# Patient Record
Sex: FEMALE | Race: WHITE | Employment: OTHER
[De-identification: names, ages, dates, MRNs, and addresses within clinical notes are randomized per-mention and may not be internally consistent; named-entity substitution may affect disease eponyms.]

---

## 2017-05-27 PROBLEM — R59.0 HILAR LYMPHADENOPATHY: Status: ACTIVE | Noted: 2017-05-27

## 2017-05-27 PROBLEM — R09.02 HYPOXIA: Status: ACTIVE | Noted: 2017-05-27

## 2017-05-27 PROBLEM — Z72.0 TOBACCO ABUSE: Status: ACTIVE | Noted: 2017-05-27

## 2017-05-27 PROBLEM — R91.1 LUNG NODULE: Status: ACTIVE | Noted: 2017-05-27

## 2017-06-09 ENCOUNTER — TELEPHONE (OUTPATIENT)
Dept: CASE MANAGEMENT | Age: 67
End: 2017-06-09

## 2017-07-14 ENCOUNTER — TELEPHONE (OUTPATIENT)
Dept: CASE MANAGEMENT | Age: 67
End: 2017-07-14

## 2018-03-30 PROBLEM — R59.0 HILAR LYMPHADENOPATHY: Status: RESOLVED | Noted: 2017-05-27 | Resolved: 2018-03-30

## 2018-03-30 PROBLEM — Z99.81 HYPOXEMIA REQUIRING SUPPLEMENTAL OXYGEN: Status: ACTIVE | Noted: 2018-03-30

## 2018-03-30 PROBLEM — Z72.0 TOBACCO ABUSE: Status: RESOLVED | Noted: 2017-05-27 | Resolved: 2018-03-30

## 2018-03-30 PROBLEM — C34.11 MALIGNANT NEOPLASM OF UPPER LOBE OF RIGHT LUNG (HCC): Status: ACTIVE | Noted: 2018-03-30

## 2018-03-30 PROBLEM — R09.02 HYPOXEMIA REQUIRING SUPPLEMENTAL OXYGEN: Status: ACTIVE | Noted: 2018-03-30

## 2018-03-30 PROBLEM — J44.9 COPD, SEVERE (HCC): Status: ACTIVE | Noted: 2018-03-30

## 2018-08-19 PROBLEM — Z92.3 STATUS POST RADIATION THERAPY: Status: ACTIVE | Noted: 2018-08-19

## 2018-08-19 PROBLEM — C34.31 MALIGNANT NEOPLASM OF LOWER LOBE OF RIGHT LUNG (HCC): Status: ACTIVE | Noted: 2018-08-19

## 2018-12-04 PROBLEM — R09.1: Status: ACTIVE | Noted: 2018-12-04

## 2020-01-15 ENCOUNTER — HOSPITAL ENCOUNTER (OUTPATIENT)
Dept: GENERAL RADIOLOGY | Age: 70
Discharge: HOME | End: 2020-01-17
Payer: MEDICARE

## 2020-01-15 PROCEDURE — 77080 DXA BONE DENSITY AXIAL: CPT

## 2021-05-23 PROBLEM — R91.1 PULMONARY NODULE, LEFT: Status: ACTIVE | Noted: 2021-05-23

## 2021-07-01 ENCOUNTER — HOSPITAL ENCOUNTER (OUTPATIENT)
Dept: CARDIAC REHAB | Age: 71
Setting detail: THERAPIES SERIES
Discharge: HOME | End: 2021-07-01
Payer: MEDICARE

## 2021-07-01 VITALS
BODY MASS INDEX: 26.15 KG/M2 | RESPIRATION RATE: 18 BRPM | OXYGEN SATURATION: 99 % | DIASTOLIC BLOOD PRESSURE: 62 MMHG | HEIGHT: 61 IN | HEART RATE: 71 BPM | WEIGHT: 138.5 LBS | SYSTOLIC BLOOD PRESSURE: 120 MMHG

## 2021-07-01 ASSESSMENT — PATIENT HEALTH QUESTIONNAIRE - PHQ9
SUM OF ALL RESPONSES TO PHQ QUESTIONS 1-9: 12
SUM OF ALL RESPONSES TO PHQ QUESTIONS 1-9: 12

## 2021-07-06 ENCOUNTER — HOSPITAL ENCOUNTER (OUTPATIENT)
Dept: CARDIAC REHAB | Age: 71
Setting detail: THERAPIES SERIES
Discharge: HOME | End: 2021-07-06
Payer: MEDICARE

## 2021-07-06 PROCEDURE — G0424 PULMONARY REHAB W EXER: HCPCS

## 2021-07-08 ENCOUNTER — HOSPITAL ENCOUNTER (OUTPATIENT)
Dept: CARDIAC REHAB | Age: 71
Setting detail: THERAPIES SERIES
Discharge: HOME | End: 2021-07-08
Payer: MEDICARE

## 2021-07-08 PROCEDURE — G0424 PULMONARY REHAB W EXER: HCPCS

## 2021-07-13 ENCOUNTER — HOSPITAL ENCOUNTER (OUTPATIENT)
Dept: CARDIAC REHAB | Age: 71
Setting detail: THERAPIES SERIES
Discharge: HOME | End: 2021-07-13
Payer: MEDICARE

## 2021-07-13 PROCEDURE — G0424 PULMONARY REHAB W EXER: HCPCS

## 2021-07-15 ENCOUNTER — HOSPITAL ENCOUNTER (OUTPATIENT)
Dept: CARDIAC REHAB | Age: 71
Setting detail: THERAPIES SERIES
Discharge: HOME | End: 2021-07-15
Payer: MEDICARE

## 2021-07-15 PROCEDURE — G0424 PULMONARY REHAB W EXER: HCPCS

## 2021-07-20 ENCOUNTER — HOSPITAL ENCOUNTER (OUTPATIENT)
Dept: CARDIAC REHAB | Age: 71
Setting detail: THERAPIES SERIES
Discharge: HOME | End: 2021-07-20
Payer: MEDICARE

## 2021-07-20 PROCEDURE — G0424 PULMONARY REHAB W EXER: HCPCS

## 2021-07-22 ENCOUNTER — HOSPITAL ENCOUNTER (OUTPATIENT)
Dept: CARDIAC REHAB | Age: 71
Setting detail: THERAPIES SERIES
Discharge: HOME | End: 2021-07-22
Payer: MEDICARE

## 2021-07-22 PROCEDURE — G0424 PULMONARY REHAB W EXER: HCPCS

## 2021-07-27 ENCOUNTER — HOSPITAL ENCOUNTER (OUTPATIENT)
Dept: CARDIAC REHAB | Age: 71
Setting detail: THERAPIES SERIES
Discharge: HOME | End: 2021-07-27
Payer: MEDICARE

## 2021-07-27 PROCEDURE — G0424 PULMONARY REHAB W EXER: HCPCS

## 2021-07-29 ENCOUNTER — HOSPITAL ENCOUNTER (OUTPATIENT)
Dept: CARDIAC REHAB | Age: 71
Setting detail: THERAPIES SERIES
Discharge: HOME | End: 2021-07-29
Payer: MEDICARE

## 2021-07-29 PROCEDURE — G0424 PULMONARY REHAB W EXER: HCPCS

## 2021-07-29 ASSESSMENT — PATIENT HEALTH QUESTIONNAIRE - PHQ9
SUM OF ALL RESPONSES TO PHQ QUESTIONS 1-9: 7
SUM OF ALL RESPONSES TO PHQ QUESTIONS 1-9: 7

## 2021-08-03 ENCOUNTER — HOSPITAL ENCOUNTER (OUTPATIENT)
Dept: CARDIAC REHAB | Age: 71
Setting detail: THERAPIES SERIES
Discharge: HOME | End: 2021-08-03
Payer: MEDICARE

## 2021-08-03 PROCEDURE — G0424 PULMONARY REHAB W EXER: HCPCS

## 2021-08-05 ENCOUNTER — HOSPITAL ENCOUNTER (OUTPATIENT)
Dept: CARDIAC REHAB | Age: 71
Setting detail: THERAPIES SERIES
Discharge: HOME | End: 2021-08-05
Payer: MEDICARE

## 2021-08-05 PROCEDURE — G0424 PULMONARY REHAB W EXER: HCPCS

## 2021-08-26 ENCOUNTER — HOSPITAL ENCOUNTER (OUTPATIENT)
Dept: CARDIAC REHAB | Age: 71
Setting detail: THERAPIES SERIES
Discharge: HOME | End: 2021-08-26
Payer: MEDICARE

## 2021-08-26 PROCEDURE — G0424 PULMONARY REHAB W EXER: HCPCS

## 2021-08-31 ENCOUNTER — HOSPITAL ENCOUNTER (OUTPATIENT)
Dept: CARDIAC REHAB | Age: 71
Setting detail: THERAPIES SERIES
Discharge: HOME | End: 2021-08-31
Payer: MEDICARE

## 2021-08-31 PROCEDURE — G0424 PULMONARY REHAB W EXER: HCPCS

## 2021-09-02 ENCOUNTER — HOSPITAL ENCOUNTER (OUTPATIENT)
Dept: CARDIAC REHAB | Age: 71
Setting detail: THERAPIES SERIES
Discharge: HOME | End: 2021-09-02
Payer: MEDICARE

## 2021-09-02 PROCEDURE — G0424 PULMONARY REHAB W EXER: HCPCS

## 2021-09-07 ENCOUNTER — HOSPITAL ENCOUNTER (OUTPATIENT)
Dept: CARDIAC REHAB | Age: 71
Setting detail: THERAPIES SERIES
Discharge: HOME | End: 2021-09-07
Payer: MEDICARE

## 2021-09-07 PROCEDURE — G0424 PULMONARY REHAB W EXER: HCPCS

## 2021-09-09 ENCOUNTER — HOSPITAL ENCOUNTER (OUTPATIENT)
Dept: CARDIAC REHAB | Age: 71
Setting detail: THERAPIES SERIES
Discharge: HOME | End: 2021-09-09
Payer: MEDICARE

## 2021-09-09 PROCEDURE — G0424 PULMONARY REHAB W EXER: HCPCS

## 2021-09-14 ENCOUNTER — HOSPITAL ENCOUNTER (OUTPATIENT)
Dept: CARDIAC REHAB | Age: 71
Setting detail: THERAPIES SERIES
Discharge: HOME | End: 2021-09-14
Payer: MEDICARE

## 2021-09-14 PROCEDURE — G0424 PULMONARY REHAB W EXER: HCPCS

## 2021-09-16 ENCOUNTER — HOSPITAL ENCOUNTER (OUTPATIENT)
Dept: CARDIAC REHAB | Age: 71
Setting detail: THERAPIES SERIES
Discharge: HOME | End: 2021-09-16
Payer: MEDICARE

## 2021-09-16 PROCEDURE — G0424 PULMONARY REHAB W EXER: HCPCS

## 2021-09-21 ENCOUNTER — HOSPITAL ENCOUNTER (OUTPATIENT)
Dept: CARDIAC REHAB | Age: 71
Setting detail: THERAPIES SERIES
Discharge: HOME | End: 2021-09-21
Payer: MEDICARE

## 2021-09-21 PROCEDURE — G0424 PULMONARY REHAB W EXER: HCPCS

## 2021-09-23 ENCOUNTER — HOSPITAL ENCOUNTER (OUTPATIENT)
Dept: CARDIAC REHAB | Age: 71
Setting detail: THERAPIES SERIES
Discharge: HOME | End: 2021-09-23
Payer: MEDICARE

## 2021-09-23 PROCEDURE — G0424 PULMONARY REHAB W EXER: HCPCS

## 2021-09-30 ENCOUNTER — HOSPITAL ENCOUNTER (OUTPATIENT)
Dept: CARDIAC REHAB | Age: 71
Setting detail: THERAPIES SERIES
Discharge: HOME | End: 2021-09-30
Payer: MEDICARE

## 2021-09-30 PROCEDURE — G0424 PULMONARY REHAB W EXER: HCPCS

## 2021-09-30 ASSESSMENT — PATIENT HEALTH QUESTIONNAIRE - PHQ9
SUM OF ALL RESPONSES TO PHQ QUESTIONS 1-9: 5
SUM OF ALL RESPONSES TO PHQ QUESTIONS 1-9: 5

## 2021-10-05 ENCOUNTER — HOSPITAL ENCOUNTER (OUTPATIENT)
Dept: CARDIAC REHAB | Age: 71
Setting detail: THERAPIES SERIES
Discharge: HOME | End: 2021-10-05
Payer: MEDICARE

## 2021-10-05 PROCEDURE — G0424 PULMONARY REHAB W EXER: HCPCS

## 2021-10-07 ENCOUNTER — HOSPITAL ENCOUNTER (OUTPATIENT)
Dept: CARDIAC REHAB | Age: 71
Setting detail: THERAPIES SERIES
Discharge: HOME | End: 2021-10-07
Payer: MEDICARE

## 2021-10-07 PROCEDURE — G0424 PULMONARY REHAB W EXER: HCPCS

## 2021-10-12 ENCOUNTER — HOSPITAL ENCOUNTER (OUTPATIENT)
Dept: CARDIAC REHAB | Age: 71
Setting detail: THERAPIES SERIES
Discharge: HOME | End: 2021-10-12
Payer: MEDICARE

## 2021-10-12 PROCEDURE — G0424 PULMONARY REHAB W EXER: HCPCS

## 2021-10-19 ENCOUNTER — HOSPITAL ENCOUNTER (OUTPATIENT)
Dept: CARDIAC REHAB | Age: 71
Setting detail: THERAPIES SERIES
Discharge: HOME | End: 2021-10-19
Payer: MEDICARE

## 2021-10-19 PROCEDURE — G0424 PULMONARY REHAB W EXER: HCPCS

## 2021-10-21 ENCOUNTER — HOSPITAL ENCOUNTER (OUTPATIENT)
Dept: CARDIAC REHAB | Age: 71
Setting detail: THERAPIES SERIES
Discharge: HOME | End: 2021-10-21
Payer: MEDICARE

## 2021-10-21 PROCEDURE — G0424 PULMONARY REHAB W EXER: HCPCS

## 2021-10-26 ENCOUNTER — HOSPITAL ENCOUNTER (OUTPATIENT)
Dept: CARDIAC REHAB | Age: 71
Setting detail: THERAPIES SERIES
Discharge: HOME | End: 2021-10-26
Payer: MEDICARE

## 2021-10-26 PROCEDURE — G0424 PULMONARY REHAB W EXER: HCPCS

## 2021-10-28 ENCOUNTER — HOSPITAL ENCOUNTER (OUTPATIENT)
Dept: CARDIAC REHAB | Age: 71
Setting detail: THERAPIES SERIES
Discharge: HOME | End: 2021-10-28
Payer: MEDICARE

## 2021-10-28 PROCEDURE — G0424 PULMONARY REHAB W EXER: HCPCS

## 2021-10-28 ASSESSMENT — PATIENT HEALTH QUESTIONNAIRE - PHQ9
SUM OF ALL RESPONSES TO PHQ QUESTIONS 1-9: 3
SUM OF ALL RESPONSES TO PHQ QUESTIONS 1-9: 3

## 2021-11-02 ENCOUNTER — HOSPITAL ENCOUNTER (OUTPATIENT)
Dept: CARDIAC REHAB | Age: 71
Setting detail: THERAPIES SERIES
Discharge: HOME | End: 2021-11-02
Payer: MEDICARE

## 2021-11-02 PROCEDURE — G0424 PULMONARY REHAB W EXER: HCPCS

## 2021-11-03 ENCOUNTER — TELEPHONE (OUTPATIENT)
Dept: CARDIAC REHAB | Age: 71
End: 2021-11-03

## 2021-11-03 NOTE — TELEPHONE ENCOUNTER
11/3/2021 9:14 am Nutrition: Attempted to reach patient on this date by phone regarding appointment for 11/4/2021 at 11:00 am. Line busy and unable to leave message. Will remain available.  Electronically signed by Ronel Byrne RD, LD on 11/3/21 at 9:15 AM EDT

## 2021-11-04 ENCOUNTER — HOSPITAL ENCOUNTER (OUTPATIENT)
Dept: CARDIAC REHAB | Age: 71
Setting detail: THERAPIES SERIES
Discharge: HOME | End: 2021-11-04
Payer: MEDICARE

## 2021-11-04 VITALS — HEIGHT: 61 IN | BODY MASS INDEX: 25.15 KG/M2 | WEIGHT: 133.2 LBS

## 2021-11-04 PROCEDURE — 93797 PHYS/QHP OP CAR RHAB WO ECG: CPT

## 2021-11-04 PROCEDURE — G0424 PULMONARY REHAB W EXER: HCPCS

## 2021-11-04 NOTE — PROGRESS NOTES
Outpatient Dietitian Assessment  11/4/2021    Estrella Damian 66 y.o. female    PCP:   Yariel Plascencia MD    Assessment:  Subjective data:Patient in for nutrition counseling. Patient reports she does not eat healthy and eats many sweets and salty foods; has decreased coffee consumption; had GI procedure yesterday ( camera inserted); has low iron stores; anemic and low Vitamin B 12 levels and on medications. Patient reports she has lost some upper fat stores but feels better in rehab and gaining muscle tone. Objective data:   Patient attest to negative for COVID-19 symptoms. Weight History:Patient reports highest recent weight 150 pounds and can lose 30 pounds and achieve weight 120 pounds if stops eating junk food. Risk explained. Patient verbalized understanding. Initial admit weight to rehab: 138 pounds in 7/1/2021. Patient has lost 5 pounds over 4 months indicating 3.6% weight loss. Weight loss beneficial. Discussed initial weight loss goal of 130 pounds and long term 128 pounds. Patient voiced she wants to develop muscle tone and not lose weight. Appearance:Patient appears slightly over weight ( had bulky sweater on  and difficulty to distinguish) and on oxygen     Comparative Standards:  Ideal Body Weight: 105 pounds +/-10%  Ideal Body Weight%: 126%  Resting Energy Expenditure(REE):1052  Estimated Total Calories: 1300- 1400 calories. Goal is for patient to eat more balanced meals. Weight loss not appropriate at this time. ( [place] with AF x 1.3)    Estimated Total Protein needs: 38 to 48 grams/day ( based on 0.8 to one gram/IBW)  Estimated Fluid needs: 1500- 1800 ml/day ( based on 25 to 30 ml/kg actual weight) * subject to change.      Pertinent Past Medical/Surgical History: COPD, Hypertension, Lung Cancer upper and lower lobe, lung biopsy, bronchoscopy, hypoxia, s/p radiation, history of fibrinous pleuritis, , left pulmonary nodule, history of skin biopsy, history of bulging cervical from Last 1 Encounters:   11/04/21 5' 1\" (1.549 m)     Wt Readings from Last 3 Encounters:   11/04/21 133 lb 3.2 oz (60.4 kg)   07/01/21 138 lb 8 oz (62.8 kg)   05/19/21 138 lb (62.6 kg)     Body mass index is 25.17 kg/m². Waist circumference:deferred  Body Fat %:deferred    Diagnosis:  Inadequate PO intake related to insufficient PO intake and pulmonary dysfunction as evident by patient comments and BMI 25.17. Intervention:    RD 1:1 initial assessment and education    Meet estimated needs. Maintain weight. Adequate hydration. Increased knowledge. Improved compliance. Follow individualized diet plan. Education provided:Medical Nutrition Therapy Heart Healthy Low Sodium, My Plate, Dietary Resolutions, Serving Size, MNT iron rich food and MNT Vitamin B 12 list of food, verbal education on effects of weight on breathing. Rate Your Plate not available. Interdisciplinary Treatment Plan (ITP) note: Patient is a phase II participant and ITP done. Monitoring/Evaluation:  RD will monitor PO intake and weight data as available. The goals set by the patient are to try to eat 3 balanced meals and 1 to 2 snacks to meet estimated needs, increase water consumption and decrease sweet and salty snack consumption. The patient will be followed on December 2, 2021. Clemencia Pike M.A.,R.D., L.D. Contact information: [phone].

## 2021-11-09 ENCOUNTER — HOSPITAL ENCOUNTER (OUTPATIENT)
Dept: CARDIAC REHAB | Age: 71
Setting detail: THERAPIES SERIES
Discharge: HOME | End: 2021-11-09
Payer: MEDICARE

## 2021-11-09 PROCEDURE — G0424 PULMONARY REHAB W EXER: HCPCS

## 2021-11-11 ENCOUNTER — HOSPITAL ENCOUNTER (OUTPATIENT)
Dept: CARDIAC REHAB | Age: 71
Setting detail: THERAPIES SERIES
Discharge: HOME | End: 2021-11-11
Payer: MEDICARE

## 2021-11-11 PROCEDURE — G0424 PULMONARY REHAB W EXER: HCPCS

## 2021-11-15 PROBLEM — I47.1 PAROXYSMAL SVT (SUPRAVENTRICULAR TACHYCARDIA) (HCC): Status: ACTIVE | Noted: 2021-11-15

## 2021-11-15 PROBLEM — I47.10 PAROXYSMAL SVT (SUPRAVENTRICULAR TACHYCARDIA): Status: ACTIVE | Noted: 2021-11-15

## 2021-11-16 ENCOUNTER — HOSPITAL ENCOUNTER (OUTPATIENT)
Dept: CARDIAC REHAB | Age: 71
Setting detail: THERAPIES SERIES
Discharge: HOME | End: 2021-11-16
Payer: MEDICARE

## 2021-11-16 PROCEDURE — G0424 PULMONARY REHAB W EXER: HCPCS

## 2021-11-18 ENCOUNTER — HOSPITAL ENCOUNTER (OUTPATIENT)
Dept: NON INVASIVE DIAGNOSTICS | Age: 71
Discharge: HOME | End: 2021-11-18
Payer: MEDICARE

## 2021-11-18 PROCEDURE — 93225 XTRNL ECG REC<48 HRS REC: CPT

## 2021-11-18 PROCEDURE — 93226 XTRNL ECG REC<48 HR SCAN A/R: CPT

## 2021-11-23 ENCOUNTER — HOSPITAL ENCOUNTER (OUTPATIENT)
Dept: CARDIAC REHAB | Age: 71
Setting detail: THERAPIES SERIES
Discharge: HOME | End: 2021-11-23
Payer: MEDICARE

## 2021-11-23 PROCEDURE — G0424 PULMONARY REHAB W EXER: HCPCS

## 2021-11-24 ENCOUNTER — HOSPITAL ENCOUNTER (OUTPATIENT)
Dept: CARDIAC REHAB | Age: 71
Setting detail: THERAPIES SERIES
End: 2021-11-24
Payer: MEDICARE

## 2021-11-30 ENCOUNTER — HOSPITAL ENCOUNTER (OUTPATIENT)
Dept: CARDIAC REHAB | Age: 71
Setting detail: THERAPIES SERIES
Discharge: HOME | End: 2021-11-30
Payer: MEDICARE

## 2021-11-30 PROCEDURE — G0424 PULMONARY REHAB W EXER: HCPCS

## 2021-12-01 ENCOUNTER — TELEPHONE (OUTPATIENT)
Dept: CARDIAC REHAB | Age: 71
End: 2021-12-01

## 2021-12-01 NOTE — TELEPHONE ENCOUNTER
12/1/2021 1605 Nutrition: Spoke with patient on this date by phone confirming appointment for 12/2/2021 at 11:30 am. Will remain available.  Electronically signed by Dami Ovalles RD, RAQUEL on 12/1/21 at 4:06 PM EST

## 2021-12-02 ENCOUNTER — HOSPITAL ENCOUNTER (OUTPATIENT)
Dept: CARDIAC REHAB | Age: 71
Setting detail: THERAPIES SERIES
Discharge: HOME | End: 2021-12-02
Payer: MEDICARE

## 2021-12-02 VITALS — WEIGHT: 131 LBS | HEIGHT: 61 IN | BODY MASS INDEX: 24.73 KG/M2

## 2021-12-02 PROCEDURE — G0424 PULMONARY REHAB W EXER: HCPCS

## 2021-12-02 ASSESSMENT — PATIENT HEALTH QUESTIONNAIRE - PHQ9
SUM OF ALL RESPONSES TO PHQ QUESTIONS 1-9: 7
SUM OF ALL RESPONSES TO PHQ QUESTIONS 1-9: 7

## 2021-12-02 NOTE — PROGRESS NOTES
Outpatient Dietitian Follow Up/Discharge   12/2/2021    Anjum Orr 66 y.o. female    PCP:   Janki Sanchez MD    Assessment:  Subjective data:Patient in for follow up visit. Patient reports stopped eating candy and cookies; struggles limiting sodium intake. but trying to eat healthier. Patient reports still struggling to improve iron levels and gets full quickly at times. Objective data:   Patient attest to negative for COVID-19 symptoms. Appearance: patient is slender frame build and wears oxygen. Goals reviewed. Patient is eating 3 meals per day. Patient prefers large salad with protein source ( chicken) and different fruits as dinner instead of multiple entrees for dinner. Goal is in progress and goals ongoing for discharge. Patient is not having snack before exercise. Risks explained. RD explained snacks available in department. Patient verbalized understanding. Goal not met but goal on going for discharge. Patient reports is drinking at least 6  glasses water per day. Goal in progress and goal on going for discharge. Patient is limiting sweets  but struggles with limiting salt due to  prepares salted items. Patient is trying different spices ( summer time- fresh spices and fall/winter - ground spices) Goal in progress and goal on going for discharge. Patient lost 2 pounds over one month indicating 1.5% weight loss. Weight loss beneficial. RD and patient reviewed initial weight loss goal of 130 pounds and long term weight loss goal 128 pounds. Goal in progress and goal on going for discharge.        24 hour Dietary Recall:    Breakfast: 8:30 am, at home,  1 large piece toast (equals 2 slices hearty white bread with butter) with 1 1/2 cups regular coffee ( plain)  AM snack: none  Lunch:  1:00 pm, at home, 1 bowl homemade wedding soup and 1 cup hot regular tea ( plain)  PM snack: none  Dinner: 6:00 pm, at home,  1/2 cup    homemade beef stew with 2 cups buttered noodles and 1 bowl salad ( mixed greens, carrots, dried cranberries, olives and baby croutons) with regular sweet and sour dressing,  1 cup hot regular tea ( plain)  Evening snack: none    Likes [employer], boxed [place]camore candy and other candies. Ht Readings from Last 1 Encounters:   12/02/21 5' 1\" (1.549 m)     Wt Readings from Last 3 Encounters:   12/02/21 131 lb (59.4 kg)   11/10/21 136 lb (61.7 kg)   11/04/21 133 lb 3.2 oz (60.4 kg)     Body mass index is 24.75 kg/m². Waist circumference:deferred  Body Fat %:deferred    Diagnosis: (reviewed)   Inadequate PO intake related to insufficient PO intake and pulmonary dysfunction as evident by patient comments and BMI 24.75. ( update: BMI decreased, BMI within normal limits and eating habits are improving)    Intervention:    RD 1:1 follow up and education    Continue to meet estimated needs. Continue to try to lose weight. Continued adequate hydration. Continued increased knowledge. Continued improved compliance. Continue to follow individualized diet plan. Education provided:Menu Planning, Label Reading and rate your plate directions. Rate Your Plate score pending. Interdisciplinary Treatment Plan (ITP) note: Patient is a phase II participant and ITP done. Monitoring/Evaluation:  The patient will be followed up per pt, PRN. Clemencia Wong MA, RDN, LD  Contact information: [phone].

## 2021-12-07 ENCOUNTER — HOSPITAL ENCOUNTER (OUTPATIENT)
Dept: CARDIAC REHAB | Age: 71
Setting detail: THERAPIES SERIES
Discharge: HOME | End: 2021-12-07
Payer: MEDICARE

## 2021-12-07 VITALS
SYSTOLIC BLOOD PRESSURE: 138 MMHG | OXYGEN SATURATION: 95 % | WEIGHT: 132 LBS | HEART RATE: 83 BPM | RESPIRATION RATE: 22 BRPM | DIASTOLIC BLOOD PRESSURE: 74 MMHG | HEIGHT: 62 IN | BODY MASS INDEX: 24.29 KG/M2

## 2021-12-07 PROCEDURE — G0424 PULMONARY REHAB W EXER: HCPCS

## 2021-12-07 ASSESSMENT — PATIENT HEALTH QUESTIONNAIRE - PHQ9
SUM OF ALL RESPONSES TO PHQ QUESTIONS 1-9: 6
SUM OF ALL RESPONSES TO PHQ QUESTIONS 1-9: 6

## 2021-12-09 ENCOUNTER — HOSPITAL ENCOUNTER (OUTPATIENT)
Dept: CARDIAC REHAB | Age: 71
Setting detail: THERAPIES SERIES
Discharge: HOME | End: 2021-12-09
Payer: MEDICARE

## 2021-12-09 PROCEDURE — G0424 PULMONARY REHAB W EXER: HCPCS

## 2021-12-14 ENCOUNTER — HOSPITAL ENCOUNTER (OUTPATIENT)
Dept: CARDIAC REHAB | Age: 71
Setting detail: THERAPIES SERIES
Discharge: HOME | End: 2021-12-14
Payer: MEDICARE

## 2021-12-14 PROCEDURE — G0424 PULMONARY REHAB W EXER: HCPCS

## 2021-12-21 ENCOUNTER — HOSPITAL ENCOUNTER (OUTPATIENT)
Dept: CARDIAC REHAB | Age: 71
Setting detail: THERAPIES SERIES
Discharge: HOME | End: 2021-12-21
Payer: MEDICARE

## 2021-12-21 PROCEDURE — G0424 PULMONARY REHAB W EXER: HCPCS

## 2021-12-23 ENCOUNTER — HOSPITAL ENCOUNTER (OUTPATIENT)
Dept: CARDIAC REHAB | Age: 71
Setting detail: THERAPIES SERIES
End: 2021-12-23
Payer: MEDICARE

## 2021-12-30 ENCOUNTER — HOSPITAL ENCOUNTER (OUTPATIENT)
Dept: CARDIAC REHAB | Age: 71
Setting detail: THERAPIES SERIES
Discharge: HOME | End: 2021-12-30
Payer: MEDICARE

## 2021-12-30 PROCEDURE — G0424 PULMONARY REHAB W EXER: HCPCS

## 2022-01-04 ENCOUNTER — HOSPITAL ENCOUNTER (OUTPATIENT)
Dept: CARDIAC REHAB | Age: 72
Setting detail: THERAPIES SERIES
Discharge: HOME | End: 2022-01-04
Payer: MEDICARE

## 2022-01-04 PROCEDURE — 94626 PHY/QHP OP PULM RHB W/MNTR: CPT

## 2022-01-04 ASSESSMENT — PATIENT HEALTH QUESTIONNAIRE - PHQ9
SUM OF ALL RESPONSES TO PHQ QUESTIONS 1-9: 6
SUM OF ALL RESPONSES TO PHQ QUESTIONS 1-9: 6

## 2022-01-06 ENCOUNTER — TELEPHONE (OUTPATIENT)
Dept: CARDIAC REHAB | Age: 72
End: 2022-01-06

## 2022-01-06 ENCOUNTER — HOSPITAL ENCOUNTER (OUTPATIENT)
Dept: CARDIAC REHAB | Age: 72
Setting detail: THERAPIES SERIES
Discharge: HOME | End: 2022-01-06
Payer: MEDICARE

## 2022-01-06 PROCEDURE — 94626 PHY/QHP OP PULM RHB W/MNTR: CPT

## 2022-01-06 PROCEDURE — G0237 THERAPEUTIC PROCD STRG ENDUR: HCPCS

## 2022-01-11 ENCOUNTER — HOSPITAL ENCOUNTER (OUTPATIENT)
Dept: CARDIAC REHAB | Age: 72
Setting detail: THERAPIES SERIES
Discharge: HOME | End: 2022-01-11
Payer: MEDICARE

## 2022-01-11 PROCEDURE — 94626 PHY/QHP OP PULM RHB W/MNTR: CPT

## 2022-01-20 ENCOUNTER — HOSPITAL ENCOUNTER (OUTPATIENT)
Dept: CARDIAC REHAB | Age: 72
Setting detail: THERAPIES SERIES
Discharge: HOME | End: 2022-01-20
Payer: MEDICARE

## 2022-01-20 PROCEDURE — 94626 PHY/QHP OP PULM RHB W/MNTR: CPT

## 2022-01-25 ENCOUNTER — HOSPITAL ENCOUNTER (OUTPATIENT)
Dept: CARDIAC REHAB | Age: 72
Setting detail: THERAPIES SERIES
Discharge: HOME | End: 2022-01-25
Payer: MEDICARE

## 2022-01-25 PROCEDURE — 94626 PHY/QHP OP PULM RHB W/MNTR: CPT

## 2022-01-27 ENCOUNTER — HOSPITAL ENCOUNTER (OUTPATIENT)
Dept: CARDIAC REHAB | Age: 72
Setting detail: THERAPIES SERIES
Discharge: HOME | End: 2022-01-27
Payer: MEDICARE

## 2022-01-27 PROCEDURE — 94626 PHY/QHP OP PULM RHB W/MNTR: CPT

## 2022-02-01 ENCOUNTER — HOSPITAL ENCOUNTER (OUTPATIENT)
Dept: CARDIAC REHAB | Age: 72
Setting detail: THERAPIES SERIES
Discharge: HOME | End: 2022-02-01
Payer: MEDICARE

## 2022-02-01 PROCEDURE — 94626 PHY/QHP OP PULM RHB W/MNTR: CPT

## 2022-02-01 ASSESSMENT — PATIENT HEALTH QUESTIONNAIRE - PHQ9
SUM OF ALL RESPONSES TO PHQ QUESTIONS 1-9: 3
SUM OF ALL RESPONSES TO PHQ QUESTIONS 1-9: 3

## 2022-02-03 ENCOUNTER — HOSPITAL ENCOUNTER (OUTPATIENT)
Dept: CARDIAC REHAB | Age: 72
Setting detail: THERAPIES SERIES
End: 2022-02-03
Payer: MEDICARE

## 2022-02-08 ENCOUNTER — HOSPITAL ENCOUNTER (OUTPATIENT)
Dept: CARDIAC REHAB | Age: 72
Setting detail: THERAPIES SERIES
Discharge: HOME | End: 2022-02-08
Payer: MEDICARE

## 2022-02-08 PROCEDURE — 94626 PHY/QHP OP PULM RHB W/MNTR: CPT

## 2022-02-10 ENCOUNTER — HOSPITAL ENCOUNTER (OUTPATIENT)
Dept: CARDIAC REHAB | Age: 72
Setting detail: THERAPIES SERIES
Discharge: HOME | End: 2022-02-10
Payer: MEDICARE

## 2022-02-10 PROCEDURE — 94626 PHY/QHP OP PULM RHB W/MNTR: CPT

## 2022-02-15 ENCOUNTER — HOSPITAL ENCOUNTER (OUTPATIENT)
Dept: CARDIAC REHAB | Age: 72
Setting detail: THERAPIES SERIES
Discharge: HOME | End: 2022-02-15
Payer: MEDICARE

## 2022-02-15 PROCEDURE — 94626 PHY/QHP OP PULM RHB W/MNTR: CPT

## 2022-02-17 ENCOUNTER — HOSPITAL ENCOUNTER (OUTPATIENT)
Dept: CARDIAC REHAB | Age: 72
Setting detail: THERAPIES SERIES
Discharge: HOME | End: 2022-02-17
Payer: MEDICARE

## 2022-02-17 PROCEDURE — 94626 PHY/QHP OP PULM RHB W/MNTR: CPT

## 2022-02-22 ENCOUNTER — HOSPITAL ENCOUNTER (OUTPATIENT)
Dept: CARDIAC REHAB | Age: 72
Setting detail: THERAPIES SERIES
End: 2022-02-22
Payer: MEDICARE

## 2022-02-24 ENCOUNTER — HOSPITAL ENCOUNTER (OUTPATIENT)
Dept: CARDIAC REHAB | Age: 72
Setting detail: THERAPIES SERIES
Discharge: HOME | End: 2022-02-24
Payer: MEDICARE

## 2022-02-24 PROCEDURE — 94626 PHY/QHP OP PULM RHB W/MNTR: CPT

## 2022-03-01 ENCOUNTER — HOSPITAL ENCOUNTER (OUTPATIENT)
Dept: CARDIAC REHAB | Age: 72
Setting detail: THERAPIES SERIES
Discharge: HOME | End: 2022-03-01
Payer: MEDICARE

## 2022-03-01 PROCEDURE — 94626 PHY/QHP OP PULM RHB W/MNTR: CPT

## 2022-03-01 ASSESSMENT — PATIENT HEALTH QUESTIONNAIRE - PHQ9
SUM OF ALL RESPONSES TO PHQ QUESTIONS 1-9: 0
10. IF YOU CHECKED OFF ANY PROBLEMS, HOW DIFFICULT HAVE THESE PROBLEMS MADE IT FOR YOU TO DO YOUR WORK, TAKE CARE OF THINGS AT HOME, OR GET ALONG WITH OTHER PEOPLE: 1
9. THOUGHTS THAT YOU WOULD BE BETTER OFF DEAD, OR OF HURTING YOURSELF: 0
SUM OF ALL RESPONSES TO PHQ QUESTIONS 1-9: 0
SUM OF ALL RESPONSES TO PHQ9 QUESTIONS 1 & 2: 0
4. FEELING TIRED OR HAVING LITTLE ENERGY: 0
6. FEELING BAD ABOUT YOURSELF - OR THAT YOU ARE A FAILURE OR HAVE LET YOURSELF OR YOUR FAMILY DOWN: 0
SUM OF ALL RESPONSES TO PHQ QUESTIONS 1-9: 0
8. MOVING OR SPEAKING SO SLOWLY THAT OTHER PEOPLE COULD HAVE NOTICED. OR THE OPPOSITE, BEING SO FIGETY OR RESTLESS THAT YOU HAVE BEEN MOVING AROUND A LOT MORE THAN USUAL: 0
5. POOR APPETITE OR OVEREATING: 0
3. TROUBLE FALLING OR STAYING ASLEEP: 0
2. FEELING DOWN, DEPRESSED OR HOPELESS: 0
SUM OF ALL RESPONSES TO PHQ QUESTIONS 1-9: 0
1. LITTLE INTEREST OR PLEASURE IN DOING THINGS: 0
7. TROUBLE CONCENTRATING ON THINGS, SUCH AS READING THE NEWSPAPER OR WATCHING TELEVISION: 0

## 2022-03-03 ENCOUNTER — HOSPITAL ENCOUNTER (OUTPATIENT)
Dept: CARDIAC REHAB | Age: 72
Setting detail: THERAPIES SERIES
End: 2022-03-03
Payer: MEDICARE

## 2022-03-08 ENCOUNTER — HOSPITAL ENCOUNTER (OUTPATIENT)
Dept: CARDIAC REHAB | Age: 72
Setting detail: THERAPIES SERIES
Discharge: HOME | End: 2022-03-08
Payer: MEDICARE

## 2022-03-08 PROCEDURE — 94626 PHY/QHP OP PULM RHB W/MNTR: CPT

## 2022-03-10 ENCOUNTER — HOSPITAL ENCOUNTER (OUTPATIENT)
Dept: CARDIAC REHAB | Age: 72
Setting detail: THERAPIES SERIES
Discharge: HOME | End: 2022-03-10
Payer: MEDICARE

## 2022-03-10 PROCEDURE — 94626 PHY/QHP OP PULM RHB W/MNTR: CPT

## 2022-03-15 ENCOUNTER — HOSPITAL ENCOUNTER (OUTPATIENT)
Dept: CARDIAC REHAB | Age: 72
Setting detail: THERAPIES SERIES
Discharge: HOME | End: 2022-03-15
Payer: MEDICARE

## 2022-03-15 PROCEDURE — 94626 PHY/QHP OP PULM RHB W/MNTR: CPT

## 2022-03-17 ENCOUNTER — HOSPITAL ENCOUNTER (OUTPATIENT)
Dept: CARDIAC REHAB | Age: 72
Setting detail: THERAPIES SERIES
Discharge: HOME | End: 2022-03-17
Payer: MEDICARE

## 2022-03-17 PROCEDURE — 94626 PHY/QHP OP PULM RHB W/MNTR: CPT

## 2022-03-22 ENCOUNTER — HOSPITAL ENCOUNTER (OUTPATIENT)
Dept: CARDIAC REHAB | Age: 72
Setting detail: THERAPIES SERIES
Discharge: HOME | End: 2022-03-22
Payer: MEDICARE

## 2022-03-22 PROCEDURE — 94626 PHY/QHP OP PULM RHB W/MNTR: CPT

## 2022-03-24 ENCOUNTER — HOSPITAL ENCOUNTER (OUTPATIENT)
Dept: CARDIAC REHAB | Age: 72
Setting detail: THERAPIES SERIES
Discharge: HOME | End: 2022-03-24
Payer: MEDICARE

## 2022-03-24 PROCEDURE — 94626 PHY/QHP OP PULM RHB W/MNTR: CPT

## 2022-03-29 ENCOUNTER — HOSPITAL ENCOUNTER (OUTPATIENT)
Dept: CARDIAC REHAB | Age: 72
Setting detail: THERAPIES SERIES
Discharge: HOME | End: 2022-03-29
Payer: MEDICARE

## 2022-03-29 PROCEDURE — 94626 PHY/QHP OP PULM RHB W/MNTR: CPT

## 2022-03-29 ASSESSMENT — PATIENT HEALTH QUESTIONNAIRE - PHQ9
4. FEELING TIRED OR HAVING LITTLE ENERGY: 1
3. TROUBLE FALLING OR STAYING ASLEEP: 0
SUM OF ALL RESPONSES TO PHQ QUESTIONS 1-9: 3
6. FEELING BAD ABOUT YOURSELF - OR THAT YOU ARE A FAILURE OR HAVE LET YOURSELF OR YOUR FAMILY DOWN: 1
SUM OF ALL RESPONSES TO PHQ QUESTIONS 1-9: 3
10. IF YOU CHECKED OFF ANY PROBLEMS, HOW DIFFICULT HAVE THESE PROBLEMS MADE IT FOR YOU TO DO YOUR WORK, TAKE CARE OF THINGS AT HOME, OR GET ALONG WITH OTHER PEOPLE: 0
8. MOVING OR SPEAKING SO SLOWLY THAT OTHER PEOPLE COULD HAVE NOTICED. OR THE OPPOSITE, BEING SO FIGETY OR RESTLESS THAT YOU HAVE BEEN MOVING AROUND A LOT MORE THAN USUAL: 0
1. LITTLE INTEREST OR PLEASURE IN DOING THINGS: 0
7. TROUBLE CONCENTRATING ON THINGS, SUCH AS READING THE NEWSPAPER OR WATCHING TELEVISION: 0
SUM OF ALL RESPONSES TO PHQ QUESTIONS 1-9: 3
9. THOUGHTS THAT YOU WOULD BE BETTER OFF DEAD, OR OF HURTING YOURSELF: 0
SUM OF ALL RESPONSES TO PHQ QUESTIONS 1-9: 3
SUM OF ALL RESPONSES TO PHQ9 QUESTIONS 1 & 2: 1
5. POOR APPETITE OR OVEREATING: 0
2. FEELING DOWN, DEPRESSED OR HOPELESS: 1

## 2022-03-31 ENCOUNTER — HOSPITAL ENCOUNTER (OUTPATIENT)
Dept: CARDIAC REHAB | Age: 72
Setting detail: THERAPIES SERIES
Discharge: HOME | End: 2022-03-31
Payer: MEDICARE

## 2022-03-31 PROCEDURE — 94626 PHY/QHP OP PULM RHB W/MNTR: CPT

## 2022-04-07 ENCOUNTER — HOSPITAL ENCOUNTER (OUTPATIENT)
Dept: CARDIAC REHAB | Age: 72
Setting detail: THERAPIES SERIES
End: 2022-04-07
Payer: MEDICARE

## 2022-04-12 ENCOUNTER — HOSPITAL ENCOUNTER (OUTPATIENT)
Dept: CARDIAC REHAB | Age: 72
Setting detail: THERAPIES SERIES
Discharge: HOME | End: 2022-04-12
Payer: MEDICARE

## 2022-04-12 PROCEDURE — 94626 PHY/QHP OP PULM RHB W/MNTR: CPT

## 2022-04-14 ENCOUNTER — HOSPITAL ENCOUNTER (OUTPATIENT)
Dept: CARDIAC REHAB | Age: 72
Setting detail: THERAPIES SERIES
Discharge: HOME | End: 2022-04-14
Payer: MEDICARE

## 2022-04-14 PROCEDURE — 94626 PHY/QHP OP PULM RHB W/MNTR: CPT

## 2022-04-19 ENCOUNTER — HOSPITAL ENCOUNTER (OUTPATIENT)
Dept: CARDIAC REHAB | Age: 72
Setting detail: THERAPIES SERIES
Discharge: HOME | End: 2022-04-19
Payer: MEDICARE

## 2022-04-19 PROCEDURE — 94626 PHY/QHP OP PULM RHB W/MNTR: CPT

## 2022-04-21 ENCOUNTER — HOSPITAL ENCOUNTER (OUTPATIENT)
Dept: CARDIAC REHAB | Age: 72
Setting detail: THERAPIES SERIES
Discharge: HOME | End: 2022-04-21
Payer: MEDICARE

## 2022-04-21 PROCEDURE — 94626 PHY/QHP OP PULM RHB W/MNTR: CPT

## 2022-04-26 ENCOUNTER — HOSPITAL ENCOUNTER (OUTPATIENT)
Dept: CARDIAC REHAB | Age: 72
Setting detail: THERAPIES SERIES
Discharge: HOME | End: 2022-04-26
Payer: MEDICARE

## 2022-04-26 PROCEDURE — 94626 PHY/QHP OP PULM RHB W/MNTR: CPT

## 2022-04-26 ASSESSMENT — PATIENT HEALTH QUESTIONNAIRE - PHQ9
5. POOR APPETITE OR OVEREATING: 1
7. TROUBLE CONCENTRATING ON THINGS, SUCH AS READING THE NEWSPAPER OR WATCHING TELEVISION: 0
3. TROUBLE FALLING OR STAYING ASLEEP: 0
9. THOUGHTS THAT YOU WOULD BE BETTER OFF DEAD, OR OF HURTING YOURSELF: 0
SUM OF ALL RESPONSES TO PHQ QUESTIONS 1-9: 2
SUM OF ALL RESPONSES TO PHQ9 QUESTIONS 1 & 2: 0
8. MOVING OR SPEAKING SO SLOWLY THAT OTHER PEOPLE COULD HAVE NOTICED. OR THE OPPOSITE, BEING SO FIGETY OR RESTLESS THAT YOU HAVE BEEN MOVING AROUND A LOT MORE THAN USUAL: 0
SUM OF ALL RESPONSES TO PHQ QUESTIONS 1-9: 2
10. IF YOU CHECKED OFF ANY PROBLEMS, HOW DIFFICULT HAVE THESE PROBLEMS MADE IT FOR YOU TO DO YOUR WORK, TAKE CARE OF THINGS AT HOME, OR GET ALONG WITH OTHER PEOPLE: 0
6. FEELING BAD ABOUT YOURSELF - OR THAT YOU ARE A FAILURE OR HAVE LET YOURSELF OR YOUR FAMILY DOWN: 0
SUM OF ALL RESPONSES TO PHQ QUESTIONS 1-9: 2
2. FEELING DOWN, DEPRESSED OR HOPELESS: 0
4. FEELING TIRED OR HAVING LITTLE ENERGY: 1
1. LITTLE INTEREST OR PLEASURE IN DOING THINGS: 0
SUM OF ALL RESPONSES TO PHQ QUESTIONS 1-9: 2

## 2022-04-28 ENCOUNTER — HOSPITAL ENCOUNTER (OUTPATIENT)
Dept: CARDIAC REHAB | Age: 72
Setting detail: THERAPIES SERIES
Discharge: HOME | End: 2022-04-28
Payer: MEDICARE

## 2022-04-28 PROCEDURE — 94626 PHY/QHP OP PULM RHB W/MNTR: CPT

## 2022-05-03 ENCOUNTER — HOSPITAL ENCOUNTER (OUTPATIENT)
Dept: CARDIAC REHAB | Age: 72
Setting detail: THERAPIES SERIES
End: 2022-05-03
Payer: MEDICARE

## 2022-05-05 ENCOUNTER — HOSPITAL ENCOUNTER (OUTPATIENT)
Dept: CARDIAC REHAB | Age: 72
Setting detail: THERAPIES SERIES
Discharge: HOME | End: 2022-05-05
Payer: MEDICARE

## 2022-05-05 PROCEDURE — 94626 PHY/QHP OP PULM RHB W/MNTR: CPT

## 2022-05-10 ENCOUNTER — HOSPITAL ENCOUNTER (OUTPATIENT)
Dept: CARDIAC REHAB | Age: 72
Setting detail: THERAPIES SERIES
Discharge: HOME | End: 2022-05-10
Payer: MEDICARE

## 2022-05-10 PROCEDURE — 94626 PHY/QHP OP PULM RHB W/MNTR: CPT

## 2022-05-12 ENCOUNTER — HOSPITAL ENCOUNTER (OUTPATIENT)
Dept: CARDIAC REHAB | Age: 72
Setting detail: THERAPIES SERIES
Discharge: HOME | End: 2022-05-12
Payer: MEDICARE

## 2022-05-12 PROCEDURE — 94626 PHY/QHP OP PULM RHB W/MNTR: CPT

## 2022-05-17 ENCOUNTER — HOSPITAL ENCOUNTER (OUTPATIENT)
Dept: CARDIAC REHAB | Age: 72
Setting detail: THERAPIES SERIES
Discharge: HOME | End: 2022-05-17
Payer: MEDICARE

## 2022-05-17 VITALS
BODY MASS INDEX: 24.14 KG/M2 | HEIGHT: 62 IN | DIASTOLIC BLOOD PRESSURE: 70 MMHG | RESPIRATION RATE: 18 BRPM | HEART RATE: 85 BPM | OXYGEN SATURATION: 90 % | SYSTOLIC BLOOD PRESSURE: 134 MMHG

## 2022-05-17 PROCEDURE — 94626 PHY/QHP OP PULM RHB W/MNTR: CPT

## 2022-05-17 ASSESSMENT — PATIENT HEALTH QUESTIONNAIRE - PHQ9
SUM OF ALL RESPONSES TO PHQ QUESTIONS 1-9: 3
9. THOUGHTS THAT YOU WOULD BE BETTER OFF DEAD, OR OF HURTING YOURSELF: 0
SUM OF ALL RESPONSES TO PHQ9 QUESTIONS 1 & 2: 1
6. FEELING BAD ABOUT YOURSELF - OR THAT YOU ARE A FAILURE OR HAVE LET YOURSELF OR YOUR FAMILY DOWN: 0
2. FEELING DOWN, DEPRESSED OR HOPELESS: 1
SUM OF ALL RESPONSES TO PHQ QUESTIONS 1-9: 3
10. IF YOU CHECKED OFF ANY PROBLEMS, HOW DIFFICULT HAVE THESE PROBLEMS MADE IT FOR YOU TO DO YOUR WORK, TAKE CARE OF THINGS AT HOME, OR GET ALONG WITH OTHER PEOPLE: 0
5. POOR APPETITE OR OVEREATING: 1
7. TROUBLE CONCENTRATING ON THINGS, SUCH AS READING THE NEWSPAPER OR WATCHING TELEVISION: 0
1. LITTLE INTEREST OR PLEASURE IN DOING THINGS: 0
3. TROUBLE FALLING OR STAYING ASLEEP: 1
4. FEELING TIRED OR HAVING LITTLE ENERGY: 0
8. MOVING OR SPEAKING SO SLOWLY THAT OTHER PEOPLE COULD HAVE NOTICED. OR THE OPPOSITE, BEING SO FIGETY OR RESTLESS THAT YOU HAVE BEEN MOVING AROUND A LOT MORE THAN USUAL: 0
SUM OF ALL RESPONSES TO PHQ QUESTIONS 1-9: 3
SUM OF ALL RESPONSES TO PHQ QUESTIONS 1-9: 3

## 2022-05-17 NOTE — PROGRESS NOTES
[address] [facility]  [phone]    Upon completion of your initial Phase II portion of cardiopulmonary rehabilitation, you will need to continue to exercise to maintain your present level of fitness obtained during Phase II to prevent any complications that may occur with lack of activity. This home exercise program is designed to help you achieve your maximum fitness level based on your accomplishments in Phase II. Following are some guidelines to help you exercise safely and effectively. Make exercise part of your daily routine  Do not exercise if you are not feeling well  Wear comfortable shoes and clothes made for exercise  Do not engage in vigorous activities for at least one hour after a heavy meal  Do not drink alcoholic or caffeinated beverages two hours before exercising  Avoid smoking one hour before and right after exercising, try to quit ASAP  Plan to exercise in a climate-controlled environment. Do not exercise outside if it's hotter than 80F, colder than 30F or greater than 75% humidity, and/or very windy  After exercise, take a brief warm shower. Avoid extremely hot or cold water  When exercising outdoors consider:  Letting someone know that you are exercising outdoors. If possible, exercise with someone  Have a plan of action of what to do if you do not feel well during exercise  Carry your identification; especially if you are exercising alone  Wear colors that are easily seen  When exercising in cold weather:  Dress to maintain body heat, but avoid overheating  Dress in layers  Wear a hat and gloves to prevent heat loss  A mask or scarf over your nose and mouth will warm the air as you breathe. Also, inhale through your nose  During your exercise program, if you experience any of the following symptoms STOP!!!  Chest tightness, discomfort, or pain.  If chest pain persists after two minutes, take nitroglycerine pills if prescribed as instructed, make sure you are seated. Light headedness or dizziness. Wait a maximum of two minutes and if there is no relief or an increase in symptoms, STOP and contact your physician IMMEDIATELY! Unusual shortness of breath  Nausea or vomiting   Leg pain or weakness  Palpitations  Irregular heartbeat  Pale, cool, clammy skin    Pulmonary Patients:   Make sure to use your inhaler before exercise and remember to carry your inhaler with you always. Remember to check your oxygen tank before exercising to be sure that you have enough. If you have pulse ox monitor take it with you as well. To get the most out of your home exercise program, you will need to follow the recommended guidelines set up by the staff at the [place] [id number]. These guidelines are based on your present conditions. Based on your age, medication and ejection fraction (% of blood pumped from the heart), your personal target heart rate range is  bpm.   (this is what you have been working between -- this is working at your [employer] under [employer] of [address]). Frequency: According to ACSM guidelines, it is recommended to exercise most days of the week, maintaining 150 minutes or more of low to moderate intensity. This can be done in combination of aerobic (exercise increasing the cardiovascular system by exchanging oxygen) and dynamic (exercise involving movement of the joints to increase strength and stability).        Intensity:  Low                                               Moderate                                                       High      Modalities Level METs   Arm ergometer (Level 1; 20 Hilton)  Level 8; 38 Hilton 4.2 METs    Nu-Step (Level 1; 10 Hilton)   Level 4; 28 Hilton 1.9 METs   Recumbent Bike (Level 1; 10 Hilton)   Level 4; 34 Hilton    Treadmill (0.8 mph) 1.6 METs                Heart Rate Conversions     10 second count Beats/Minute 10 Second Count Beats/Minute   10 60 21 126    1 66 22 132   12 72 23 138   13 78 24 144   14 84 25 150   15 90 26 156   16 96 27 162   17 102 28 168   18 108 29 174   19 114 30 180   20 120 31 186       Rate of Perceived Dyspnea   0 Nothing at all    .5 Extremely Slight   1 Very Slight   2 Slight   3 Moderate    4 Somewhat Severe   5 Severe   6    7 Very Severe   8    9 Extremely Severe    10 Maximal        Aye's Rate of Perceived Exertion   6 No Exertion   7 Extremely Light   8    9 Very Light   10    11 Light   12    13 Somewhat Hard   14    15 Hard (Heavy)   16    17 Very Hard   18    19 Extremely Hard   20 Maximal Exertion                          **RPD scale or rate of perceived dyspnea scale is a tool that can be used to determine how breathless you may feel while at rest or during exertion. It is common for your breathing to get heavier with exercise. Your RPD should not exceed a 5 while exercising. **    ** RPE scale or rate of perceived exertion is a tool that can be used to determine how hard you are exerting yourself during daily activities or working out. You should take caution when you feel that you are working at a 12 or higher and you should never work at a rate of 17 or higher. **    Comments: Patient Completed a Pre/Post 6MWT. Patient did well with her second round of Pulmonary Rehab and showed significant progressions. Patient plans to continue with our phase III pulmonary rehab.               Approximate Energy Requirements of Selected Activities  Category Self or Home Care Occupational Recreational Physical Conditioning   VERY LIGHT  < 3 METS  <10mL/kg/min  <4kcals    1 MET= sitting and doing nothing Washing, shaving, dressing  Washing dishes  Driving automobile  Dusting, ironing  Shopping  Using hand tools  Kneading dough  Riding power mower    Sitting (clerical, assembling)  Standing (store clerking, tending bar)  Repairing radios and TVs  Desk work   Operating cranes Shuffleboard, darts  Horseshoes  Bait casting  Billiards, bowling  [employer], [employer] (cart)  Abby Pancake playing  Card playing  [address] riding (walk)  Kayaking, rowing, canoeing (2.5 mph)   Walking (level at 2 mph)  Bicycling (5 mph)  Very light calisthenics  Range of motion exercising    LIGHT  3-5 METs   11-18mL/kg/min  4-6kcals Cleaning windows  Raking leaves  Weeding  Waxing floors (slowly)  Painting  Carrying objects (15-30 lbs)  Gardening, hoeing  Power saw  Pushing  Stocking shelves (light to medium weight)  Moderate walking   Light carpentry  Machine assembly   Auto repair Plastering/drywall  Putting in a sidewalk  Hitching trailers  [address] work  Qian  Interior  Repair/remodeling  Plowing/operating   Tractor  Driving trailer truck [address] (social)  Golfing (walking)  Sailing  Horseback riding (trot)  Volleyball (6 person)  [employer]  Table tennis  Softball (noncompetitive)  Ice boating  Taylor, rowing, canoeing (3 mph) Walking (3-4 mph)  Bicycling (5 mph)  Light calisthenics   Light swimming  Stair climbing (moderate)  Moderate calisthenics    MODERATE  5-7 METs  18-25mL/kg/min  6-8kcals Easy digging in garden  Push mowing (level)   Carrying objects (30-60lbs)  Splitting wood Carpentry (exterior home building)  Shoveling dirt  Using pneumatic tools Badminton (competitive)  Tennis (singles)   Downhill skiing (light)  Backpacking (5 lbs)  Ice or roller skating  Horseback riding (post trot)  Softball (competitive)  Cross-country hiking   [employer], rowing, canoeing (4 mph) Walking (4-5 mph)  Bicycling (9-10 mph)  Swimming (breast stroke)   HEAVY-Not Recommended  7-9 METs  25-32 mL/kg/min  8-10kcals Sawing wood  Heavy shoveling   Climbing a ladder  Carrying heavy objects (60-90lbs)  Carrying 20lbs upstairs Hand sawing hardwood  Tending furnace  Digging ditches  Using pick and shovel  Using sledge hammer  Moving and pushing desks and filing cabinets  Laying railroad track [employer] climbing   [employer] riding (gallop)  [place] shoeing (3mph)  Sledding, tobogganing  Ice hockey  Paddle ball  Touch football  Kayaking, rowing, canoeing (5mph)  Basketball (noncompetitive) Downhill skiing (vigorous)  Cross-country skiing (4 mph) Heavy calisthenics   Jogging (5mph)  Swimming (crawl)  Using rower machine  Walking (6mph)  Bicycling (11-12mph)  Stair climbing (fast)   VERY HEAVY-Not Recommended  >9 METs  >32 mL/kg/min  >10kcals Carrying Objects (>90lb)  Shoveling heavy snow  Shoveling 16lbs x10/min Logging   Heavy labor   Handball  Squash  Cross-country skiing  Basketball (competitive) Running (>6mph)  Bicycling (>13mph) or up a steep hill  Jumping rope

## 2022-06-08 ENCOUNTER — HOSPITAL ENCOUNTER (OUTPATIENT)
Dept: CARDIAC REHAB | Age: 72
Discharge: HOME | End: 2022-06-08

## 2022-06-08 PROCEDURE — 9900000038 HC CARDIAC REHAB PHASE 3 - MONTHLY

## 2022-07-13 ENCOUNTER — HOSPITAL ENCOUNTER (OUTPATIENT)
Dept: CARDIAC REHAB | Age: 72
Discharge: HOME | End: 2022-07-13

## 2022-07-13 PROCEDURE — 9900000038 HC CARDIAC REHAB PHASE 3 - MONTHLY

## 2022-08-10 ENCOUNTER — HOSPITAL ENCOUNTER (OUTPATIENT)
Dept: CARDIAC REHAB | Age: 72
Discharge: HOME | End: 2022-08-10

## 2022-08-10 PROCEDURE — 9900000038 HC CARDIAC REHAB PHASE 3 - MONTHLY

## 2022-12-03 PROBLEM — S22.32XA CLOSED FRACTURE OF ONE RIB OF LEFT SIDE: Status: ACTIVE | Noted: 2022-12-03

## 2022-12-03 PROBLEM — U09.9 POST COVID-19 CONDITION, UNSPECIFIED: Status: ACTIVE | Noted: 2022-12-03

## 2023-06-02 PROBLEM — F43.9 STRESS AT HOME: Status: ACTIVE | Noted: 2023-06-02

## 2023-11-02 NOTE — PROGRESS NOTES
SPOKE WITH DR. FREITAS ABOUT COPD,O2 USE, LUNG CA. DR INFANTE'S NOTES ARE IN Epic. MAY PROCEED WITH SURGERY.

## 2023-11-02 NOTE — PROGRESS NOTES
[facility] PRE-ADMISSION TESTING INSTRUCTIONS    The Preadmission Testing patient is instructed accordingly using the following criteria (check applicable):    ARRIVAL INSTRUCTIONS:  [x] Parking the day of Surgery is located in the Main Entrance lot. Upon entering the door, make an immediate right to the surgery reception desk    [x] Bring photo ID and insurance card    [] Bring in a copy of Living will or Durable Power of  papers. [x] Please be sure to arrange transportation to and from the hospital    [x] Please arrange for someone to be with you the remainder of the day due to having anesthesia      GENERAL INSTRUCTIONS:    [x] Nothing by mouth after midnight, including gum, candy, mints or water    [x] You may brush your teeth, but do not swallow any water    [x] Take medications as instructed with 1-2 oz of water    [x] Stop herbal supplements and vitamins 5 days prior to procedure    [x] Follow preop dosing of blood thinners per physician instructions    [] Do not take insulin or oral diabetic medications    [] If diabetic and have low blood sugar or feel symptomatic, take 1-2oz apple juice or glucose tablets    [] Bring inhalers day of surgery    [] Bring C-PAP/ Bi-Pap day of surgery    [] Bring urine specimen day of surgery    [x] Antibacterial Soap shower or bath AM of Surgery, no lotion, powders or creams to surgical site    [] Follow bowel prep as instructed per surgeon    [x] No tobacco products within 24 hours of surgery     [x] No alcohol or illegal drug use within 24 hours of surgery.     [x] Jewelry, body piercing's, eyeglasses, contact lenses and dentures are not permitted into surgery (bring cases)      [] Please do not wear any nail polish or make up on the day of surgery    [] If not already done, you can expect a call from registration    [x] If surgeon requests a time change you will be notified the day prior to surgery    [] If you receive a survey after

## 2023-11-06 ENCOUNTER — ANESTHESIA EVENT (OUTPATIENT)
Dept: OPERATING ROOM | Age: 73
End: 2023-11-06
Payer: MEDICARE

## 2023-11-06 RX ORDER — ONDANSETRON 2 MG/ML
4 INJECTION INTRAMUSCULAR; INTRAVENOUS EVERY 6 HOURS PRN
Status: CANCELLED | OUTPATIENT
Start: 2023-11-06

## 2023-11-06 RX ORDER — BRIMONIDINE TARTRATE 2 MG/ML
1 SOLUTION/ DROPS OPHTHALMIC 3 TIMES DAILY
Status: CANCELLED | OUTPATIENT
Start: 2023-11-06

## 2023-11-06 RX ORDER — PREDNISOLONE ACETATE 10 MG/ML
1 SUSPENSION/ DROPS OPHTHALMIC
Status: CANCELLED | OUTPATIENT
Start: 2023-11-07

## 2023-11-06 RX ORDER — ONDANSETRON 4 MG/1
4 TABLET, ORALLY DISINTEGRATING ORAL EVERY 8 HOURS PRN
Status: CANCELLED | OUTPATIENT
Start: 2023-11-06

## 2023-11-06 RX ORDER — CIPROFLOXACIN HYDROCHLORIDE 3.5 MG/ML
1 SOLUTION/ DROPS TOPICAL EVERY 6 HOURS SCHEDULED
Status: CANCELLED | OUTPATIENT
Start: 2023-11-07

## 2023-11-07 ENCOUNTER — ANESTHESIA (OUTPATIENT)
Dept: OPERATING ROOM | Age: 73
End: 2023-11-07
Payer: MEDICARE

## 2023-11-07 ENCOUNTER — HOSPITAL ENCOUNTER (OUTPATIENT)
Age: 73
Setting detail: OUTPATIENT SURGERY
Discharge: HOME | End: 2023-11-07
Attending: STUDENT IN AN ORGANIZED HEALTH CARE EDUCATION/TRAINING PROGRAM | Admitting: STUDENT IN AN ORGANIZED HEALTH CARE EDUCATION/TRAINING PROGRAM
Payer: MEDICARE

## 2023-11-07 VITALS
OXYGEN SATURATION: 98 % | HEIGHT: 62 IN | BODY MASS INDEX: 21.35 KG/M2 | SYSTOLIC BLOOD PRESSURE: 123 MMHG | TEMPERATURE: 98 F | DIASTOLIC BLOOD PRESSURE: 57 MMHG | WEIGHT: 116 LBS | RESPIRATION RATE: 16 BRPM | HEART RATE: 100 BPM

## 2023-11-07 PROCEDURE — 2580000003 HC RX 258: Performed by: NURSE ANESTHETIST, CERTIFIED REGISTERED

## 2023-11-07 PROCEDURE — 2709999900 HC NON-CHARGEABLE SUPPLY: Performed by: STUDENT IN AN ORGANIZED HEALTH CARE EDUCATION/TRAINING PROGRAM

## 2023-11-07 PROCEDURE — 6370000000 HC RX 637 (ALT 250 FOR IP)

## 2023-11-07 PROCEDURE — 2500000003 HC RX 250 WO HCPCS: Performed by: STUDENT IN AN ORGANIZED HEALTH CARE EDUCATION/TRAINING PROGRAM

## 2023-11-07 PROCEDURE — V2632 POST CHMBR INTRAOCULAR LENS: HCPCS | Performed by: STUDENT IN AN ORGANIZED HEALTH CARE EDUCATION/TRAINING PROGRAM

## 2023-11-07 PROCEDURE — 3700000000 HC ANESTHESIA ATTENDED CARE: Performed by: STUDENT IN AN ORGANIZED HEALTH CARE EDUCATION/TRAINING PROGRAM

## 2023-11-07 PROCEDURE — 6360000002 HC RX W HCPCS: Performed by: STUDENT IN AN ORGANIZED HEALTH CARE EDUCATION/TRAINING PROGRAM

## 2023-11-07 PROCEDURE — 6370000000 HC RX 637 (ALT 250 FOR IP): Performed by: STUDENT IN AN ORGANIZED HEALTH CARE EDUCATION/TRAINING PROGRAM

## 2023-11-07 PROCEDURE — 3600000013 HC SURGERY LEVEL 3 ADDTL 15MIN: Performed by: STUDENT IN AN ORGANIZED HEALTH CARE EDUCATION/TRAINING PROGRAM

## 2023-11-07 PROCEDURE — 7100000011 HC PHASE II RECOVERY - ADDTL 15 MIN: Performed by: STUDENT IN AN ORGANIZED HEALTH CARE EDUCATION/TRAINING PROGRAM

## 2023-11-07 PROCEDURE — 3700000001 HC ADD 15 MINUTES (ANESTHESIA): Performed by: STUDENT IN AN ORGANIZED HEALTH CARE EDUCATION/TRAINING PROGRAM

## 2023-11-07 PROCEDURE — 7100000010 HC PHASE II RECOVERY - FIRST 15 MIN: Performed by: STUDENT IN AN ORGANIZED HEALTH CARE EDUCATION/TRAINING PROGRAM

## 2023-11-07 PROCEDURE — 3600000003 HC SURGERY LEVEL 3 BASE: Performed by: STUDENT IN AN ORGANIZED HEALTH CARE EDUCATION/TRAINING PROGRAM

## 2023-11-07 DEVICE — LENS CLAREON IOL 21.0D: Type: IMPLANTABLE DEVICE | Site: EYE | Status: FUNCTIONAL

## 2023-11-07 RX ORDER — TROPICAMIDE 10 MG/ML
1 SOLUTION/ DROPS OPHTHALMIC
Status: COMPLETED | OUTPATIENT
Start: 2023-11-07 | End: 2023-11-07

## 2023-11-07 RX ORDER — SODIUM CHLORIDE 9 MG/ML
INJECTION, SOLUTION INTRAVENOUS CONTINUOUS PRN
Status: DISCONTINUED | OUTPATIENT
Start: 2023-11-07 | End: 2023-11-07 | Stop reason: SDUPTHER

## 2023-11-07 RX ORDER — TETRACAINE HYDROCHLORIDE 5 MG/ML
1 SOLUTION OPHTHALMIC
Status: COMPLETED | OUTPATIENT
Start: 2023-11-07 | End: 2023-11-07

## 2023-11-07 RX ORDER — PHENYLEPHRINE HYDROCHLORIDE 25 MG/ML
1 SOLUTION/ DROPS OPHTHALMIC
Status: COMPLETED | OUTPATIENT
Start: 2023-11-07 | End: 2023-11-07

## 2023-11-07 RX ORDER — TETRACAINE HYDROCHLORIDE 5 MG/ML
SOLUTION OPHTHALMIC PRN
Status: DISCONTINUED | OUTPATIENT
Start: 2023-11-07 | End: 2023-11-07 | Stop reason: HOSPADM

## 2023-11-07 RX ORDER — CIPROFLOXACIN HYDROCHLORIDE 3.5 MG/ML
SOLUTION/ DROPS TOPICAL PRN
Status: DISCONTINUED | OUTPATIENT
Start: 2023-11-07 | End: 2023-11-07 | Stop reason: HOSPADM

## 2023-11-07 RX ORDER — CYCLOPENTOLATE HYDROCHLORIDE 10 MG/ML
1 SOLUTION/ DROPS OPHTHALMIC
Status: COMPLETED | OUTPATIENT
Start: 2023-11-07 | End: 2023-11-07

## 2023-11-07 RX ORDER — BRIMONIDINE TARTRATE 2 MG/ML
SOLUTION/ DROPS OPHTHALMIC PRN
Status: DISCONTINUED | OUTPATIENT
Start: 2023-11-07 | End: 2023-11-07 | Stop reason: HOSPADM

## 2023-11-07 RX ORDER — KETOROLAC TROMETHAMINE 5 MG/ML
1 SOLUTION OPHTHALMIC
Status: COMPLETED | OUTPATIENT
Start: 2023-11-07 | End: 2023-11-07

## 2023-11-07 RX ORDER — PREDNISOLONE ACETATE 10 MG/ML
SUSPENSION/ DROPS OPHTHALMIC PRN
Status: DISCONTINUED | OUTPATIENT
Start: 2023-11-07 | End: 2023-11-07 | Stop reason: HOSPADM

## 2023-11-07 RX ORDER — CEFAZOLIN SODIUM 1 G/3ML
INJECTION, POWDER, FOR SOLUTION INTRAMUSCULAR; INTRAVENOUS PRN
Status: DISCONTINUED | OUTPATIENT
Start: 2023-11-07 | End: 2023-11-07 | Stop reason: HOSPADM

## 2023-11-07 RX ORDER — SODIUM CHLORIDE, SODIUM LACTATE, POTASSIUM CHLORIDE, CALCIUM CHLORIDE 600; 310; 30; 20 MG/100ML; MG/100ML; MG/100ML; MG/100ML
INJECTION, SOLUTION INTRAVENOUS CONTINUOUS
Status: DISCONTINUED | OUTPATIENT
Start: 2023-11-07 | End: 2023-11-07 | Stop reason: HOSPADM

## 2023-11-07 RX ORDER — CIPROFLOXACIN HYDROCHLORIDE 3.5 MG/ML
1 SOLUTION/ DROPS TOPICAL
Status: COMPLETED | OUTPATIENT
Start: 2023-11-07 | End: 2023-11-07

## 2023-11-07 RX ADMIN — Medication 1 DROP: at 07:20

## 2023-11-07 RX ADMIN — TETRACAINE HYDROCHLORIDE 1 DROP: 5 SOLUTION OPHTHALMIC at 06:58

## 2023-11-07 RX ADMIN — Medication 1 DROP: at 07:39

## 2023-11-07 RX ADMIN — CIPROFLOXACIN 1 DROP: 3 SOLUTION OPHTHALMIC at 07:20

## 2023-11-07 RX ADMIN — KETOROLAC TROMETHAMINE 1 DROP: 5 SOLUTION OPHTHALMIC at 07:20

## 2023-11-07 RX ADMIN — Medication 1 DROP: at 06:58

## 2023-11-07 RX ADMIN — KETOROLAC TROMETHAMINE 1 DROP: 5 SOLUTION OPHTHALMIC at 07:39

## 2023-11-07 RX ADMIN — CIPROFLOXACIN 1 DROP: 3 SOLUTION OPHTHALMIC at 07:11

## 2023-11-07 RX ADMIN — TETRACAINE HYDROCHLORIDE 1 DROP: 5 SOLUTION OPHTHALMIC at 07:20

## 2023-11-07 RX ADMIN — SODIUM CHLORIDE: 9 INJECTION, SOLUTION INTRAVENOUS at 08:09

## 2023-11-07 RX ADMIN — KETOROLAC TROMETHAMINE 1 DROP: 5 SOLUTION OPHTHALMIC at 06:58

## 2023-11-07 RX ADMIN — PHENYLEPHRINE HYDROCHLORIDE 1 DROP: 25 SOLUTION/ DROPS OPHTHALMIC at 07:39

## 2023-11-07 RX ADMIN — TETRACAINE HYDROCHLORIDE 1 DROP: 5 SOLUTION OPHTHALMIC at 07:11

## 2023-11-07 RX ADMIN — Medication 1 DROP: at 07:10

## 2023-11-07 RX ADMIN — CIPROFLOXACIN 1 DROP: 3 SOLUTION OPHTHALMIC at 07:39

## 2023-11-07 RX ADMIN — PHENYLEPHRINE HYDROCHLORIDE 1 DROP: 25 SOLUTION/ DROPS OPHTHALMIC at 07:10

## 2023-11-07 RX ADMIN — PHENYLEPHRINE HYDROCHLORIDE 1 DROP: 25 SOLUTION/ DROPS OPHTHALMIC at 06:58

## 2023-11-07 RX ADMIN — PHENYLEPHRINE HYDROCHLORIDE 1 DROP: 25 SOLUTION/ DROPS OPHTHALMIC at 07:20

## 2023-11-07 RX ADMIN — KETOROLAC TROMETHAMINE 1 DROP: 5 SOLUTION OPHTHALMIC at 07:10

## 2023-11-07 RX ADMIN — CIPROFLOXACIN 1 DROP: 3 SOLUTION OPHTHALMIC at 06:58

## 2023-11-07 ASSESSMENT — COPD QUESTIONNAIRES: CAT_SEVERITY: SEVERE

## 2023-11-07 ASSESSMENT — PAIN SCALES - GENERAL
PAINLEVEL_OUTOF10: 0
PAINLEVEL_OUTOF10: 0

## 2023-11-07 ASSESSMENT — ENCOUNTER SYMPTOMS: DYSPNEA ACTIVITY LEVEL: NO INTERVAL CHANGE

## 2023-11-07 ASSESSMENT — PAIN - FUNCTIONAL ASSESSMENT: PAIN_FUNCTIONAL_ASSESSMENT: 0-10

## 2023-11-07 NOTE — ANESTHESIA POSTPROCEDURE EVALUATION
Department of Anesthesiology  Postprocedure Note    Patient: Lucrecia Valdez  MRN: [id number]  YOB: 1950  Date of evaluation: 11/7/2023      Procedure Summary     Date: 11/07/23 Room / Location: [facility] OR 08 / [facility]    Anesthesia Start: 1360 Anesthesia Stop: 9003    Procedure: RIGHT EYE CATARACT EXTRACTION IOL IMPLANT (Right: Eye) Diagnosis:       Cataract of right eye, unspecified cataract type      (Cataract of right eye, unspecified cataract type [H26.9])    Surgeons: Nirmal Valentine MD Responsible Provider: Kendal Koch DO    Anesthesia Type: MAC ASA Status: 4          Anesthesia Type: MAC    Ling Phase I: Ling Score: 9    Ling Phase II:        Anesthesia Post Evaluation    Patient location during evaluation: PACU  Patient participation: complete - patient participated  Level of consciousness: awake and alert  Pain score: 0  Airway patency: patent  Nausea & Vomiting: no nausea and no vomiting  Complications: no  Cardiovascular status: hemodynamically stable  Respiratory status: acceptable  Hydration status: stable  Pain management: adequate

## 2023-11-07 NOTE — DISCHARGE INSTRUCTIONS
1. PLEASE READ AFTER CATARACT INSTRUCTIONS FROM DR. MCKEON. 2.  LEAVE THE EYE PATCH ON TILL TOMORROW . DR. MCKEON WILL REMOVE THE PATCH IN THE MORNING. 3.  FOLLOW UP IN DR MCKEON OFFICE TOMORROW AT 8:30 AM.  SHE WILL GIVE YOU ALL YOUR EYE DROPS AND INSTRUCTIONS AT THAT TIME. 4.  FOLLOW POST ANESTHESIA DISCHARGE INSTRUCTIONS PROVIDED. Marco Antonio Parker. Diane Ordonez M.D. Diplomate American Board of Ophthalmology    [address] [place], [address]  Phone: [phone]  Fax: [phone]  After Cataract Surgery Patient Directions       A recently operated eye must be protected from injury and infection since it is healing. You must always scrub your hands well before applying any medications, treating or touching about the eye; this will lessen the chance of introducing infection. Some REDNESS is expected, as you will note when the dressing is removed; this redness should gradually lessen. Mild irritation is expected. It may feel like an eyelash irritating the eye. DEEP ACHING pain is NOT expected. Please call me immediately if you have any problems at office or through medical dental [phone]    A mucus discharge will sometimes be found on the lashes or at the corners of the eye; this is normal in modest amounts. Use a clean tissue directly from the box (not a purse or pocket) or a clean washcloth to gently wipe around the eye to remove any mucus from the eye or lashes. If this discharge should become excessive and yellow (pus-like), notify me immediately. If any eye, eyelid or orbital swelling becomes prominent notify me. Your vision may be blurred for a period of time postoperatively while the eye is healing. The vision should not dramatically worsen at any time. Notify me if there is any significant change. If any other eye changes occur that seem out of the ordinary please contact our office.     You should not

## 2023-11-07 NOTE — OP NOTE
Operative Note      Patient: Allegra Montano  YOB: 1950  MRN: [id number]    Date of Procedure: 11/7/2023    Pre-Op Diagnosis Codes:     * Cataract of right eye, unspecified cataract type [H26.9]    Post-Op Diagnosis:  Combined Cataract Right Eye       Procedure(s):  RIGHT EYE CATARACT EXTRACTION IOL IMPLANT    Surgeon(s):  Chichi Burns MD    Assistant:   * No surgical staff found *    Anesthesia: Monitor Anesthesia Care    Estimated Blood Loss (mL): Minimal    Complications: None    Specimens:   * No specimens in log *    Implants:  Implant Name Type Inv. Item Serial No.  Lot No. LRB No. Used Action   LENS CLAREON IOL 21.0D - [id number]  [address] [place] [address],[address] Msb 21.0D [id number] [employer]-  Right 1 Implanted         Drains: * No LDAs found *    Findings: As above      Detailed Description of Procedure: The patient was identified in the preoperative area where consent was obtained and the surgical eye indicated by the patient was appropriately marked. Proper eyedrops were instilled per nursing orders. The patient was taken to the operating room suite where leads and monitors were placed. The patient confirmed the operative eye in the presence of the surgeon and circulator and the intraocular lens was confirmed. The eye was then cleaned and draped in the usual fasion for intraocular surgery. A lid speculum was placed. A 15 degree blade was used to make a paracentesis. Preservative free Shugarcaine was placed for additional anesthesia. Viscoat was injected into the anterior chamber. A 2.75mm keratome was used to construct the main wound. A bent cystitome was used to start a continuous curvilinear capsulorrhexis, which was then completed using Utrata forceps. The capsule was removed from the eye. BSS on a weber hydrodissection cannula was used to hydrodissect the lens. The lens was rotated to ensure it was freely mobile.   The phacoemulsification canula

## 2023-12-01 RX ORDER — M-VIT,TX,IRON,MINS/CALC/FOLIC 27MG-0.4MG
1 TABLET ORAL DAILY
COMMUNITY

## 2023-12-01 NOTE — PROGRESS NOTES
DR. Ximena Negro NOTIFIED OF COPD AND CONT. O2 USE. SHE IS SOB WITH EXERTION. NO FURTHER WORKUP NEEDED.

## 2023-12-01 NOTE — PROGRESS NOTES
[employer] PRE-ADMISSION TESTING INSTRUCTIONS    The Preadmission Testing patient is instructed accordingly using the following criteria (check applicable):    ARRIVAL INSTRUCTIONS:  [x] Parking the day of Surgery is located in the Main Entrance lot. Upon entering the door, make an immediate right to the surgery reception desk    [x] Bring photo ID and insurance card    [] Bring in a copy of Living will or Durable Power of  papers. [x] Please be sure to arrange transportation to and from the hospital    [x] Please arrange for someone to be with you the remainder of the day due to having anesthesia      GENERAL INSTRUCTIONS:    [x] Nothing by mouth after midnight, including gum, candy, mints or water    [x] You may brush your teeth, but do not swallow any water    [x] Take medications as instructed with 1-2 oz of water    [x] Stop herbal supplements and vitamins 5 days prior to procedure    [x] Follow preop dosing of blood thinners per physician instructions    [] Do not take insulin or oral diabetic medications    [] If diabetic and have low blood sugar or feel symptomatic, take 1-2oz apple juice or glucose tablets    [] Bring inhalers day of surgery    [] Bring C-PAP/ Bi-Pap day of surgery    [] Bring urine specimen day of surgery    [x] Antibacterial Soap shower or bath AM of Surgery, no lotion, powders or creams to surgical site    [] Follow bowel prep as instructed per surgeon    [x] No tobacco products within 24 hours of surgery     [x] No alcohol or illegal drug use within 24 hours of surgery.     [x] Jewelry, body piercing's, eyeglasses, contact lenses and dentures are not permitted into surgery (bring cases)      [] Please do not wear any nail polish or make up on the day of surgery    [] If not already done, you can expect a call from registration    [x] If surgeon requests a time change you will be notified the day prior to surgery    [] If you receive a survey after

## 2023-12-02 PROBLEM — Z91.89 PULMONARY NODULE LESS THAN 1 CM IN DIAMETER WITH MODERATE TO HIGH RISK FOR MALIGNANT NEOPLASM: Status: ACTIVE | Noted: 2021-05-23

## 2023-12-02 PROBLEM — Z23 IMMUNIZATION DUE: Status: ACTIVE | Noted: 2023-12-02

## 2023-12-02 PROBLEM — J96.11 CHRONIC RESPIRATORY FAILURE WITH HYPOXIA AND HYPERCAPNIA (HCC): Status: ACTIVE | Noted: 2023-12-02

## 2023-12-02 PROBLEM — J96.12 CHRONIC RESPIRATORY FAILURE WITH HYPOXIA AND HYPERCAPNIA (HCC): Status: ACTIVE | Noted: 2023-12-02

## 2023-12-04 RX ORDER — ONDANSETRON 2 MG/ML
4 INJECTION INTRAMUSCULAR; INTRAVENOUS EVERY 6 HOURS PRN
Status: CANCELLED | OUTPATIENT
Start: 2023-12-04

## 2023-12-04 RX ORDER — ONDANSETRON 4 MG/1
4 TABLET, ORALLY DISINTEGRATING ORAL EVERY 8 HOURS PRN
Status: CANCELLED | OUTPATIENT
Start: 2023-12-04

## 2023-12-04 RX ORDER — BRIMONIDINE TARTRATE 2 MG/ML
1 SOLUTION/ DROPS OPHTHALMIC 3 TIMES DAILY
Status: CANCELLED | OUTPATIENT
Start: 2023-12-04

## 2023-12-04 RX ORDER — KETOROLAC TROMETHAMINE 5 MG/ML
1 SOLUTION OPHTHALMIC 4 TIMES DAILY
Status: CANCELLED | OUTPATIENT
Start: 2023-12-04

## 2023-12-04 RX ORDER — PREDNISOLONE ACETATE 10 MG/ML
1 SUSPENSION/ DROPS OPHTHALMIC EVERY 6 HOURS SCHEDULED
Status: CANCELLED | OUTPATIENT
Start: 2023-12-05

## 2023-12-05 ENCOUNTER — ANESTHESIA (OUTPATIENT)
Dept: OPERATING ROOM | Age: 73
End: 2023-12-05
Payer: MEDICARE

## 2023-12-05 ENCOUNTER — ANESTHESIA EVENT (OUTPATIENT)
Dept: OPERATING ROOM | Age: 73
End: 2023-12-05
Payer: MEDICARE

## 2023-12-05 ENCOUNTER — HOSPITAL ENCOUNTER (OUTPATIENT)
Age: 73
Setting detail: OUTPATIENT SURGERY
Discharge: HOME | End: 2023-12-05
Attending: STUDENT IN AN ORGANIZED HEALTH CARE EDUCATION/TRAINING PROGRAM | Admitting: STUDENT IN AN ORGANIZED HEALTH CARE EDUCATION/TRAINING PROGRAM
Payer: MEDICARE

## 2023-12-05 VITALS
WEIGHT: 150 LBS | OXYGEN SATURATION: 96 % | BODY MASS INDEX: 27.6 KG/M2 | RESPIRATION RATE: 22 BRPM | SYSTOLIC BLOOD PRESSURE: 131 MMHG | DIASTOLIC BLOOD PRESSURE: 58 MMHG | HEART RATE: 82 BPM | TEMPERATURE: 97 F | HEIGHT: 62 IN

## 2023-12-05 PROCEDURE — 7100000011 HC PHASE II RECOVERY - ADDTL 15 MIN: Performed by: STUDENT IN AN ORGANIZED HEALTH CARE EDUCATION/TRAINING PROGRAM

## 2023-12-05 PROCEDURE — 3700000001 HC ADD 15 MINUTES (ANESTHESIA): Performed by: STUDENT IN AN ORGANIZED HEALTH CARE EDUCATION/TRAINING PROGRAM

## 2023-12-05 PROCEDURE — 2709999900 HC NON-CHARGEABLE SUPPLY: Performed by: STUDENT IN AN ORGANIZED HEALTH CARE EDUCATION/TRAINING PROGRAM

## 2023-12-05 PROCEDURE — 6370000000 HC RX 637 (ALT 250 FOR IP): Performed by: STUDENT IN AN ORGANIZED HEALTH CARE EDUCATION/TRAINING PROGRAM

## 2023-12-05 PROCEDURE — V2632 POST CHMBR INTRAOCULAR LENS: HCPCS | Performed by: STUDENT IN AN ORGANIZED HEALTH CARE EDUCATION/TRAINING PROGRAM

## 2023-12-05 PROCEDURE — 3600000002 HC SURGERY LEVEL 2 BASE: Performed by: STUDENT IN AN ORGANIZED HEALTH CARE EDUCATION/TRAINING PROGRAM

## 2023-12-05 PROCEDURE — 3600000012 HC SURGERY LEVEL 2 ADDTL 15MIN: Performed by: STUDENT IN AN ORGANIZED HEALTH CARE EDUCATION/TRAINING PROGRAM

## 2023-12-05 PROCEDURE — 7100000010 HC PHASE II RECOVERY - FIRST 15 MIN: Performed by: STUDENT IN AN ORGANIZED HEALTH CARE EDUCATION/TRAINING PROGRAM

## 2023-12-05 PROCEDURE — 6360000002 HC RX W HCPCS: Performed by: STUDENT IN AN ORGANIZED HEALTH CARE EDUCATION/TRAINING PROGRAM

## 2023-12-05 PROCEDURE — 3700000000 HC ANESTHESIA ATTENDED CARE: Performed by: STUDENT IN AN ORGANIZED HEALTH CARE EDUCATION/TRAINING PROGRAM

## 2023-12-05 PROCEDURE — 6370000000 HC RX 637 (ALT 250 FOR IP)

## 2023-12-05 PROCEDURE — 2500000003 HC RX 250 WO HCPCS: Performed by: STUDENT IN AN ORGANIZED HEALTH CARE EDUCATION/TRAINING PROGRAM

## 2023-12-05 PROCEDURE — 2580000003 HC RX 258: Performed by: NURSE ANESTHETIST, CERTIFIED REGISTERED

## 2023-12-05 DEVICE — LENS CLAREON IOL 21.0D: Type: IMPLANTABLE DEVICE | Site: EYE | Status: FUNCTIONAL

## 2023-12-05 RX ORDER — IPRATROPIUM BROMIDE AND ALBUTEROL SULFATE 2.5; .5 MG/3ML; MG/3ML
1 SOLUTION RESPIRATORY (INHALATION)
Status: CANCELLED | OUTPATIENT
Start: 2023-12-05 | End: 2023-12-06

## 2023-12-05 RX ORDER — PHENYLEPHRINE HYDROCHLORIDE 25 MG/ML
1 SOLUTION/ DROPS OPHTHALMIC
Status: COMPLETED | OUTPATIENT
Start: 2023-12-05 | End: 2023-12-05

## 2023-12-05 RX ORDER — SODIUM CHLORIDE 9 MG/ML
INJECTION, SOLUTION INTRAVENOUS PRN
Status: CANCELLED | OUTPATIENT
Start: 2023-12-05

## 2023-12-05 RX ORDER — CEFAZOLIN SODIUM 1 G/3ML
INJECTION, POWDER, FOR SOLUTION INTRAMUSCULAR; INTRAVENOUS PRN
Status: DISCONTINUED | OUTPATIENT
Start: 2023-12-05 | End: 2023-12-05 | Stop reason: ALTCHOICE

## 2023-12-05 RX ORDER — SODIUM CHLORIDE 0.9 % (FLUSH) 0.9 %
5-40 SYRINGE (ML) INJECTION PRN
Status: CANCELLED | OUTPATIENT
Start: 2023-12-05

## 2023-12-05 RX ORDER — HYDRALAZINE HYDROCHLORIDE 20 MG/ML
10 INJECTION INTRAMUSCULAR; INTRAVENOUS
Status: CANCELLED | OUTPATIENT
Start: 2023-12-05

## 2023-12-05 RX ORDER — ONDANSETRON 2 MG/ML
4 INJECTION INTRAMUSCULAR; INTRAVENOUS
Status: CANCELLED | OUTPATIENT
Start: 2023-12-05 | End: 2023-12-06

## 2023-12-05 RX ORDER — SODIUM CHLORIDE 9 MG/ML
INJECTION, SOLUTION INTRAVENOUS CONTINUOUS PRN
Status: DISCONTINUED | OUTPATIENT
Start: 2023-12-05 | End: 2023-12-05 | Stop reason: SDUPTHER

## 2023-12-05 RX ORDER — PREDNISOLONE ACETATE 10 MG/ML
SUSPENSION/ DROPS OPHTHALMIC PRN
Status: DISCONTINUED | OUTPATIENT
Start: 2023-12-05 | End: 2023-12-05 | Stop reason: ALTCHOICE

## 2023-12-05 RX ORDER — SODIUM CHLORIDE, SODIUM LACTATE, POTASSIUM CHLORIDE, CALCIUM CHLORIDE 600; 310; 30; 20 MG/100ML; MG/100ML; MG/100ML; MG/100ML
INJECTION, SOLUTION INTRAVENOUS CONTINUOUS
Status: DISCONTINUED | OUTPATIENT
Start: 2023-12-05 | End: 2023-12-05 | Stop reason: HOSPADM

## 2023-12-05 RX ORDER — CIPROFLOXACIN HYDROCHLORIDE 3.5 MG/ML
1 SOLUTION/ DROPS TOPICAL
Status: COMPLETED | OUTPATIENT
Start: 2023-12-05 | End: 2023-12-05

## 2023-12-05 RX ORDER — TROPICAMIDE 10 MG/ML
1 SOLUTION/ DROPS OPHTHALMIC
Status: COMPLETED | OUTPATIENT
Start: 2023-12-05 | End: 2023-12-05

## 2023-12-05 RX ORDER — LABETALOL HYDROCHLORIDE 5 MG/ML
10 INJECTION, SOLUTION INTRAVENOUS
Status: CANCELLED | OUTPATIENT
Start: 2023-12-05

## 2023-12-05 RX ORDER — CIPROFLOXACIN HYDROCHLORIDE 3.5 MG/ML
SOLUTION/ DROPS TOPICAL PRN
Status: DISCONTINUED | OUTPATIENT
Start: 2023-12-05 | End: 2023-12-05 | Stop reason: ALTCHOICE

## 2023-12-05 RX ORDER — KETOROLAC TROMETHAMINE 5 MG/ML
1 SOLUTION OPHTHALMIC
Status: COMPLETED | OUTPATIENT
Start: 2023-12-05 | End: 2023-12-05

## 2023-12-05 RX ORDER — MIDAZOLAM HYDROCHLORIDE 2 MG/2ML
2 INJECTION, SOLUTION INTRAMUSCULAR; INTRAVENOUS
Status: CANCELLED | OUTPATIENT
Start: 2023-12-05 | End: 2023-12-06

## 2023-12-05 RX ORDER — PROPARACAINE HYDROCHLORIDE 5 MG/ML
1 SOLUTION/ DROPS OPHTHALMIC
Status: COMPLETED | OUTPATIENT
Start: 2023-12-05 | End: 2023-12-05

## 2023-12-05 RX ORDER — SODIUM CHLORIDE 0.9 % (FLUSH) 0.9 %
5-40 SYRINGE (ML) INJECTION EVERY 12 HOURS SCHEDULED
Status: CANCELLED | OUTPATIENT
Start: 2023-12-05

## 2023-12-05 RX ORDER — MEPERIDINE HYDROCHLORIDE 25 MG/ML
12.5 INJECTION INTRAMUSCULAR; INTRAVENOUS; SUBCUTANEOUS EVERY 5 MIN PRN
Status: CANCELLED | OUTPATIENT
Start: 2023-12-05

## 2023-12-05 RX ORDER — CYCLOPENTOLATE HYDROCHLORIDE 10 MG/ML
1 SOLUTION/ DROPS OPHTHALMIC
Status: COMPLETED | OUTPATIENT
Start: 2023-12-05 | End: 2023-12-05

## 2023-12-05 RX ADMIN — CIPROFLOXACIN 1 DROP: 3 SOLUTION OPHTHALMIC at 06:56

## 2023-12-05 RX ADMIN — Medication 1 DROP: at 07:10

## 2023-12-05 RX ADMIN — PHENYLEPHRINE HYDROCHLORIDE 1 DROP: 25 SOLUTION/ DROPS OPHTHALMIC at 07:10

## 2023-12-05 RX ADMIN — PHENYLEPHRINE HYDROCHLORIDE 1 DROP: 25 SOLUTION/ DROPS OPHTHALMIC at 06:56

## 2023-12-05 RX ADMIN — CIPROFLOXACIN 1 DROP: 3 SOLUTION OPHTHALMIC at 07:05

## 2023-12-05 RX ADMIN — Medication 1 DROP: at 06:55

## 2023-12-05 RX ADMIN — KETOROLAC TROMETHAMINE 1 DROP: 5 SOLUTION OPHTHALMIC at 06:55

## 2023-12-05 RX ADMIN — Medication 1 DROP: at 07:05

## 2023-12-05 RX ADMIN — SODIUM CHLORIDE: 9 INJECTION, SOLUTION INTRAVENOUS at 08:04

## 2023-12-05 RX ADMIN — Medication 1 DROP: at 07:24

## 2023-12-05 RX ADMIN — KETOROLAC TROMETHAMINE 1 DROP: 5 SOLUTION OPHTHALMIC at 07:24

## 2023-12-05 RX ADMIN — KETOROLAC TROMETHAMINE 1 DROP: 5 SOLUTION OPHTHALMIC at 07:10

## 2023-12-05 RX ADMIN — PHENYLEPHRINE HYDROCHLORIDE 1 DROP: 25 SOLUTION/ DROPS OPHTHALMIC at 07:24

## 2023-12-05 RX ADMIN — KETOROLAC TROMETHAMINE 1 DROP: 5 SOLUTION OPHTHALMIC at 07:05

## 2023-12-05 RX ADMIN — CIPROFLOXACIN 1 DROP: 3 SOLUTION OPHTHALMIC at 07:23

## 2023-12-05 RX ADMIN — PHENYLEPHRINE HYDROCHLORIDE 1 DROP: 25 SOLUTION/ DROPS OPHTHALMIC at 07:05

## 2023-12-05 RX ADMIN — CIPROFLOXACIN 1 DROP: 3 SOLUTION OPHTHALMIC at 07:10

## 2023-12-05 NOTE — OP NOTE
Operative Note      Patient: Cy Martinez  YOB: 1950  MRN: [id number]    Date of Procedure: 12/5/2023    Pre-Op Diagnosis Codes:     * Cataract of left eye, unspecified cataract type [H26.9]    Post-Op Diagnosis: Same       Procedure(s):  LEFT EYE CATARACT EXTRACTION IOL IMPLANT    Surgeon(s):  Ralph Jaimes MD    Assistant:   * No surgical staff found *    Anesthesia: Monitor Anesthesia Care    Estimated Blood Loss (mL): Minimal    Complications: None    Specimens:   * No specimens in log *    Implants:  Implant Name Type Inv. Item Serial No.  Lot No. LRB No. Used Action   LENS CLAREON IOL 21.0D - [id number]  LENS CLAREON IOL 21.0D [id number] [address] [facility] [employer]  Left 1 Implanted         Drains: * No LDAs found *    Findings: Combined cataract left eye        Detailed Description of Procedure: The patient was identified in the preoperative area where consent was obtained and the surgical eye indicated by the patient was appropriately marked. Proper eyedrops were instilled per nursing orders. The patient was taken to the operating room suite where leads and monitors were placed. The patient confirmed the operative eye in the presence of the surgeon and circulator and the intraocular lens was confirmed. The eye was then cleaned and draped in the usual fasion for intraocular surgery. A lid speculum was placed. A 15 degree blade was used to make a paracentesis. Preservative free Shugarcaine was placed for additional anesthesia. Viscoat was injected into the anterior chamber. A 2.75mm keratome was used to construct the main wound. . A bent cystitome was used to start a continuous curvilinear capsulorrhexis, which was then completed using Utrata forceps. The capsule was removed from the eye. BSS on a weber hydrodissection cannula was used to hydrodissect the lens. The lens was rotated to ensure it was freely mobile.   The phacoemulsification canula was placed

## 2023-12-05 NOTE — ANESTHESIA POSTPROCEDURE EVALUATION
Department of Anesthesiology  Postprocedure Note    Patient: Britany Rothman  MRN: [id number]  YOB: 1950  Date of evaluation: 12/5/2023      Procedure Summary     Date: 12/05/23 Room / Location: [facility] OR 03 / [facility]    Anesthesia Start: 8921 Anesthesia Stop: 9947    Procedure: LEFT EYE CATARACT EXTRACTION IOL IMPLANT (Left: Eye) Diagnosis:       Cataract of left eye, unspecified cataract type      (Cataract of left eye, unspecified cataract type [H26.9])    Surgeons: Alvaro Mensah MD Responsible Provider: Riky Moncada DO    Anesthesia Type: MAC ASA Status: 3          Anesthesia Type: No value filed.     Ling Phase I: Ling Score: 9    Ling Phase II:        Anesthesia Post Evaluation    Patient location during evaluation: bedside  Patient participation: complete - patient participated  Level of consciousness: awake and alert  Airway patency: patent  Nausea & Vomiting: no nausea and no vomiting  Complications: no  Cardiovascular status: blood pressure returned to baseline  Respiratory status: acceptable  Hydration status: euvolemic

## 2023-12-12 ENCOUNTER — HOSPITAL ENCOUNTER (OUTPATIENT)
Age: 73
Discharge: HOME | End: 2023-12-12

## 2023-12-29 ENCOUNTER — HOSPITAL ENCOUNTER (OUTPATIENT)
Age: 73
Discharge: HOME | End: 2023-12-29

## 2023-12-29 LAB
B.E.: 7.8 MMOL/L (ref -3–3)
COHB: 0.5 % (ref 0–1.5)
CRITICAL: ABNORMAL
DATE ANALYZED: ABNORMAL
DATE OF COLLECTION: ABNORMAL
HCO3: 35 MMOL/L (ref 22–26)
HHB: 7.5 % (ref 0–5)
LAB: ABNORMAL
Lab: 1138
METHB: 0.3 % (ref 0–1.5)
MODE: ABNORMAL
O2 CONTENT: 15.1 ML/DL
O2 SATURATION: 92.4 % (ref 92–98.5)
O2HB: 91.7 % (ref 94–97)
OPERATOR ID: 1821
PATIENT TEMP: 37 C
PCO2: 63 MMHG (ref 35–45)
PH BLOOD GAS: 7.36 (ref 7.35–7.45)
PO2: 66.2 MMHG (ref 75–100)
SOURCE, BLOOD GAS: ABNORMAL
THB: 11.7 G/DL (ref 11.5–16.5)
TIME ANALYZED: 1143

## 2024-05-26 PROBLEM — R91.8 PULMONARY NODULES/LESIONS, MULTIPLE: Status: ACTIVE | Noted: 2021-05-23

## 2024-11-28 PROBLEM — R54 FRAILTY: Status: ACTIVE | Noted: 2024-11-28

## 2024-11-28 PROBLEM — C34.90: Status: ACTIVE | Noted: 2024-11-28

## 2024-12-02 ENCOUNTER — HOSPITAL ENCOUNTER (INPATIENT)
Age: 74
LOS: 12 days | Discharge: NURSING, REHAB OR LONG-TERM CARE | DRG: 811 | End: 2024-12-14
Attending: EMERGENCY MEDICINE | Admitting: INTERNAL MEDICINE
Payer: MEDICARE

## 2024-12-02 ENCOUNTER — APPOINTMENT (OUTPATIENT)
Dept: CT IMAGING | Age: 74
DRG: 811 | End: 2024-12-02
Payer: MEDICARE

## 2024-12-02 DIAGNOSIS — D64.9 SYMPTOMATIC ANEMIA: Primary | ICD-10-CM

## 2024-12-02 DIAGNOSIS — C34.90 MALIGNANT NEOPLASM OF LUNG, UNSPECIFIED LATERALITY, UNSPECIFIED PART OF LUNG (HCC): ICD-10-CM

## 2024-12-02 DIAGNOSIS — T45.1X5A COMPLICATION OF CHEMOTHERAPY, INITIAL ENCOUNTER: ICD-10-CM

## 2024-12-02 DIAGNOSIS — R55 SYNCOPE AND COLLAPSE: ICD-10-CM

## 2024-12-02 DIAGNOSIS — R55 SYNCOPE, UNSPECIFIED SYNCOPE TYPE: ICD-10-CM

## 2024-12-02 LAB
ALBUMIN SERPL-MCNC: 3.8 G/DL (ref 3.5–5.2)
ALP SERPL-CCNC: 52 U/L (ref 35–104)
ALT SERPL-CCNC: 16 U/L (ref 0–32)
ANION GAP SERPL CALCULATED.3IONS-SCNC: 7 MMOL/L (ref 7–16)
AST SERPL-CCNC: 21 U/L (ref 0–31)
BASOPHILS # BLD: 0.02 K/UL (ref 0–0.2)
BASOPHILS NFR BLD: 0 % (ref 0–2)
BILIRUB SERPL-MCNC: 0.3 MG/DL (ref 0–1.2)
BNP SERPL-MCNC: 282 PG/ML (ref 0–450)
BUN SERPL-MCNC: 17 MG/DL (ref 6–23)
CALCIUM SERPL-MCNC: 9.1 MG/DL (ref 8.6–10.2)
CHLORIDE SERPL-SCNC: 97 MMOL/L (ref 98–107)
CK SERPL-CCNC: 45 U/L (ref 20–180)
CO2 SERPL-SCNC: 38 MMOL/L (ref 22–29)
CREAT SERPL-MCNC: 0.5 MG/DL (ref 0.5–1)
EOSINOPHIL # BLD: 0.02 K/UL (ref 0.05–0.5)
EOSINOPHILS RELATIVE PERCENT: 0 % (ref 0–6)
ERYTHROCYTE [DISTWIDTH] IN BLOOD BY AUTOMATED COUNT: 20.1 % (ref 11.5–15)
GFR, ESTIMATED: >90 ML/MIN/1.73M2
GLUCOSE SERPL-MCNC: 130 MG/DL (ref 74–99)
HCT VFR BLD AUTO: 21.7 % (ref 34–48)
HGB BLD-MCNC: 6.6 G/DL (ref 11.5–15.5)
IMM GRANULOCYTES # BLD AUTO: 0.1 K/UL (ref 0–0.58)
IMM GRANULOCYTES NFR BLD: 1 % (ref 0–5)
LYMPHOCYTES NFR BLD: 0.43 K/UL (ref 1.5–4)
LYMPHOCYTES RELATIVE PERCENT: 4 % (ref 20–42)
MCH RBC QN AUTO: 29.6 PG (ref 26–35)
MCHC RBC AUTO-ENTMCNC: 30.4 G/DL (ref 32–34.5)
MCV RBC AUTO: 97.3 FL (ref 80–99.9)
MONOCYTES NFR BLD: 0.82 K/UL (ref 0.1–0.95)
MONOCYTES NFR BLD: 7 % (ref 2–12)
NEUTROPHILS NFR BLD: 88 % (ref 43–80)
NEUTS SEG NFR BLD: 10.28 K/UL (ref 1.8–7.3)
PLATELET # BLD AUTO: 245 K/UL (ref 130–450)
PMV BLD AUTO: 10.1 FL (ref 7–12)
POTASSIUM SERPL-SCNC: 4 MMOL/L (ref 3.5–5)
PROT SERPL-MCNC: 6.3 G/DL (ref 6.4–8.3)
RBC # BLD AUTO: 2.23 M/UL (ref 3.5–5.5)
RBC # BLD: ABNORMAL 10*6/UL
SODIUM SERPL-SCNC: 142 MMOL/L (ref 132–146)
TROPONIN I SERPL HS-MCNC: 37 NG/L (ref 0–9)
TROPONIN I SERPL HS-MCNC: 38 NG/L (ref 0–9)
WBC OTHER # BLD: 11.7 K/UL (ref 4.5–11.5)

## 2024-12-02 PROCEDURE — 71275 CT ANGIOGRAPHY CHEST: CPT

## 2024-12-02 PROCEDURE — 99285 EMERGENCY DEPT VISIT HI MDM: CPT

## 2024-12-02 PROCEDURE — 90714 TD VACC NO PRESV 7 YRS+ IM: CPT | Performed by: EMERGENCY MEDICINE

## 2024-12-02 PROCEDURE — 85025 COMPLETE CBC W/AUTO DIFF WBC: CPT

## 2024-12-02 PROCEDURE — 93005 ELECTROCARDIOGRAM TRACING: CPT | Performed by: EMERGENCY MEDICINE

## 2024-12-02 PROCEDURE — 82550 ASSAY OF CK (CPK): CPT

## 2024-12-02 PROCEDURE — 90471 IMMUNIZATION ADMIN: CPT | Performed by: EMERGENCY MEDICINE

## 2024-12-02 PROCEDURE — 80053 COMPREHEN METABOLIC PANEL: CPT

## 2024-12-02 PROCEDURE — 84484 ASSAY OF TROPONIN QUANT: CPT

## 2024-12-02 PROCEDURE — 2060000000 HC ICU INTERMEDIATE R&B

## 2024-12-02 PROCEDURE — 70450 CT HEAD/BRAIN W/O DYE: CPT

## 2024-12-02 PROCEDURE — 6370000000 HC RX 637 (ALT 250 FOR IP): Performed by: INTERNAL MEDICINE

## 2024-12-02 PROCEDURE — 6360000002 HC RX W HCPCS: Performed by: EMERGENCY MEDICINE

## 2024-12-02 PROCEDURE — 94640 AIRWAY INHALATION TREATMENT: CPT

## 2024-12-02 PROCEDURE — P9016 RBC LEUKOCYTES REDUCED: HCPCS

## 2024-12-02 PROCEDURE — 86850 RBC ANTIBODY SCREEN: CPT

## 2024-12-02 PROCEDURE — 86923 COMPATIBILITY TEST ELECTRIC: CPT

## 2024-12-02 PROCEDURE — 6360000004 HC RX CONTRAST MEDICATION: Performed by: RADIOLOGY

## 2024-12-02 PROCEDURE — 6370000000 HC RX 637 (ALT 250 FOR IP): Performed by: EMERGENCY MEDICINE

## 2024-12-02 PROCEDURE — 86900 BLOOD TYPING SEROLOGIC ABO: CPT

## 2024-12-02 PROCEDURE — 86901 BLOOD TYPING SEROLOGIC RH(D): CPT

## 2024-12-02 PROCEDURE — 30233N1 TRANSFUSION OF NONAUTOLOGOUS RED BLOOD CELLS INTO PERIPHERAL VEIN, PERCUTANEOUS APPROACH: ICD-10-PCS | Performed by: INTERNAL MEDICINE

## 2024-12-02 PROCEDURE — 99223 1ST HOSP IP/OBS HIGH 75: CPT | Performed by: INTERNAL MEDICINE

## 2024-12-02 PROCEDURE — 83880 ASSAY OF NATRIURETIC PEPTIDE: CPT

## 2024-12-02 RX ORDER — IOPAMIDOL 755 MG/ML
70 INJECTION, SOLUTION INTRAVASCULAR
Status: COMPLETED | OUTPATIENT
Start: 2024-12-02 | End: 2024-12-02

## 2024-12-02 RX ORDER — POLYETHYLENE GLYCOL 3350 17 G/17G
17 POWDER, FOR SOLUTION ORAL DAILY PRN
Status: DISCONTINUED | OUTPATIENT
Start: 2024-12-02 | End: 2024-12-03

## 2024-12-02 RX ORDER — SODIUM CHLORIDE 9 MG/ML
INJECTION, SOLUTION INTRAVENOUS PRN
Status: DISCONTINUED | OUTPATIENT
Start: 2024-12-02 | End: 2024-12-03

## 2024-12-02 RX ORDER — ACETAMINOPHEN 325 MG/1
650 TABLET ORAL EVERY 6 HOURS PRN
Status: DISCONTINUED | OUTPATIENT
Start: 2024-12-02 | End: 2024-12-03

## 2024-12-02 RX ORDER — IPRATROPIUM BROMIDE AND ALBUTEROL SULFATE 2.5; .5 MG/3ML; MG/3ML
3 SOLUTION RESPIRATORY (INHALATION) ONCE
Status: COMPLETED | OUTPATIENT
Start: 2024-12-02 | End: 2024-12-02

## 2024-12-02 RX ORDER — SODIUM CHLORIDE 0.9 % (FLUSH) 0.9 %
5-40 SYRINGE (ML) INJECTION PRN
Status: DISCONTINUED | OUTPATIENT
Start: 2024-12-02 | End: 2024-12-03

## 2024-12-02 RX ORDER — ENOXAPARIN SODIUM 100 MG/ML
40 INJECTION SUBCUTANEOUS DAILY
Status: DISCONTINUED | OUTPATIENT
Start: 2024-12-03 | End: 2024-12-03

## 2024-12-02 RX ORDER — ACETAMINOPHEN 650 MG/1
650 SUPPOSITORY RECTAL EVERY 6 HOURS PRN
Status: DISCONTINUED | OUTPATIENT
Start: 2024-12-02 | End: 2024-12-03

## 2024-12-02 RX ORDER — MAGNESIUM SULFATE IN WATER 40 MG/ML
2000 INJECTION, SOLUTION INTRAVENOUS PRN
Status: DISCONTINUED | OUTPATIENT
Start: 2024-12-02 | End: 2024-12-03

## 2024-12-02 RX ORDER — ONDANSETRON 4 MG/1
4 TABLET, ORALLY DISINTEGRATING ORAL EVERY 8 HOURS PRN
Status: DISCONTINUED | OUTPATIENT
Start: 2024-12-02 | End: 2024-12-03

## 2024-12-02 RX ORDER — POTASSIUM CHLORIDE 7.45 MG/ML
10 INJECTION INTRAVENOUS PRN
Status: DISCONTINUED | OUTPATIENT
Start: 2024-12-02 | End: 2024-12-03

## 2024-12-02 RX ORDER — ONDANSETRON 2 MG/ML
4 INJECTION INTRAMUSCULAR; INTRAVENOUS EVERY 6 HOURS PRN
Status: DISCONTINUED | OUTPATIENT
Start: 2024-12-02 | End: 2024-12-03

## 2024-12-02 RX ORDER — POTASSIUM CHLORIDE 1500 MG/1
40 TABLET, EXTENDED RELEASE ORAL PRN
Status: DISCONTINUED | OUTPATIENT
Start: 2024-12-02 | End: 2024-12-03

## 2024-12-02 RX ORDER — SODIUM CHLORIDE 0.9 % (FLUSH) 0.9 %
5-40 SYRINGE (ML) INJECTION EVERY 12 HOURS SCHEDULED
Status: DISCONTINUED | OUTPATIENT
Start: 2024-12-02 | End: 2024-12-03

## 2024-12-02 RX ADMIN — ACETAMINOPHEN 650 MG: 325 TABLET ORAL at 20:21

## 2024-12-02 RX ADMIN — IPRATROPIUM BROMIDE AND ALBUTEROL SULFATE 3 DOSE: .5; 2.5 SOLUTION RESPIRATORY (INHALATION) at 11:48

## 2024-12-02 RX ADMIN — CLOSTRIDIUM TETANI TOXOID ANTIGEN (FORMALDEHYDE INACTIVATED) AND CORYNEBACTERIUM DIPHTHERIAE TOXOID ANTIGEN (FORMALDEHYDE INACTIVATED) 0.5 ML: 5; 2 INJECTION, SUSPENSION INTRAMUSCULAR at 14:49

## 2024-12-02 RX ADMIN — IOPAMIDOL 70 ML: 755 INJECTION, SOLUTION INTRAVENOUS at 13:23

## 2024-12-02 ASSESSMENT — PAIN DESCRIPTION - DESCRIPTORS
DESCRIPTORS: ACHING;NUMBNESS;THROBBING
DESCRIPTORS: ACHING

## 2024-12-02 ASSESSMENT — PAIN DESCRIPTION - ORIENTATION
ORIENTATION: MID
ORIENTATION: POSTERIOR

## 2024-12-02 ASSESSMENT — PAIN DESCRIPTION - LOCATION
LOCATION: BACK;HEAD
LOCATION: BACK

## 2024-12-02 ASSESSMENT — PAIN - FUNCTIONAL ASSESSMENT
PAIN_FUNCTIONAL_ASSESSMENT: ACTIVITIES ARE NOT PREVENTED
PAIN_FUNCTIONAL_ASSESSMENT: 0-10

## 2024-12-02 ASSESSMENT — PAIN DESCRIPTION - PAIN TYPE: TYPE: ACUTE PAIN

## 2024-12-02 ASSESSMENT — PAIN SCALES - GENERAL
PAINLEVEL_OUTOF10: 4
PAINLEVEL_OUTOF10: 6

## 2024-12-02 ASSESSMENT — PAIN DESCRIPTION - ONSET: ONSET: ON-GOING

## 2024-12-02 ASSESSMENT — PAIN DESCRIPTION - FREQUENCY: FREQUENCY: CONTINUOUS

## 2024-12-02 NOTE — ED PROVIDER NOTES
74-year-old female presenting from home.  She got up overnight to go to the bathroom.  Says she got weak maybe little lightheaded and fell.  She has pain to her mid thoracic area.  She is always on oxygen, has known lung cancer and is being treated with chemotherapy.  Upon arrival she is wearing her oxygen, is 94% on her oxygen, awake, alert, oriented x 4         Family History   Problem Relation Age of Onset    Cancer Mother         lymphoma non-hodgkin's    Inflam Bowel Dis Father         Diverticulitis     Past Surgical History:   Procedure Laterality Date    BREAST LUMPECTOMY Left 2020    BRONCHOSCOPY  08/30/2017    With EBUS    CARPAL TUNNEL RELEASE Bilateral     COLONOSCOPY  04/01/2021    EYE SURGERY Right 11/07/2023    RIGHT EYE CATARACT EXTRACTION IOL IMPLANT performed by Lida Horvath MD at [facility] OR    EYE SURGERY Left 12/5/2023    LEFT EYE CATARACT EXTRACTION IOL IMPLANT performed by Lida Horvath MD at [facility] OR    KNEE ARTHROSCOPY Left     LUNG BIOPSY Right 07/25/2017    SKIN BIOPSY      CANCER BASAL TIP NOSE       Review of Systems   Constitutional:  Negative for chills and fever.   Respiratory:  Negative for chest tightness and shortness of breath.    Musculoskeletal:  Positive for back pain.   Neurological:  Positive for weakness and light-headedness.        Physical Exam  Constitutional:       General: She is not in acute distress.     Appearance: She is well-developed.      Comments: Chronically ill-appearing   HENT:      Head: Normocephalic and atraumatic.   Eyes:      Conjunctiva/sclera: Conjunctivae normal.      Pupils: Pupils are equal, round, and reactive to light.   Neck:      Thyroid: No thyromegaly.   Cardiovascular:      Rate and Rhythm: Normal rate and regular rhythm.   Pulmonary:      Effort: Pulmonary effort is normal. No respiratory distress.      Breath sounds: Normal breath sounds.      Comments: Minimally tight breath sounds  Abdominal:      General: There is no  117 23 96 % -- --   12/02/24 1930 (!) 138/54 -- (!) 114 22 96 % -- --   12/02/24 1925 119/71 98.5 °F (36.9 °C) (!) 115 20 94 % -- --   12/02/24 1920 132/60 -- (!) 113 29 91 % -- --   12/02/24 1915 (!) 131/57 -- (!) 110 25 93 % -- --   12/02/24 1858 (!) 145/56 98.7 °F (37.1 °C) (!) 104 24 96 % -- --   12/02/24 1600 135/67 -- (!) 111 27 97 % -- --   12/02/24 1530 (!) 112/52 -- (!) 113 17 94 % -- --   12/02/24 1221 (!) 148/58 -- (!) 114 (!) 31 -- -- --   12/02/24 1122 (!) 148/77 98.6 °F (37 °C) 98 22 93 % 1.549 m (5' 1\") 52.2 kg (115 lb)       Oxygen Saturation Interpretation: Normal    ------------------------------------------ PROGRESS NOTES ------------------------------------------  Re-evaluation(s):  Patient’s symptoms show no change  Repeat physical examination is not changed    Counseling:  I have spoken with the patient and spouse and daughter  and discussed today’s results, in addition to providing specific details for the plan of care and counseling regarding the diagnosis and prognosis.  Their questions are answered at this time and they are agreeable with the plan of admission.    CRITICAL CARE:   32 MINUTES.          Please note that the withdrawal or failure to initiate urgent interventions for this patient would likely result in a life threatening deterioration or permanent disability.  Accordingly this patient received the above mentioned time, excluding separately billable procedures.         --------------------------------- ADDITIONAL PROVIDER NOTES ---------------------------------  Consultations:  . Spoke with Dr. Calvert.  Discussed case.  They will admit the patient.  This patient's ED course included: a personal history and physicial examination, re-evaluation prior to disposition, multiple bedside re-evaluations, and IV medications    This patient has remained hemodynamically stable during their ED course.    Diagnosis:  1. Symptomatic anemia    2. Syncope and collapse    3. Malignant neoplasm

## 2024-12-02 NOTE — H&P
[facility]   History and Physical      CHIEF COMPLAINT:  near syncope    History of Present Illness:  74 y.o. female with a history of left breast and lung cancer, on chemo for lung cancer, COPD, HTN chronic respiratory faliure on 4 liters chronically presents with near syncope.  When she got up to go to the bathroom last night she felt weak.  This was associated with lightheadedness. She fell. possible  LOC.  She hit her head when she fell and has received stitches in her occiput.  She also has pain in her mid  to palpation she thinks she fell onto this area as well.  Patient is somewhat anxious and changes her mind about medical care in various ways.  I do not trust that she is a good historian but she is not overtly very confused      Informant(s) for H&P: ed dr, chart, patient    REVIEW OF SYSTEMS:  no fevers, chills, cp, sob, n/v, ha, vision/hearing changes, wt changes, hot/cold flashes, other open skin lesions, diarrhea, constipation, dysuria/hematuria unless noted in HPI. Complete ROS performed with the patient and is otherwise negative.      PMH:  Past Medical History:   Diagnosis Date    Cancer (HCC) 2020    LT BREAST    COPD (chronic obstructive pulmonary disease) ([facility])     History of Holter monitoring 11/18/2021    Hypertension     Lung cancer, lower lobe (HCC) 08/2017    Lung cancer, upper lobe (HCC) 08/2017       Surgical History:  Past Surgical History:   Procedure Laterality Date    BREAST LUMPECTOMY Left 2020    BRONCHOSCOPY  08/30/2017    With EBUS    CARPAL TUNNEL RELEASE Bilateral     COLONOSCOPY  04/01/2021    EYE SURGERY Right 11/07/2023    RIGHT EYE CATARACT EXTRACTION IOL IMPLANT performed by Lida Horvath MD at [facility] OR    EYE SURGERY Left 12/5/2023    LEFT EYE CATARACT EXTRACTION IOL IMPLANT performed by Lida Horvath MD at [facility] OR    KNEE ARTHROSCOPY Left     LUNG BIOPSY Right 07/25/2017    SKIN BIOPSY      CANCER BASAL TIP

## 2024-12-02 NOTE — CONSENT
Informed Consent for Blood Component Transfusion Note    I have discussed with the patient the rationale for blood component transfusion; its benefits in treating or preventing fatigue, organ damage, or death; and its risk which includes mild transfusion reactions, rare risk of blood borne infection, or more serious but rare reactions. I have discussed the alternatives to transfusion, including the risk and consequences of not receiving transfusion. The patient had an opportunity to ask questions and had agreed to proceed with transfusion of blood components.    Electronically signed by Moisés GAONA MD on 12/2/24 at 4:26 PM EST

## 2024-12-03 LAB
ABO/RH: NORMAL
ANTIBODY SCREEN: NEGATIVE
ARM BAND NUMBER: NORMAL
BASOPHILS # BLD: 0 K/UL (ref 0–0.2)
BASOPHILS NFR BLD: 0 % (ref 0–2)
BLOOD BANK BLOOD PRODUCT EXPIRATION DATE: NORMAL
BLOOD BANK DISPENSE STATUS: NORMAL
BLOOD BANK ISBT PRODUCT BLOOD TYPE: 6200
BLOOD BANK PRODUCT CODE: NORMAL
BLOOD BANK SAMPLE EXPIRATION: NORMAL
BLOOD BANK UNIT TYPE AND RH: NORMAL
BPU ID: NORMAL
COMPONENT: NORMAL
CROSSMATCH RESULT: NORMAL
EOSINOPHIL # BLD: 0 K/UL (ref 0.05–0.5)
EOSINOPHILS RELATIVE PERCENT: 0 % (ref 0–6)
ERYTHROCYTE [DISTWIDTH] IN BLOOD BY AUTOMATED COUNT: 19.9 % (ref 11.5–15)
HCT VFR BLD AUTO: 25.4 % (ref 34–48)
HCT VFR BLD AUTO: 26.3 % (ref 34–48)
HGB BLD-MCNC: 7.8 G/DL (ref 11.5–15.5)
HGB BLD-MCNC: 8.4 G/DL (ref 11.5–15.5)
LYMPHOCYTES NFR BLD: 0.42 K/UL (ref 1.5–4)
LYMPHOCYTES RELATIVE PERCENT: 5 % (ref 20–42)
MCH RBC QN AUTO: 30.1 PG (ref 26–35)
MCHC RBC AUTO-ENTMCNC: 30.7 G/DL (ref 32–34.5)
MCV RBC AUTO: 98.1 FL (ref 80–99.9)
MONOCYTES NFR BLD: 0.35 K/UL (ref 0.1–0.95)
MONOCYTES NFR BLD: 4 % (ref 2–12)
NEUTROPHILS NFR BLD: 90 % (ref 43–80)
NEUTS SEG NFR BLD: 7.23 K/UL (ref 1.8–7.3)
PLATELET # BLD AUTO: 198 K/UL (ref 130–450)
PMV BLD AUTO: 10.6 FL (ref 7–12)
RBC # BLD AUTO: 2.59 M/UL (ref 3.5–5.5)
RBC # BLD: ABNORMAL 10*6/UL
TRANSFUSION STATUS: NORMAL
UNIT DIVISION: 0
UNIT ISSUE DATE/TIME: NORMAL
WBC OTHER # BLD: 8 K/UL (ref 4.5–11.5)

## 2024-12-03 PROCEDURE — 85018 HEMOGLOBIN: CPT

## 2024-12-03 PROCEDURE — 2580000003 HC RX 258: Performed by: INTERNAL MEDICINE

## 2024-12-03 PROCEDURE — 6360000002 HC RX W HCPCS: Performed by: INTERNAL MEDICINE

## 2024-12-03 PROCEDURE — 94640 AIRWAY INHALATION TREATMENT: CPT

## 2024-12-03 PROCEDURE — 2060000000 HC ICU INTERMEDIATE R&B

## 2024-12-03 PROCEDURE — 36415 COLL VENOUS BLD VENIPUNCTURE: CPT

## 2024-12-03 PROCEDURE — 99232 SBSQ HOSP IP/OBS MODERATE 35: CPT | Performed by: INTERNAL MEDICINE

## 2024-12-03 PROCEDURE — 6370000000 HC RX 637 (ALT 250 FOR IP): Performed by: INTERNAL MEDICINE

## 2024-12-03 PROCEDURE — 85025 COMPLETE CBC W/AUTO DIFF WBC: CPT

## 2024-12-03 PROCEDURE — 85014 HEMATOCRIT: CPT

## 2024-12-03 PROCEDURE — 2700000000 HC OXYGEN THERAPY PER DAY

## 2024-12-03 PROCEDURE — 94761 N-INVAS EAR/PLS OXIMETRY MLT: CPT

## 2024-12-03 RX ORDER — ACETAMINOPHEN 325 MG/1
650 TABLET ORAL EVERY 4 HOURS PRN
Status: DISCONTINUED | OUTPATIENT
Start: 2024-12-03 | End: 2024-12-14 | Stop reason: HOSPADM

## 2024-12-03 RX ORDER — ALBUTEROL SULFATE 0.83 MG/ML
2.5 SOLUTION RESPIRATORY (INHALATION) 4 TIMES DAILY PRN
Status: DISCONTINUED | OUTPATIENT
Start: 2024-12-03 | End: 2024-12-14 | Stop reason: HOSPADM

## 2024-12-03 RX ORDER — M-VIT,TX,IRON,MINS/CALC/FOLIC 27MG-0.4MG
1 TABLET ORAL DAILY
Status: DISCONTINUED | OUTPATIENT
Start: 2024-12-03 | End: 2024-12-14 | Stop reason: HOSPADM

## 2024-12-03 RX ORDER — ARFORMOTEROL TARTRATE 15 UG/2ML
15 SOLUTION RESPIRATORY (INHALATION)
Status: DISCONTINUED | OUTPATIENT
Start: 2024-12-04 | End: 2024-12-07

## 2024-12-03 RX ORDER — GUAIFENESIN 600 MG/1
1200 TABLET, EXTENDED RELEASE ORAL 2 TIMES DAILY PRN
Status: DISCONTINUED | OUTPATIENT
Start: 2024-12-03 | End: 2024-12-14 | Stop reason: HOSPADM

## 2024-12-03 RX ORDER — ARFORMOTEROL TARTRATE 15 UG/2ML
15 SOLUTION RESPIRATORY (INHALATION)
Status: DISCONTINUED | OUTPATIENT
Start: 2024-12-03 | End: 2024-12-03

## 2024-12-03 RX ORDER — BUDESONIDE 0.5 MG/2ML
1 INHALANT ORAL
Status: DISCONTINUED | OUTPATIENT
Start: 2024-12-03 | End: 2024-12-03

## 2024-12-03 RX ORDER — BUDESONIDE 0.5 MG/2ML
1 INHALANT ORAL
Status: DISCONTINUED | OUTPATIENT
Start: 2024-12-04 | End: 2024-12-14 | Stop reason: HOSPADM

## 2024-12-03 RX ORDER — IPRATROPIUM BROMIDE 42 UG/1
1 SPRAY, METERED NASAL 2 TIMES DAILY PRN
Status: DISCONTINUED | OUTPATIENT
Start: 2024-12-03 | End: 2024-12-03 | Stop reason: RX

## 2024-12-03 RX ADMIN — ARFORMOTEROL TARTRATE 15 MCG: 15 SOLUTION RESPIRATORY (INHALATION) at 17:11

## 2024-12-03 RX ADMIN — IPRATROPIUM BROMIDE 0.5 MG: 0.5 SOLUTION RESPIRATORY (INHALATION) at 17:11

## 2024-12-03 RX ADMIN — SODIUM CHLORIDE, PRESERVATIVE FREE 5 ML: 5 INJECTION INTRAVENOUS at 09:13

## 2024-12-03 RX ADMIN — BUDESONIDE INHALATION 1000 MCG: 0.5 SUSPENSION RESPIRATORY (INHALATION) at 17:12

## 2024-12-03 RX ADMIN — ENOXAPARIN SODIUM 40 MG: 100 INJECTION SUBCUTANEOUS at 09:11

## 2024-12-03 RX ADMIN — ACETAMINOPHEN 650 MG: 325 TABLET ORAL at 20:14

## 2024-12-03 RX ADMIN — ACETAMINOPHEN 650 MG: 325 TABLET ORAL at 04:44

## 2024-12-03 RX ADMIN — Medication 1 TABLET: at 16:10

## 2024-12-03 ASSESSMENT — PAIN - FUNCTIONAL ASSESSMENT: PAIN_FUNCTIONAL_ASSESSMENT: ACTIVITIES ARE NOT PREVENTED

## 2024-12-03 ASSESSMENT — PAIN SCALES - GENERAL
PAINLEVEL_OUTOF10: 5
PAINLEVEL_OUTOF10: 2
PAINLEVEL_OUTOF10: 2

## 2024-12-03 ASSESSMENT — PAIN DESCRIPTION - DESCRIPTORS
DESCRIPTORS: ACHING;DISCOMFORT;SORE
DESCRIPTORS: ACHING;DISCOMFORT;SORE

## 2024-12-03 ASSESSMENT — PAIN DESCRIPTION - LOCATION
LOCATION: BACK
LOCATION: GENERALIZED

## 2024-12-03 NOTE — PROGRESS NOTES
Admitting Date and Time: 12/2/2024 11:03 AM  Admit Dx: Syncope and collapse [R55]  Anemia [D64.9]  Complication of chemotherapy, initial encounter [T45.1X5A]  Symptomatic anemia [D64.9]  Malignant neoplasm of lung, unspecified laterality, unspecified part of lung (HCC) [C34.90]    Subjective:    Pt feels well  Per RN: no issues    ROS: denies fever, chills, cp, sob, n/v, HA unless stated above.     budesonide  1 mg Nebulization BID RT    And    arformoterol tartrate  15 mcg Nebulization BID RT    And    ipratropium  0.5 mg Nebulization Q6H RT    therapeutic multivitamin-minerals  1 tablet Oral Daily     albuterol, 2.5 mg, 4x Daily PRN  guaiFENesin, 1,200 mg, BID PRN         Objective:    BP (!) 136/57   Pulse (!) 114   Temp 98.4 °F (36.9 °C) (Oral)   Resp 18   Ht 1.575 m (5' 2\")   Wt 50.9 kg (112 lb 3.4 oz)   SpO2 94%   BMI 20.52 kg/m²   General Appearance: alert and oriented to person, place and time and in no acute distress  Skin: warm and dry  Head: normocephalic and atraumatic  Eyes: pupils equal, round, and reactive to light, extraocular eye movements intact, conjunctivae normal  Neck: neck supple and non tender without mass   Pulmonary/Chest: clear to auscultation bilaterally- no wheezes, rales or rhonchi, normal air movement, no respiratory distress  Cardiovascular: normal rate, normal S1 and S2 and no carotid bruits  Abdomen: soft, non-tender, non-distended, normal bowel sounds, no masses or organomegaly  Extremities: no cyanosis, no clubbing and no  edema  Neurologic: no cranial nerve deficit and speech normal      Recent Labs     12/02/24  1130      K 4.0   CL 97*   CO2 38*   BUN 17   CREATININE 0.5   GLUCOSE 130*   CALCIUM 9.1       Recent Labs     12/02/24  1130   ALKPHOS 52   BILITOT 0.3   AST 21   ALT 16       Recent Labs     12/02/24  1130 12/03/24  0018 12/03/24  0721   WBC 11.7*  --  8.0   RBC 2.23*  --  2.59*   HGB 6.6* 8.4* 7.8*   HCT 21.7* 26.3* 25.4*   MCV 97.3  --  98.1   MCH 29.6

## 2024-12-03 NOTE — ACP (ADVANCE CARE PLANNING)
Advance Care Planning   Healthcare Decision Maker:    Primary Decision Maker: Juan C Vivas - Spouse - [phone]    Secondary Decision Maker: RachelLigia hammond - Child - [phone]    Click here to complete Healthcare Decision Makers including selection of the Healthcare Decision Maker Relationship (ie \"Primary\").  Today we documented Decision Maker(s) consistent with Legal Next of Kin hierarchy.   Incomplete POA for Healthcare in medical record

## 2024-12-03 NOTE — CARE COORDINATION
Case Management Assessment  Initial Evaluation    Date/Time of Evaluation: 12/3/2024 2:50 PM  Assessment Completed by: ANMOL Galvan    If patient is discharged prior to next notation, then this note serves as note for discharge by case management.    Patient Name: Ashley Vivas                   YOB: 1950  Diagnosis: Syncope and collapse [R55]  Anemia [D64.9]  Complication of chemotherapy, initial encounter [T45.1X5A]  Symptomatic anemia [D64.9]  Malignant neoplasm of lung, unspecified laterality, unspecified part of lung (HCC) [C34.90]                   Date / Time: 12/2/2024 11:03 AM    Patient Admission Status: Inpatient   Readmission Risk (Low < 19, Mod (19-27), High > 27): Readmission Risk Score: 13.2    Current PCP: Samina Ji MD  PCP verified by ? Yes    Chart Reviewed: Yes      History Provided by: Patient  Patient Orientation: Alert and Oriented, Person, Situation, Place    Patient Cognition: Alert    Hospitalization in the last 30 days (Readmission):  No    If yes, Readmission Assessment in  Navigator will be completed.    Advance Directives:      Code Status: Full Code   Patient's Primary Decision Maker is: Legal Next of Kin    Primary Decision Maker: Juan C Vivas - Spouse - [phone]    Secondary Decision Maker: Ligia Hightower - Child - [phone]    Discharge Planning:    Patient lives with: Spouse/Significant Other Type of Home: House  Primary Care Giver: Self  Patient Support Systems include: Spouse/Significant Other, Children, Family Members   Current Financial resources:    Current community resources:    Current services prior to admission: None            Current DME:              Type of Home Care services:  None    ADLS  Prior functional level: Independent in ADLs/IADLs  Current functional level: Independent in ADLs/IADLs    PT AM-PAC:   /24  OT AM-PAC:   /24    Family can provide assistance at DC: No  Would you like Case Management to discuss the

## 2024-12-03 NOTE — PLAN OF CARE
Problem: Discharge Planning  Goal: Discharge to home or other facility with appropriate resources  12/3/2024 1228 by Pepe Genao, RN  Outcome: Progressing  Flowsheets (Taken 12/3/2024 0756)  Discharge to home or other facility with appropriate resources:   Identify barriers to discharge with patient and caregiver   Arrange for needed discharge resources and transportation as appropriate   Identify discharge learning needs (meds, wound care, etc)  12/3/2024 0611 by Sourav Cuba RN  Outcome: Progressing     Problem: Pain  Goal: Verbalizes/displays adequate comfort level or baseline comfort level  12/3/2024 1228 by Pepe Genao, RN  Outcome: Progressing  Flowsheets (Taken 12/3/2024 0755)  Verbalizes/displays adequate comfort level or baseline comfort level:   Encourage patient to monitor pain and request assistance   Assess pain using appropriate pain scale   Administer analgesics based on type and severity of pain and evaluate response  12/3/2024 0611 by Sourav Cuba RN  Outcome: Progressing     Problem: Safety - Adult  Goal: Free from fall injury  12/3/2024 1228 by Pepe Genao, RN  Outcome: Progressing  12/3/2024 0611 by Sourav Cuba RN  Outcome: Progressing     Problem: ABCDS Injury Assessment  Goal: Absence of physical injury  12/3/2024 1228 by Pepe Genao, RN  Outcome: Progressing  12/3/2024 0611 by Sourav Cuba RN  Outcome: Progressing

## 2024-12-03 NOTE — PROGRESS NOTES
Spiritual Health History and Assessment/Progress Note  [facility]    Initial Encounter, Spiritual/Emotional Needs,  ,  ,      Name: Ashley Vivas MRN: [id number]    Age: 74 y.o.     Sex: female   Language: English   Rastafari: Zoroastrianism   Anemia     Date: 12/3/2024                           Spiritual Assessment began in [facility]        Referral/Consult From: Rounding   Encounter Overview/Reason: Initial Encounter, Spiritual/Emotional Needs  Service Provided For: Patient    Pauly, Belief, Meaning:   Patient is connected with a pauly tradition or spiritual practice  Family/Friends No family/friends present      Importance and Influence:  Patient has spiritual/personal beliefs that influence decisions regarding their health  Family/Friends No family/friends present    Community:  Patient is connected with a spiritual community  Family/Friends No family/friends present    Assessment and Plan of Care:     Patient Interventions include: Engaged in theological reflection  Family/Friends Interventions include: No family/friends present    Patient Plan of Care: Spiritual Care available upon further referral  Family/Friends Plan of Care: No family/friends present    Electronically signed by Chaplain Joan on 12/3/2024 at 12:27 PM

## 2024-12-04 ENCOUNTER — APPOINTMENT (OUTPATIENT)
Age: 74
DRG: 811 | End: 2024-12-04
Payer: MEDICARE

## 2024-12-04 PROBLEM — R53.81 DEBILITATED: Status: ACTIVE | Noted: 2024-12-04

## 2024-12-04 LAB
ANION GAP SERPL CALCULATED.3IONS-SCNC: 6 MMOL/L (ref 7–16)
BASOPHILS # BLD: 0.07 K/UL (ref 0–0.2)
BASOPHILS NFR BLD: 1 % (ref 0–2)
BUN SERPL-MCNC: 17 MG/DL (ref 6–23)
CALCIUM SERPL-MCNC: 8.7 MG/DL (ref 8.6–10.2)
CHLORIDE SERPL-SCNC: 101 MMOL/L (ref 98–107)
CHOLEST SERPL-MCNC: 213 MG/DL
CO2 SERPL-SCNC: 38 MMOL/L (ref 22–29)
CREAT SERPL-MCNC: 0.6 MG/DL (ref 0.5–1)
EOSINOPHIL # BLD: 0.07 K/UL (ref 0.05–0.5)
EOSINOPHILS RELATIVE PERCENT: 1 % (ref 0–6)
ERYTHROCYTE [DISTWIDTH] IN BLOOD BY AUTOMATED COUNT: 19.6 % (ref 11.5–15)
GFR, ESTIMATED: >90 ML/MIN/1.73M2
GLUCOSE SERPL-MCNC: 152 MG/DL (ref 74–99)
HBA1C MFR BLD: 5.4 % (ref 4–5.6)
HCT VFR BLD AUTO: 26.8 % (ref 34–48)
HDLC SERPL-MCNC: 79 MG/DL
HGB BLD-MCNC: 7.8 G/DL (ref 11.5–15.5)
IRON SATN MFR SERPL: 8 % (ref 15–50)
IRON SERPL-MCNC: 28 UG/DL (ref 37–145)
LDLC SERPL CALC-MCNC: 113 MG/DL
LYMPHOCYTES NFR BLD: 0.76 K/UL (ref 1.5–4)
LYMPHOCYTES RELATIVE PERCENT: 10 % (ref 20–42)
MAGNESIUM SERPL-MCNC: 2.1 MG/DL (ref 1.6–2.6)
MCH RBC QN AUTO: 29.5 PG (ref 26–35)
MCHC RBC AUTO-ENTMCNC: 29.1 G/DL (ref 32–34.5)
MCV RBC AUTO: 101.5 FL (ref 80–99.9)
MONOCYTES NFR BLD: 0.69 K/UL (ref 0.1–0.95)
MONOCYTES NFR BLD: 9 % (ref 2–12)
NEUTROPHILS NFR BLD: 80 % (ref 43–80)
NEUTS SEG NFR BLD: 6.32 K/UL (ref 1.8–7.3)
PLATELET # BLD AUTO: 212 K/UL (ref 130–450)
PMV BLD AUTO: 11.3 FL (ref 7–12)
POTASSIUM SERPL-SCNC: 4.1 MMOL/L (ref 3.5–5)
RBC # BLD AUTO: 2.64 M/UL (ref 3.5–5.5)
RBC # BLD: ABNORMAL 10*6/UL
SODIUM SERPL-SCNC: 145 MMOL/L (ref 132–146)
T4 FREE SERPL-MCNC: 0.8 NG/DL (ref 0.9–1.7)
TIBC SERPL-MCNC: 338 UG/DL (ref 250–450)
TRIGL SERPL-MCNC: 105 MG/DL
TSH SERPL DL<=0.05 MIU/L-ACNC: 0.86 UIU/ML (ref 0.27–4.2)
VLDLC SERPL CALC-MCNC: 21 MG/DL
WBC OTHER # BLD: 7.9 K/UL (ref 4.5–11.5)

## 2024-12-04 PROCEDURE — 97535 SELF CARE MNGMENT TRAINING: CPT

## 2024-12-04 PROCEDURE — 83036 HEMOGLOBIN GLYCOSYLATED A1C: CPT

## 2024-12-04 PROCEDURE — 97161 PT EVAL LOW COMPLEX 20 MIN: CPT | Performed by: PHYSICAL THERAPIST

## 2024-12-04 PROCEDURE — 6370000000 HC RX 637 (ALT 250 FOR IP): Performed by: INTERNAL MEDICINE

## 2024-12-04 PROCEDURE — 99223 1ST HOSP IP/OBS HIGH 75: CPT | Performed by: INTERNAL MEDICINE

## 2024-12-04 PROCEDURE — 2700000000 HC OXYGEN THERAPY PER DAY

## 2024-12-04 PROCEDURE — 6360000002 HC RX W HCPCS: Performed by: INTERNAL MEDICINE

## 2024-12-04 PROCEDURE — 80061 LIPID PANEL: CPT

## 2024-12-04 PROCEDURE — 84443 ASSAY THYROID STIM HORMONE: CPT

## 2024-12-04 PROCEDURE — 83550 IRON BINDING TEST: CPT

## 2024-12-04 PROCEDURE — 94640 AIRWAY INHALATION TREATMENT: CPT

## 2024-12-04 PROCEDURE — 82607 VITAMIN B-12: CPT

## 2024-12-04 PROCEDURE — 82728 ASSAY OF FERRITIN: CPT

## 2024-12-04 PROCEDURE — 80048 BASIC METABOLIC PNL TOTAL CA: CPT

## 2024-12-04 PROCEDURE — 82746 ASSAY OF FOLIC ACID SERUM: CPT

## 2024-12-04 PROCEDURE — 97165 OT EVAL LOW COMPLEX 30 MIN: CPT

## 2024-12-04 PROCEDURE — 6370000000 HC RX 637 (ALT 250 FOR IP): Performed by: CLINICAL NURSE SPECIALIST

## 2024-12-04 PROCEDURE — 36415 COLL VENOUS BLD VENIPUNCTURE: CPT

## 2024-12-04 PROCEDURE — 2060000000 HC ICU INTERMEDIATE R&B

## 2024-12-04 PROCEDURE — 97530 THERAPEUTIC ACTIVITIES: CPT | Performed by: PHYSICAL THERAPIST

## 2024-12-04 PROCEDURE — 83735 ASSAY OF MAGNESIUM: CPT

## 2024-12-04 PROCEDURE — 85025 COMPLETE CBC W/AUTO DIFF WBC: CPT

## 2024-12-04 PROCEDURE — 99232 SBSQ HOSP IP/OBS MODERATE 35: CPT | Performed by: INTERNAL MEDICINE

## 2024-12-04 PROCEDURE — 84439 ASSAY OF FREE THYROXINE: CPT

## 2024-12-04 PROCEDURE — 83540 ASSAY OF IRON: CPT

## 2024-12-04 PROCEDURE — APPSS60 APP SPLIT SHARED TIME 46-60 MINUTES: Performed by: CLINICAL NURSE SPECIALIST

## 2024-12-04 RX ORDER — DOCUSATE SODIUM 100 MG/1
100 CAPSULE, LIQUID FILLED ORAL DAILY
Status: DISCONTINUED | OUTPATIENT
Start: 2024-12-04 | End: 2024-12-14 | Stop reason: HOSPADM

## 2024-12-04 RX ORDER — BISACODYL 10 MG
10 SUPPOSITORY, RECTAL RECTAL ONCE
Status: DISCONTINUED | OUTPATIENT
Start: 2024-12-04 | End: 2024-12-14 | Stop reason: HOSPADM

## 2024-12-04 RX ORDER — METOPROLOL SUCCINATE 25 MG/1
12.5 TABLET, EXTENDED RELEASE ORAL DAILY
Status: DISCONTINUED | OUTPATIENT
Start: 2024-12-04 | End: 2024-12-14 | Stop reason: HOSPADM

## 2024-12-04 RX ADMIN — DOCUSATE SODIUM 100 MG: 100 CAPSULE, LIQUID FILLED ORAL at 14:42

## 2024-12-04 RX ADMIN — IPRATROPIUM BROMIDE 0.5 MG: 0.5 SOLUTION RESPIRATORY (INHALATION) at 18:52

## 2024-12-04 RX ADMIN — BUDESONIDE INHALATION 1000 MCG: 0.5 SUSPENSION RESPIRATORY (INHALATION) at 07:30

## 2024-12-04 RX ADMIN — ACETAMINOPHEN 650 MG: 325 TABLET ORAL at 16:30

## 2024-12-04 RX ADMIN — BUDESONIDE INHALATION 1000 MCG: 0.5 SUSPENSION RESPIRATORY (INHALATION) at 18:52

## 2024-12-04 RX ADMIN — ARFORMOTEROL TARTRATE 15 MCG: 15 SOLUTION RESPIRATORY (INHALATION) at 07:30

## 2024-12-04 RX ADMIN — IPRATROPIUM BROMIDE 0.5 MG: 0.5 SOLUTION RESPIRATORY (INHALATION) at 10:41

## 2024-12-04 RX ADMIN — Medication 1 TABLET: at 08:40

## 2024-12-04 RX ADMIN — ARFORMOTEROL TARTRATE 15 MCG: 15 SOLUTION RESPIRATORY (INHALATION) at 18:52

## 2024-12-04 RX ADMIN — IPRATROPIUM BROMIDE 0.5 MG: 0.5 SOLUTION RESPIRATORY (INHALATION) at 07:30

## 2024-12-04 RX ADMIN — METOPROLOL SUCCINATE 12.5 MG: 25 TABLET, EXTENDED RELEASE ORAL at 14:42

## 2024-12-04 ASSESSMENT — PAIN SCALES - GENERAL: PAINLEVEL_OUTOF10: 6

## 2024-12-04 ASSESSMENT — PAIN DESCRIPTION - DESCRIPTORS: DESCRIPTORS: ACHING

## 2024-12-04 ASSESSMENT — PAIN DESCRIPTION - LOCATION: LOCATION: GENERALIZED

## 2024-12-04 NOTE — PROGRESS NOTES
Patient refused dulcolax, adjusted medication to be given 12/5 at 0900. Patient stated she was tired and did not want the medication at this time. Educated patient.

## 2024-12-04 NOTE — CONSULTS
Inpatient Cardiology Consultation      Reason for Consult:  Syncope    Consulting Physician: Dr Bernal    Requesting Physician:  Dr. Calvert    Date of Consultation: 12/4/2024    HISTORY OF PRESENT ILLNESS:   Ashley Vivas  is a 74 y.o.  female not previously known to [facility] - denies following with a cardiologist     PMH: see below    Pulmonology office visit 11/24/2024 patient was noted to have tachycardia with questionable PSVT was started on Toprol XL 25 daily and stopped losartan by Dr. Wiley    ED [facility] 12/2/24 via EMS for Fall--- weak slightly lightheaded  Posterior head laceration linear 3 cm-repaired in ED  Arrival vitals: /77 HR  SpO2 93% on 6 L nasal cannula oxygen afebrile  Significant Labs: NT proBNP 282 troponin 38-37 CK total 45 BUN 17 CR 0.6 K4.1 WBC 11.7>> 7.9 Hgb 6.6>> 1 unit PRBC>> 8.4>> 7.8  albumin 3.8 LFT WNL  RAD: CT of head without contrast: Presumed small vessel ischemic deep white matter disease.  No acute finding.  CT of the chest with contrast: No PE.  RUL and RLL tumor LLL nodule moderate emphysema linear segment focal infiltrate with adjacent pleural scarring mild T11 compression fracture  ED treatment: Tetanus injection, DuoNeb, Tylenol    Patient seen and examined.  Recent vitals: /75 HR 95 SpO2 86% on 6 L nasal cannula nasal cannula increased to high flow 8 L SpO2 90% afebrile  Weight 112 pounds BMI 21  Intake and output incomplete  Patient is weak and frail appearing.  Falling asleep during interview - limiting ROS.    Patient tells me that on day of admission, 12/2/2024, she got up to go to the bathroom in the middle of the night.  She does not use an assistive device.  She may have gotten up quickly but she is unsure.  She had a sudden onset of palpitations and dizziness with the room \"sort of spinning\".  The next thing she remembers she awoke on the ground.  Her back and the back of her head hurt.  She denied any chest pain pressure  mouth daily   Yes Harper Harden MD   metoprolol succinate (TOPROL XL) 25 MG extended release tablet Take 1 tablet by mouth daily  Patient not taking: Reported on 12/2/2024 11/26/24   Juan C Wiley DO   albuterol sulfate HFA (VENTOLIN HFA) 108 (90 Base) MCG/ACT inhaler Inhale 2 puffs into the lungs 4 times daily as needed for Wheezing 1/2/24   Juan C Wiley DO   levoFLOXacin (LEVAQUIN) 750 MG tablet Take 1 tablet by mouth daily  Patient not taking: Reported on 12/1/2023 11/29/23   Juan C Wiley DO   methylPREDNISolone (MEDROL DOSEPACK) 4 MG tablet Take 1 tablet by mouth See Admin Instructions Take by mouth.  Patient taking differently: Take 1 tablet by mouth See Admin Instructions Indications: PRN Take by mouth. 11/29/23   Juan C Wiley DO   oseltamivir (TAMIFLU) 75 MG capsule Take 1 capsule by mouth 2 times daily X 5 days cold/flu symptoms Dec-May 11/29/23   Juan C Wiley DO   ipratropium (ATROVENT) 0.06 % nasal spray 1 spray by Each Nostril route 2 times daily as needed (to control nasal drip) 11/29/23   Juan C Wiley DO   levoFLOXacin (LEVAQUIN) 750 MG tablet Take 1 tablet by mouth daily Take 1 tablet daily for 7 days. 12/1/22   Juan C Wiley DO   guaiFENesin (MUCINEX) 600 MG extended release tablet Take 2 tablets by mouth 2 times daily as needed Drink a full glass of water with each dose to help thin mucus FOR FLU    Harper Harden MD   OXYGEN Inhale into the lungs continuous 3-4 liters continuous oxygen flow with humidification with Inogen One provided by HCS    Harper Harden MD       Current Medications:    Current Facility-Administered Medications: albuterol (PROVENTIL) (2.5 MG/3ML) 0.083% nebulizer solution 2.5 mg, 2.5 mg, Nebulization, 4x Daily PRN  guaiFENesin (MUCINEX) extended release tablet 1,200 mg, 1,200 mg, Oral, BID PRN  therapeutic multivitamin-minerals 1 tablet, 1 tablet, Oral, Daily  budesonide (PULMICORT) nebulizer suspension 1,000 mcg, 1 mg, Nebulization, BID    Recurrent squamous cell lung cancer with bilateral lung nodules confirmed on biopsy 7/24/2024 with Thoracic Surgeon, Dr. Cory Delgadillo, at the [facility].  Currently under the care of Oncologist, Dr. Martita Tompkins, at [facility] and noting response to chemotherapy on most recent chest CT in October 2024 and initiating targeted therapy with Keytruda under the care of medical oncology.   IV port in the left subclavian vein   L Breast CA 2020 Stage 1 s/p lumpectomy chemotherapy and radiation that has been completed as of March 31, 2021.   cytotoxic chemotherapy developed acute on chronic anemia due to chronic disease with her hemoglobin reduced to 8.2 g/dL   Aranesp   Skin cancer 2017  Carpal tunnel  Anxiety/depression  BMI 21  Frailty -     Plan:   Mild troponin leak in the setting of significant anemia with hemoglobin of 6.6 is likely type II in nature  Please rule out GI bleed and consult GI as well as obtain stool occult blood  If no GI bleed consider hematology involvement for evaluation of anemia  Echocardiogram to guide therapy  TSH and fasting lipid profile if not has been done recently  14-day Zio patch heart monitor prior to discharge  Monitor closely on telemetry  Monitor electrolyte and keep potassium at 4 magnesium at 2  Daily weight, strict in and out monitoring, and orthostatic vital signs  Once GI evaluation is complete/anemia evaluation completed and if there is no risk of ongoing or recurrent anemia arrange for Lexiscan myocardial perfusion stress test for further evaluation  We will resume metoprolol succinate bilateral lower dose (12.5 mg p.o. daily).  Rest per primary and other specialist involved          Guanakito Bernal MD  [facility]

## 2024-12-04 NOTE — PROGRESS NOTES
Admitting Date and Time: 12/2/2024 11:03 AM  Admit Dx: Syncope and collapse [R55]  Anemia [D64.9]  Complication of chemotherapy, initial encounter [T45.1X5A]  Symptomatic anemia [D64.9]  Malignant neoplasm of lung, unspecified laterality, unspecified part of lung (HCC) [C34.90]    Subjective:    Pt feels well  Per RN: no issues    ROS: denies fever, chills, cp, sob, n/v, HA unless stated above.     metoprolol succinate  12.5 mg Oral Daily    docusate sodium  100 mg Oral Daily    therapeutic multivitamin-minerals  1 tablet Oral Daily    budesonide  1 mg Nebulization BID RT    And    arformoterol tartrate  15 mcg Nebulization BID RT    And    ipratropium  0.5 mg Nebulization 4x Daily RT     albuterol, 2.5 mg, 4x Daily PRN  guaiFENesin, 1,200 mg, BID PRN  acetaminophen, 650 mg, Q4H PRN         Objective:    BP (!) 128/50   Pulse (!) 108   Temp 98.1 °F (36.7 °C) (Temporal)   Resp 20   Ht 1.575 m (5' 2\")   Wt 50.9 kg (112 lb 3.4 oz)   SpO2 93%   BMI 20.52 kg/m²   General Appearance: alert and oriented to person, place and time and in no acute distress  Skin: warm and dry  Head: normocephalic and atraumatic  Eyes: pupils equal, round, and reactive to light, extraocular eye movements intact, conjunctivae normal  Neck: neck supple and non tender without mass   Pulmonary/Chest: clear to auscultation bilaterally- no wheezes, rales or rhonchi, normal air movement, no respiratory distress  Cardiovascular: normal rate, normal S1 and S2 and no carotid bruits  Abdomen: soft, non-tender, non-distended, normal bowel sounds, no masses or organomegaly  Extremities: no cyanosis, no clubbing and no  edema  Neurologic: no cranial nerve deficit and speech normal      Recent Labs     12/02/24  1130 12/04/24  0502    145   K 4.0 4.1   CL 97* 101   CO2 38* 38*   BUN 17 17   CREATININE 0.5 0.6   GLUCOSE 130* 152*   CALCIUM 9.1 8.7       Recent Labs     12/02/24  1130   ALKPHOS 52   BILITOT 0.3   AST 21   ALT 16       Recent Labs      12/02/24  1130 12/03/24  0018 12/03/24  0721 12/04/24  0502   WBC 11.7*  --  8.0 7.9   RBC 2.23*  --  2.59* 2.64*   HGB 6.6* 8.4* 7.8* 7.8*   HCT 21.7* 26.3* 25.4* 26.8*   MCV 97.3  --  98.1 101.5*   MCH 29.6  --  30.1 29.5   MCHC 30.4*  --  30.7* 29.1*   RDW 20.1*  --  19.9* 19.6*     --  198 212   MPV 10.1  --  10.6 11.3           Radiology:   CT HEAD WO CONTRAST   Final Result      1.  No evidence of acute intracranial process.      2.  Findings of presumed small vessel ischemic deep white matter disease.      3.  Skin staples in the posterior scalp in a presumed area of laceration.  No   evidence of skull fracture.         CTA CHEST W CONTRAST   Final Result   1. No PE.   2. Right upper lobe and right lower lobe treated tumor.  Left lower lobe   subcentimeter pleural base nodule.   3. Moderate emphysema.  Lingular segment focal infiltrate with adjacent   pleural scarring in keeping with post radiotherapy scarring.   4. Age indeterminate mild T11 superior endplate compression fracture.   5. When outside CT exam is received, an addendum report can be issued.             Assessment:  Principal Problem:    Symptomatic anemia  Resolved Problems:    * No resolved hospital problems. *      Plan: History of present illness from History and Physical:  74 y.o. female with a history of left breast and lung cancer, on chemo for lung cancer, COPD, HTN chronic respiratory faliure on 4 liters chronically presents with near syncope.  When she got up to go to the bathroom last night she felt weak.  This was associated with lightheadedness. She fell. possible  LOC.  She hit her head when she fell and has received stitches in her occiput.  She also has pain in her mid  to palpation she thinks she fell onto this area as well.  Patient is somewhat anxious and changes her mind about medical care in various ways.  I do not trust that she is a good historian but she is not overtly very confused         (Near?)

## 2024-12-04 NOTE — PROGRESS NOTES
OCCUPATIONAL THERAPY INITIAL EVALUATION    [facility]  [address] [place]        Date:2024                                                  Patient Name: Ashley Vivas    MRN: [id number]    : 1950    Room: [address] [place] [address]      Evaluating OT: Rachid Cardoso OTR/L; #[id number]     Referring Provider and Specific Provider Orders/Date:      24 1015  OT eval and treat  Start:  24 1015,   End:  24 1015,   ONE TIME,   Standing Count:  1 Occurrences,   R       Comments:  Please provide services when f...    Pepe Calvert MD      Placement Recommendation: Subacute Rehab vs Home with occupational therapy if patient continues to progress with OT.        Diagnosis:   1. Symptomatic anemia    2. Syncope and collapse    3. Malignant neoplasm of lung, unspecified laterality, unspecified part of lung (HCC)    4. Complication of chemotherapy, initial encounter    5. Syncope, unspecified syncope type         Surgery: None      Pertinent Medical History:       Past Medical History:   Diagnosis Date    Cancer (HCC)     LT BREAST    COPD (chronic obstructive pulmonary disease) (HCC)     History of Holter monitoring 2021    Hypertension     Lung cancer, lower lobe (HCC) 2017    Lung cancer, upper lobe (HCC) 2017         Past Surgical History:   Procedure Laterality Date    BREAST LUMPECTOMY Left     BRONCHOSCOPY  2017    With EBUS    CARPAL TUNNEL RELEASE Bilateral     COLONOSCOPY  2021    EYE SURGERY Right 2023    RIGHT EYE CATARACT EXTRACTION IOL IMPLANT performed by Lida Horvath MD at [facility]    EYE SURGERY Left 2023    LEFT EYE CATARACT EXTRACTION IOL IMPLANT performed by Lida Horvath MD at [facility]    KNEE ARTHROSCOPY Left     LUNG BIOPSY Right 2017    SKIN BIOPSY      CANCER BASAL TIP NOSE        Precautions:  Fall Risk, NC O2, Head staples      Assessment of current deficits:

## 2024-12-04 NOTE — DISCHARGE SUMMARY
[facility] Hospitalist       Hospitalist Physician Discharge Summary       No follow-up provider specified.    Activity level: Slowly increase as tolerated    Diet: ADULT DIET; Regular    Labs: None are pending at the discharge    Condition at discharge: Stable    Dispo: Return to home setting     Patient ID:  Ashley Vivas  [id number]  74 y.o.  1950    Admit date: 12/2/2024    Discharge date and time:  12/4/2024  9:34 AM    Admission Diagnoses: Principal Problem:    Anemia  Resolved Problems:    * No resolved hospital problems. *      Discharge Diagnoses: Principal Problem:    Anemia  Resolved Problems:    * No resolved hospital problems. *      Consults:  IP CONSULT TO CARDIOLOGY    Procedures: None significant except if described in hospital course.    Hospital Course:  History of present illness from History and Physical:  74 y.o. female with a history of left breast and lung cancer, on chemo for lung cancer, COPD, HTN chronic respiratory faliure on 4 liters chronically presents with near syncope.  When she got up to go to the bathroom last night she felt weak.  This was associated with lightheadedness. She fell. possible  LOC.  She hit her head when she fell and has received stitches in her occiput.  She also has pain in her mid  to palpation she thinks she fell onto this area as well.  Patient is somewhat anxious and changes her mind about medical care in various ways.  I do not trust that she is a good historian but she is not overtly very confused         (Near?) syncope: can not be sure about LOC since patient and witness  seem to be poor historians.  Could have been due to anemia.    48 hours of tele: No abnormality  Cardiology said:lexiscan once identify cause of anemia.  Cardiology canceled cardiology also consulted GI to help diagnose the cause of anemia however this is not necessary considering she is not even had a Hemoccult of her stool.  Instead I will wait for

## 2024-12-04 NOTE — PROGRESS NOTES
Physical Therapy  Physical Therapy Initial Evaluation/Plan of Care    Room #:  0618/0618-02  Patient Name: Ashley Vivas  YOB: 1950  MRN: [id number]    Date of Service: 12/4/2024     Tentative placement recommendation: Subacute Rehab  Equipment recommendation: To be determined      Evaluating Physical Therapist: Caden Baltazar PT, DPT #[id number]      Specific Provider Orders/Date/Referring Provider :     12/04/24 1015    PT eval and treat  Start:  12/04/24 1015,   End:  12/04/24 1015,   ONE TIME,   Standing Count:  1 Occurrences,   R       Comments:  Please provide services when f...  Pepe Calvert MD Acknowledge New    Admitting Diagnosis:   Syncope and collapse [R55]  Anemia [D64.9]  Complication of chemotherapy, initial encounter [T45.1X5A]  Symptomatic anemia [D64.9]  Malignant neoplasm of lung, unspecified laterality, unspecified part of lung (HCC) [C34.90]      Surgery: none  Visit Diagnoses         Codes    Syncope and collapse     R55    Malignant neoplasm of lung, unspecified laterality, unspecified part of lung (HCC)     C34.90    Complication of chemotherapy, initial encounter     T45.1X5A    Syncope, unspecified syncope type     R55            Patient Active Problem List   Diagnosis    Bulging of cervical intervertebral disc without myelopathy    Cervical compression fracture (HCC)    Ulnar neuropathy at elbow    Cervical radiculopathy    COPD, very severe (HCC)    Hypoxemia requiring supplemental oxygen    Malignant neoplasm of upper lobe of right lung (HCC)    Malignant neoplasm of lower lobe of right lung (HCC)    Status post radiation therapy    Fibrinous pleuritis    Pulmonary nodules/lesions, multiple    Paroxysmal SVT (supraventricular tachycardia) (HCC)    Closed fracture of one rib of left side    Post covid-19 condition, unspecified    Stress at home    Chronic respiratory failure with hypoxia and hypercapnia    Need for immunization against influenza    Recurrent squamous cell

## 2024-12-04 NOTE — CARE COORDINATION
SOCIAL WORK / DISCHARGE PLANNING:  SW met with pt at bedside. She is anxious to discharge home today with her spouse and support of her children.  Await cardiology to clear for dc per Dr Calvert report this am to . They consulted GI, want occult stool completed. Plan for Zio patch at dc. PT/OT were ordered today but pt states she is able to get around. She is currently denying any dc needs. Family will provide home going transport. Pt has home O2 in place from [facility]. Sw will follow.               Electronically signed by ANMOL Galvan on 12/4/2024 at 1:05 PM

## 2024-12-04 NOTE — PROGRESS NOTES
4 Eyes Skin Assessment     NAME:  Ashley Vivas  YOB: 1950  MEDICAL RECORD NUMBER:  [id number]    The patient is being assessed for  Admission    I agree that at least one RN has performed a thorough Head to Toe Skin Assessment on the patient. ALL assessment sites listed below have been assessed.      Areas assessed by both nurses:    Head, Face, Ears, Shoulders, Back, Chest, Arms, Elbows, Hands, Sacrum. Buttock, Coccyx, Ischium, and Legs. Feet and Heels        Does the Patient have a Wound? No noted wound(s)       Clayton Prevention initiated by RN: No  Wound Care Orders initiated by RN: No    Pressure Injury (Stage 3,4, Unstageable, DTI, NWPT, and Complex wounds) if present, place Wound referral order by RN under : No    New Ostomies, if present place, Ostomy referral order under : No     Nurse 1 eSignature: Electronically signed by Sourav Cuba RN on 12/3/24 at 9:11 PM EST    **SHARE this note so that the co-signing nurse can place an eSignature**    Nurse 2 eSignature: Electronically signed by Jessica Hwang RN on 12/3/24 at 10:30 PM EST

## 2024-12-05 ENCOUNTER — APPOINTMENT (OUTPATIENT)
Age: 74
DRG: 811 | End: 2024-12-05
Payer: MEDICARE

## 2024-12-05 LAB
BASOPHILS # BLD: 0.07 K/UL (ref 0–0.2)
BASOPHILS NFR BLD: 1 % (ref 0–2)
EKG ATRIAL RATE: 93 BPM
EKG P AXIS: 80 DEGREES
EKG P-R INTERVAL: 114 MS
EKG Q-T INTERVAL: 332 MS
EKG QRS DURATION: 74 MS
EKG QTC CALCULATION (BAZETT): 412 MS
EKG R AXIS: 89 DEGREES
EKG T AXIS: 53 DEGREES
EKG VENTRICULAR RATE: 93 BPM
EOSINOPHIL # BLD: 0.07 K/UL (ref 0.05–0.5)
EOSINOPHILS RELATIVE PERCENT: 1 % (ref 0–6)
ERYTHROCYTE [DISTWIDTH] IN BLOOD BY AUTOMATED COUNT: 18.7 % (ref 11.5–15)
FERRITIN SERPL-MCNC: 67 NG/ML
FOLATE SERPL-MCNC: 13.4 NG/ML (ref 4.8–24.2)
HCT VFR BLD AUTO: 25.4 % (ref 34–48)
HGB BLD-MCNC: 7.3 G/DL (ref 11.5–15.5)
IMM RETICS NFR: 11.4 % (ref 3–15.9)
LYMPHOCYTES NFR BLD: 0.2 K/UL (ref 1.5–4)
LYMPHOCYTES RELATIVE PERCENT: 3 % (ref 20–42)
MCH RBC QN AUTO: 29.8 PG (ref 26–35)
MCHC RBC AUTO-ENTMCNC: 28.7 G/DL (ref 32–34.5)
MCV RBC AUTO: 103.7 FL (ref 80–99.9)
MONOCYTES NFR BLD: 0.52 K/UL (ref 0.1–0.95)
MONOCYTES NFR BLD: 7 % (ref 2–12)
NEUTROPHILS NFR BLD: 89 % (ref 43–80)
NEUTS SEG NFR BLD: 6.65 K/UL (ref 1.8–7.3)
PLATELET # BLD AUTO: 182 K/UL (ref 130–450)
PMV BLD AUTO: 11.6 FL (ref 7–12)
RBC # BLD AUTO: 2.45 M/UL (ref 3.5–5.5)
RBC # BLD: ABNORMAL 10*6/UL
RETIC HEMOGLOBIN: 19.1 PG (ref 28.2–36.6)
RETICS # AUTO: 0.11 M/UL
RETICS/RBC NFR AUTO: 4.3 % (ref 0.4–1.9)
VIT B12 SERPL-MCNC: 458 PG/ML (ref 211–946)
WBC OTHER # BLD: 7.5 K/UL (ref 4.5–11.5)

## 2024-12-05 PROCEDURE — 6360000002 HC RX W HCPCS: Performed by: INTERNAL MEDICINE

## 2024-12-05 PROCEDURE — 99239 HOSP IP/OBS DSCHRG MGMT >30: CPT | Performed by: INTERNAL MEDICINE

## 2024-12-05 PROCEDURE — 6370000000 HC RX 637 (ALT 250 FOR IP): Performed by: CLINICAL NURSE SPECIALIST

## 2024-12-05 PROCEDURE — 6370000000 HC RX 637 (ALT 250 FOR IP): Performed by: INTERNAL MEDICINE

## 2024-12-05 PROCEDURE — 2060000000 HC ICU INTERMEDIATE R&B

## 2024-12-05 PROCEDURE — 2580000003 HC RX 258: Performed by: INTERNAL MEDICINE

## 2024-12-05 PROCEDURE — 99233 SBSQ HOSP IP/OBS HIGH 50: CPT | Performed by: INTERNAL MEDICINE

## 2024-12-05 PROCEDURE — 85025 COMPLETE CBC W/AUTO DIFF WBC: CPT

## 2024-12-05 PROCEDURE — 93010 ELECTROCARDIOGRAM REPORT: CPT | Performed by: INTERNAL MEDICINE

## 2024-12-05 PROCEDURE — 94640 AIRWAY INHALATION TREATMENT: CPT

## 2024-12-05 PROCEDURE — 2700000000 HC OXYGEN THERAPY PER DAY

## 2024-12-05 PROCEDURE — 85045 AUTOMATED RETICULOCYTE COUNT: CPT

## 2024-12-05 PROCEDURE — 36415 COLL VENOUS BLD VENIPUNCTURE: CPT

## 2024-12-05 RX ORDER — METOPROLOL SUCCINATE 25 MG/1
12.5 TABLET, EXTENDED RELEASE ORAL DAILY
Qty: 30 TABLET | Refills: 0 | Status: SHIPPED | OUTPATIENT
Start: 2024-12-06

## 2024-12-05 RX ORDER — PANTOPRAZOLE SODIUM 40 MG/1
40 TABLET, DELAYED RELEASE ORAL
Status: DISCONTINUED | OUTPATIENT
Start: 2024-12-05 | End: 2024-12-14 | Stop reason: HOSPADM

## 2024-12-05 RX ORDER — PANTOPRAZOLE SODIUM 40 MG/1
40 TABLET, DELAYED RELEASE ORAL
Qty: 30 TABLET | Refills: 0 | Status: SHIPPED | OUTPATIENT
Start: 2024-12-06

## 2024-12-05 RX ADMIN — SODIUM CHLORIDE 125 MG: 9 INJECTION, SOLUTION INTRAVENOUS at 13:54

## 2024-12-05 RX ADMIN — Medication 1 TABLET: at 10:46

## 2024-12-05 RX ADMIN — ALBUTEROL SULFATE 2.5 MG: 2.5 SOLUTION RESPIRATORY (INHALATION) at 18:58

## 2024-12-05 RX ADMIN — DOCUSATE SODIUM 100 MG: 100 CAPSULE, LIQUID FILLED ORAL at 10:46

## 2024-12-05 RX ADMIN — METOPROLOL SUCCINATE 12.5 MG: 25 TABLET, EXTENDED RELEASE ORAL at 10:46

## 2024-12-05 RX ADMIN — ARFORMOTEROL TARTRATE 15 MCG: 15 SOLUTION RESPIRATORY (INHALATION) at 18:58

## 2024-12-05 RX ADMIN — IPRATROPIUM BROMIDE 0.5 MG: 0.5 SOLUTION RESPIRATORY (INHALATION) at 18:58

## 2024-12-05 RX ADMIN — PANTOPRAZOLE SODIUM 40 MG: 40 TABLET, DELAYED RELEASE ORAL at 17:30

## 2024-12-05 RX ADMIN — IPRATROPIUM BROMIDE 0.5 MG: 0.5 SOLUTION RESPIRATORY (INHALATION) at 04:50

## 2024-12-05 RX ADMIN — BUDESONIDE INHALATION 1000 MCG: 0.5 SUSPENSION RESPIRATORY (INHALATION) at 18:58

## 2024-12-05 RX ADMIN — BUDESONIDE INHALATION 1000 MCG: 0.5 SUSPENSION RESPIRATORY (INHALATION) at 04:50

## 2024-12-05 RX ADMIN — IPRATROPIUM BROMIDE 0.5 MG: 0.5 SOLUTION RESPIRATORY (INHALATION) at 09:10

## 2024-12-05 RX ADMIN — IPRATROPIUM BROMIDE 0.5 MG: 0.5 SOLUTION RESPIRATORY (INHALATION) at 14:17

## 2024-12-05 RX ADMIN — ARFORMOTEROL TARTRATE 15 MCG: 15 SOLUTION RESPIRATORY (INHALATION) at 04:50

## 2024-12-05 ASSESSMENT — PAIN DESCRIPTION - ORIENTATION: ORIENTATION: POSTERIOR

## 2024-12-05 ASSESSMENT — PAIN DESCRIPTION - LOCATION: LOCATION: HEAD

## 2024-12-05 ASSESSMENT — PAIN SCALES - GENERAL: PAINLEVEL_OUTOF10: 5

## 2024-12-05 NOTE — PROGRESS NOTES
Subjective:    The patient is awake and alert, on 8L O2.  No problems overnight.  She has complaints of a headache and backache after falling. Denies n/v/d. Tolerating meals well.     Objective:    BP (!) 114/52   Pulse (!) 108   Temp 97.9 °F (36.6 °C) (Oral)   Resp 18   Ht 1.575 m (5' 2\")   Wt 50.9 kg (112 lb 3.4 oz)   SpO2 100%   BMI 20.52 kg/m²     General: Alert, oriented, no acute distress. Emotion due to current condition  HEENT: No thrush or mucositis, EOMI, PERRLA  Heart:  RRR, no murmurs, gallops, or rubs.  Lungs:  Diminished with faint wheezing in all fields  Abd: BS present, nontender, nondistended, no masses  Extrem:  No clubbing, cyanosis, or edema  Lymphatics: No palpable adenopathy in cervical and supraclavicular regions  Skin: Intact, no petechia or purpura. Multiple scattered ecchymotic areas    CBC with Differential:    Lab Results   Component Value Date/Time    WBC 7.5 12/05/2024 05:50 AM    RBC 2.45 12/05/2024 05:50 AM    HGB 7.3 12/05/2024 05:50 AM    HCT 25.4 12/05/2024 05:50 AM     12/05/2024 05:50 AM    .7 12/05/2024 05:50 AM    MCH 29.8 12/05/2024 05:50 AM    MCHC 28.7 12/05/2024 05:50 AM    RDW 18.7 12/05/2024 05:50 AM    LYMPHOPCT 3 12/05/2024 05:50 AM    MONOPCT 7 12/05/2024 05:50 AM    EOSPCT 1 12/05/2024 05:50 AM    BASOPCT 1 12/05/2024 05:50 AM    MONOSABS 0.52 12/05/2024 05:50 AM    LYMPHSABS 0.20 12/05/2024 05:50 AM    EOSABS 0.07 12/05/2024 05:50 AM    BASOSABS 0.07 12/05/2024 05:50 AM     CMP:    Lab Results   Component Value Date/Time     12/04/2024 05:02 AM    K 4.1 12/04/2024 05:02 AM     12/04/2024 05:02 AM    CO2 38 12/04/2024 05:02 AM    BUN 17 12/04/2024 05:02 AM    CREATININE 0.6 12/04/2024 05:02 AM    GFRAA >60 05/27/2017 05:25 AM    LABGLOM >90 12/04/2024 05:02 AM    GLUCOSE 152 12/04/2024 05:02 AM    CALCIUM 8.7 12/04/2024 05:02 AM    BILITOT 0.3 12/02/2024 11:30 AM    ALKPHOS 52 12/02/2024 11:30 AM    AST 21 12/02/2024 11:30 AM    ALT  16 12/02/2024 11:30 AM              Current Facility-Administered Medications:     ferric gluconate (FERRLECIT) 125 mg in sodium chloride 0.9 % 100 mL IVPB, 125 mg, IntraVENous, Daily, Martita Tompkins MD    metoprolol succinate (TOPROL XL) extended release tablet 12.5 mg, 12.5 mg, Oral, Daily, Octavia Iverson, APRN - CNS, 12.5 mg at 12/05/24 1046    docusate sodium (COLACE) capsule 100 mg, 100 mg, Oral, Daily, Pepe Calvert MD, 100 mg at 12/05/24 1046    bisacodyl (DULCOLAX) suppository 10 mg, 10 mg, Rectal, Once, Pepe Calvert MD    albuterol (PROVENTIL) (2.5 MG/3ML) 0.083% nebulizer solution 2.5 mg, 2.5 mg, Nebulization, 4x Daily PRN, Pepe Calvert MD    guaiFENesin (MUCINEX) extended release tablet 1,200 mg, 1,200 mg, Oral, BID PRN, Pepe Calvert MD    therapeutic multivitamin-minerals 1 tablet, 1 tablet, Oral, Daily, Pepe Calvert MD, 1 tablet at 12/05/24 1046    budesonide (PULMICORT) nebulizer suspension 1,000 mcg, 1 mg, Nebulization, BID RT, 1,000 mcg at 12/05/24 0450 **AND** arformoterol tartrate (BROVANA) nebulizer solution 15 mcg, 15 mcg, Nebulization, BID RT, 15 mcg at 12/05/24 0450 **AND** ipratropium (ATROVENT) 0.02 % nebulizer solution 0.5 mg, 0.5 mg, Nebulization, 4x Daily RT, Pepe Calvert MD, 0.5 mg at 12/05/24 0910    acetaminophen (TYLENOL) tablet 650 mg, 650 mg, Oral, Q4H PRN, Que Chapman DO, 650 mg at 12/04/24 1630    CT HEAD WO CONTRAST   Final Result      1.  No evidence of acute intracranial process.      2.  Findings of presumed small vessel ischemic deep white matter disease.      3.  Skin staples in the posterior scalp in a presumed area of laceration.  No   evidence of skull fracture.         CTA CHEST W CONTRAST   Final Result   1. No PE.   2. Right upper lobe and right lower lobe treated tumor.  Left lower lobe   subcentimeter pleural base nodule.   3. Moderate emphysema.  Lingular segment focal infiltrate with adjacent   pleural scarring in keeping with

## 2024-12-05 NOTE — CARE COORDINATION
SOCIAL WORK / DISCHARGE PLANNING:  Stevo spoke with pt at bedside. PT/OT eval rec [facility] but pt will not consider this. She is planning return home with spouse and support of her family. She is now open to [facility]. Stevo reviewed [facility] agencies, chose [facility]. Referral called to [employer] rep via voice message, await review for acceptance. WILL NEED C ORDER PRIOR TO DC RN/PT/OT.  Pt to have Zio patch placed prior to dc. She has home O2,([facility])  has access to assistive device dme. Family will provide home going transport.     Addendum: 1122am Pt accepted by [facility], will obtain orders prior to dc.       Electronically signed by ANMOL Galvan on 12/5/2024 at 10:52 AM

## 2024-12-05 NOTE — DISCHARGE SUMMARY
[facility] Hospitalist       Hospitalist Physician Discharge Summary       [facility]  [address]  [place] [address]  [phone]  Follow up        Activity level: Slowly increase as tolerated    Diet: ADULT DIET; Regular    Labs: None are pending at the discharge    Condition at discharge: Stable    Dispo: Return to home setting     Patient ID:  Ashley Vivas  [id number]  74 y.o.  1950    Admit date: 12/2/2024    Discharge date and time:  12/5/2024  6:46 PM    Admission Diagnoses: Principal Problem:    Anemia  Active Problems:    Debilitated  Resolved Problems:    * No resolved hospital problems. *      Discharge Diagnoses: Principal Problem:    Anemia  Active Problems:    Debilitated  Resolved Problems:    * No resolved hospital problems. *      Consults:  IP CONSULT TO CARDIOLOGY  IP CONSULT TO ONCOLOGY  IP CONSULT TO HEM/ONC    Procedures: None significant except if described in hospital course.    Hospital Course:   Plan: History of present illness from History and Physical:  74 y.o. female with a history of left breast and lung cancer, on chemo for lung cancer, COPD, HTN chronic respiratory faliure on 4 liters chronically presents with near syncope.  When she got up to go to the bathroom last night she felt weak.  This was associated with lightheadedness. She fell. possible  LOC.  She hit her head when she fell and has received stitches in her occiput.  She also has pain in her mid  to palpation she thinks she fell onto this area as well.  Patient is somewhat anxious and changes her mind about medical care in various ways.  I do not trust that she is a good historian but she is not overtly very confused         (Near?) syncope: can not be sure about LOC since patient and witness  seem to be poor historians.  Could have been due to anemia.    48 hours of tele: No abnormality  Cardiology said:lexiscan once identify cause of anemia.

## 2024-12-05 NOTE — PROGRESS NOTES
1 tablet, Oral, Daily  budesonide (PULMICORT) nebulizer suspension 1,000 mcg, 1 mg, Nebulization, BID RT **AND** arformoterol tartrate (BROVANA) nebulizer solution 15 mcg, 15 mcg, Nebulization, BID RT **AND** ipratropium (ATROVENT) 0.02 % nebulizer solution 0.5 mg, 0.5 mg, Nebulization, 4x Daily RT  acetaminophen (TYLENOL) tablet 650 mg, 650 mg, Oral, Q4H PRN    Allergies:  Celebrex [celecoxib], Neosporin [neomycin-polymyxin-gramicidin], Augmentin [amoxicillin-pot clavulanate], and Benzalkonium chloride    Social History:    Tobacco use /vapin pack a day for 50 years quit in 2017  Alcohol: Denies  Marijuana: Denies  Illicit: Denies      Full code    Family History: Noncontributory secondary to age    REVIEW OF SYSTEMS:     Patient is weak and frail appearing.  Falling asleep during interview - limiting ROS.  See HPI    PHYSICAL EXAM:   BP (!) 122/57   Pulse (!) 113   Temp 98.2 °F (36.8 °C) (Oral)   Resp 18   Ht 1.575 m (5' 2\")   Wt 50.9 kg (112 lb 3.4 oz)   SpO2 96%   BMI 20.52 kg/m²   CONST:  Well developed, thin frail appearing  elderly female, who appears stated age. Awakens to verbal stimuli answers few questions then quickly falls back asleep,  cooperative, no apparent distress at rest on nasal cannula oxygen  HEENT:   Head- Normocephalic, atraumatic   Eyes- Conjunctivae pink  Throat- Oral mucosa pink and moist  Neck-  No stridor, trachea midline, no jugular venous distention. No hepatojugular reflex  CHEST: Chest symmetrical and non-tender to palpation. No accessory muscle use or intercostal retractions  RESPIRATORY: Lung sounds -diminished throughout fields   CARDIOVASCULAR:     No carotid bruit  Heart Inspection- shows no noted pulsations  Heart Palpation- no heaves or thrills  Heart Ausculation- Regular rhythm, tachycardic, 2/6 aortic murmur. No  rub   PV: Bilateral upper and lower  extremity trace pitting edema. No varicosities. Pedal pulses palpable  ABDOMEN: Soft, non-tender to  light palpation. Bowel sounds present.   MS: Poor muscle strength and tone.  Positive atrophy no abnormal movements.   : Deferred  SKIN: Warm and dry pale  NEURO / PSYCH: Oriented to person, place and time. Speech clear and appropriate. Follows all commands. Pleasant affect     DATA:    ECG:reviewed with DR Bernal    Tele strips: NSR 98      Diagnostic:  See above      Wt Readings from Last 3 Encounters:   12/02/24 50.9 kg (112 lb 3.4 oz)   11/26/24 52.2 kg (115 lb)   05/21/24 51.7 kg (114 lb)       No intake or output data in the 24 hours ending 12/05/24 1811    Labs:   CBC:   Recent Labs     12/04/24  0502 12/05/24  0550   WBC 7.9 7.5   HGB 7.8* 7.3*   HCT 26.8* 25.4*    182     BMP:   Recent Labs     12/04/24  0502      K 4.1   CO2 38*   BUN 17   CREATININE 0.6   LABGLOM >90   CALCIUM 8.7     Mag:   Recent Labs     12/04/24  0502   MG 2.1     Phos: No results for input(s): \"PHOS\" in the last 72 hours.  TSH:   Lab Results   Component Value Date    TSH 0.86 12/04/2024       HgA1c:   Lab Results   Component Value Date    LABA1C 5.4 12/04/2024       PRO-BNP:   Lab Results   Component Value Date    PROBNP 282 12/02/2024    PROBNP 208 (H) 05/27/2017       HS TROP:  Lab Results   Component Value Date/Time    TROPHS 37 12/02/2024 12:53 PM    TROPHS 38 12/02/2024 11:30 AM     LIPID PANEL:  Lab Results   Component Value Date    CHOL 213 (H) 12/04/2024    TRIG 105 12/04/2024    HDL 79 12/04/2024     (H) 12/04/2024    VLDL 21 12/04/2024       LIVER PROFILE:  No results for input(s): \"AST\", \"ALT\", \"LABALBU\" in the last 72 hours.        TTE 2017:  Dr Holguin  Summary   Ejection fraction is visually estimated at 70%.   No regional wall motion abnormalities seen.   There is doppler evidence of stage I diastolic dysfunction.   Dilated right ventricle with normal function.   Physiologic and/or trace mitral regurgitation is present.   Physiologic and/or trace tricuspid regurgitation.   Pulmonary  therapy  Recent TSH was fine  14-day Zio patch heart monitor prior to discharge  Monitor closely on telemetry  Monitor electrolyte and keep potassium at 4 magnesium at 2  Daily weight, strict in and out monitoring, and orthostatic vital signs  Eventual ischemic evaluation once GI bleed resolved and anemia workup complete  Continue low-dose beta-blocker monitor closely.  Rest per primary and other specialist involved      Greater than 50 minutes was spent counseling the patient, reviewing the rationale for the above recommendations and reviewing the patient's current medication list, problem list and results of all previously ordered testing.         Guanakito Bernal MD  [facility]

## 2024-12-05 NOTE — PLAN OF CARE
Problem: Discharge Planning  Goal: Discharge to home or other facility with appropriate resources  12/5/2024 1132 by Jimmy Hernandez RN  Outcome: Progressing  Flowsheets (Taken 12/3/2024 0756 by Pepe Genao, RN)  Discharge to home or other facility with appropriate resources:   Identify barriers to discharge with patient and caregiver   Arrange for needed discharge resources and transportation as appropriate   Identify discharge learning needs (meds, wound care, etc)     Problem: Pain  Goal: Verbalizes/displays adequate comfort level or baseline comfort level  12/5/2024 1132 by Jimmy Hernandez RN  Outcome: Progressing  Flowsheets (Taken 12/3/2024 0745 by Pepe Genao, RN)  Verbalizes/displays adequate comfort level or baseline comfort level:   Encourage patient to monitor pain and request assistance   Assess pain using appropriate pain scale   Administer analgesics based on type and severity of pain and evaluate response     Problem: Safety - Adult  Goal: Free from fall injury  12/5/2024 1132 by Jimmy Hernandez RN  Outcome: Progressing  Flowsheets (Taken 12/5/2024 1132)  Free From Fall Injury: Instruct family/caregiver on patient safety     Problem: ABCDS Injury Assessment  Goal: Absence of physical injury  12/5/2024 1132 by Jimmy Hernandez RN  Outcome: Progressing  Flowsheets (Taken 12/5/2024 1132)  Absence of Physical Injury: Implement safety measures based on patient assessment

## 2024-12-05 NOTE — DISCHARGE INSTRUCTIONS
Follow up with  you primary care physician during the next 7 days after discharge.  I recommend following up with cardiology since cardiology wanted you to leave with a heart monitor.  They also have plans to do further testing for your heart which I believe is a good idea.  Furthermore your stool shows evidence of microscopic blood and therefore it would be worthwhile to repeat a colonoscopy if your doctors agree.  Please check in with your primary care doctor and your gastrointestinal doctor especially the one that you had a colonoscopy with 2 years ago.  Also follow-up with Dr. Tompkins next week   Tarah Green(Attending)

## 2024-12-06 ENCOUNTER — APPOINTMENT (OUTPATIENT)
Age: 74
DRG: 811 | End: 2024-12-06
Payer: MEDICARE

## 2024-12-06 LAB
BACTERIA URNS QL MICRO: ABNORMAL
BASOPHILS # BLD: 0.07 K/UL (ref 0–0.2)
BASOPHILS NFR BLD: 1 % (ref 0–2)
BILIRUB UR QL STRIP: NEGATIVE
CLARITY UR: CLEAR
COLOR UR: YELLOW
EOSINOPHIL # BLD: 0.07 K/UL (ref 0.05–0.5)
EOSINOPHILS RELATIVE PERCENT: 1 % (ref 0–6)
EPI CELLS #/AREA URNS HPF: ABNORMAL /HPF
ERYTHROCYTE [DISTWIDTH] IN BLOOD BY AUTOMATED COUNT: 18.6 % (ref 11.5–15)
GLUCOSE UR STRIP-MCNC: NEGATIVE MG/DL
HCT VFR BLD AUTO: 24.3 % (ref 34–48)
HCT VFR BLD AUTO: 26.6 % (ref 34–48)
HGB BLD-MCNC: 6.9 G/DL (ref 11.5–15.5)
HGB BLD-MCNC: 8.2 G/DL (ref 11.5–15.5)
HGB UR QL STRIP.AUTO: ABNORMAL
KETONES UR STRIP-MCNC: NEGATIVE MG/DL
LEUKOCYTE ESTERASE UR QL STRIP: NEGATIVE
LYMPHOCYTES NFR BLD: 0.14 K/UL (ref 1.5–4)
LYMPHOCYTES RELATIVE PERCENT: 2 % (ref 20–42)
MCH RBC QN AUTO: 29.6 PG (ref 26–35)
MCHC RBC AUTO-ENTMCNC: 28.4 G/DL (ref 32–34.5)
MCV RBC AUTO: 104.3 FL (ref 80–99.9)
METAMYELOCYTES ABSOLUTE COUNT: 0.07 K/UL (ref 0–0.12)
METAMYELOCYTES: 1 % (ref 0–1)
MONOCYTES NFR BLD: 0.63 K/UL (ref 0.1–0.95)
MONOCYTES NFR BLD: 8 % (ref 2–12)
NEUTROPHILS NFR BLD: 88 % (ref 43–80)
NEUTS SEG NFR BLD: 7.03 K/UL (ref 1.8–7.3)
NITRITE UR QL STRIP: NEGATIVE
PH UR STRIP: 5.5 [PH] (ref 5–9)
PLATELET # BLD AUTO: 192 K/UL (ref 130–450)
PMV BLD AUTO: 10.3 FL (ref 7–12)
PROT UR STRIP-MCNC: ABNORMAL MG/DL
RBC # BLD AUTO: 2.33 M/UL (ref 3.5–5.5)
RBC # BLD: ABNORMAL 10*6/UL
RBC #/AREA URNS HPF: ABNORMAL /HPF
SP GR UR STRIP: 1.01 (ref 1–1.03)
UROBILINOGEN UR STRIP-ACNC: 0.2 EU/DL (ref 0–1)
WBC #/AREA URNS HPF: ABNORMAL /HPF
WBC OTHER # BLD: 8 K/UL (ref 4.5–11.5)

## 2024-12-06 PROCEDURE — 86901 BLOOD TYPING SEROLOGIC RH(D): CPT

## 2024-12-06 PROCEDURE — 6370000000 HC RX 637 (ALT 250 FOR IP): Performed by: INTERNAL MEDICINE

## 2024-12-06 PROCEDURE — 99232 SBSQ HOSP IP/OBS MODERATE 35: CPT | Performed by: INTERNAL MEDICINE

## 2024-12-06 PROCEDURE — 94640 AIRWAY INHALATION TREATMENT: CPT

## 2024-12-06 PROCEDURE — 99233 SBSQ HOSP IP/OBS HIGH 50: CPT | Performed by: INTERNAL MEDICINE

## 2024-12-06 PROCEDURE — P9016 RBC LEUKOCYTES REDUCED: HCPCS

## 2024-12-06 PROCEDURE — 86923 COMPATIBILITY TEST ELECTRIC: CPT

## 2024-12-06 PROCEDURE — 6360000002 HC RX W HCPCS: Performed by: INTERNAL MEDICINE

## 2024-12-06 PROCEDURE — 97530 THERAPEUTIC ACTIVITIES: CPT

## 2024-12-06 PROCEDURE — 85014 HEMATOCRIT: CPT

## 2024-12-06 PROCEDURE — 2580000003 HC RX 258: Performed by: INTERNAL MEDICINE

## 2024-12-06 PROCEDURE — 86900 BLOOD TYPING SEROLOGIC ABO: CPT

## 2024-12-06 PROCEDURE — 2700000000 HC OXYGEN THERAPY PER DAY

## 2024-12-06 PROCEDURE — 6370000000 HC RX 637 (ALT 250 FOR IP): Performed by: CLINICAL NURSE SPECIALIST

## 2024-12-06 PROCEDURE — 87086 URINE CULTURE/COLONY COUNT: CPT

## 2024-12-06 PROCEDURE — 85025 COMPLETE CBC W/AUTO DIFF WBC: CPT

## 2024-12-06 PROCEDURE — 94761 N-INVAS EAR/PLS OXIMETRY MLT: CPT

## 2024-12-06 PROCEDURE — 1200000000 HC SEMI PRIVATE

## 2024-12-06 PROCEDURE — 86850 RBC ANTIBODY SCREEN: CPT

## 2024-12-06 PROCEDURE — 2580000003 HC RX 258

## 2024-12-06 PROCEDURE — 36430 TRANSFUSION BLD/BLD COMPNT: CPT

## 2024-12-06 PROCEDURE — 85018 HEMOGLOBIN: CPT

## 2024-12-06 PROCEDURE — 87077 CULTURE AEROBIC IDENTIFY: CPT

## 2024-12-06 PROCEDURE — 81001 URINALYSIS AUTO W/SCOPE: CPT

## 2024-12-06 PROCEDURE — 36415 COLL VENOUS BLD VENIPUNCTURE: CPT

## 2024-12-06 RX ORDER — SODIUM CHLORIDE 9 MG/ML
INJECTION, SOLUTION INTRAVENOUS PRN
Status: COMPLETED | OUTPATIENT
Start: 2024-12-06 | End: 2024-12-06

## 2024-12-06 RX ADMIN — IPRATROPIUM BROMIDE 0.5 MG: 0.5 SOLUTION RESPIRATORY (INHALATION) at 13:23

## 2024-12-06 RX ADMIN — ACETAMINOPHEN 650 MG: 325 TABLET ORAL at 20:08

## 2024-12-06 RX ADMIN — ACETAMINOPHEN 650 MG: 325 TABLET ORAL at 10:50

## 2024-12-06 RX ADMIN — ARFORMOTEROL TARTRATE 15 MCG: 15 SOLUTION RESPIRATORY (INHALATION) at 20:11

## 2024-12-06 RX ADMIN — IPRATROPIUM BROMIDE 0.5 MG: 0.5 SOLUTION RESPIRATORY (INHALATION) at 09:45

## 2024-12-06 RX ADMIN — SODIUM CHLORIDE 125 MG: 9 INJECTION, SOLUTION INTRAVENOUS at 18:02

## 2024-12-06 RX ADMIN — BUDESONIDE INHALATION 1000 MCG: 0.5 SUSPENSION RESPIRATORY (INHALATION) at 20:11

## 2024-12-06 RX ADMIN — PANTOPRAZOLE SODIUM 40 MG: 40 TABLET, DELAYED RELEASE ORAL at 10:49

## 2024-12-06 RX ADMIN — DOCUSATE SODIUM 100 MG: 100 CAPSULE, LIQUID FILLED ORAL at 10:49

## 2024-12-06 RX ADMIN — Medication 1 TABLET: at 10:49

## 2024-12-06 RX ADMIN — IPRATROPIUM BROMIDE 0.5 MG: 0.5 SOLUTION RESPIRATORY (INHALATION) at 20:11

## 2024-12-06 RX ADMIN — METOPROLOL SUCCINATE 12.5 MG: 25 TABLET, EXTENDED RELEASE ORAL at 10:49

## 2024-12-06 RX ADMIN — ACETAMINOPHEN 650 MG: 325 TABLET ORAL at 01:33

## 2024-12-06 RX ADMIN — SODIUM CHLORIDE: 9 INJECTION, SOLUTION INTRAVENOUS at 10:59

## 2024-12-06 ASSESSMENT — PAIN DESCRIPTION - ORIENTATION: ORIENTATION: POSTERIOR

## 2024-12-06 ASSESSMENT — PAIN DESCRIPTION - LOCATION
LOCATION: BACK
LOCATION: HEAD
LOCATION: HEAD

## 2024-12-06 ASSESSMENT — PAIN SCALES - GENERAL
PAINLEVEL_OUTOF10: 5
PAINLEVEL_OUTOF10: 3
PAINLEVEL_OUTOF10: 10
PAINLEVEL_OUTOF10: 5

## 2024-12-06 NOTE — PROGRESS NOTES
Subjective:    The patient is awake and alert, sitting up in bed on 11L High Flow.  and daughter present at the bedside. She is upset because she feels she is not getting better and now she needs to be seen for blood in her stool. Tolerating diet well, no n/v/d.    Objective:    BP (!) 129/53   Pulse (!) 108   Temp 98.6 °F (37 °C)   Resp 20   Ht 1.575 m (5' 2\")   Wt 50.9 kg (112 lb 3.4 oz)   SpO2 100%   BMI 20.52 kg/m²     General: Alert, oriented, no acute distress  HEENT: No thrush or mucositis, EOMI, PERRLA  Heart:  RRR, no murmurs, gallops, or rubs.  Lungs: Diminished with wheezing in all fields. 11L high flow  Abd: BS present, nontender, nondistended, no masses  Extrem:  No clubbing, cyanosis, or edema  Lymphatics: No palpable adenopathy in cervical and supraclavicular regions  Skin: Intact, no petechia or purpura. Multiple scattered ecchymotic areas. Staples intact to occiput    CBC with Differential:    Lab Results   Component Value Date/Time    WBC 8.0 12/06/2024 05:51 AM    RBC 2.33 12/06/2024 05:51 AM    HGB 6.9 12/06/2024 05:51 AM    HCT 24.3 12/06/2024 05:51 AM     12/06/2024 05:51 AM    .3 12/06/2024 05:51 AM    MCH 29.6 12/06/2024 05:51 AM    MCHC 28.4 12/06/2024 05:51 AM    RDW 18.6 12/06/2024 05:51 AM    METASPCT 1 12/06/2024 05:51 AM    LYMPHOPCT 2 12/06/2024 05:51 AM    MONOPCT 8 12/06/2024 05:51 AM    EOSPCT 1 12/06/2024 05:51 AM    BASOPCT 1 12/06/2024 05:51 AM    MONOSABS 0.63 12/06/2024 05:51 AM    LYMPHSABS 0.14 12/06/2024 05:51 AM    EOSABS 0.07 12/06/2024 05:51 AM    BASOSABS 0.07 12/06/2024 05:51 AM     CMP:    Lab Results   Component Value Date/Time     12/04/2024 05:02 AM    K 4.1 12/04/2024 05:02 AM     12/04/2024 05:02 AM    CO2 38 12/04/2024 05:02 AM    BUN 17 12/04/2024 05:02 AM    CREATININE 0.6 12/04/2024 05:02 AM    GFRAA >60 05/27/2017 05:25 AM    LABGLOM >90 12/04/2024 05:02 AM    GLUCOSE 152 12/04/2024 05:02 AM    CALCIUM 8.7 12/04/2024

## 2024-12-06 NOTE — PROGRESS NOTES
Physician Progress Note      PATIENT:               DOROTHY PLUMMER  CSN #:                  [id number]  :                       1950  ADMIT DATE:       2024 11:03 AM  DISCH DATE:  RESPONDING  PROVIDER #:        Pepe Calvert MD          QUERY TEXT:    Pt admitted with concerns for syncope and anemia, and has chronic respiratory   failure documented. If possible, please document in progress notes and   discharge summary further specificity regarding the type of chronic   respiratory failure:    The medical record reflects the following:  Risk Factors: COPD, Hx of smoking & Lung CA  Clinical Indicators: Progress Notes - \"...Chronic respiratory failure   due to copd...\", CO2 38, Oxygen Saturations 81-98% on 4-9L NC, Oxygen   Saturations % on 5-11L HFNC  Treatment: Labs, Pulmicort, Brovana, Atrovent nebulizers, prn DuoNeb    Thank you,  Raza Beatty, BSN, RN, CRCR  Clinical Documentation Integrity  [phone]  Options provided:  -- Chronic respiratory failure with hypercapnia  -- Chronic respiratory failure with hypoxia  -- Chronic respiratory failure with hypoxia and hypercapnia  -- Other - I will add my own diagnosis  -- Disagree - Not applicable / Not valid  -- Disagree - Clinically unable to determine / Unknown  -- Refer to Clinical Documentation Reviewer    PROVIDER RESPONSE TEXT:    This patient has chronic respiratory failure with hypercapnia.    Query created by: Raza Beatty on 2024 9:51 AM      Electronically signed by:  Pepe Calvert MD 2024 10:33 AM

## 2024-12-06 NOTE — PROGRESS NOTES
Physical Therapy  Physical Therapy Treatment Note/Plan of Care    Room #:  0438/0438-02  Patient Name: Ashley Vivas  YOB: 1950  MRN: [id number]    Date of Service: 12/6/2024     Tentative placement recommendation: Subacute Rehab  Equipment recommendation: To be determined      Evaluating Physical Therapist: Caden Baltazar PT, DPT #[id number]      Specific Provider Orders/Date/Referring Provider :     12/04/24 1015    PT eval and treat  Start:  12/04/24 1015,   End:  12/04/24 1015,   ONE TIME,   Standing Count:  1 Occurrences,   R       Comments:  Please provide services when f...  Pepe Calvert MD Acknowledge New    Admitting Diagnosis:   Syncope and collapse [R55]  Anemia [D64.9]  Complication of chemotherapy, initial encounter [T45.1X5A]  Symptomatic anemia [D64.9]  Malignant neoplasm of lung, unspecified laterality, unspecified part of lung (HCC) [C34.90]      Surgery: none  Visit Diagnoses         Codes    Syncope and collapse     R55    Malignant neoplasm of lung, unspecified laterality, unspecified part of lung (HCC)     C34.90    Complication of chemotherapy, initial encounter     T45.1X5A    Syncope, unspecified syncope type     R55            Patient Active Problem List   Diagnosis    Bulging of cervical intervertebral disc without myelopathy    Cervical compression fracture (HCC)    Ulnar neuropathy at elbow    Cervical radiculopathy    COPD, very severe (HCC)    Hypoxemia requiring supplemental oxygen    Malignant neoplasm of upper lobe of right lung (HCC)    Malignant neoplasm of lower lobe of right lung (HCC)    Status post radiation therapy    Fibrinous pleuritis    Pulmonary nodules/lesions, multiple    Paroxysmal SVT (supraventricular tachycardia) (HCC)    Closed fracture of one rib of left side    Post covid-19 condition, unspecified    Stress at home    Chronic respiratory failure with hypoxia and hypercapnia    Need for immunization against influenza    Recurrent squamous cell  ankle pumps and seated marching      At end of session, patient in chair with     call light and phone within reach,  all lines and tubes intact, nursing notified.      Patient would benefit from continued skilled Physical Therapy to improve functional independence and quality of life.         Patient's/ family goals   none stated    Time in    1129  Time out  1142    Total Treatment Time  13 minutes    CPT codes:  Therapeutic activities (06892)   13 minutes  1 unit(s)    YESENIA Ruvalcaba #[id number]

## 2024-12-06 NOTE — PROGRESS NOTES
Inpatient Cardiology Consultation      Reason for Consult:  Syncope    Consulting Physician: Dr Bernal    Requesting Physician:  Dr. Calvert    Date of Consultation: 12/6/2024    HISTORY OF PRESENT ILLNESS:   Ashley Vivas  is a 74 y.o.  female not previously known to [facility] - denies following with a cardiologist     PMH: see below    Pulmonology office visit 11/24/2024 patient was noted to have tachycardia with questionable PSVT was started on Toprol XL 25 daily and stopped losartan by Dr. Wiley    Interim:  Denies chest pains but report dyspnea on exertion  Undergoing anemia evaluation  Hemoglobin has dropped again today now 6.9 and receiving packed RBC transfusion    Past Medical History:    Mod Pulm HTN RVSP 51 mmHg TTE 2017    HTN  Palpitations  11/21/2021 48 hour holter monitor:  PAC / PVC  ? PSVT in pulmonology office visit 11/26/2024 started on Toprol XL 25 daily and stopped losartan by Dr. Wiley  Former tobacco abuse 50 pack years quit smoking in 2017  Advanced COPD--chronic respiratory failure with hypoxia and hypercapnia-home oxygen 4 L nasal cannula  Per Pulm note 11/26/24: Gold 4 with an FEV1 500 cc and 26% of predicted with chronic respiratory failure with moderate chronic hypercapnia(PaCO2 63 mmHg)/chronic hypoxemia.   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 Cory Delgadillo, at the [facility].  Currently under the care of Oncologist, Dr. Martita Tompkins, at [facility] and noting response to chemotherapy on most recent chest CT in October 2024 and initiating targeted therapy with Keytruda under the care of medical oncology.   IV port in the left subclavian vein   L Breast CA 2020 Stage 1 s/p lumpectomy chemotherapy and radiation that has been completed as of March 31,  hour   Intake 350 ml   Output --   Net 350 ml       Labs:   CBC:   Recent Labs     12/05/24  0550 12/06/24  0551   WBC 7.5 8.0   HGB 7.3* 6.9*   HCT 25.4* 24.3*    192     BMP:   Recent Labs     12/04/24  0502      K 4.1   CO2 38*   BUN 17   CREATININE 0.6   LABGLOM >90   CALCIUM 8.7     Mag:   Recent Labs     12/04/24  0502   MG 2.1     Phos: No results for input(s): \"PHOS\" in the last 72 hours.  TSH:   Lab Results   Component Value Date    TSH 0.86 12/04/2024       HgA1c:   Lab Results   Component Value Date    LABA1C 5.4 12/04/2024       PRO-BNP:   Lab Results   Component Value Date    PROBNP 282 12/02/2024    PROBNP 208 (H) 05/27/2017       HS TROP:  Lab Results   Component Value Date/Time    TROPHS 37 12/02/2024 12:53 PM    TROPHS 38 12/02/2024 11:30 AM     LIPID PANEL:  Lab Results   Component Value Date    CHOL 213 (H) 12/04/2024    TRIG 105 12/04/2024    HDL 79 12/04/2024     (H) 12/04/2024    VLDL 21 12/04/2024       LIVER PROFILE:  No results for input(s): \"AST\", \"ALT\", \"LABALBU\" in the last 72 hours.        TTE 2017:  Dr Holguin  Summary   Ejection fraction is visually estimated at 70%.   No regional wall motion abnormalities seen.   There is doppler evidence of stage I diastolic dysfunction.   Dilated right ventricle with normal function.   Physiologic and/or trace mitral regurgitation is present.   Physiologic and/or trace tricuspid regurgitation.   Pulmonary hypertension is moderate. RVSP is 51 mmHg.   There is a trivial circumferential pericardial effusion noted.    EKG shows sinus rhythm 93 bpm and nonspecific ST-T wave changes.    Assessment  Syncope  NATION  SOB  Fatigue  Mild trop leak  Mod Pulm HTN RVSP 51 mmHg TTE 2017    HTN  HLP  Acute on Chronic Anemia with hgb of 6.6  PSSVT  Palpitations  11/21/2021 48 hour holter monitor:  PAC / PVC  ? PSVT in pulmonology office visit 11/26/2024 started on Toprol XL 25 daily and stopped losartan by Dr. Wiley  Former tobacco abuse 50 pack

## 2024-12-06 NOTE — CARE COORDINATION
Patient seen and examined -recent endoscopies in [place], otherwise locally follows with Dr. CHAVA Martinez -recommend changing consultation orders to ensure continuity of care and to reflect patient's wishes.  Thank you    Ge Hobbs MD

## 2024-12-06 NOTE — PROGRESS NOTES
Admitting Date and Time: 12/2/2024 11:03 AM  Admit Dx: Syncope and collapse [R55]  Anemia [D64.9]  Complication of chemotherapy, initial encounter [T45.1X5A]  Symptomatic anemia [D64.9]  Malignant neoplasm of lung, unspecified laterality, unspecified part of lung (HCC) [C34.90]    Subjective:    No new complaints.  Orthostatic dizzy with movement  Per RN: Respiratory status was decent however when the RT came to give her treatment today during my exam she had her nail polish on and was only satting 85% with no distress therefore I asked him to recheck the saturation on some other appendage that was not containing nail polish    ROS: denies fever, chills, cp, sob, n/v, HA unless stated above.     ferric gluconate (FERRLECIT) 125 mg in sodium chloride 0.9 % 100 mL IVPB  125 mg IntraVENous Daily    pantoprazole  40 mg Oral QAM AC    metoprolol succinate  12.5 mg Oral Daily    docusate sodium  100 mg Oral Daily    bisacodyl  10 mg Rectal Once    therapeutic multivitamin-minerals  1 tablet Oral Daily    budesonide  1 mg Nebulization BID RT    And    arformoterol tartrate  15 mcg Nebulization BID RT    And    ipratropium  0.5 mg Nebulization 4x Daily RT     sodium chloride, , PRN  albuterol, 2.5 mg, 4x Daily PRN  guaiFENesin, 1,200 mg, BID PRN  acetaminophen, 650 mg, Q4H PRN         Objective:    BP (!) 140/63   Pulse (!) 116   Temp 98.6 °F (37 °C)   Resp 20   Ht 1.575 m (5' 2\")   Wt 50.9 kg (112 lb 3.4 oz)   SpO2 100%   BMI 20.52 kg/m²   General Appearance: alert and oriented to person, place and time and in no acute distress  Skin: warm and dry  Head: normocephalic and atraumatic  Eyes: pupils equal, round, and reactive to light, extraocular eye movements intact, conjunctivae normal  Neck: neck supple and non tender without mass   Pulmonary/Chest: clear to auscultation bilaterally- no wheezes, rales or rhonchi, normal air movement, no respiratory distress  Cardiovascular: normal rate, normal S1 and S2 and no

## 2024-12-07 ENCOUNTER — APPOINTMENT (OUTPATIENT)
Dept: GENERAL RADIOLOGY | Age: 74
DRG: 811 | End: 2024-12-07
Payer: MEDICARE

## 2024-12-07 ENCOUNTER — APPOINTMENT (OUTPATIENT)
Age: 74
DRG: 811 | End: 2024-12-07
Payer: MEDICARE

## 2024-12-07 LAB
B PARAP IS1001 DNA NPH QL NAA+NON-PROBE: NOT DETECTED
B PERT DNA SPEC QL NAA+PROBE: NOT DETECTED
BASOPHILS # BLD: 0.02 K/UL (ref 0–0.2)
BASOPHILS NFR BLD: 0 % (ref 0–2)
C PNEUM DNA NPH QL NAA+NON-PROBE: NOT DETECTED
CRITICAL ACTION: NORMAL
CRITICAL NOTIFICATION DATE/TIME: NORMAL
CRITICAL NOTIFICATION: NORMAL
CRITICAL VALUE READ BACK: YES
ECHO AO ASC DIAM: 2.4 CM
ECHO AO ASCENDING AORTA INDEX: 1.61 CM/M2
ECHO AV AREA PEAK VELOCITY: 1.8 CM2
ECHO AV AREA VTI: 2.1 CM2
ECHO AV AREA/BSA PEAK VELOCITY: 1.2 CM2/M2
ECHO AV AREA/BSA VTI: 1.4 CM2/M2
ECHO AV MEAN GRADIENT: 12 MMHG
ECHO AV MEAN VELOCITY: 1.6 M/S
ECHO AV PEAK GRADIENT: 27 MMHG
ECHO AV PEAK VELOCITY: 2.6 M/S
ECHO AV VELOCITY RATIO: 0.62
ECHO AV VTI: 42.3 CM
ECHO BSA: 1.49 M2
ECHO EST RA PRESSURE: 3 MMHG
ECHO LA DIAMETER INDEX: 2.21 CM/M2
ECHO LA DIAMETER: 3.3 CM
ECHO LA VOL A-L A2C: 41 ML (ref 22–52)
ECHO LA VOL A-L A4C: 31 ML (ref 22–52)
ECHO LA VOL BP: 34 ML (ref 22–52)
ECHO LA VOL MOD A2C: 39 ML (ref 22–52)
ECHO LA VOL MOD A4C: 29 ML (ref 22–52)
ECHO LA VOL/BSA BIPLANE: 23 ML/M2 (ref 16–34)
ECHO LA VOLUME AREA LENGTH: 36 ML
ECHO LA VOLUME INDEX A-L A2C: 28 ML/M2 (ref 16–34)
ECHO LA VOLUME INDEX A-L A4C: 21 ML/M2 (ref 16–34)
ECHO LA VOLUME INDEX AREA LENGTH: 24 ML/M2 (ref 16–34)
ECHO LA VOLUME INDEX MOD A2C: 26 ML/M2 (ref 16–34)
ECHO LA VOLUME INDEX MOD A4C: 19 ML/M2 (ref 16–34)
ECHO LV EDV A2C: 77 ML
ECHO LV EDV A4C: 69 ML
ECHO LV EDV BP: 74 ML (ref 56–104)
ECHO LV EDV INDEX A4C: 46 ML/M2
ECHO LV EDV INDEX BP: 50 ML/M2
ECHO LV EDV NDEX A2C: 52 ML/M2
ECHO LV EF PHYSICIAN: 55 %
ECHO LV EJECTION FRACTION A2C: 63 %
ECHO LV EJECTION FRACTION A4C: 68 %
ECHO LV EJECTION FRACTION BIPLANE: 65 % (ref 55–100)
ECHO LV ESV A2C: 29 ML
ECHO LV ESV A4C: 22 ML
ECHO LV ESV BP: 26 ML (ref 19–49)
ECHO LV ESV INDEX A2C: 19 ML/M2
ECHO LV ESV INDEX A4C: 15 ML/M2
ECHO LV ESV INDEX BP: 17 ML/M2
ECHO LV FRACTIONAL SHORTENING: 41 % (ref 28–44)
ECHO LV INTERNAL DIMENSION DIASTOLE INDEX: 2.28 CM/M2
ECHO LV INTERNAL DIMENSION DIASTOLIC: 3.4 CM (ref 3.9–5.3)
ECHO LV INTERNAL DIMENSION SYSTOLIC INDEX: 1.34 CM/M2
ECHO LV INTERNAL DIMENSION SYSTOLIC: 2 CM
ECHO LV ISOVOLUMETRIC RELAXATION TIME (IVRT): 53.3 MS
ECHO LV IVSD: 0.9 CM (ref 0.6–0.9)
ECHO LV MASS 2D: 91.8 G (ref 67–162)
ECHO LV MASS INDEX 2D: 61.6 G/M2 (ref 43–95)
ECHO LV POSTERIOR WALL DIASTOLIC: 1 CM (ref 0.6–0.9)
ECHO LV RELATIVE WALL THICKNESS RATIO: 0.59
ECHO LVOT AREA: 2.8 CM2
ECHO LVOT AV VTI INDEX: 0.71
ECHO LVOT DIAM: 1.9 CM
ECHO LVOT MEAN GRADIENT: 5 MMHG
ECHO LVOT PEAK GRADIENT: 10 MMHG
ECHO LVOT PEAK VELOCITY: 1.6 M/S
ECHO LVOT STROKE VOLUME INDEX: 57.1 ML/M2
ECHO LVOT SV: 85 ML
ECHO LVOT VTI: 30 CM
ECHO MV A VELOCITY: 1.5 M/S
ECHO MV AREA PHT: 6.1 CM2
ECHO MV AREA VTI: 3 CM2
ECHO MV E DECELERATION TIME (DT): 164.6 MS
ECHO MV E VELOCITY: 0.88 M/S
ECHO MV E/A RATIO: 0.59
ECHO MV LVOT VTI INDEX: 0.94
ECHO MV MAX VELOCITY: 1.9 M/S
ECHO MV MEAN GRADIENT: 5 MMHG
ECHO MV MEAN VELOCITY: 1 M/S
ECHO MV PEAK GRADIENT: 14 MMHG
ECHO MV PRESSURE HALF TIME (PHT): 35.8 MS
ECHO MV VTI: 28.2 CM
ECHO PULMONARY ARTERY SYSTOLIC PRESSURE (PASP): 92 MMHG
ECHO PV MAX VELOCITY: 1.4 M/S
ECHO PV MEAN GRADIENT: 3 MMHG
ECHO PV MEAN VELOCITY: 0.8 M/S
ECHO PV PEAK GRADIENT: 9 MMHG
ECHO PV VTI: 23.7 CM
ECHO RIGHT VENTRICULAR SYSTOLIC PRESSURE (RVSP): 92 MMHG
ECHO RV INTERNAL DIMENSION: 2.6 CM
ECHO TV REGURGITANT MAX VELOCITY: 4.73 M/S
ECHO TV REGURGITANT PEAK GRADIENT: 89 MMHG
EOSINOPHIL # BLD: 0.15 K/UL (ref 0.05–0.5)
EOSINOPHILS RELATIVE PERCENT: 2 % (ref 0–6)
ERYTHROCYTE [DISTWIDTH] IN BLOOD BY AUTOMATED COUNT: 18.7 % (ref 11.5–15)
FLOW RATE: 7
FLUAV RNA NPH QL NAA+NON-PROBE: NOT DETECTED
FLUBV RNA NPH QL NAA+NON-PROBE: NOT DETECTED
HADV DNA NPH QL NAA+NON-PROBE: NOT DETECTED
HCOV 229E RNA NPH QL NAA+NON-PROBE: NOT DETECTED
HCOV HKU1 RNA NPH QL NAA+NON-PROBE: NOT DETECTED
HCOV NL63 RNA NPH QL NAA+NON-PROBE: NOT DETECTED
HCOV OC43 RNA NPH QL NAA+NON-PROBE: NOT DETECTED
HCT VFR BLD AUTO: 24.9 % (ref 34–48)
HGB BLD-MCNC: 7.4 G/DL (ref 11.5–15.5)
HMPV RNA NPH QL NAA+NON-PROBE: NOT DETECTED
HPIV1 RNA NPH QL NAA+NON-PROBE: NOT DETECTED
HPIV2 RNA NPH QL NAA+NON-PROBE: NOT DETECTED
HPIV3 RNA NPH QL NAA+NON-PROBE: NOT DETECTED
HPIV4 RNA NPH QL NAA+NON-PROBE: NOT DETECTED
IMM GRANULOCYTES # BLD AUTO: 0.06 K/UL (ref 0–0.58)
IMM GRANULOCYTES NFR BLD: 1 % (ref 0–5)
LYMPHOCYTES NFR BLD: 0.52 K/UL (ref 1.5–4)
LYMPHOCYTES RELATIVE PERCENT: 7 % (ref 20–42)
M PNEUMO DNA NPH QL NAA+NON-PROBE: NOT DETECTED
MCH RBC QN AUTO: 29.6 PG (ref 26–35)
MCHC RBC AUTO-ENTMCNC: 29.7 G/DL (ref 32–34.5)
MCV RBC AUTO: 99.6 FL (ref 80–99.9)
MONOCYTES NFR BLD: 0.92 K/UL (ref 0.1–0.95)
MONOCYTES NFR BLD: 12 % (ref 2–12)
NEUTROPHILS NFR BLD: 78 % (ref 43–80)
NEUTS SEG NFR BLD: 6.07 K/UL (ref 1.8–7.3)
O2 DELIVERY DEVICE: ABNORMAL
PLATELET # BLD AUTO: 184 K/UL (ref 130–450)
PMV BLD AUTO: 11.2 FL (ref 7–12)
POC HCO3: 48.2 MMOL/L (ref 22–26)
POC O2 SATURATION: 91.5 % (ref 92–98.5)
POC PCO2: 81.3 MM HG (ref 35–45)
POC PH: 7.38 (ref 7.35–7.45)
POC PO2: 68.4 MM HG (ref 60–80)
POSITIVE BASE EXCESS, ART: 20.7 MMOL/L (ref 0–3)
PROCALCITONIN SERPL-MCNC: 0.13 NG/ML (ref 0–0.08)
RBC # BLD AUTO: 2.5 M/UL (ref 3.5–5.5)
RBC # BLD: ABNORMAL 10*6/UL
RSV RNA NPH QL NAA+NON-PROBE: NOT DETECTED
RV+EV RNA NPH QL NAA+NON-PROBE: NOT DETECTED
SARS-COV-2 RNA NPH QL NAA+NON-PROBE: NOT DETECTED
SPECIMEN DESCRIPTION: NORMAL
WBC OTHER # BLD: 7.7 K/UL (ref 4.5–11.5)

## 2024-12-07 PROCEDURE — 6370000000 HC RX 637 (ALT 250 FOR IP): Performed by: INTERNAL MEDICINE

## 2024-12-07 PROCEDURE — 99223 1ST HOSP IP/OBS HIGH 75: CPT | Performed by: INTERNAL MEDICINE

## 2024-12-07 PROCEDURE — 6370000000 HC RX 637 (ALT 250 FOR IP): Performed by: CLINICAL NURSE SPECIALIST

## 2024-12-07 PROCEDURE — 6360000002 HC RX W HCPCS: Performed by: INTERNAL MEDICINE

## 2024-12-07 PROCEDURE — 2700000000 HC OXYGEN THERAPY PER DAY

## 2024-12-07 PROCEDURE — 99232 SBSQ HOSP IP/OBS MODERATE 35: CPT | Performed by: INTERNAL MEDICINE

## 2024-12-07 PROCEDURE — 82803 BLOOD GASES ANY COMBINATION: CPT

## 2024-12-07 PROCEDURE — 1200000000 HC SEMI PRIVATE

## 2024-12-07 PROCEDURE — 85025 COMPLETE CBC W/AUTO DIFF WBC: CPT

## 2024-12-07 PROCEDURE — 2580000003 HC RX 258: Performed by: INTERNAL MEDICINE

## 2024-12-07 PROCEDURE — 94660 CPAP INITIATION&MGMT: CPT

## 2024-12-07 PROCEDURE — 94640 AIRWAY INHALATION TREATMENT: CPT

## 2024-12-07 PROCEDURE — 87449 NOS EACH ORGANISM AG IA: CPT

## 2024-12-07 PROCEDURE — 36415 COLL VENOUS BLD VENIPUNCTURE: CPT

## 2024-12-07 PROCEDURE — 71045 X-RAY EXAM CHEST 1 VIEW: CPT

## 2024-12-07 PROCEDURE — 94761 N-INVAS EAR/PLS OXIMETRY MLT: CPT

## 2024-12-07 PROCEDURE — 93306 TTE W/DOPPLER COMPLETE: CPT

## 2024-12-07 PROCEDURE — 87081 CULTURE SCREEN ONLY: CPT

## 2024-12-07 PROCEDURE — 87899 AGENT NOS ASSAY W/OPTIC: CPT

## 2024-12-07 PROCEDURE — 0202U NFCT DS 22 TRGT SARS-COV-2: CPT

## 2024-12-07 PROCEDURE — 84145 PROCALCITONIN (PCT): CPT

## 2024-12-07 PROCEDURE — 5A09557 ASSISTANCE WITH RESPIRATORY VENTILATION, GREATER THAN 96 CONSECUTIVE HOURS, CONTINUOUS POSITIVE AIRWAY PRESSURE: ICD-10-PCS | Performed by: INTERNAL MEDICINE

## 2024-12-07 PROCEDURE — 86738 MYCOPLASMA ANTIBODY: CPT

## 2024-12-07 PROCEDURE — 93306 TTE W/DOPPLER COMPLETE: CPT | Performed by: INTERNAL MEDICINE

## 2024-12-07 RX ORDER — IPRATROPIUM BROMIDE AND ALBUTEROL SULFATE 2.5; .5 MG/3ML; MG/3ML
1 SOLUTION RESPIRATORY (INHALATION)
Status: DISCONTINUED | OUTPATIENT
Start: 2024-12-07 | End: 2024-12-14 | Stop reason: HOSPADM

## 2024-12-07 RX ORDER — ACETYLCYSTEINE 100 MG/ML
4 SOLUTION ORAL; RESPIRATORY (INHALATION) EVERY 4 HOURS
Status: DISCONTINUED | OUTPATIENT
Start: 2024-12-07 | End: 2024-12-14 | Stop reason: HOSPADM

## 2024-12-07 RX ORDER — ENOXAPARIN SODIUM 100 MG/ML
30 INJECTION SUBCUTANEOUS DAILY
Status: DISCONTINUED | OUTPATIENT
Start: 2024-12-07 | End: 2024-12-14 | Stop reason: HOSPADM

## 2024-12-07 RX ORDER — GUAIFENESIN 600 MG/1
600 TABLET, EXTENDED RELEASE ORAL 2 TIMES DAILY
Status: DISCONTINUED | OUTPATIENT
Start: 2024-12-07 | End: 2024-12-14 | Stop reason: HOSPADM

## 2024-12-07 RX ORDER — ENOXAPARIN SODIUM 100 MG/ML
40 INJECTION SUBCUTANEOUS DAILY
Status: DISCONTINUED | OUTPATIENT
Start: 2024-12-07 | End: 2024-12-07 | Stop reason: DRUGHIGH

## 2024-12-07 RX ORDER — LEVOFLOXACIN 5 MG/ML
750 INJECTION, SOLUTION INTRAVENOUS EVERY 24 HOURS
Status: COMPLETED | OUTPATIENT
Start: 2024-12-07 | End: 2024-12-11

## 2024-12-07 RX ADMIN — IPRATROPIUM BROMIDE 0.5 MG: 0.5 SOLUTION RESPIRATORY (INHALATION) at 13:22

## 2024-12-07 RX ADMIN — DOCUSATE SODIUM 100 MG: 100 CAPSULE, LIQUID FILLED ORAL at 08:46

## 2024-12-07 RX ADMIN — ACETYLCYSTEINE 400 MG: 100 INHALANT RESPIRATORY (INHALATION) at 21:28

## 2024-12-07 RX ADMIN — ACETAMINOPHEN 650 MG: 325 TABLET ORAL at 23:14

## 2024-12-07 RX ADMIN — IPRATROPIUM BROMIDE 0.5 MG: 0.5 SOLUTION RESPIRATORY (INHALATION) at 06:57

## 2024-12-07 RX ADMIN — Medication 1 TABLET: at 08:46

## 2024-12-07 RX ADMIN — IPRATROPIUM BROMIDE AND ALBUTEROL SULFATE 1 DOSE: .5; 2.5 SOLUTION RESPIRATORY (INHALATION) at 19:23

## 2024-12-07 RX ADMIN — BUDESONIDE INHALATION 1000 MCG: 0.5 SUSPENSION RESPIRATORY (INHALATION) at 06:57

## 2024-12-07 RX ADMIN — ARFORMOTEROL TARTRATE 15 MCG: 15 SOLUTION RESPIRATORY (INHALATION) at 06:57

## 2024-12-07 RX ADMIN — IPRATROPIUM BROMIDE 0.5 MG: 0.5 SOLUTION RESPIRATORY (INHALATION) at 09:46

## 2024-12-07 RX ADMIN — GUAIFENESIN 600 MG: 600 TABLET, EXTENDED RELEASE ORAL at 20:34

## 2024-12-07 RX ADMIN — LEVOFLOXACIN 750 MG: 5 INJECTION, SOLUTION INTRAVENOUS at 16:35

## 2024-12-07 RX ADMIN — ALBUTEROL SULFATE 2.5 MG: 2.5 SOLUTION RESPIRATORY (INHALATION) at 06:57

## 2024-12-07 RX ADMIN — BUDESONIDE INHALATION 1000 MCG: 0.5 SUSPENSION RESPIRATORY (INHALATION) at 19:22

## 2024-12-07 RX ADMIN — ALBUTEROL SULFATE 2.5 MG: 2.5 SOLUTION RESPIRATORY (INHALATION) at 02:05

## 2024-12-07 RX ADMIN — PANTOPRAZOLE SODIUM 40 MG: 40 TABLET, DELAYED RELEASE ORAL at 05:12

## 2024-12-07 RX ADMIN — WATER 40 MG: 1 INJECTION INTRAMUSCULAR; INTRAVENOUS; SUBCUTANEOUS at 16:29

## 2024-12-07 RX ADMIN — METOPROLOL SUCCINATE 12.5 MG: 25 TABLET, EXTENDED RELEASE ORAL at 08:46

## 2024-12-07 RX ADMIN — ACETAMINOPHEN 650 MG: 325 TABLET ORAL at 17:49

## 2024-12-07 RX ADMIN — ALBUTEROL SULFATE 2.5 MG: 2.5 SOLUTION RESPIRATORY (INHALATION) at 09:47

## 2024-12-07 RX ADMIN — ACETYLCYSTEINE 400 MG: 100 INHALANT RESPIRATORY (INHALATION) at 19:23

## 2024-12-07 RX ADMIN — IPRATROPIUM BROMIDE AND ALBUTEROL SULFATE 1 DOSE: .5; 2.5 SOLUTION RESPIRATORY (INHALATION) at 21:28

## 2024-12-07 ASSESSMENT — PAIN SCALES - GENERAL
PAINLEVEL_OUTOF10: 3
PAINLEVEL_OUTOF10: 3

## 2024-12-07 ASSESSMENT — PAIN DESCRIPTION - LOCATION: LOCATION: HEAD

## 2024-12-07 NOTE — PROGRESS NOTES
Subjective:    The patient is awake and alert.  She is requiring 7 to 11L High Flow.  She is feeling fatigued.     Objective:    /77   Pulse (!) 101   Temp 97.5 °F (36.4 °C) (Axillary)   Resp 24   Ht 1.575 m (5' 2\")   Wt 50.8 kg (112 lb)   SpO2 93%   BMI 20.49 kg/m²     General: Alert, oriented, no acute distress  HEENT: No thrush or mucositis, EOMI  Heart:  Tachycardic, no murmurs, gallops, or rubs.  Lungs: Diminished BS with wheezing in all fields. High flow O2.  Abd: BS present, nontender, nondistended, no masses  Extrem:  No clubbing, cyanosis, or edema  Lymphatics: No palpable adenopathy in cervical and supraclavicular regions  Skin: Intact, no petechia or purpura. Multiple scattered ecchymotic areas. Staples intact to occiput    CBC with Differential:    Lab Results   Component Value Date/Time    WBC 7.7 12/07/2024 08:12 AM    RBC 2.50 12/07/2024 08:12 AM    HGB 7.4 12/07/2024 08:12 AM    HCT 24.9 12/07/2024 08:12 AM     12/07/2024 08:12 AM    MCV 99.6 12/07/2024 08:12 AM    MCH 29.6 12/07/2024 08:12 AM    MCHC 29.7 12/07/2024 08:12 AM    RDW 18.7 12/07/2024 08:12 AM    METASPCT 1 12/06/2024 05:51 AM    LYMPHOPCT 7 12/07/2024 08:12 AM    MONOPCT 12 12/07/2024 08:12 AM    EOSPCT 2 12/07/2024 08:12 AM    BASOPCT 0 12/07/2024 08:12 AM    MONOSABS 0.92 12/07/2024 08:12 AM    LYMPHSABS 0.52 12/07/2024 08:12 AM    EOSABS 0.15 12/07/2024 08:12 AM    BASOSABS 0.02 12/07/2024 08:12 AM     CMP:    Lab Results   Component Value Date/Time     12/04/2024 05:02 AM    K 4.1 12/04/2024 05:02 AM     12/04/2024 05:02 AM    CO2 38 12/04/2024 05:02 AM    BUN 17 12/04/2024 05:02 AM    CREATININE 0.6 12/04/2024 05:02 AM    GFRAA >60 05/27/2017 05:25 AM    LABGLOM >90 12/04/2024 05:02 AM    GLUCOSE 152 12/04/2024 05:02 AM    CALCIUM 8.7 12/04/2024 05:02 AM    BILITOT 0.3 12/02/2024 11:30 AM    ALKPHOS 52 12/02/2024 11:30 AM    AST 21 12/02/2024 11:30 AM    ALT 16 12/02/2024 11:30 AM          Current  ipratropium (ATROVENT) 0.02 % nebulizer solution 0.5 mg, 0.5 mg, Nebulization, 4x Daily RT, Pepe Calvert MD, 0.5 mg at 12/07/24 1322    acetaminophen (TYLENOL) tablet 650 mg, 650 mg, Oral, Q4H PRN, Que Chapman, , 650 mg at 12/06/24 2008    XR CHEST PORTABLE   Final Result   Cardiomegaly with small bilateral pleural effusions and minimal atelectatic   changes at the lung bases.  Scarring/atelectatic change in the right upper   lobe.         CT HEAD WO CONTRAST   Final Result      1.  No evidence of acute intracranial process.      2.  Findings of presumed small vessel ischemic deep white matter disease.      3.  Skin staples in the posterior scalp in a presumed area of laceration.  No   evidence of skull fracture.         CTA CHEST W CONTRAST   Final Result   1. No PE.   2. Right upper lobe and right lower lobe treated tumor.  Left lower lobe   subcentimeter pleural base nodule.   3. Moderate emphysema.  Lingular segment focal infiltrate with adjacent   pleural scarring in keeping with post radiotherapy scarring.   4. Age indeterminate mild T11 superior endplate compression fracture.   5. When outside CT exam is received, an addendum report can be issued.             Assessment:    Principal Problem:    Symptomatic anemia  Active Problems:    Debilitated  Resolved Problems:    * No resolved hospital problems. *    74 year old female known to Dr. 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 Delgadillo.  PET + lung nodules, two on the right and two on the left,   suspicious for mets. July CT shows increase in size of some nodules. She started carbo/abraxane/keytruda  8/8/24 and completed cycle #4 on 11/7/24. She has had good partial response with resolution of some lung nodules, decreased in size on Oct 24 restaging CT chest/abd. She started Keytruda alone end of November 2024.     Admitted with near syncope and increased weakness, fall that

## 2024-12-07 NOTE — PROGRESS NOTES
Pharmacist Review and Automatic Dose Adjustment of Prophylactic Enoxaparin    Reviewed reason(s) for admission/hospital problem list    The reviewing pharmacist has made an adjustment to the ordered enoxaparin dose or converted to UFH per the approved [facility] protocol and table as identified below.        Ashley Vivas is a 74 y.o. female.     No results for input(s): \"CREATININE\" in the last 72 hours.    Estimated Creatinine Clearance: 63 mL/min (based on SCr of 0.6 mg/dL).    Recent Labs     12/06/24  0551 12/06/24  1857 12/07/24  0812   HGB 6.9* 8.2* 7.4*   HCT 24.3* 26.6* 24.9*     --  184     No results for input(s): \"INR\" in the last 72 hours.    Height:   Ht Readings from Last 1 Encounters:   12/07/24 1.575 m (5' 2\")     Weight:  Wt Readings from Last 1 Encounters:   12/07/24 50.8 kg (112 lb)               Plan: Based upon the patient's weight and renal function    Ordered: Enoxaparin 40mg SUBQ Daily    Changed/converted to    New Order: Enoxaparin 30mg SUBQ Daily      Thank you,  Dalia Braswell [facility]  12/7/2024, 3:11 PM

## 2024-12-07 NOTE — CONSULTS
Assessment and Plan  Patient is a 74 y.o. female with the following medical Problems:   COPD with acute exacerbation  Community-acquired pneumonia  Acute on chronic hypoxic respiratory failure requiring escalating dose of supplemental oxygen with associated exertional dyspnea and cough.  Mod Pulm HTN RVSP 51 mmHg TTE 2017    Former tobacco abuse 50 pack years quit smoking in 2017  Advanced COPD--chronic respiratory failure with hypoxia and hypercapnia-home oxygen 4 L nasal cannula  Per Pulm note 11/26/24: Gold 4 with an FEV1 500 cc and 26% of predicted with chronic respiratory failure with moderate chronic hypercapnia(PaCO2 63 mmHg)/chronic hypoxemia.   6.  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 Cory Delgadillo, at the [facility].  Currently under the care of Oncologist, Dr. Martita Tompkins, at [facility] and noting response to chemotherapy on most recent chest CT in October 2024 and initiating targeted therapy with Keytruda under the care of medical oncology.   IV port in the left subclavian vein   7.  L Breast CA 2020 Stage 1 s/p lumpectomy chemotherapy and radiation that has been completed as of March 31, 2021.   8.  Cytotoxic chemotherapy developed acute on chronic anemia due to chronic disease with her hemoglobin reduced to 8.2 g/dL   Aranesp   9.  Skin cancer 2017  10. Palpitations  11/21/2021 48 hour holter monitor:  PAC / PVC  ? PSVT in pulmonology office visit 11/26/2024 started on Toprol XL 25 daily and stopped losartan by Dr. Wiley  11.  Carpal tunnel  12. Anxiety/depression  13. BMI 21  14. Frailty -   15.  Urinary tract infection    Plan of care:  Scheduled Solu-Medrol, DuoNeb, Pulmicort,  Levaquin to cover for community-acquired pneumonia and for COPD exacerbation.  Follow-up urine  Diverticulitis       Allergies:         Celebrex [celecoxib], Neosporin [neomycin-polymyxin-gramicidin], Augmentin [amoxicillin-pot clavulanate], and Benzalkonium chloride    Social history:  Social History     Socioeconomic History    Marital status:      Spouse name: Not on file    Number of children: Not on file    Years of education: Not on file    Highest education level: Not on file   Occupational History    Not on file   Tobacco Use    Smoking status: Former     Current packs/day: 1.00     Average packs/day: 1 pack/day for 50.0 years (50.0 ttl pk-yrs)     Types: Cigarettes    Smokeless tobacco: Never    Tobacco comments:     STOP 5-   Substance and Sexual Activity    Alcohol use: No    Drug use: No    Sexual activity: Not on file   Other Topics Concern    Not on file   Social History Narrative    Graduated in 1966 from [employer].  [id number] graduated from [employer] with Nursing Degree.  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  1973 quit work to start family.  Miscarried one child during pregnancy.  1974 had first child, and second child in 1975.  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  Went back to school in 1989 to get a degree in Accounting, but didn't finish due to mother's illness.  In 2000, worked for  as  until he retired in 2012.  She is currently retired.         Social Determinants of Health     Financial Resource Strain: Not on file   Food Insecurity: No Food Insecurity (12/2/2024)    Hunger Vital Sign     Worried About Running Out of Food in the Last Year: Never true     Ran Out of Food in the Last Year: Never true   Transportation Needs: No Transportation Needs (12/2/2024)    PRAPARE - Transportation     Lack of Transportation (Medical): No     Lack of Transportation (Non-Medical): No   Physical Activity: Not on file   Stress: Not on file   Social Connections: Not on file    Intimate Partner Violence: Not on file   Housing Stability: Low Risk  (12/2/2024)    Housing Stability Vital Sign     Unable to Pay for Housing in the Last Year: No     Number of Times Moved in the Last Year: 1     Homeless in the Last Year: No       Current Medications:     Current Facility-Administered Medications:     pantoprazole (PROTONIX) tablet 40 mg, 40 mg, Oral, Leda ROSAS Lori Crosby, MD, 40 mg at 12/07/24 0512    metoprolol succinate (TOPROL XL) extended release tablet 12.5 mg, 12.5 mg, Oral, Daily, Octavia Iverson APRN - CNS, 12.5 mg at 12/07/24 0846    docusate sodium (COLACE) capsule 100 mg, 100 mg, Oral, Daily, Pepe Calvert MD, 100 mg at 12/07/24 0846    bisacodyl (DULCOLAX) suppository 10 mg, 10 mg, Rectal, Once, Pepe Calvert MD    albuterol (PROVENTIL) (2.5 MG/3ML) 0.083% nebulizer solution 2.5 mg, 2.5 mg, Nebulization, 4x Daily PRN, Pepe Calvert MD, 2.5 mg at 12/07/24 0947    guaiFENesin (MUCINEX) extended release tablet 1,200 mg, 1,200 mg, Oral, BID PRN, Pepe Calvert MD    therapeutic multivitamin-minerals 1 tablet, 1 tablet, Oral, Daily, Pepe Calvert MD, 1 tablet at 12/07/24 0846    budesonide (PULMICORT) nebulizer suspension 1,000 mcg, 1 mg, Nebulization, BID RT, 1,000 mcg at 12/07/24 0657 **AND** arformoterol tartrate (BROVANA) nebulizer solution 15 mcg, 15 mcg, Nebulization, BID RT, 15 mcg at 12/07/24 0657 **AND** ipratropium (ATROVENT) 0.02 % nebulizer solution 0.5 mg, 0.5 mg, Nebulization, 4x Daily RT, Pepe Calvert MD, 0.5 mg at 12/07/24 1322    acetaminophen (TYLENOL) tablet 650 mg, 650 mg, Oral, Q4H PRN, Raspovic, Que, DO, 650 mg at 12/06/24 2008      Review of Systems:   General: denies weight gain, denies loss of appetite, fever, chills, night sweats.  HEENT: denies headaches, dizziness, head trauma, visual changes, eye pain, tinnitus, nosebleeds, hoarseness or throat pain    Respiratory: denies chest pain,+ve dyspnea, cough but no

## 2024-12-07 NOTE — CONSULTS
[address] [address]                              CONSULTATION      PATIENT NAME: DOROTHY PLUMMER             : 1950  MED REC NO: [id number]                        ROOM: 0438  ACCOUNT NO: [id number]                       ADMIT DATE: 2024  PROVIDER: Patel Martinez      CONSULT DATE: 2024    PRIMARY CARE PHYSICIAN:  Dr. Calvert (House Doctor)    REASON FOR CONSULTATION:  Anemia with heme-positive stool.    HISTORY OF PRESENT ILLNESS:  The patient is a 74-year-old female, known to have past medical history of right lung cancer, underwent gamma knife treatment in  at the [facility], recently diagnosed with bilateral lung cancer, started treatment this year at the [facility] with chemotherapy, last time chemo and immunotherapy was 2 weeks ago.  Also, she is known to have left breast cancer and underwent lumpectomy in 2020, and chemotherapy after that 2 times that she had to stop due to side effects from shortness of breath.  She had also radiation therapy.  She is known also to have COPD, on oxygen 3 L at home.  She is an ex-smoker.  In the hospital, she has been placed on 7 L and occasionally, as per nursing, oxygen saturation drops if she moves from the bed to the chair, and I am increasing it to 11 L.  Also, she is known to have high blood pressure, moderate pulmonary hypertension, cytotoxic chemotherapy, developed acute on chronic anemia, history of skin cancer, anxiety, depression, history of iron deficiency anemia.  Worked up by me 3 years ago with endoscopy and colonoscopy, revealed 5 colon polyps removed, 3 to 13 mm in size, proved to be adenoma.  Endoscopy revealed hiatal hernia with mild gastritis.  Biopsies revealed reactive gastropathy with no Helicobacter pylori noted.  Proximal duodenum was normal.  Biopsies to rule out celiac disease came back unremarkable.  The patient underwent a

## 2024-12-07 NOTE — PROGRESS NOTES
Admitting Date and Time: 12/2/2024 11:03 AM  Admit Dx: Syncope and collapse [R55]  Anemia [D64.9]  Complication of chemotherapy, initial encounter [T45.1X5A]  Symptomatic anemia [D64.9]  Malignant neoplasm of lung, unspecified laterality, unspecified part of lung (HCC) [C34.90]    Subjective:    No new complaints.  Dyspnea with movement.  Since yesterday her FiO2 has increased  She is still having pulse oximetry over her nail.  I placed the nurse communication order to relocate this today    ROS: denies fever, chills, cp, sob, n/v, HA unless stated above.     pantoprazole  40 mg Oral QAM AC    metoprolol succinate  12.5 mg Oral Daily    docusate sodium  100 mg Oral Daily    bisacodyl  10 mg Rectal Once    therapeutic multivitamin-minerals  1 tablet Oral Daily    budesonide  1 mg Nebulization BID RT    And    arformoterol tartrate  15 mcg Nebulization BID RT    And    ipratropium  0.5 mg Nebulization 4x Daily RT     albuterol, 2.5 mg, 4x Daily PRN  guaiFENesin, 1,200 mg, BID PRN  acetaminophen, 650 mg, Q4H PRN         Objective:    /77   Pulse (!) 101   Temp 97.5 °F (36.4 °C) (Axillary)   Resp 24   Ht 1.575 m (5' 2\")   Wt 50.9 kg (112 lb 3.4 oz)   SpO2 93%   BMI 20.52 kg/m²   General Appearance: alert and oriented to person, place and time and in no acute distress  Skin: warm and dry  Head: normocephalic and atraumatic  Eyes: pupils equal, round, and reactive to light, extraocular eye movements intact, conjunctivae normal  Neck: neck supple and non tender without mass   Pulmonary/Chest: clear to auscultation bilaterally- no wheezes, rales or rhonchi, normal air movement, no respiratory distress  Cardiovascular: normal rate, normal S1 and S2 and no carotid bruits  Abdomen: soft, non-tender, non-distended, normal bowel sounds, no masses or organomegaly  Extremities: no cyanosis, no clubbing and no  edema  Neurologic: no cranial nerve deficit and speech normal      No results for input(s): \"NA\", \"K\", \"CL\",  \"CO2\", \"BUN\", \"CREATININE\", \"GLUCOSE\", \"CALCIUM\" in the last 72 hours.      No results for input(s): \"ALKPHOS\", \"LABALBU\", \"BILITOT\", \"AST\", \"ALT\" in the last 72 hours.    Invalid input(s): \"PROT\"    Recent Labs     12/05/24  0550 12/06/24  0551 12/06/24  1857 12/07/24  0812   WBC 7.5 8.0  --  7.7   RBC 2.45* 2.33*  --  2.50*   HGB 7.3* 6.9* 8.2* 7.4*   HCT 25.4* 24.3* 26.6* 24.9*   .7* 104.3*  --  99.6   MCH 29.8 29.6  --  29.6   MCHC 28.7* 28.4*  --  29.7*   RDW 18.7* 18.6*  --  18.7*    192  --  184   MPV 11.6 10.3  --  11.2           Radiology:   XR CHEST PORTABLE   Final Result   Cardiomegaly with small bilateral pleural effusions and minimal atelectatic   changes at the lung bases.  Scarring/atelectatic change in the right upper   lobe.         CT HEAD WO CONTRAST   Final Result      1.  No evidence of acute intracranial process.      2.  Findings of presumed small vessel ischemic deep white matter disease.      3.  Skin staples in the posterior scalp in a presumed area of laceration.  No   evidence of skull fracture.         CTA CHEST W CONTRAST   Final Result   1. No PE.   2. Right upper lobe and right lower lobe treated tumor.  Left lower lobe   subcentimeter pleural base nodule.   3. Moderate emphysema.  Lingular segment focal infiltrate with adjacent   pleural scarring in keeping with post radiotherapy scarring.   4. Age indeterminate mild T11 superior endplate compression fracture.   5. When outside CT exam is received, an addendum report can be issued.             Assessment:  Principal Problem:    Symptomatic anemia  Active Problems:    Debilitated  Resolved Problems:    * No resolved hospital problems. *      Plan: History of present illness from History and Physical:  74 y.o. female with a history of left breast and lung cancer, on chemo for lung cancer, COPD, HTN chronic respiratory faliure on 4 liters chronically presents with near syncope.  When she got up to go to the bathroom last

## 2024-12-08 LAB
L PNEUMO1 AG UR QL IA.RAPID: NEGATIVE
MICROORGANISM SPEC CULT: ABNORMAL
MICROORGANISM SPEC CULT: ABNORMAL
S PNEUM AG SPEC QL: NEGATIVE
SERVICE CMNT-IMP: ABNORMAL
SPECIMEN DESCRIPTION: ABNORMAL
SPECIMEN SOURCE: NORMAL

## 2024-12-08 PROCEDURE — 6360000002 HC RX W HCPCS: Performed by: INTERNAL MEDICINE

## 2024-12-08 PROCEDURE — 6370000000 HC RX 637 (ALT 250 FOR IP): Performed by: INTERNAL MEDICINE

## 2024-12-08 PROCEDURE — 99233 SBSQ HOSP IP/OBS HIGH 50: CPT | Performed by: INTERNAL MEDICINE

## 2024-12-08 PROCEDURE — 2580000003 HC RX 258: Performed by: INTERNAL MEDICINE

## 2024-12-08 PROCEDURE — 2700000000 HC OXYGEN THERAPY PER DAY

## 2024-12-08 PROCEDURE — 6370000000 HC RX 637 (ALT 250 FOR IP): Performed by: CLINICAL NURSE SPECIALIST

## 2024-12-08 PROCEDURE — 94660 CPAP INITIATION&MGMT: CPT

## 2024-12-08 PROCEDURE — 94640 AIRWAY INHALATION TREATMENT: CPT

## 2024-12-08 PROCEDURE — 94761 N-INVAS EAR/PLS OXIMETRY MLT: CPT

## 2024-12-08 PROCEDURE — 1200000000 HC SEMI PRIVATE

## 2024-12-08 PROCEDURE — 99239 HOSP IP/OBS DSCHRG MGMT >30: CPT | Performed by: INTERNAL MEDICINE

## 2024-12-08 PROCEDURE — 94669 MECHANICAL CHEST WALL OSCILL: CPT

## 2024-12-08 RX ADMIN — DOCUSATE SODIUM 100 MG: 100 CAPSULE, LIQUID FILLED ORAL at 09:13

## 2024-12-08 RX ADMIN — ACETYLCYSTEINE 400 MG: 100 INHALANT RESPIRATORY (INHALATION) at 06:20

## 2024-12-08 RX ADMIN — ALBUTEROL SULFATE 2.5 MG: 2.5 SOLUTION RESPIRATORY (INHALATION) at 04:40

## 2024-12-08 RX ADMIN — IPRATROPIUM BROMIDE AND ALBUTEROL SULFATE 1 DOSE: .5; 2.5 SOLUTION RESPIRATORY (INHALATION) at 18:47

## 2024-12-08 RX ADMIN — Medication 1 TABLET: at 09:13

## 2024-12-08 RX ADMIN — LEVOFLOXACIN 750 MG: 5 INJECTION, SOLUTION INTRAVENOUS at 15:48

## 2024-12-08 RX ADMIN — METOPROLOL SUCCINATE 12.5 MG: 25 TABLET, EXTENDED RELEASE ORAL at 09:13

## 2024-12-08 RX ADMIN — ENOXAPARIN SODIUM 30 MG: 100 INJECTION SUBCUTANEOUS at 15:43

## 2024-12-08 RX ADMIN — BENZOCAINE 6 MG-MENTHOL 10 MG LOZENGES 1 LOZENGE: at 21:19

## 2024-12-08 RX ADMIN — ACETYLCYSTEINE 400 MG: 100 INHALANT RESPIRATORY (INHALATION) at 23:18

## 2024-12-08 RX ADMIN — ACETYLCYSTEINE 400 MG: 100 INHALANT RESPIRATORY (INHALATION) at 14:07

## 2024-12-08 RX ADMIN — PANTOPRAZOLE SODIUM 40 MG: 40 TABLET, DELAYED RELEASE ORAL at 05:37

## 2024-12-08 RX ADMIN — BUDESONIDE INHALATION 1000 MCG: 0.5 SUSPENSION RESPIRATORY (INHALATION) at 18:47

## 2024-12-08 RX ADMIN — ACETAMINOPHEN 650 MG: 325 TABLET ORAL at 13:56

## 2024-12-08 RX ADMIN — ACETYLCYSTEINE 400 MG: 100 INHALANT RESPIRATORY (INHALATION) at 18:47

## 2024-12-08 RX ADMIN — IPRATROPIUM BROMIDE AND ALBUTEROL SULFATE 1 DOSE: .5; 2.5 SOLUTION RESPIRATORY (INHALATION) at 06:19

## 2024-12-08 RX ADMIN — ACETYLCYSTEINE 400 MG: 100 INHALANT RESPIRATORY (INHALATION) at 10:08

## 2024-12-08 RX ADMIN — WATER 40 MG: 1 INJECTION INTRAMUSCULAR; INTRAVENOUS; SUBCUTANEOUS at 15:43

## 2024-12-08 RX ADMIN — WATER 40 MG: 1 INJECTION INTRAMUSCULAR; INTRAVENOUS; SUBCUTANEOUS at 04:31

## 2024-12-08 RX ADMIN — IPRATROPIUM BROMIDE AND ALBUTEROL SULFATE 1 DOSE: .5; 2.5 SOLUTION RESPIRATORY (INHALATION) at 10:08

## 2024-12-08 RX ADMIN — GUAIFENESIN 600 MG: 600 TABLET, EXTENDED RELEASE ORAL at 09:13

## 2024-12-08 RX ADMIN — IPRATROPIUM BROMIDE AND ALBUTEROL SULFATE 1 DOSE: .5; 2.5 SOLUTION RESPIRATORY (INHALATION) at 14:06

## 2024-12-08 RX ADMIN — IPRATROPIUM BROMIDE AND ALBUTEROL SULFATE 1 DOSE: .5; 2.5 SOLUTION RESPIRATORY (INHALATION) at 23:19

## 2024-12-08 RX ADMIN — GUAIFENESIN 600 MG: 600 TABLET, EXTENDED RELEASE ORAL at 21:13

## 2024-12-08 RX ADMIN — BENZOCAINE 6 MG-MENTHOL 10 MG LOZENGES 1 LOZENGE: at 12:19

## 2024-12-08 RX ADMIN — BUDESONIDE INHALATION 1000 MCG: 0.5 SUSPENSION RESPIRATORY (INHALATION) at 06:19

## 2024-12-08 ASSESSMENT — PAIN DESCRIPTION - DESCRIPTORS: DESCRIPTORS: DISCOMFORT;SORE

## 2024-12-08 ASSESSMENT — PAIN SCALES - GENERAL
PAINLEVEL_OUTOF10: 6
PAINLEVEL_OUTOF10: 5

## 2024-12-08 ASSESSMENT — PAIN DESCRIPTION - LOCATION
LOCATION: HEAD
LOCATION: THROAT

## 2024-12-08 NOTE — PROGRESS NOTES
Admitting Date and Time: 12/2/2024 11:03 AM  Admit Dx: Syncope and collapse [R55]  Anemia [D64.9]  Complication of chemotherapy, initial encounter [T45.1X5A]  Symptomatic anemia [D64.9]  Malignant neoplasm of lung, unspecified laterality, unspecified part of lung (HCC) [C34.90]    Subjective:  Sore throat  Nurse reports patient still uncomfortable with her breathing    ROS: denies fever, chills, cp, sob, n/v, HA unless stated above.     ipratropium 0.5 mg-albuterol 2.5 mg  1 Dose Inhalation Q4H WA RT    methylPREDNISolone  40 mg IntraVENous Q12H    guaiFENesin  600 mg Oral BID    acetylcysteine  4 mL Inhalation Q4H    levofloxacin  750 mg IntraVENous Q24H    enoxaparin  30 mg SubCUTAneous Daily    pantoprazole  40 mg Oral QAM AC    metoprolol succinate  12.5 mg Oral Daily    docusate sodium  100 mg Oral Daily    bisacodyl  10 mg Rectal Once    therapeutic multivitamin-minerals  1 tablet Oral Daily    budesonide  1 mg Nebulization BID RT     Benzocaine-Menthol, 1 lozenge, Q2H PRN  albuterol, 2.5 mg, 4x Daily PRN  guaiFENesin, 1,200 mg, BID PRN  acetaminophen, 650 mg, Q4H PRN         Objective:    /64   Pulse (!) 110   Temp 98.1 °F (36.7 °C) (Oral)   Resp 18   Ht 1.575 m (5' 2\")   Wt 50.8 kg (112 lb)   SpO2 100%   BMI 20.49 kg/m²   General Appearance: alert and oriented to person, place and time and in no acute distress  Skin: warm and dry  Head: normocephalic and atraumatic  Eyes: pupils equal, round, and reactive to light, extraocular eye movements intact, conjunctivae normal  Neck: neck supple and non tender without mass   Pulmonary/Chest: clear to auscultation bilaterally- no wheezes, rales or rhonchi, normal air movement, no respiratory distress  Cardiovascular: normal rate, normal S1 and S2 and no carotid bruits  Abdomen: soft, non-tender, non-distended, normal bowel sounds, no masses or organomegaly  Extremities: no cyanosis, no clubbing and no  edema  Neurologic: no cranial nerve deficit and  she felt weak.  This was associated with lightheadedness. She fell. possible  LOC.  She hit her head when she fell and has received stitches in her occiput.  She also has pain in her mid  to palpation she thinks she fell onto this area as well.  Patient is somewhat anxious and changes her mind about medical care in various ways.  I do not trust that she is a good historian but she is not overtly very confused         (Near?) syncope: can not be sure about LOC since patient and witness  seem to be poor historians.  Could have been due to anemia.    48 hours of tele: No abnormality  Cardiology said:lexiscan once identify cause of anemia.  Cardiology did suggest GI consult.  Before I obtained GI consult I waited for her stool to be tested for occult blood.  That test came back positive later in the afternoon 12/5     Lung change b/l non-small cell: chemo / radiation as per her hem onc dr tompkins after discharge.  Finished chemo 11/7 per d/w dr tompkins who started keytruda afterwards.    Anemia initially thought to be Chemo associated anemia: 1 unit prbcs transfusion then hb stable even through 12/4 needing transfusion upon presentation . 11/7 ferritin 22 iron 80 iron sat 19%. During type and screen antibody screen (-).  I spoke to Dr. Tompkins consulted and gave IV iron since iron levels were low.  She will follow-up with her next week to offer further iron treatment and around that time it could be determined if her hemoglobin is stable to proceed with further workup.  12/6 hemoglobin decreased to 6.9 so no other unit of PRBCs transfused. 12/7 hemoglobin drifting down again.  My suspicion for GI bleed is growing we will attempt to reach out to GI again this morning  Chronic respiratory failure due to copd  Patient is prescription steroids and antibiotics are given by the office pulmonologist just in case the patient decides that she ever needs to use any of these medications.  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  Upon discharge patient will be going to her own home  He called me directly through the hospital  on 12/6 to project ahead her length of stay.  Explained the difficulty I would have in accurately guessing since it would depend on multiple other physicians and how they manage some issues for example GI will do a workup and she will have a cardiology workup but it has not yet been determined how much of a cardiac workup she will have prior to discharge  8.  full code  9.  DVT prophylaxis with SCDs  10.  Disposition respiratory has helped confirm that she does not have the ability to deliver herself as much decision at home as she is on here with the beginning of flutter valve aggressively this at mid afternoon I am hoping she could be weaned if not other arrangements will be need to be made which should occur for discharge on 12/9    NOTE: This report was transcribed using voice recognition software. Every effort was made to ensure accuracy; however, inadvertent computerized transcription errors may be present.     Electronically signed by SHAYY GAONA MD on 12/8/2024 at 12:05 PM

## 2024-12-08 NOTE — PROGRESS NOTES
Nail polish removed from left finger. Continuous pulse ox moved to the finger with no polish. Pt spo2 is 100% on 10L high flow nc.

## 2024-12-08 NOTE — PLAN OF CARE
Problem: Discharge Planning  Goal: Discharge to home or other facility with appropriate resources  12/8/2024 1123 by Suzanne Salas RN  Outcome: Progressing  12/7/2024 2329 by Reyna Ross RN  Outcome: Progressing     Problem: Pain  Goal: Verbalizes/displays adequate comfort level or baseline comfort level  12/8/2024 1123 by Suzanne Salas RN  Outcome: Progressing  12/7/2024 2329 by Reyna Ross RN  Outcome: Progressing     Problem: Safety - Adult  Goal: Free from fall injury  12/8/2024 1123 by Suzanne Salas RN  Outcome: Progressing  12/7/2024 2329 by Reyna Ross RN  Outcome: Progressing     Problem: ABCDS Injury Assessment  Goal: Absence of physical injury  12/8/2024 1123 by Suzanne Salas RN  Outcome: Progressing  12/7/2024 2329 by Reyna Ross RN  Outcome: Progressing     Problem: Skin/Tissue Integrity  Goal: Absence of new skin breakdown  Description: 1.  Monitor for areas of redness and/or skin breakdown  2.  Assess vascular access sites hourly  3.  Every 4-6 hours minimum:  Change oxygen saturation probe site  4.  Every 4-6 hours:  If on nasal continuous positive airway pressure, respiratory therapy assess nares and determine need for appliance change or resting period.  12/8/2024 1123 by Suzanne Salas RN  Outcome: Progressing  12/7/2024 2329 by Reyna Ross RN  Outcome: Progressing

## 2024-12-08 NOTE — CARE COORDINATION
SOCIAL WORK / DISCHARGE PLANNING:  Sw received call from Deanna MAX charge regarding possible dc. Dtr told her that pt has home O2 from [facility] with 6L concentrator?? Pt currently with exertion requiring 11L HF. Sw explained concentrator usually come 5L and 10 L. If pt needs new concentrator would require new O2 testing and script. When this Sw completed original assessment, pt told this Sw baseline was 3-4L and she does not have cpap or bipap at home.           Electronically signed by ANMOL Galvan on 12/8/2024 at 2:23 PM

## 2024-12-08 NOTE — PROGRESS NOTES
Assessment and Plan  Patient is a 74 y.o. female with the following medical Problems:   COPD with acute exacerbation  Community-acquired pneumonia  Acute on chronic hypoxic respiratory failure requiring escalating dose of supplemental oxygen with associated exertional dyspnea and cough.  Mod Pulm HTN RVSP 51 mmHg TTE 2017    Former tobacco abuse 50 pack years quit smoking in 2017  Advanced COPD--chronic respiratory failure with hypoxia and hypercapnia-home oxygen 4 L nasal cannula  Per Pulm note 11/26/24: Gold 4 with an FEV1 500 cc and 26% of predicted with chronic respiratory failure with moderate chronic hypercapnia(PaCO2 63 mmHg)/chronic hypoxemia.   6.  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 Coyr Delgadillo, at the [facility].  Currently under the care of Oncologist, Dr. Martita Tompkins, at [facility] and noting response to chemotherapy on most recent chest CT in October 2024 and initiating targeted therapy with Keytruda under the care of medical oncology.   IV port in the left subclavian vein   7.  L Breast CA 2020 Stage 1 s/p lumpectomy chemotherapy and radiation that has been completed as of March 31, 2021.   8.  Cytotoxic chemotherapy developed acute on chronic anemia due to chronic disease with her hemoglobin reduced to 8.2 g/dL   Aranesp   9.  Skin cancer 2017  10. Palpitations  11/21/2021 48 hour holter monitor:  PAC / PVC  ? PSVT in pulmonology office visit 11/26/2024 started on Toprol XL 25 daily and stopped losartan by Dr. Wiley  11.  Carpal tunnel  12. Anxiety/depression  13. BMI 21  14. Frailty -   15.  Urinary tract infection with E. coli.    Plan of care:  Scheduled Solu-Medrol, DuoNeb, Pulmicort,  Levaquin to cover for community-acquired pneumonia and for COPD exacerbation.  Urine  pantoprazole (PROTONIX) tablet 40 mg, 40 mg, Oral, QAM AC, HemrockMartita MD, 40 mg at 12/08/24 0537    metoprolol succinate (TOPROL XL) extended release tablet 12.5 mg, 12.5 mg, Oral, Daily, Octavia Iverson, APRN - CNS, 12.5 mg at 12/08/24 0913    docusate sodium (COLACE) capsule 100 mg, 100 mg, Oral, Daily, Pepe Calvert MD, 100 mg at 12/08/24 0913    bisacodyl (DULCOLAX) suppository 10 mg, 10 mg, Rectal, Once, Pepe Calvert MD    albuterol (PROVENTIL) (2.5 MG/3ML) 0.083% nebulizer solution 2.5 mg, 2.5 mg, Nebulization, 4x Daily PRN, Pepe Calvert MD, 2.5 mg at 12/08/24 0440    guaiFENesin (MUCINEX) extended release tablet 1,200 mg, 1,200 mg, Oral, BID PRN, Pepe Calvert MD    therapeutic multivitamin-minerals 1 tablet, 1 tablet, Oral, Daily, Pepe Calvert MD, 1 tablet at 12/08/24 0913    budesonide (PULMICORT) nebulizer suspension 1,000 mcg, 1 mg, Nebulization, BID RT, 1,000 mcg at 12/08/24 0619 **AND** [DISCONTINUED] arformoterol tartrate (BROVANA) nebulizer solution 15 mcg, 15 mcg, Nebulization, BID RT, 15 mcg at 12/07/24 0657 **AND** [DISCONTINUED] ipratropium (ATROVENT) 0.02 % nebulizer solution 0.5 mg, 0.5 mg, Nebulization, 4x Daily RT, Pepe Calvert MD, 0.5 mg at 12/07/24 1322    acetaminophen (TYLENOL) tablet 650 mg, 650 mg, Oral, Q4H PRN, Que Chapman DO, 650 mg at 12/07/24 4859      Review of Systems:   General: denies weight gain, denies loss of appetite, fever, chills, night sweats.  HEENT: denies headaches, dizziness, head trauma, visual changes, eye pain, tinnitus, nosebleeds, hoarseness or throat pain    Respiratory: denies chest pain,+ve dyspnea, cough but no hemoptysis  Cardiovascular: denies orthopnea, paroxysmal nocturnal dyspnea, leg swelling, and previous heart attack.    Gastrointestinal: denies pain, nausea vomiting, diarrhea, constipation, melena or bleeding.  Genitourinary: denies hematuria, frequency, urgency or dysuria  Neurology: denies syncope, seizures,  to have those questions answered.      Electronically signed by Syd Patel MD on 12/8/2024 at 1:22 PM

## 2024-12-09 ENCOUNTER — APPOINTMENT (OUTPATIENT)
Dept: GENERAL RADIOLOGY | Age: 74
DRG: 811 | End: 2024-12-09
Payer: MEDICARE

## 2024-12-09 PROBLEM — J44.1 COPD EXACERBATION (HCC): Status: ACTIVE | Noted: 2024-12-09

## 2024-12-09 PROBLEM — R55 SYNCOPE AND COLLAPSE: Status: ACTIVE | Noted: 2024-12-09

## 2024-12-09 PROBLEM — T45.1X5A COMPLICATION OF CHEMOTHERAPY: Status: ACTIVE | Noted: 2024-12-09

## 2024-12-09 LAB
ABO/RH: NORMAL
ANTIBODY SCREEN: NEGATIVE
ARM BAND NUMBER: NORMAL
BASOPHILS # BLD: 0 K/UL (ref 0–0.2)
BASOPHILS NFR BLD: 0 % (ref 0–2)
BLOOD BANK BLOOD PRODUCT EXPIRATION DATE: NORMAL
BLOOD BANK DISPENSE STATUS: NORMAL
BLOOD BANK DISPENSE STATUS: NORMAL
BLOOD BANK ISBT PRODUCT BLOOD TYPE: 6200
BLOOD BANK PRODUCT CODE: NORMAL
BLOOD BANK SAMPLE EXPIRATION: NORMAL
BLOOD BANK UNIT TYPE AND RH: NORMAL
BPU ID: NORMAL
BPU ID: NORMAL
COMPONENT: NORMAL
COMPONENT: NORMAL
CROSSMATCH RESULT: NORMAL
CROSSMATCH RESULT: NORMAL
EOSINOPHIL # BLD: 0 K/UL (ref 0.05–0.5)
EOSINOPHILS RELATIVE PERCENT: 0 % (ref 0–6)
ERYTHROCYTE [DISTWIDTH] IN BLOOD BY AUTOMATED COUNT: 18 % (ref 11.5–15)
HCT VFR BLD AUTO: 21 % (ref 34–48)
HCT VFR BLD AUTO: 27.1 % (ref 34–48)
HGB BLD-MCNC: 6.3 G/DL (ref 11.5–15.5)
HGB BLD-MCNC: 8.5 G/DL (ref 11.5–15.5)
IMM GRANULOCYTES # BLD AUTO: 0.08 K/UL (ref 0–0.58)
IMM GRANULOCYTES NFR BLD: 1 % (ref 0–5)
LYMPHOCYTES NFR BLD: 0.31 K/UL (ref 1.5–4)
LYMPHOCYTES RELATIVE PERCENT: 5 % (ref 20–42)
MCH RBC QN AUTO: 30.3 PG (ref 26–35)
MCHC RBC AUTO-ENTMCNC: 30 G/DL (ref 32–34.5)
MCV RBC AUTO: 101 FL (ref 80–99.9)
MICROORGANISM SPEC CULT: NORMAL
MONOCYTES NFR BLD: 0.18 K/UL (ref 0.1–0.95)
MONOCYTES NFR BLD: 3 % (ref 2–12)
MYCOPLASMA AB,IGM: NORMAL
NEUTROPHILS NFR BLD: 91 % (ref 43–80)
NEUTS SEG NFR BLD: 5.69 K/UL (ref 1.8–7.3)
PLATELET # BLD AUTO: 199 K/UL (ref 130–450)
PMV BLD AUTO: 11 FL (ref 7–12)
RBC # BLD AUTO: 2.08 M/UL (ref 3.5–5.5)
RBC # BLD: ABNORMAL 10*6/UL
RBC # BLD: ABNORMAL 10*6/UL
SERVICE CMNT-IMP: NORMAL
SPECIMEN DESCRIPTION: NORMAL
TRANSFUSION STATUS: NORMAL
TRANSFUSION STATUS: NORMAL
UNIT DIVISION: 0
UNIT DIVISION: 0
UNIT ISSUE DATE/TIME: NORMAL
WBC OTHER # BLD: 6.3 K/UL (ref 4.5–11.5)

## 2024-12-09 PROCEDURE — 94640 AIRWAY INHALATION TREATMENT: CPT

## 2024-12-09 PROCEDURE — 6370000000 HC RX 637 (ALT 250 FOR IP): Performed by: INTERNAL MEDICINE

## 2024-12-09 PROCEDURE — 71045 X-RAY EXAM CHEST 1 VIEW: CPT

## 2024-12-09 PROCEDURE — 6360000002 HC RX W HCPCS: Performed by: INTERNAL MEDICINE

## 2024-12-09 PROCEDURE — P9016 RBC LEUKOCYTES REDUCED: HCPCS

## 2024-12-09 PROCEDURE — 99233 SBSQ HOSP IP/OBS HIGH 50: CPT | Performed by: INTERNAL MEDICINE

## 2024-12-09 PROCEDURE — 1200000000 HC SEMI PRIVATE

## 2024-12-09 PROCEDURE — 86850 RBC ANTIBODY SCREEN: CPT

## 2024-12-09 PROCEDURE — 86901 BLOOD TYPING SEROLOGIC RH(D): CPT

## 2024-12-09 PROCEDURE — 36430 TRANSFUSION BLD/BLD COMPNT: CPT

## 2024-12-09 PROCEDURE — 85025 COMPLETE CBC W/AUTO DIFF WBC: CPT

## 2024-12-09 PROCEDURE — 2700000000 HC OXYGEN THERAPY PER DAY

## 2024-12-09 PROCEDURE — 85018 HEMOGLOBIN: CPT

## 2024-12-09 PROCEDURE — 86923 COMPATIBILITY TEST ELECTRIC: CPT

## 2024-12-09 PROCEDURE — 2580000003 HC RX 258: Performed by: INTERNAL MEDICINE

## 2024-12-09 PROCEDURE — 85014 HEMATOCRIT: CPT

## 2024-12-09 PROCEDURE — 36415 COLL VENOUS BLD VENIPUNCTURE: CPT

## 2024-12-09 PROCEDURE — 86900 BLOOD TYPING SEROLOGIC ABO: CPT

## 2024-12-09 PROCEDURE — 6370000000 HC RX 637 (ALT 250 FOR IP): Performed by: CLINICAL NURSE SPECIALIST

## 2024-12-09 PROCEDURE — 94660 CPAP INITIATION&MGMT: CPT

## 2024-12-09 RX ORDER — SODIUM CHLORIDE 9 MG/ML
INJECTION, SOLUTION INTRAVENOUS PRN
Status: DISCONTINUED | OUTPATIENT
Start: 2024-12-09 | End: 2024-12-14 | Stop reason: HOSPADM

## 2024-12-09 RX ADMIN — PANTOPRAZOLE SODIUM 40 MG: 40 TABLET, DELAYED RELEASE ORAL at 05:44

## 2024-12-09 RX ADMIN — ALBUTEROL SULFATE 2.5 MG: 2.5 SOLUTION RESPIRATORY (INHALATION) at 22:20

## 2024-12-09 RX ADMIN — BENZOCAINE 6 MG-MENTHOL 10 MG LOZENGES 1 LOZENGE: at 13:54

## 2024-12-09 RX ADMIN — IPRATROPIUM BROMIDE AND ALBUTEROL SULFATE 1 DOSE: .5; 2.5 SOLUTION RESPIRATORY (INHALATION) at 06:43

## 2024-12-09 RX ADMIN — DOCUSATE SODIUM 100 MG: 100 CAPSULE, LIQUID FILLED ORAL at 09:00

## 2024-12-09 RX ADMIN — GUAIFENESIN 600 MG: 600 TABLET, EXTENDED RELEASE ORAL at 20:53

## 2024-12-09 RX ADMIN — ACETYLCYSTEINE 400 MG: 100 INHALANT RESPIRATORY (INHALATION) at 09:55

## 2024-12-09 RX ADMIN — IPRATROPIUM BROMIDE AND ALBUTEROL SULFATE 1 DOSE: .5; 2.5 SOLUTION RESPIRATORY (INHALATION) at 18:38

## 2024-12-09 RX ADMIN — ACETYLCYSTEINE 400 MG: 100 INHALANT RESPIRATORY (INHALATION) at 06:43

## 2024-12-09 RX ADMIN — BENZOCAINE 6 MG-MENTHOL 10 MG LOZENGES 1 LOZENGE: at 20:55

## 2024-12-09 RX ADMIN — ACETYLCYSTEINE 400 MG: 100 INHALANT RESPIRATORY (INHALATION) at 18:38

## 2024-12-09 RX ADMIN — IPRATROPIUM BROMIDE AND ALBUTEROL SULFATE 1 DOSE: .5; 2.5 SOLUTION RESPIRATORY (INHALATION) at 09:54

## 2024-12-09 RX ADMIN — Medication 1 TABLET: at 08:59

## 2024-12-09 RX ADMIN — LEVOFLOXACIN 750 MG: 5 INJECTION, SOLUTION INTRAVENOUS at 16:34

## 2024-12-09 RX ADMIN — GUAIFENESIN 600 MG: 600 TABLET, EXTENDED RELEASE ORAL at 08:59

## 2024-12-09 RX ADMIN — METOPROLOL SUCCINATE 12.5 MG: 25 TABLET, EXTENDED RELEASE ORAL at 08:59

## 2024-12-09 RX ADMIN — ACETYLCYSTEINE 400 MG: 100 INHALANT RESPIRATORY (INHALATION) at 22:20

## 2024-12-09 RX ADMIN — WATER 40 MG: 1 INJECTION INTRAMUSCULAR; INTRAVENOUS; SUBCUTANEOUS at 16:27

## 2024-12-09 RX ADMIN — WATER 40 MG: 1 INJECTION INTRAMUSCULAR; INTRAVENOUS; SUBCUTANEOUS at 03:24

## 2024-12-09 RX ADMIN — ENOXAPARIN SODIUM 30 MG: 100 INJECTION SUBCUTANEOUS at 16:28

## 2024-12-09 RX ADMIN — BUDESONIDE INHALATION 1000 MCG: 0.5 SUSPENSION RESPIRATORY (INHALATION) at 06:43

## 2024-12-09 RX ADMIN — BUDESONIDE INHALATION 1000 MCG: 0.5 SUSPENSION RESPIRATORY (INHALATION) at 18:38

## 2024-12-09 ASSESSMENT — PAIN SCALES - GENERAL
PAINLEVEL_OUTOF10: 0
PAINLEVEL_OUTOF10: 0

## 2024-12-09 NOTE — CARE COORDINATION
12/9/2024 1344 CM note: Pt sleeping soundly. Per previous SW note,pt resides with spouse in 2 story home, bed/bath upstairs, 2STE. She does not use an assistive device to ambulate in home but has access to SPC, quad cane, ww, std walker, rollater, shower chair. She uses 3-4L o2(5L concentrator) from [facility]. No cpap/bpap. Pt is currently wearing o2@8L HF NC(pox 92%). Pt has lung CA, follows with [facility] and started keytruda at end of November. Hgb 6.3 today and pt to receive PRBC. Plan is for pt to return home with spouse and family support and HHC. [facility] accepted pt and will need orders.Will await updated therapy notes and follow for transition of care needs and possible increased home o2 needs. Arash MAX

## 2024-12-09 NOTE — PLAN OF CARE
Problem: Discharge Planning  Goal: Discharge to home or other facility with appropriate resources  12/9/2024 0057 by Vika Raines, RN  Outcome: Progressing  12/8/2024 1123 by Suzanne Salas, RN  Outcome: Progressing

## 2024-12-09 NOTE — PROGRESS NOTES
Asked pt at beginning of shift 1935 to wear bipap and pt refused.  Pt up to [facility] [place] and asked to wear bipap as long as she can stand it and she again refuses.

## 2024-12-09 NOTE — PROGRESS NOTES
Comprehensive Nutrition Assessment    Type and Reason for Visit:  Initial, LOS    Nutrition Recommendations/Plan:   Continue current diet and encourage intake as tolerated   Recommend ONS Ensure Enlive (or Ensure Plus HP) TID w/ meals        Malnutrition Assessment:  Malnutrition Status:  At risk for malnutrition (12/09/24 1204)    Context:  Acute Illness     Findings of the 6 clinical characteristics of malnutrition:  Energy Intake:  Mild decrease in energy intake  Weight Loss:  Unable to assess     Body Fat Loss:  No body fat loss     Muscle Mass Loss:  No muscle mass loss    Fluid Accumulation:  Mild Extremities   Strength:  Not Performed    Nutrition Assessment:    Pt admit w/ near syncope r/t acute on chronic resp failure requiing BiPAP r/t COPD exac, pulm. HTN, recurrent lung CA & acute on chronic anemia r/t chemo. PMHx of lung CA, L breast CA, skin CA, advanced COPD, HTN, anxiety/depression. Pt is on regular diet but intake poor 0-25%, Will add ONS w/ meals and encourage intake    Nutrition Related Findings:    abd WDL, trace edema BLE, I/O's WDL, A&Ox4 Wound Type: Wound Consult Pending (traumatic wound to posterior head)       Current Nutrition Intake & Therapies:    Average Meal Intake: 1-25%  Average Supplements Intake: None Ordered  ADULT DIET; Regular  ADULT ORAL NUTRITION SUPPLEMENT; Breakfast, Lunch, Dinner; Standard High Calorie/High Protein Oral Supplement    Anthropometric Measures:  Height: 157.5 cm (5' 2.01\")  Ideal Body Weight (IBW): 110 lbs (50 kg)    Admission Body Weight: 50.9 kg (112 lb 3 oz) (12/2 bed scale)  Current Body Weight: 50.9 kg (112 lb 3 oz) (12/2 bed scale), 102 % IBW. Weight Source: Bed scale  Current BMI (kg/m2): 20.5  Usual Body Weight:  (VIVEK d/t lack of accurate recent wt hx per EMR)  Weight Adjustment For: No Adjustment  BMI Categories: Underweight (BMI less than 22) age over 65    Estimated Daily Nutrient Needs:  Energy Requirements Based On: Kcal/kg  Weight Used for  Energy Requirements: Current  Energy (kcal/day): 1600-1800kcal/day (32-35kcal/kg CBW)  Weight Used for Protein Requirements: Current  Protein (g/day): 75-85g/day (1.5-1.7g/kg CBW)  Method Used for Fluid Requirements: 1 ml/kcal  Fluid (ml/day): 1600-1800kcal/day    Nutrition Diagnosis:   Inadequate oral intake related to impaired respiratory function as evidenced by intake 0-25%    Nutrition Interventions:   Food and/or Nutrient Delivery: Continue Current Diet, Start Oral Nutrition Supplement (Recommend ONS Ensure Enlive (or Ensure Plus HP) TID w/ meals)  Nutrition Education/Counseling: Education/Counseling not indicated  Coordination of Nutrition Care: Continue to monitor while inpatient     Goals:  Goals: PO intake 75% or greater, by next RD assessment  Type of Goal: New goal  Previous Goal Met: New Goal    Nutrition Monitoring and Evaluation:   Behavioral-Environmental Outcomes: None Identified  Food/Nutrient Intake Outcomes: Supplement Intake, Food and Nutrient Intake  Physical Signs/Symptoms Outcomes: Biochemical Data, Chewing or Swallowing, GI Status, Fluid Status or Edema, Nutrition Focused Physical Findings, Skin, Weight    Discharge Planning:    Continue Oral Nutrition Supplement, Continue current diet     Amy Solomon RD, LD  Contact: [phone]

## 2024-12-09 NOTE — PROGRESS NOTES
[facility]  Department of Internal Medicine  Division of Pulmonary, Critical Care and Sleep Medicine  Progress Note      Aroldo Diaz, APRN-CNP  Angeles Cooley, APRN-CNP        Patient's primary pulmonologist: Dr. Wiley     Subjective:  Patient seen and examined.  She was seen on 7 L nasal cannula, in no acute distress.  No reported issues overnight.  She denies any respiratory complaints during my evaluation.  Hemoglobin 6.3 this morning.      OBJECTIVE:     PHYSICAL EXAM:   VITALS:   Vitals:    12/09/24 1041 12/09/24 1155 12/09/24 1311 12/09/24 1351   BP: (!) 121/50  (!) 117/46 109/61   Pulse: (!) 102  89 96   Resp: 20  20 20   Temp: 98.3 °F (36.8 °C)  98.1 °F (36.7 °C) 98.1 °F (36.7 °C)   TempSrc: Oral  Oral Oral   SpO2: 100%  100% 100%   Weight:       Height:  1.575 m (5' 2.01\")          Intake/Output Summary (Last 24 hours) at 12/9/2024 1651  Last data filed at 12/8/2024 1754  Gross per 24 hour   Intake 120 ml   Output --   Net 120 ml        CONSTITUTIONAL:   Pale, ill-appearing, weak, A&O x 3, NAD  SKIN:     No rash, No suspicious lesions, No skin discoloration  HEENT:     EOMI, MMM, No thrush  NECK:    No bruits, No JVP appreciated  CV:      Sinus,  No murmur, No rubs, No gallops  PULMONARY:   Decreased BS,  No Wheezing, No Rales, No Rhonchi      No noted egophony  ABDOMEN:     Soft, non-tender. BS normal. No R/R/G  EXT:    No deformities .  No clubbing.       No lower extremity edema, No venous stasis  PULSE:   Appears equal and palpable.  PSYCHIATRIC:  Seems appropriate, No acute psychosis  MS:    No fractures, No gross weakness  NEUROLOGIC:   The clinical assessment is non-focal     DATA: IMAGING & TESTING:     LABORATORY TESTS:        PRO-BNP:   Lab Results   Component Value Date    PROBNP 282 12/02/2024    PROBNP 208 (H)  05/27/2017      ABGs:   Lab Results   Component Value Date/Time    PH 7.363 12/29/2023 11:38 AM    PO2 66.2 12/29/2023 11:38 AM    PCO2 63.0 12/29/2023 11:38 AM     Hemoglobin A1C: No components found for: \"HGBA1C\"    IMAGING:  Imaging tests were completed and reviewed and discussed radiology and care team involved and reveals   Echo (TTE) complete (PRN contrast/bubble/strain/3D)    Result Date: 12/7/2024    Left Ventricle: Normal left ventricular systolic function with a visually estimated EF of 55 - 60%. Left ventricle size is normal. Normal wall thickness. Normal wall motion. Indeterminate diastolic function.  Abnormal septal motion secondary to RV pressure overload.   Right Ventricle: Right ventricle size is normal. Mildly reduced systolic function.   Aortic Valve: Moderate sclerosis of the aortic valve cusps. Mild stenosis of the aortic valve. AV mean gradient is 12 mmHg. AV area by continuity VTI is 2.1 cm2. AV Stroke Volume index is 57.1 mL/m2.   Mitral Valve: Mild annular calcification.   Tricuspid Valve: Moderate regurgitation. Severely elevated RVSP, consistent with severe pulmonary hypertension. Est RA pressure is 3 mmHg. The estimated PASP is 92 mmHg.   Left Atrium: Left atrium size is normal.   Right Atrium: Right atrium size is normal.   Pericardium: Trivial pericardial effusion present. No indication of cardiac tamponade.   Image quality is adequate.     XR CHEST PORTABLE    Result Date: 12/7/2024  EXAMINATION: ONE XRAY VIEW OF THE CHEST 12/7/2024 7:41 am COMPARISON: None. HISTORY: ORDERING SYSTEM PROVIDED HISTORY: hypoxia TECHNOLOGIST PROVIDED HISTORY: Reason for exam:->hypoxia FINDINGS: Portable chest reveals heart to be enlarged.  Yola catheter identified on the right tip in the SVC.  Small bilateral pleural effusions.  There is calcified granuloma identified in the periphery the right lower lung field. Minimal atelectatic changes at the lung bases.  Degenerative changes seen within the spine.

## 2024-12-09 NOTE — PROGRESS NOTES
Admitting Date and Time: 12/2/2024 11:03 AM  Admit Dx: Syncope and collapse [R55]  Anemia [D64.9]  Complication of chemotherapy, initial encounter [T45.1X5A]  Symptomatic anemia [D64.9]  Malignant neoplasm of lung, unspecified laterality, unspecified part of lung (HCC) [C34.90]    Subjective:    12/8 Nurse reports patient still uncomfortable with her breathing    ROS: denies fever, chills, cp, sob, n/v, HA unless stated above.    12/9  pt is laying in bed in no distress, and she has been doing fair still on  8 L o2    ipratropium 0.5 mg-albuterol 2.5 mg  1 Dose Inhalation Q4H WA RT    methylPREDNISolone  40 mg IntraVENous Q12H    guaiFENesin  600 mg Oral BID    acetylcysteine  4 mL Inhalation Q4H    levofloxacin  750 mg IntraVENous Q24H    enoxaparin  30 mg SubCUTAneous Daily    pantoprazole  40 mg Oral QAM AC    metoprolol succinate  12.5 mg Oral Daily    docusate sodium  100 mg Oral Daily    bisacodyl  10 mg Rectal Once    therapeutic multivitamin-minerals  1 tablet Oral Daily    budesonide  1 mg Nebulization BID RT     sodium chloride, , PRN  Benzocaine-Menthol, 1 lozenge, Q2H PRN  albuterol, 2.5 mg, 4x Daily PRN  guaiFENesin, 1,200 mg, BID PRN  acetaminophen, 650 mg, Q4H PRN         Objective:    /61   Pulse 96   Temp 98.1 °F (36.7 °C) (Oral)   Resp 20   Ht 1.575 m (5' 2.01\")   Wt 50.8 kg (112 lb)   SpO2 100%   BMI 20.48 kg/m²   General Appearance: alert and oriented to person, place and time and on o2 8 L   Skin: warm and dry  Head: normocephalic and atraumatic  Eyes: pupils equal, round, and reactive to light, extraocular eye movements intact, conjunctivae normal  Pulmonary/Chest: Bibasilar fine rales worse on the left   Cardiovascular: normal rate, normal S1 and S2 and no carotid bruits  Abdomen: soft, non-tender, non-distended, normal bowel sounds, no masses or organomegaly  Extremities: no cyanosis, no clubbing and trace edema  Neurologic: no cranial nerve deficit and speech normal      No  bathroom last night she felt weak.  This was associated with lightheadedness. She fell. possible  LOC.  She hit her head when she fell and has received stitches in her occiput.  She also has pain in her mid  to palpation she thinks she fell onto this area as well.  Patient is somewhat anxious and changes her mind about medical care in various ways.  I do not trust that she is a good historian but she is not overtly very confused         pre syncope: can not be sure about LOC since patient and witness  seem to be poor historians.  Could have been due to anemia.    No abnormailty on telemetery   Cardiology said:lexiscan once identify cause of anemia. GI consulted , recommended EGD when respiratory status is better I discussed with dr jacob about some changes in her meds and see if that will help with her breathing      Lung change b/l non-small cell: chemo / radiation as per her hem onc dr tompkins after discharge.  Finished chemo 11/7sees  dr tompkins who started keytruda afterwards.    Anemia initially thought to be Chemo associated anemia: 1 unit prbcs transfusion then hb stable even through 12/4 needing transfusion upon presentation . 11/7 ferritin 22 iron 80 iron sat 19%. During type and screen antibody screen (-).  I spoke to Dr. Tompkins consulted and gave IV iron since iron levels were low.  She will follow-up with her next week to offer further iron treatment and around that time it could be determined if her hemoglobin is stable to proceed with further workup.  12/6 hemoglobin decreased to 6.9 so one  unit of PRBCs transfused. 12/7 hemoglobin drifting down again.   On 12/9 hb 6.3 transfused 1 unit of pRBC , pt apparently had EGD and colonoscopy out pt 3 years ago and capsule endoscopy and no source of bleeding seen at that time   Chronic respiratory failure due to copd and now with exacerbation on solumedrol 40 mg IV q 12 hours , mucomyst , and aerosol treatment and levaquin     Mood

## 2024-12-09 NOTE — PROGRESS NOTES
Subjective:    The patient is awake and alert, she feels exhausted and run down. She doesn't understand why this all happened. She is currently requiring 11L O2,     Objective:    BP (!) 124/57   Pulse (!) 108   Temp 98.4 °F (36.9 °C) (Oral)   Resp 22   Ht 1.575 m (5' 2\")   Wt 50.8 kg (112 lb)   SpO2 100%   BMI 20.49 kg/m²     General: NAD  HEENT: No thrush or mucositis, EOMI, PERRLA  Heart:  RRR, no murmurs, gallops, or rubs.  Lungs:  CTA bilaterally, no wheeze, rales or rhonchi  Abd: Lungs diminished with scattered wheezing and crackles  Extrem:  No clubbing, cyanosis, or edema  Lymphatics: No palpable adenopathy in cervical and supraclavicular regions  Skin: Intact, no petechia or purpura    CBC with Differential:    Lab Results   Component Value Date/Time    WBC 7.7 12/07/2024 08:12 AM    RBC 2.50 12/07/2024 08:12 AM    HGB 7.4 12/07/2024 08:12 AM    HCT 24.9 12/07/2024 08:12 AM     12/07/2024 08:12 AM    MCV 99.6 12/07/2024 08:12 AM    MCH 29.6 12/07/2024 08:12 AM    MCHC 29.7 12/07/2024 08:12 AM    RDW 18.7 12/07/2024 08:12 AM    METASPCT 1 12/06/2024 05:51 AM    LYMPHOPCT 7 12/07/2024 08:12 AM    MONOPCT 12 12/07/2024 08:12 AM    EOSPCT 2 12/07/2024 08:12 AM    BASOPCT 0 12/07/2024 08:12 AM    MONOSABS 0.92 12/07/2024 08:12 AM    LYMPHSABS 0.52 12/07/2024 08:12 AM    EOSABS 0.15 12/07/2024 08:12 AM    BASOSABS 0.02 12/07/2024 08:12 AM     CMP:    Lab Results   Component Value Date/Time     12/04/2024 05:02 AM    K 4.1 12/04/2024 05:02 AM     12/04/2024 05:02 AM    CO2 38 12/04/2024 05:02 AM    BUN 17 12/04/2024 05:02 AM    CREATININE 0.6 12/04/2024 05:02 AM    GFRAA >60 05/27/2017 05:25 AM    LABGLOM >90 12/04/2024 05:02 AM    GLUCOSE 152 12/04/2024 05:02 AM    CALCIUM 8.7 12/04/2024 05:02 AM    BILITOT 0.3 12/02/2024 11:30 AM    ALKPHOS 52 12/02/2024 11:30 AM    AST 21 12/02/2024 11:30 AM    ALT 16 12/02/2024 11:30 AM            Current Facility-Administered Medications:      Benzocaine-Menthol (CEPACOL) 1 lozenge, 1 lozenge, Oral, Q2H PRN, Pepe Calvert MD, 1 lozenge at 12/08/24 2119    ipratropium 0.5 mg-albuterol 2.5 mg (DUONEB) nebulizer solution 1 Dose, 1 Dose, Inhalation, Q4H WA RT, Syd Patel MD, 1 Dose at 12/09/24 0643    methylPREDNISolone sodium succ (SOLU-MEDROL) 40 mg in sterile water 1 mL injection, 40 mg, IntraVENous, Q12H, Syd Patel MD, 40 mg at 12/09/24 0324    guaiFENesin (MUCINEX) extended release tablet 600 mg, 600 mg, Oral, BID, Syd Patel MD, 600 mg at 12/08/24 2113    acetylcysteine (MUCOMYST) 10 % solution 400 mg, 4 mL, Inhalation, Q4H, Syd Patel MD, 400 mg at 12/09/24 0643    levoFLOXacin (LEVAQUIN) 750 MG/150ML infusion 750 mg, 750 mg, IntraVENous, Q24H, Syd Patel MD, Stopped at 12/08/24 1724    enoxaparin Sodium (LOVENOX) injection 30 mg, 30 mg, SubCUTAneous, Daily, Syd Patel MD, 30 mg at 12/08/24 1543    pantoprazole (PROTONIX) tablet 40 mg, 40 mg, Oral, [facility], HemrockMartita MD, 40 mg at 12/09/24 0544    metoprolol succinate (TOPROL XL) extended release tablet 12.5 mg, 12.5 mg, Oral, Daily, Octavia Iverson, APRN - CNS, 12.5 mg at 12/08/24 0913    docusate sodium (COLACE) capsule 100 mg, 100 mg, Oral, Daily, Pepe Calvert MD, 100 mg at 12/08/24 0913    bisacodyl (DULCOLAX) suppository 10 mg, 10 mg, Rectal, Once, Pepe Calvert MD    albuterol (PROVENTIL) (2.5 MG/3ML) 0.083% nebulizer solution 2.5 mg, 2.5 mg, Nebulization, 4x Daily PRN, Pepe Calvert MD, 2.5 mg at 12/08/24 0440    guaiFENesin (MUCINEX) extended release tablet 1,200 mg, 1,200 mg, Oral, BID PRN, Pepe Calvert MD    therapeutic multivitamin-minerals 1 tablet, 1 tablet, Oral, Daily, Pepe Calvert MD, 1 tablet at 12/08/24 0913    budesonide (PULMICORT) nebulizer suspension 1,000 mcg, 1 mg, Nebulization, BID RT, 1,000 mcg at 12/09/24 0643 **AND** [DISCONTINUED]

## 2024-12-10 LAB
ABO/RH: NORMAL
ALBUMIN SERPL-MCNC: 3.4 G/DL (ref 3.5–5.2)
ALP SERPL-CCNC: 50 U/L (ref 35–104)
ALT SERPL-CCNC: 38 U/L (ref 0–32)
ANION GAP SERPL CALCULATED.3IONS-SCNC: 6 MMOL/L (ref 7–16)
ANTIBODY SCREEN: NEGATIVE
ARM BAND NUMBER: NORMAL
AST SERPL-CCNC: 29 U/L (ref 0–31)
BASOPHILS # BLD: 0 K/UL (ref 0–0.2)
BASOPHILS NFR BLD: 0 % (ref 0–2)
BILIRUB SERPL-MCNC: 0.3 MG/DL (ref 0–1.2)
BLOOD BANK BLOOD PRODUCT EXPIRATION DATE: NORMAL
BLOOD BANK DISPENSE STATUS: NORMAL
BLOOD BANK ISBT PRODUCT BLOOD TYPE: 6200
BLOOD BANK PRODUCT CODE: NORMAL
BLOOD BANK SAMPLE EXPIRATION: NORMAL
BLOOD BANK UNIT TYPE AND RH: NORMAL
BPU ID: NORMAL
BUN SERPL-MCNC: 19 MG/DL (ref 6–23)
CALCIUM SERPL-MCNC: 9.1 MG/DL (ref 8.6–10.2)
CHLORIDE SERPL-SCNC: 96 MMOL/L (ref 98–107)
CO2 SERPL-SCNC: 39 MMOL/L (ref 22–29)
COMPONENT: NORMAL
CREAT SERPL-MCNC: 0.5 MG/DL (ref 0.5–1)
CROSSMATCH RESULT: NORMAL
EOSINOPHIL # BLD: 0 K/UL (ref 0.05–0.5)
EOSINOPHILS RELATIVE PERCENT: 0 % (ref 0–6)
ERYTHROCYTE [DISTWIDTH] IN BLOOD BY AUTOMATED COUNT: 19.2 % (ref 11.5–15)
GFR, ESTIMATED: >90 ML/MIN/1.73M2
GLUCOSE SERPL-MCNC: 129 MG/DL (ref 74–99)
HCT VFR BLD AUTO: 27.6 % (ref 34–48)
HGB BLD-MCNC: 8.5 G/DL (ref 11.5–15.5)
LYMPHOCYTES NFR BLD: 0.23 K/UL (ref 1.5–4)
LYMPHOCYTES RELATIVE PERCENT: 4 % (ref 20–42)
MCH RBC QN AUTO: 30.5 PG (ref 26–35)
MCHC RBC AUTO-ENTMCNC: 30.8 G/DL (ref 32–34.5)
MCV RBC AUTO: 98.9 FL (ref 80–99.9)
MONOCYTES NFR BLD: 0.23 K/UL (ref 0.1–0.95)
MONOCYTES NFR BLD: 4 % (ref 2–12)
MYELOCYTES ABSOLUTE COUNT: 0.06 K/UL
MYELOCYTES: 1 %
NEUTROPHILS NFR BLD: 92 % (ref 43–80)
NEUTS SEG NFR BLD: 6.18 K/UL (ref 1.8–7.3)
PLATELET # BLD AUTO: 204 K/UL (ref 130–450)
PMV BLD AUTO: 10.6 FL (ref 7–12)
POTASSIUM SERPL-SCNC: 4.6 MMOL/L (ref 3.5–5)
PROT SERPL-MCNC: 5.7 G/DL (ref 6.4–8.3)
RBC # BLD AUTO: 2.79 M/UL (ref 3.5–5.5)
RBC # BLD: ABNORMAL 10*6/UL
SODIUM SERPL-SCNC: 141 MMOL/L (ref 132–146)
TRANSFUSION STATUS: NORMAL
UNIT DIVISION: 0
UNIT ISSUE DATE/TIME: NORMAL
WBC OTHER # BLD: 6.7 K/UL (ref 4.5–11.5)

## 2024-12-10 PROCEDURE — 2580000003 HC RX 258: Performed by: INTERNAL MEDICINE

## 2024-12-10 PROCEDURE — 94640 AIRWAY INHALATION TREATMENT: CPT

## 2024-12-10 PROCEDURE — 94669 MECHANICAL CHEST WALL OSCILL: CPT

## 2024-12-10 PROCEDURE — 97530 THERAPEUTIC ACTIVITIES: CPT

## 2024-12-10 PROCEDURE — 6370000000 HC RX 637 (ALT 250 FOR IP): Performed by: INTERNAL MEDICINE

## 2024-12-10 PROCEDURE — 97110 THERAPEUTIC EXERCISES: CPT

## 2024-12-10 PROCEDURE — 6370000000 HC RX 637 (ALT 250 FOR IP): Performed by: CLINICAL NURSE SPECIALIST

## 2024-12-10 PROCEDURE — 85025 COMPLETE CBC W/AUTO DIFF WBC: CPT

## 2024-12-10 PROCEDURE — 80053 COMPREHEN METABOLIC PANEL: CPT

## 2024-12-10 PROCEDURE — 94660 CPAP INITIATION&MGMT: CPT

## 2024-12-10 PROCEDURE — 2700000000 HC OXYGEN THERAPY PER DAY

## 2024-12-10 PROCEDURE — 6360000002 HC RX W HCPCS: Performed by: INTERNAL MEDICINE

## 2024-12-10 PROCEDURE — 99233 SBSQ HOSP IP/OBS HIGH 50: CPT | Performed by: INTERNAL MEDICINE

## 2024-12-10 PROCEDURE — 1200000000 HC SEMI PRIVATE

## 2024-12-10 PROCEDURE — 36415 COLL VENOUS BLD VENIPUNCTURE: CPT

## 2024-12-10 RX ORDER — PREDNISONE 20 MG/1
20 TABLET ORAL DAILY
Status: DISCONTINUED | OUTPATIENT
Start: 2024-12-11 | End: 2024-12-14 | Stop reason: HOSPADM

## 2024-12-10 RX ADMIN — ACETYLCYSTEINE 400 MG: 100 INHALANT RESPIRATORY (INHALATION) at 21:02

## 2024-12-10 RX ADMIN — GUAIFENESIN 600 MG: 600 TABLET, EXTENDED RELEASE ORAL at 19:55

## 2024-12-10 RX ADMIN — WATER 40 MG: 1 INJECTION INTRAMUSCULAR; INTRAVENOUS; SUBCUTANEOUS at 15:46

## 2024-12-10 RX ADMIN — BUDESONIDE INHALATION 1000 MCG: 0.5 SUSPENSION RESPIRATORY (INHALATION) at 06:35

## 2024-12-10 RX ADMIN — ACETYLCYSTEINE 400 MG: 100 INHALANT RESPIRATORY (INHALATION) at 10:31

## 2024-12-10 RX ADMIN — ACETYLCYSTEINE 400 MG: 100 INHALANT RESPIRATORY (INHALATION) at 02:16

## 2024-12-10 RX ADMIN — Medication 1 TABLET: at 10:09

## 2024-12-10 RX ADMIN — GUAIFENESIN 600 MG: 600 TABLET, EXTENDED RELEASE ORAL at 10:10

## 2024-12-10 RX ADMIN — METOPROLOL SUCCINATE 12.5 MG: 25 TABLET, EXTENDED RELEASE ORAL at 10:10

## 2024-12-10 RX ADMIN — WATER 40 MG: 1 INJECTION INTRAMUSCULAR; INTRAVENOUS; SUBCUTANEOUS at 05:15

## 2024-12-10 RX ADMIN — DOCUSATE SODIUM 100 MG: 100 CAPSULE, LIQUID FILLED ORAL at 10:09

## 2024-12-10 RX ADMIN — ACETYLCYSTEINE 400 MG: 100 INHALANT RESPIRATORY (INHALATION) at 06:35

## 2024-12-10 RX ADMIN — PANTOPRAZOLE SODIUM 40 MG: 40 TABLET, DELAYED RELEASE ORAL at 05:16

## 2024-12-10 RX ADMIN — BENZOCAINE 6 MG-MENTHOL 10 MG LOZENGES 1 LOZENGE: at 05:16

## 2024-12-10 RX ADMIN — IPRATROPIUM BROMIDE AND ALBUTEROL SULFATE 1 DOSE: .5; 2.5 SOLUTION RESPIRATORY (INHALATION) at 10:31

## 2024-12-10 RX ADMIN — ENOXAPARIN SODIUM 30 MG: 100 INJECTION SUBCUTANEOUS at 15:46

## 2024-12-10 RX ADMIN — ALBUTEROL SULFATE 2.5 MG: 2.5 SOLUTION RESPIRATORY (INHALATION) at 02:16

## 2024-12-10 RX ADMIN — ACETYLCYSTEINE 400 MG: 100 INHALANT RESPIRATORY (INHALATION) at 13:43

## 2024-12-10 RX ADMIN — IPRATROPIUM BROMIDE AND ALBUTEROL SULFATE 1 DOSE: .5; 2.5 SOLUTION RESPIRATORY (INHALATION) at 13:43

## 2024-12-10 RX ADMIN — BENZOCAINE 6 MG-MENTHOL 10 MG LOZENGES 1 LOZENGE: at 00:57

## 2024-12-10 RX ADMIN — LEVOFLOXACIN 750 MG: 5 INJECTION, SOLUTION INTRAVENOUS at 15:54

## 2024-12-10 RX ADMIN — IPRATROPIUM BROMIDE AND ALBUTEROL SULFATE 1 DOSE: .5; 2.5 SOLUTION RESPIRATORY (INHALATION) at 06:34

## 2024-12-10 RX ADMIN — ALBUTEROL SULFATE 2.5 MG: 2.5 SOLUTION RESPIRATORY (INHALATION) at 21:02

## 2024-12-10 NOTE — PROGRESS NOTES
Subjective:    The patient is awake and alert, resting in bed on 8L O2. She is tired and worn out. She just wants to go home.  No problems overnight.  Denies chest pain, angina, dyspnea or abdominal discomfort. No nausea or vomiting. Tolerating diet.      Objective:    BP (!) 133/48   Pulse 100   Temp 98.2 °F (36.8 °C) (Oral)   Resp (!) 187   Ht 1.575 m (5' 2.01\")   Wt 50.8 kg (112 lb)   SpO2 96% Comment: 8L  BMI 20.48 kg/m²     General: Alert, oriented, no acute distress. Appears ill  HEENT: No thrush or mucositis, EOMI, PERRLA  Heart:  RRR, no murmurs, gallops, or rubs.  Lungs: Bibasilar fine rales. 8L O2  Abd: BS present, nontender, nondistended, no masses  Extrem:  No clubbing, cyanosis, or edema  Lymphatics: No palpable adenopathy in cervical and supraclavicular regions  Skin: Intact, no petechia or purpura    CBC with Differential:    Lab Results   Component Value Date/Time    WBC 6.7 12/10/2024 08:03 AM    RBC 2.79 12/10/2024 08:03 AM    HGB 8.5 12/10/2024 08:03 AM    HCT 27.6 12/10/2024 08:03 AM     12/10/2024 08:03 AM    MCV 98.9 12/10/2024 08:03 AM    MCH 30.5 12/10/2024 08:03 AM    MCHC 30.8 12/10/2024 08:03 AM    RDW 19.2 12/10/2024 08:03 AM    METASPCT 1 12/06/2024 05:51 AM    LYMPHOPCT 4 12/10/2024 08:03 AM    MONOPCT 4 12/10/2024 08:03 AM    MYELOPCT 1 12/10/2024 08:03 AM    EOSPCT 0 12/10/2024 08:03 AM    BASOPCT 0 12/10/2024 08:03 AM    MONOSABS 0.23 12/10/2024 08:03 AM    LYMPHSABS 0.23 12/10/2024 08:03 AM    EOSABS 0.00 12/10/2024 08:03 AM    BASOSABS 0.00 12/10/2024 08:03 AM     CMP:    Lab Results   Component Value Date/Time     12/10/2024 08:03 AM    K 4.6 12/10/2024 08:03 AM    CL 96 12/10/2024 08:03 AM    CO2 39 12/10/2024 08:03 AM    BUN 19 12/10/2024 08:03 AM    CREATININE 0.5 12/10/2024 08:03 AM    GFRAA >60 05/27/2017 05:25 AM    LABGLOM >90 12/10/2024 08:03 AM    GLUCOSE 129 12/10/2024 08:03 AM    CALCIUM 9.1 12/10/2024 08:03 AM    BILITOT 0.3 12/10/2024 08:03 AM     ALKPHOS 50 12/10/2024 08:03 AM    AST 29 12/10/2024 08:03 AM    ALT 38 12/10/2024 08:03 AM              Current Facility-Administered Medications:     0.9 % sodium chloride infusion, , IntraVENous, PRN, Rose Marie Munoz MD    Benzocaine-Menthol (CEPACOL) 1 lozenge, 1 lozenge, Oral, Q2H PRN, Pepe Calvert MD, 1 lozenge at 12/10/24 0516    ipratropium 0.5 mg-albuterol 2.5 mg (DUONEB) nebulizer solution 1 Dose, 1 Dose, Inhalation, Q4H WA RT, Syd Patel MD, 1 Dose at 12/10/24 1031    methylPREDNISolone sodium succ (SOLU-MEDROL) 40 mg in sterile water 1 mL injection, 40 mg, IntraVENous, Q12H, Syd Patel MD, 40 mg at 12/10/24 0515    guaiFENesin (MUCINEX) extended release tablet 600 mg, 600 mg, Oral, BID, Syd Patel MD, 600 mg at 12/10/24 1010    acetylcysteine (MUCOMYST) 10 % solution 400 mg, 4 mL, Inhalation, Q4H, Syd Patel MD, 400 mg at 12/10/24 1031    levoFLOXacin (LEVAQUIN) 750 MG/150ML infusion 750 mg, 750 mg, IntraVENous, Q24H, Syd Patel MD, Stopped at 12/09/24 1804    enoxaparin Sodium (LOVENOX) injection 30 mg, 30 mg, SubCUTAneous, Daily, Syd Patel MD, 30 mg at 12/09/24 1628    pantoprazole (PROTONIX) tablet 40 mg, 40 mg, Oral, QA AC, Martita Tompkins MD, 40 mg at 12/10/24 0516    metoprolol succinate (TOPROL XL) extended release tablet 12.5 mg, 12.5 mg, Oral, Daily, Octavia Iverson, APRN - CNS, 12.5 mg at 12/10/24 1010    docusate sodium (COLACE) capsule 100 mg, 100 mg, Oral, Daily, Pepe Calvert MD, 100 mg at 12/10/24 1009    bisacodyl (DULCOLAX) suppository 10 mg, 10 mg, Rectal, Once, Pepe Calvert MD    albuterol (PROVENTIL) (2.5 MG/3ML) 0.083% nebulizer solution 2.5 mg, 2.5 mg, Nebulization, 4x Daily PRN, Pepe Calvert MD, 2.5 mg at 12/10/24 0216    guaiFENesin (MUCINEX) extended release tablet 1,200 mg, 1,200 mg, Oral, BID PRN, Pepe Calvert MD    therapeutic

## 2024-12-10 NOTE — PROGRESS NOTES
Physical Therapy  Physical Therapy Treatment Note/Plan of Care    Room #:  0438/0438-02  Patient Name: Ashley Vivas  YOB: 1950  MRN: [id number]    Date of Service: 12/10/2024     Tentative placement recommendation: Subacute Rehab  Equipment recommendation: Bedside commode      Evaluating Physical Therapist: Caden Baltazar PT, DPT #[id number]      Specific Provider Orders/Date/Referring Provider :     12/04/24 1015    PT eval and treat  Start:  12/04/24 1015,   End:  12/04/24 1015,   ONE TIME,   Standing Count:  1 Occurrences,   R       Comments:  Please provide services when f...  Pepe Calvert MD Acknowledge New    Admitting Diagnosis:   Syncope and collapse [R55]  Anemia [D64.9]  Complication of chemotherapy, initial encounter [T45.1X5A]  Symptomatic anemia [D64.9]  Malignant neoplasm of lung, unspecified laterality, unspecified part of lung (HCC) [C34.90]      Surgery: none  Visit Diagnoses         Codes    Syncope, unspecified syncope type     R55            Patient Active Problem List   Diagnosis    Bulging of cervical intervertebral disc without myelopathy    Cervical compression fracture (HCC)    Ulnar neuropathy at elbow    Cervical radiculopathy    COPD, very severe (HCC)    Hypoxemia requiring supplemental oxygen    Malignant neoplasm of upper lobe of right lung (HCC)    Malignant neoplasm of lower lobe of right lung (HCC)    Status post radiation therapy    Fibrinous pleuritis    Pulmonary nodules/lesions, multiple    Paroxysmal SVT (supraventricular tachycardia) (HCC)    Closed fracture of one rib of left side    Post covid-19 condition, unspecified    Stress at home    Chronic respiratory failure with hypoxia and hypercapnia    Need for immunization against influenza    Malignant neoplasm of lung (HCC)    Frailty    Symptomatic anemia    Debilitated    Syncope and collapse    Complication of chemotherapy    COPD exacerbation (HCC)        ASSESSMENT of Current Deficits Patient exhibits  in bed. Assisted to edge of bed and sat for ~10 minutes. Stood took steps to chair. Performed seated exercise. Stood ambulated x 3 reps forward/backwards. Stood needing to use commode. Frequent rest seated rest breaks required due to weakness, desaturation of o2, educated on pursed lip breathing. .      Therapeutic Exercises:  ankle pumps, heel raises, quad sets, heel slide, straight leg raise, long arc quad, and seated marching  x 10-12 reps.     At end of session, patient in chair with    call light and phone within reach,  all lines and tubes intact, nursing notified.      Patient would benefit from continued skilled Physical Therapy to improve functional independence and quality of life.         Patient's/ family goals   none stated    Time in   1035  Time out 1121    Total Treatment Time  46 minutes    CPT codes:  Therapeutic activities (01486)   28 minutes  2 unit(s)  Therapeutic exercises (59118)   12 minutes  1 unit(s)  Non billable time 6 minutes different disciplines talking with patient.     Kate Conde, PTA    #[id number]

## 2024-12-10 NOTE — PROGRESS NOTES
Admitting Date and Time: 12/2/2024 11:03 AM  Admit Dx: Syncope and collapse [R55]  Anemia [D64.9]  Complication of chemotherapy, initial encounter [T45.1X5A]  Symptomatic anemia [D64.9]  Malignant neoplasm of lung, unspecified laterality, unspecified part of lung (HCC) [C34.90]    Subjective:    12/8 Nurse reports patient still uncomfortable with her breathing    ROS: denies fever, chills, cp, sob, n/v, HA unless stated above.    12/9  pt is laying in bed in no distress, and she has been doing fair still on  8 L o2     12/10 pt is more awake and alert today she feels better    ipratropium 0.5 mg-albuterol 2.5 mg  1 Dose Inhalation Q4H WA RT    methylPREDNISolone  40 mg IntraVENous Q12H    guaiFENesin  600 mg Oral BID    acetylcysteine  4 mL Inhalation Q4H    levofloxacin  750 mg IntraVENous Q24H    enoxaparin  30 mg SubCUTAneous Daily    pantoprazole  40 mg Oral QAM AC    metoprolol succinate  12.5 mg Oral Daily    docusate sodium  100 mg Oral Daily    bisacodyl  10 mg Rectal Once    therapeutic multivitamin-minerals  1 tablet Oral Daily    budesonide  1 mg Nebulization BID RT     sodium chloride, , PRN  Benzocaine-Menthol, 1 lozenge, Q2H PRN  albuterol, 2.5 mg, 4x Daily PRN  guaiFENesin, 1,200 mg, BID PRN  acetaminophen, 650 mg, Q4H PRN         Objective:    BP (!) 133/48   Pulse 100   Temp 98.2 °F (36.8 °C) (Oral)   Resp (!) 187   Ht 1.575 m (5' 2.01\")   Wt 50.8 kg (112 lb)   SpO2 96% Comment: 8L  BMI 20.48 kg/m²   General Appearance: alert and oriented to person, place and time and on o2 8 L   Skin: warm and dry  Head: normocephalic and atraumatic  Eyes: pupils equal, round, and reactive to light, extraocular eye movements intact, conjunctivae normal  Pulmonary/Chest: Bibasilar fine rales worse on the left   Cardiovascular: normal rate, normal S1 and S2 and no carotid bruits  Abdomen: soft, non-tender, non-distended, normal bowel sounds, no masses or organomegaly  Extremities: no cyanosis, no clubbing and  history of left breast and lung cancer, on chemo for lung cancer, COPD, HTN chronic respiratory faliure on 4 liters chronically presents with near syncope.  When she got up to go to the bathroom last night she felt weak.  This was associated with lightheadedness. She fell. possible  LOC.  She hit her head when she fell and has received stitches in her occiput.  She also has pain in her mid  to palpation she thinks she fell onto this area as well.  Patient is somewhat anxious and changes her mind about medical care in various ways.  I do not trust that she is a good historian but she is not overtly very confused         pre syncope: can not be sure about LOC since patient and witness  seem to be poor historians.  Could have been due to anemia.    No abnormailty on telemetery   Cardiology said:lexiscan once identify cause of anemia. GI consulted , recommended EGD when respiratory status is better I discussed with dr jacob 12/9  about some changes in her meds and see if that will help with her breathing   I also discussed with her daughter Nancy this morning on the phone      Lung change b/l non-small cell: chemo / radiation as per her hem onc dr wu after discharge.  Finished chemo 11/7sees  dr wu who started keytruda afterwards.    Anemia initially thought to be Chemo associated anemia: 1 unit prbcs transfusion then hb stable even through 12/4 needing transfusion upon presentation . 11/7 ferritin 22 iron 80 iron sat 19%. During type and screen antibody screen (-).was given  IV iron since iron levels were low.  She will follow-up with her as out pt .  12/6 hemoglobin decreased to 6.9 so one  unit of PRBCs transfused. 12/7 hemoglobin drifting down again.   On 12/9 hb 6.3 transfused 1 unit of pRBC , pt apparently had EGD and colonoscopy out pt 3 years ago and capsule endoscopy and no source of bleeding seen at that time , I will check with GI if ok to proceed with EGD   Chronic respiratory

## 2024-12-10 NOTE — CARE COORDINATION
12/10/2024 1203 CM note: Pt resides with spouse in 2 story home, bed/bath upstairs, 2STE, has 1/2 bath on first level. She does not use an assistive device to ambulate in home but has access to SPC, quad cane, ww, std walker, rollater, bsc, shower chair. She uses 3-4L o2(5L concentrator) from [facility]. No cpap/bpap. Pt is currently wearing o2@8L HF NC(pox 100%). Pt has lung CA, follows with [facility] and started keytruda at end of November. Therapy evals reviewed with pt and her dtr Ligia via phone. HHC, SNF, private duty options discussed and SNF, private duty lists and [employer] information left at bedside. At this time, plan is for pt to return home with spouse/ family support and HHC. Expand HHC accepted pt and will need orders. Ligia relays she will be off work from 12/19-1/2/25 and her son will be home from college to assist also. Per pt/family request, CM will meet with pt/dtr in am to discuss options again. CM will follow for increased home o2 needs-will need pox testing and orders. Arash MAX

## 2024-12-11 ENCOUNTER — APPOINTMENT (OUTPATIENT)
Dept: GENERAL RADIOLOGY | Age: 74
DRG: 811 | End: 2024-12-11
Payer: MEDICARE

## 2024-12-11 LAB
BASOPHILS # BLD: 0.01 K/UL (ref 0–0.2)
BASOPHILS NFR BLD: 0 % (ref 0–2)
EOSINOPHIL # BLD: 0.05 K/UL (ref 0.05–0.5)
EOSINOPHILS RELATIVE PERCENT: 1 % (ref 0–6)
ERYTHROCYTE [DISTWIDTH] IN BLOOD BY AUTOMATED COUNT: 18.3 % (ref 11.5–15)
GLUCOSE BLD-MCNC: 181 MG/DL (ref 74–99)
HCT VFR BLD AUTO: 29.7 % (ref 34–48)
HGB BLD-MCNC: 9.1 G/DL (ref 11.5–15.5)
IMM GRANULOCYTES # BLD AUTO: 0.11 K/UL (ref 0–0.58)
IMM GRANULOCYTES NFR BLD: 2 % (ref 0–5)
LYMPHOCYTES NFR BLD: 0.84 K/UL (ref 1.5–4)
LYMPHOCYTES RELATIVE PERCENT: 11 % (ref 20–42)
MCH RBC QN AUTO: 30.5 PG (ref 26–35)
MCHC RBC AUTO-ENTMCNC: 30.6 G/DL (ref 32–34.5)
MCV RBC AUTO: 99.7 FL (ref 80–99.9)
MONOCYTES NFR BLD: 1.15 K/UL (ref 0.1–0.95)
MONOCYTES NFR BLD: 16 % (ref 2–12)
NEUTROPHILS NFR BLD: 71 % (ref 43–80)
NEUTS SEG NFR BLD: 5.24 K/UL (ref 1.8–7.3)
PLATELET # BLD AUTO: 222 K/UL (ref 130–450)
PMV BLD AUTO: 11.1 FL (ref 7–12)
RBC # BLD AUTO: 2.98 M/UL (ref 3.5–5.5)
WBC OTHER # BLD: 7.4 K/UL (ref 4.5–11.5)

## 2024-12-11 PROCEDURE — 97530 THERAPEUTIC ACTIVITIES: CPT

## 2024-12-11 PROCEDURE — 99233 SBSQ HOSP IP/OBS HIGH 50: CPT | Performed by: INTERNAL MEDICINE

## 2024-12-11 PROCEDURE — 82947 ASSAY GLUCOSE BLOOD QUANT: CPT

## 2024-12-11 PROCEDURE — 6370000000 HC RX 637 (ALT 250 FOR IP): Performed by: INTERNAL MEDICINE

## 2024-12-11 PROCEDURE — 36415 COLL VENOUS BLD VENIPUNCTURE: CPT

## 2024-12-11 PROCEDURE — 6370000000 HC RX 637 (ALT 250 FOR IP): Performed by: CLINICAL NURSE SPECIALIST

## 2024-12-11 PROCEDURE — 94660 CPAP INITIATION&MGMT: CPT

## 2024-12-11 PROCEDURE — 94640 AIRWAY INHALATION TREATMENT: CPT

## 2024-12-11 PROCEDURE — 71046 X-RAY EXAM CHEST 2 VIEWS: CPT

## 2024-12-11 PROCEDURE — 85025 COMPLETE CBC W/AUTO DIFF WBC: CPT

## 2024-12-11 PROCEDURE — 1200000000 HC SEMI PRIVATE

## 2024-12-11 PROCEDURE — 6360000002 HC RX W HCPCS: Performed by: INTERNAL MEDICINE

## 2024-12-11 PROCEDURE — 2700000000 HC OXYGEN THERAPY PER DAY

## 2024-12-11 RX ADMIN — LEVOFLOXACIN 750 MG: 5 INJECTION, SOLUTION INTRAVENOUS at 15:06

## 2024-12-11 RX ADMIN — GUAIFENESIN 600 MG: 600 TABLET, EXTENDED RELEASE ORAL at 08:18

## 2024-12-11 RX ADMIN — IPRATROPIUM BROMIDE AND ALBUTEROL SULFATE 1 DOSE: .5; 2.5 SOLUTION RESPIRATORY (INHALATION) at 09:58

## 2024-12-11 RX ADMIN — ACETYLCYSTEINE 400 MG: 100 INHALANT RESPIRATORY (INHALATION) at 01:26

## 2024-12-11 RX ADMIN — ACETYLCYSTEINE 400 MG: 100 INHALANT RESPIRATORY (INHALATION) at 07:32

## 2024-12-11 RX ADMIN — IPRATROPIUM BROMIDE AND ALBUTEROL SULFATE 1 DOSE: .5; 2.5 SOLUTION RESPIRATORY (INHALATION) at 18:49

## 2024-12-11 RX ADMIN — PREDNISONE 20 MG: 20 TABLET ORAL at 08:18

## 2024-12-11 RX ADMIN — IPRATROPIUM BROMIDE AND ALBUTEROL SULFATE 1 DOSE: .5; 2.5 SOLUTION RESPIRATORY (INHALATION) at 14:44

## 2024-12-11 RX ADMIN — ALBUTEROL SULFATE 2.5 MG: 2.5 SOLUTION RESPIRATORY (INHALATION) at 23:00

## 2024-12-11 RX ADMIN — METOPROLOL SUCCINATE 12.5 MG: 25 TABLET, EXTENDED RELEASE ORAL at 08:18

## 2024-12-11 RX ADMIN — Medication 1 TABLET: at 08:18

## 2024-12-11 RX ADMIN — BUDESONIDE INHALATION 1000 MCG: 0.5 SUSPENSION RESPIRATORY (INHALATION) at 07:32

## 2024-12-11 RX ADMIN — BUDESONIDE INHALATION 1000 MCG: 0.5 SUSPENSION RESPIRATORY (INHALATION) at 18:49

## 2024-12-11 RX ADMIN — PANTOPRAZOLE SODIUM 40 MG: 40 TABLET, DELAYED RELEASE ORAL at 06:59

## 2024-12-11 RX ADMIN — ENOXAPARIN SODIUM 30 MG: 100 INJECTION SUBCUTANEOUS at 15:06

## 2024-12-11 RX ADMIN — IPRATROPIUM BROMIDE AND ALBUTEROL SULFATE 1 DOSE: .5; 2.5 SOLUTION RESPIRATORY (INHALATION) at 07:32

## 2024-12-11 RX ADMIN — ACETYLCYSTEINE 400 MG: 100 INHALANT RESPIRATORY (INHALATION) at 23:00

## 2024-12-11 RX ADMIN — DOCUSATE SODIUM 100 MG: 100 CAPSULE, LIQUID FILLED ORAL at 08:18

## 2024-12-11 RX ADMIN — GUAIFENESIN 600 MG: 600 TABLET, EXTENDED RELEASE ORAL at 19:58

## 2024-12-11 RX ADMIN — ACETYLCYSTEINE 400 MG: 100 INHALANT RESPIRATORY (INHALATION) at 09:58

## 2024-12-11 ASSESSMENT — PAIN SCALES - GENERAL: PAINLEVEL_OUTOF10: 0

## 2024-12-11 NOTE — CARE COORDINATION
12/11/2024 1127 CM note: Pt resides with spouse in 2 story home, bed/bath upstairs, 2STE. She has home o2  3-4L NC(5L concentrator) from [facility]. No cpap/bpap. Pt is currently wearing o2@15L HF NC(pox 100%). Pt has lung CA, follows with [facility] and started keytruda at end of November. CM met with pt and dtr Ligia this am regarding transition of care options-HHC vs SNF. Pt agreeable for SNF and prefers 1)[facility]-declined pt  2)[facility]-referral placed to liaariana Martinez to review 3)[facility] then [place]. Pt/dtr informed she will need to forgo cancer treatment while at SNF and they voiced understanding. For SNF, NO PRECERT required, however, pt will need to be on 10L o2 or less. Will need HENS and signed ELDER. Arash MAX         The Plan for Transition of Care is related to the following treatment goals: SNF    The Patient and/or patient representative pt dtr Ligia was provided with a choice of provider and agrees   with the discharge plan. [x] Yes [] No    Freedom of choice list was provided with basic dialogue that supports the patient's individualized plan of care/goals, treatment preferences and shares the quality data associated with the providers. [x] Yes [] No

## 2024-12-11 NOTE — PROGRESS NOTES
Spiritual Health History and Assessment/Progress Note  [facility]    Spiritual/Emotional Needs,  ,  ,      Name: Ashley Vivas MRN: [id number]    Age: 74 y.o.     Sex: female   Language: English   Advent: Mosque   Symptomatic anemia     Date: 12/11/2024                           Spiritual Assessment continued in [facility] 4 MED SURG TELE        Referral/Consult From: (P) Rounding   Encounter Overview/Reason: Spiritual/Emotional Needs  Service Provided For: (P) Patient    Pauly, Belief, Meaning:   Patient identifies as spiritual, is connected with a pauly tradition or spiritual practice, and has beliefs or practices that help with coping during difficult times  Family/Friends No family/friends present      Importance and Influence:  Patient has spiritual/personal beliefs that influence decisions regarding their health  Family/Friends No family/friends present    Community:  Patient feels well-supported. Support system includes: Spouse/Partner and Children  Family/Friends No family/friends present    Assessment and Plan of Care:     Patient Interventions include: Facilitated expression of thoughts and feelings and Affirmed coping skills/support systems  Limited visit as patient not up for visit, but did appreciate the prayer support.  Family/Friends Interventions include: No family/friends present  ,  Patient Plan of Care: Spiritual Care available upon further referral  Family/Friends Plan of Care: No family/friends present    Electronically signed by JORDAN Awan on 12/11/2024 at 2:50 PM

## 2024-12-11 NOTE — PROGRESS NOTES
OCCUPATIONAL THERAPY TREATMENT NOTE    [facility]   [address] [facility]        Date:2024  Patient Name: Ashley Vivas  MRN: [id number]  : 1950  Room: [address] [place] [address]      Evaluating OT: Rachid Cardoso OTR/L; #[id number]      Referring Provider and Specific Provider Orders/Date:      24 1015  OT eval and treat  Start:  24 1015,   End:  24 1015,   ONE TIME,   Standing Count:  1 Occurrences,   R       Comments:  Please provide services when f...    Pepe Calvert MD       Placement Recommendation: Subacute Rehab vs Home with occupational therapy if patient continues to progress with OT.         Diagnosis:   1. Symptomatic anemia    2. Syncope and collapse    3. Malignant neoplasm of lung, unspecified laterality, unspecified part of lung (HCC)    4. Complication of chemotherapy, initial encounter    5. Syncope, unspecified syncope type         Surgery: None       Pertinent Medical History:       Past Medical History        Past Medical History:   Diagnosis Date    Cancer (HCC)      LT BREAST    COPD (chronic obstructive pulmonary disease) (HCC)      History of Holter monitoring 2021    Hypertension      Lung cancer, lower lobe (HCC) 2017    Lung cancer, upper lobe (HCC) 2017            Past Surgical History         Past Surgical History:   Procedure Laterality Date    BREAST LUMPECTOMY Left     BRONCHOSCOPY   2017     With EBUS    CARPAL TUNNEL RELEASE Bilateral      COLONOSCOPY   2021    EYE SURGERY Right 2023     RIGHT EYE CATARACT EXTRACTION IOL IMPLANT performed by Lida Horvath MD at [facility]    EYE SURGERY Left 2023     LEFT EYE CATARACT EXTRACTION IOL IMPLANT performed by Lida Horvath MD at [facility]    KNEE ARTHROSCOPY Left      LUNG BIOPSY Right 2017    SKIN BIOPSY         CANCER BASAL TIP NOSE            Precautions:  Fall Risk, NC O2, Head staples        Assessment of current deficits:     [x] Functional mobility            [x]ADLs           [x] Strength                   []Cognition    [x] Functional transfers          [x] IADLs          [] Safety Awareness   [x]Endurance    [] Fine Coordination              [x] Balance      [] Vision/perception    []Sensation      []Gross Motor Coordination  [] ROM           [] Delirium                   [] Motor Control      OT PLAN OF CARE   OT POC based on physician orders, patient diagnosis and results of clinical assessment     Frequency/Duration 1-3 days/wk for 2 weeks PRN      Specific OT Treatment Interventions to include:   * Instruction/training on adapted ADL techniques and AE recommendations to increase functional independence within precautions       * Training on energy conservation strategies, correct breathing pattern and techniques to improve independence/tolerance for self-care routine  * Functional transfer/mobility training/DME recommendations for increased independence, safety, and fall prevention  * Therapeutic exercise to improve motor endurance, ROM, and functional strength for ADLs/functional transfers  * Therapeutic activities to facilitate/challenge dynamic balance, stand tolerance for increased safety and independence with ADLs     Recommended Adaptive Equipment: TBD at Rehab     Home Living: with family ; Spouse; single family home, Two story, 3 steps to enter with rail, tub shower.  Bed and bath on second floor with full flight of steps.       Equipment owned: Wheeled walker, Canes, bedside commode, Shower chair, Home O2 (3-4L)     Prior Level of Function: Independent with ADLs , Assistance from family as needed with IADLs; ambulated without AD     Driving: Yes  Occupation: No     Pain Level: 5/10 pain headache; Nursing notified.        Cognition: A&O: 4/4; Follows Multiple step directions              Memory: good               Sequencing: good               Problem solving: good                Judgement/safety: good      AMPAC      AM-PAC Daily Activity - Inpatient   How much help is needed for putting on and taking off regular lower body clothing?: A Lot  How much help is needed for bathing (which includes washing, rinsing, drying)?: A Lot  How much help is needed for toileting (which includes using toilet, bedpan, or urinal)?: A Little  How much help is needed for putting on and taking off regular upper body clothing?: A Little  How much help is needed for taking care of personal grooming?: A Little  How much help for eating meals?: A Little  [facility] Raw [facility]: 16  AM-PAC Inpatient ADL T-Scale Score : 35.96  ADL Inpatient CMS 0-100% Score: 53.32  ADL Inpatient CMS G-Code Modifier : CK                         Functional Assessment:     Initial Eval Status  Date: 12/4/24 Treatment Status  Date: 12/11/24 STGs = LTGs  Time frame: 10-14 days   Feeding Setup Assistance     Assistance opening packages/containers.  Set up Independent    Grooming Minimal Assistance     Seated at EOB; Assistance with brushing hair.  MIN A decreased endurance  Modified Timber    UB Dressing Minimal Assist  MIN A pt requiring assist to tie/adjust gown in back  Modified Timber    LB Dressing Moderate Assist  MIN A to jim/doff pants pt requiring assist to maintain standing balance during task  Supervision    Bathing Moderate Assist MOD A suspected due to decreased endurance and O2 desaturation to 84% during tasks  Supervision    Toileting Minimal Assist  N/t  Modified Timber    Bed Mobility  Supine to sit: N/T   Sit to supine: N/T   Rolling:N/T    Seated at EOB   Supine<>sit MIN A  Supine to sit: Modified Timber   Sit to supine: Modified Timber   Rolling:Modified Timber     Functional Transfers Minimal Assist from EOB sit to stand.   Transfer training with verbal cues for hand placement throughout session to improve safety.  MIN A sit<>stand from EOB with HHA  Modified

## 2024-12-11 NOTE — PROGRESS NOTES
Admitting Date and Time: 12/2/2024 11:03 AM  Admit Dx: Syncope and collapse [R55]  Anemia [D64.9]  Complication of chemotherapy, initial encounter [T45.1X5A]  Symptomatic anemia [D64.9]  Malignant neoplasm of lung, unspecified laterality, unspecified part of lung (HCC) [C34.90]    Subjective:    12/8 Nurse reports patient still uncomfortable with her breathing    ROS: denies fever, chills, cp, sob, n/v, HA unless stated above.    12/9  pt is laying in bed in no distress, and she has been doing fair still on  8 L o2     12/10 pt is more awake and alert today she feels better     12/11 pt is laying in bed in no distress sleeping easily arousable , was seen this morning and was feeling better but tired and wanted to sleep   But she was going to the bathroom earlier today and her o2 sat dropped to 70/% on o2 , and had to go up to 15 L o2 , and she recovers while sitting in bed  but any movement even in bed she desats significantly , and I discussed with dr Bear and will check CTA r/o PE since her CXR didn't reveal any acute abnormality    predniSONE  20 mg Oral Daily    ipratropium 0.5 mg-albuterol 2.5 mg  1 Dose Inhalation Q4H WA RT    guaiFENesin  600 mg Oral BID    acetylcysteine  4 mL Inhalation Q4H    levofloxacin  750 mg IntraVENous Q24H    enoxaparin  30 mg SubCUTAneous Daily    pantoprazole  40 mg Oral QAM AC    metoprolol succinate  12.5 mg Oral Daily    docusate sodium  100 mg Oral Daily    bisacodyl  10 mg Rectal Once    therapeutic multivitamin-minerals  1 tablet Oral Daily    budesonide  1 mg Nebulization BID RT     sodium chloride, , PRN  Benzocaine-Menthol, 1 lozenge, Q2H PRN  albuterol, 2.5 mg, 4x Daily PRN  guaiFENesin, 1,200 mg, BID PRN  acetaminophen, 650 mg, Q4H PRN         Objective:    BP (!) 132/56   Pulse 83   Temp 97.7 °F (36.5 °C) (Oral)   Resp 18   Ht 1.575 m (5' 2.01\")   Wt 50.8 kg (112 lb)   SpO2 94%   BMI 20.48 kg/m²   General Appearance: Sleeping easily arousable oriented to  person, place and time and on o2 15 L   Skin: warm and dry  Head: normocephalic and atraumatic  Eyes: pupils equal, round, and reactive to light, extraocular eye movements intact, conjunctivae normal  Pulmonary/Chest: Bibasilar fine rales worse on the left   Cardiovascular: normal rate, normal S1 and S2 and no carotid bruits  Abdomen: soft, non-tender, non-distended, normal bowel sounds, no masses or organomegaly  Extremities: no cyanosis, no clubbing and trace edema  Neurologic: no cranial nerve deficit and speech normal      Recent Labs     12/10/24  0803      K 4.6   CL 96*   CO2 39*   BUN 19   CREATININE 0.5   GLUCOSE 129*   CALCIUM 9.1         Recent Labs     12/10/24  0803   ALKPHOS 50   BILITOT 0.3   AST 29   ALT 38*       Recent Labs     12/09/24  0858 12/09/24  1726 12/10/24  0803 12/11/24  0911   WBC 6.3  --  6.7 7.4   RBC 2.08*  --  2.79* 2.98*   HGB 6.3* 8.5* 8.5* 9.1*   HCT 21.0* 27.1* 27.6* 29.7*   .0*  --  98.9 99.7   MCH 30.3  --  30.5 30.5   MCHC 30.0*  --  30.8* 30.6*   RDW 18.0*  --  19.2* 18.3*     --  204 222   MPV 11.0  --  10.6 11.1           Radiology:   XR CHEST PORTABLE   Final Result   No acute process.         XR CHEST PORTABLE   Final Result   Cardiomegaly with small bilateral pleural effusions and minimal atelectatic   changes at the lung bases.  Scarring/atelectatic change in the right upper   lobe.         CT HEAD WO CONTRAST   Final Result      1.  No evidence of acute intracranial process.      2.  Findings of presumed small vessel ischemic deep white matter disease.      3.  Skin staples in the posterior scalp in a presumed area of laceration.  No   evidence of skull fracture.         CTA CHEST W CONTRAST   Final Result   1. No PE.   2. Right upper lobe and right lower lobe treated tumor.  Left lower lobe   subcentimeter pleural base nodule.   3. Moderate emphysema.  Lingular segment focal infiltrate with adjacent   pleural scarring in keeping with post

## 2024-12-11 NOTE — PROGRESS NOTES
Subjective:    The patient is awake and alert, resting in bed on 8L O2. She looks a lot better today, she states she feels a lot better. Her breathing feels better. I reviewed her labs for today. She asked that I call her daughter Ligia to tell her how she is improving. Denies n/v/d.    Objective:    BP (!) 125/51   Pulse 95   Temp 97.8 °F (36.6 °C) (Infrared)   Resp 22   Ht 1.575 m (5' 2.01\")   Wt 50.8 kg (112 lb)   SpO2 98%   BMI 20.48 kg/m²     General: Alert, oriented, no acute distress. Appears ill  HEENT: No thrush or mucositis, EOMI, PERRLA  Heart:  RRR, no murmurs, gallops, or rubs.  Lungs:  Diminished bilat, no wheeze, rales or rhonchi  Abd: BS present, nontender, nondistended, no masses  Extrem:  No clubbing, cyanosis, or edema  Lymphatics: No palpable adenopathy in cervical and supraclavicular regions  Skin: Intact, no petechia or purpura    CBC with Differential:    Lab Results   Component Value Date/Time    WBC 6.7 12/10/2024 08:03 AM    RBC 2.79 12/10/2024 08:03 AM    HGB 8.5 12/10/2024 08:03 AM    HCT 27.6 12/10/2024 08:03 AM     12/10/2024 08:03 AM    MCV 98.9 12/10/2024 08:03 AM    MCH 30.5 12/10/2024 08:03 AM    MCHC 30.8 12/10/2024 08:03 AM    RDW 19.2 12/10/2024 08:03 AM    METASPCT 1 12/06/2024 05:51 AM    LYMPHOPCT 4 12/10/2024 08:03 AM    MONOPCT 4 12/10/2024 08:03 AM    MYELOPCT 1 12/10/2024 08:03 AM    EOSPCT 0 12/10/2024 08:03 AM    BASOPCT 0 12/10/2024 08:03 AM    MONOSABS 0.23 12/10/2024 08:03 AM    LYMPHSABS 0.23 12/10/2024 08:03 AM    EOSABS 0.00 12/10/2024 08:03 AM    BASOSABS 0.00 12/10/2024 08:03 AM     CMP:    Lab Results   Component Value Date/Time     12/10/2024 08:03 AM    K 4.6 12/10/2024 08:03 AM    CL 96 12/10/2024 08:03 AM    CO2 39 12/10/2024 08:03 AM    BUN 19 12/10/2024 08:03 AM    CREATININE 0.5 12/10/2024 08:03 AM    GFRAA >60 05/27/2017 05:25 AM    LABGLOM >90 12/10/2024 08:03 AM    GLUCOSE 129 12/10/2024 08:03 AM    CALCIUM 9.1 12/10/2024 08:03

## 2024-12-11 NOTE — PROGRESS NOTES
3. Moderate emphysema.  Lingular segment focal infiltrate with adjacent pleural scarring in keeping with post radiotherapy scarring. 4. Age indeterminate mild T11 superior endplate compression fracture. 5. When outside CT exam is received, an addendum report can be issued.     Assessment:     Acute on chronic hypoxic respiratory failure requiring high flow oxygen  COPD with acute exacerbation  Community-acquired pneumonia  Squamous cell lung cancer on Keytruda  Acute on chronic anemia    Plan:     Wean FiO2 as able for sats 88 to 94%, baseline 4L  Continue BiPAP at bedtime and as needed  Check amatory pulse ox prior to discharge  Scheduled budesonide, DuoNebs and Mucomyst  Taper steroids   To complete Levaquin x 5 days  Incentive spirometry, flutter valve  Hem/onc following   Transfuse per admitting  PT/OT, out of bed as tolerated  DVT prophylaxis with Lovenox        DEV Clark - NP      I saw and evaluated the patient and performed the MDM as documented in my note. Radiographs, labs and medication list were reviewed by me independently. I spoke with bedside nursing, therapists and consultants. The case was discussed in detail and plans for care were reviewed with Angeles MÉNDEZ. I provided the substantive portion of this visit by personally performing the history/MDM component in its entirety.Review of CNP documentation was conducted and revisions were made as appropriate.       Case discussed with Dr. Munoz, per nursing staff patient significantly hypoxemic with any exertion. PA and LAT CXR reviewed which shows no new infiltrate or effusion. For me the patient c/o feeling fatigued. Denied any increased shortness of breath, hoping to go home soon.      Acute on chronic hypoxemic respiratory failure currently requiring 6-7 liters at rest up to 12 L with exertion, baseline is 3-4  COPD with acute exacerbation  Known squamous cell lung cancer on Keytruda  Acute on chronic anemia  Community-acquired  pneumonia     Continue to wean FiO2 as tolerated.  Patient needs aggressive pulmonary hygiene with incentive spirometer, flutter valve  Differential for hypoxemia would include PE, shunt physiology, will obtain CTA.   Continue Mucomyst, DuoNebs, budesonide  Solumedrol weaned to prednisone  Levaquin completed  Appreciate heme-onc recommendations    Shae Bear, DO

## 2024-12-12 ENCOUNTER — APPOINTMENT (OUTPATIENT)
Dept: CT IMAGING | Age: 74
DRG: 811 | End: 2024-12-12
Payer: MEDICARE

## 2024-12-12 LAB
ALBUMIN SERPL-MCNC: 3.2 G/DL (ref 3.5–5.2)
ALP SERPL-CCNC: 60 U/L (ref 35–104)
ALT SERPL-CCNC: 41 U/L (ref 0–32)
ANION GAP SERPL CALCULATED.3IONS-SCNC: 4 MMOL/L (ref 7–16)
AST SERPL-CCNC: 20 U/L (ref 0–31)
BASOPHILS # BLD: 0.01 K/UL (ref 0–0.2)
BASOPHILS NFR BLD: 0 % (ref 0–2)
BILIRUB SERPL-MCNC: 0.2 MG/DL (ref 0–1.2)
BUN SERPL-MCNC: 13 MG/DL (ref 6–23)
CALCIUM SERPL-MCNC: 8.8 MG/DL (ref 8.6–10.2)
CHLORIDE SERPL-SCNC: 93 MMOL/L (ref 98–107)
CO2 SERPL-SCNC: 42 MMOL/L (ref 22–29)
CREAT SERPL-MCNC: 0.5 MG/DL (ref 0.5–1)
EOSINOPHIL # BLD: 0.14 K/UL (ref 0.05–0.5)
EOSINOPHILS RELATIVE PERCENT: 2 % (ref 0–6)
ERYTHROCYTE [DISTWIDTH] IN BLOOD BY AUTOMATED COUNT: 17.8 % (ref 11.5–15)
GFR, ESTIMATED: >90 ML/MIN/1.73M2
GLUCOSE SERPL-MCNC: 94 MG/DL (ref 74–99)
HCT VFR BLD AUTO: 30.3 % (ref 34–48)
HGB BLD-MCNC: 9 G/DL (ref 11.5–15.5)
IMM GRANULOCYTES # BLD AUTO: 0.09 K/UL (ref 0–0.58)
IMM GRANULOCYTES NFR BLD: 1 % (ref 0–5)
LYMPHOCYTES NFR BLD: 0.75 K/UL (ref 1.5–4)
LYMPHOCYTES RELATIVE PERCENT: 9 % (ref 20–42)
MCH RBC QN AUTO: 30 PG (ref 26–35)
MCHC RBC AUTO-ENTMCNC: 29.7 G/DL (ref 32–34.5)
MCV RBC AUTO: 101 FL (ref 80–99.9)
MONOCYTES NFR BLD: 0.99 K/UL (ref 0.1–0.95)
MONOCYTES NFR BLD: 12 % (ref 2–12)
NEUTROPHILS NFR BLD: 75 % (ref 43–80)
NEUTS SEG NFR BLD: 6.03 K/UL (ref 1.8–7.3)
PLATELET # BLD AUTO: 231 K/UL (ref 130–450)
PMV BLD AUTO: 10.6 FL (ref 7–12)
POTASSIUM SERPL-SCNC: 4 MMOL/L (ref 3.5–5)
PROT SERPL-MCNC: 5.4 G/DL (ref 6.4–8.3)
RBC # BLD AUTO: 3 M/UL (ref 3.5–5.5)
SODIUM SERPL-SCNC: 139 MMOL/L (ref 132–146)
WBC OTHER # BLD: 8 K/UL (ref 4.5–11.5)

## 2024-12-12 PROCEDURE — 6370000000 HC RX 637 (ALT 250 FOR IP): Performed by: INTERNAL MEDICINE

## 2024-12-12 PROCEDURE — 94660 CPAP INITIATION&MGMT: CPT

## 2024-12-12 PROCEDURE — 99233 SBSQ HOSP IP/OBS HIGH 50: CPT | Performed by: INTERNAL MEDICINE

## 2024-12-12 PROCEDURE — 6370000000 HC RX 637 (ALT 250 FOR IP): Performed by: CLINICAL NURSE SPECIALIST

## 2024-12-12 PROCEDURE — 85025 COMPLETE CBC W/AUTO DIFF WBC: CPT

## 2024-12-12 PROCEDURE — 2700000000 HC OXYGEN THERAPY PER DAY

## 2024-12-12 PROCEDURE — 97530 THERAPEUTIC ACTIVITIES: CPT

## 2024-12-12 PROCEDURE — 80053 COMPREHEN METABOLIC PANEL: CPT

## 2024-12-12 PROCEDURE — 94640 AIRWAY INHALATION TREATMENT: CPT

## 2024-12-12 PROCEDURE — 36415 COLL VENOUS BLD VENIPUNCTURE: CPT

## 2024-12-12 PROCEDURE — 6360000002 HC RX W HCPCS: Performed by: INTERNAL MEDICINE

## 2024-12-12 PROCEDURE — 6360000004 HC RX CONTRAST MEDICATION: Performed by: RADIOLOGY

## 2024-12-12 PROCEDURE — 1200000000 HC SEMI PRIVATE

## 2024-12-12 PROCEDURE — 97110 THERAPEUTIC EXERCISES: CPT

## 2024-12-12 PROCEDURE — 71275 CT ANGIOGRAPHY CHEST: CPT

## 2024-12-12 RX ORDER — IOPAMIDOL 755 MG/ML
75 INJECTION, SOLUTION INTRAVASCULAR
Status: COMPLETED | OUTPATIENT
Start: 2024-12-12 | End: 2024-12-12

## 2024-12-12 RX ADMIN — GUAIFENESIN 600 MG: 600 TABLET, EXTENDED RELEASE ORAL at 08:06

## 2024-12-12 RX ADMIN — ACETYLCYSTEINE 400 MG: 100 INHALANT RESPIRATORY (INHALATION) at 15:15

## 2024-12-12 RX ADMIN — DOCUSATE SODIUM 100 MG: 100 CAPSULE, LIQUID FILLED ORAL at 08:06

## 2024-12-12 RX ADMIN — ACETYLCYSTEINE 400 MG: 100 INHALANT RESPIRATORY (INHALATION) at 21:56

## 2024-12-12 RX ADMIN — IOPAMIDOL 75 ML: 755 INJECTION, SOLUTION INTRAVENOUS at 11:51

## 2024-12-12 RX ADMIN — BUDESONIDE INHALATION 1000 MCG: 0.5 SUSPENSION RESPIRATORY (INHALATION) at 19:06

## 2024-12-12 RX ADMIN — Medication 1 TABLET: at 08:06

## 2024-12-12 RX ADMIN — ENOXAPARIN SODIUM 30 MG: 100 INJECTION SUBCUTANEOUS at 18:47

## 2024-12-12 RX ADMIN — IPRATROPIUM BROMIDE AND ALBUTEROL SULFATE 1 DOSE: .5; 2.5 SOLUTION RESPIRATORY (INHALATION) at 10:37

## 2024-12-12 RX ADMIN — METOPROLOL SUCCINATE 12.5 MG: 25 TABLET, EXTENDED RELEASE ORAL at 08:06

## 2024-12-12 RX ADMIN — PREDNISONE 20 MG: 20 TABLET ORAL at 08:06

## 2024-12-12 RX ADMIN — ACETYLCYSTEINE 400 MG: 100 INHALANT RESPIRATORY (INHALATION) at 10:37

## 2024-12-12 RX ADMIN — ACETYLCYSTEINE 400 MG: 100 INHALANT RESPIRATORY (INHALATION) at 19:06

## 2024-12-12 RX ADMIN — BUDESONIDE INHALATION 1000 MCG: 0.5 SUSPENSION RESPIRATORY (INHALATION) at 06:52

## 2024-12-12 RX ADMIN — PANTOPRAZOLE SODIUM 40 MG: 40 TABLET, DELAYED RELEASE ORAL at 06:04

## 2024-12-12 RX ADMIN — IPRATROPIUM BROMIDE AND ALBUTEROL SULFATE 1 DOSE: .5; 2.5 SOLUTION RESPIRATORY (INHALATION) at 19:06

## 2024-12-12 RX ADMIN — ACETYLCYSTEINE 400 MG: 100 INHALANT RESPIRATORY (INHALATION) at 06:51

## 2024-12-12 RX ADMIN — IPRATROPIUM BROMIDE AND ALBUTEROL SULFATE 1 DOSE: .5; 2.5 SOLUTION RESPIRATORY (INHALATION) at 06:51

## 2024-12-12 RX ADMIN — IPRATROPIUM BROMIDE AND ALBUTEROL SULFATE 1 DOSE: .5; 2.5 SOLUTION RESPIRATORY (INHALATION) at 15:15

## 2024-12-12 NOTE — PROGRESS NOTES
Comprehensive Nutrition Assessment    Type and Reason for Visit:  Reassess    Nutrition Recommendations/Plan:   Continue current diet and encourage intake as tolerated   Recommend continue high celeste/high pro ONS TID        Malnutrition Assessment:  Malnutrition Status:  At risk for malnutrition (12/09/24 1204)    Context:  Acute Illness     Findings of the 6 clinical characteristics of malnutrition:  Energy Intake:  Mild decrease in energy intake  Weight Loss:  Unable to assess     Body Fat Loss:  No body fat loss     Muscle Mass Loss:  No muscle mass loss    Fluid Accumulation:  Mild Extremities   Strength:  Not Performed    Nutrition Assessment:    Pt admit w/ near syncope r/t acute on chronic resp failure requiing BiPAP r/t COPD exac, pulm. HTN, recurrent lung CA & acute on chronic anemia r/t chemo. oxygenation improving. PMHx of lung CA, L breast CA, skin CA, advanced COPD, HTN, anxiety/depression. Pt remains on regular diet with poor intake 0-50% of meals and ONS, would recommend that pt continue on Ensure enlive (or ensure plus hp) TID, encourage intake as tolerated.    Nutrition Related Findings:    abd WDL, trace edeme, I/O's WDL, A&Ox4 Wound Type: Wound Consult Pending (traumatic wound to posterior head)       Current Nutrition Intake & Therapies:    Average Meal Intake: 1-25%, 26-50%  Average Supplements Intake: 1-25%, 26-50%  ADULT DIET; Regular  ADULT ORAL NUTRITION SUPPLEMENT; Breakfast, Lunch, Dinner; Standard High Calorie/High Protein Oral Supplement    Anthropometric Measures:  Height: 157.5 cm (5' 2.01\")  Ideal Body Weight (IBW): 110 lbs (50 kg)    Admission Body Weight: 50.9 kg (112 lb 3 oz) (12/2 bed scale)  Current Body Weight: 50.9 kg (112 lb 3 oz) (12/2 bed scale), 102 % IBW. Weight Source: Bed scale  Current BMI (kg/m2): 20.5  Usual Body Weight:  (VIVEK d/t lack of accurate recent wt hx per EMR)        Weight Adjustment For: No Adjustment  BMI Categories: Underweight (BMI less than 22) age  over 65    Estimated Daily Nutrient Needs:  Energy Requirements Based On: Kcal/kg  Weight Used for Energy Requirements: Current  Energy (kcal/day): 1600-1800kcal/day (32-35kcal/kg CBW)  Weight Used for Protein Requirements: Current  Protein (g/day): 75-85g/day (1.5-1.7g/kg CBW)  Method Used for Fluid Requirements: 1 ml/kcal  Fluid (ml/day): 1600-1800kcal/day    Nutrition Diagnosis:   Inadequate oral intake related to impaired respiratory function as evidenced by intake 0-25%    Nutrition Interventions:   Food and/or Nutrient Delivery: Continue Current Diet, Continue Oral Nutrition Supplement  Nutrition Education/Counseling: Education/Counseling not indicated  Coordination of Nutrition Care: Continue to monitor while inpatient     Goals:  Goals: PO intake 75% or greater, by next RD assessment  Type of Goal: Continue current goal  Previous Goal Met: No Progress toward Goal(s)    Nutrition Monitoring and Evaluation:   Behavioral-Environmental Outcomes: None Identified  Food/Nutrient Intake Outcomes: Food and Nutrient Intake, Supplement Intake  Physical Signs/Symptoms Outcomes: Biochemical Data, Chewing or Swallowing, GI Status, Fluid Status or Edema, Nutrition Focused Physical Findings, Skin, Weight    Discharge Planning:    Continue Oral Nutrition Supplement, Continue current diet     Amy Solomon RD, LD  Contact: [phone]

## 2024-12-12 NOTE — PROGRESS NOTES
Subjective:    The patient is awake and alert, on 5L O2. No problems overnight.  She is feeling much better and is agreeable to go for rehab. Denies chest pain, angina, dyspnea or abdominal discomfort. No nausea or vomiting. Tolerating diet.      Objective:    BP (!) 116/50   Pulse 85   Temp 98.1 °F (36.7 °C) (Infrared)   Resp 19   Ht 1.575 m (5' 2.01\")   Wt 50.8 kg (112 lb)   SpO2 97%   BMI 20.48 kg/m²     General: Alert, oriented, no acute distress  HEENT: No thrush or mucositis, EOMI, PERRLA  Heart:  RRR, no murmurs, gallops, or rubs.  Lungs:  Diminished bilat bases, no wheeze, rales or rhonchi. 5L O2  Abd: BS present, nontender, nondistended, no masses  Extrem:  No clubbing, cyanosis, or edema  Lymphatics: No palpable adenopathy in cervical and supraclavicular regions  Skin: Intact, no petechia or purpura    CBC with Differential:    Lab Results   Component Value Date/Time    WBC 7.4 12/11/2024 09:11 AM    RBC 2.98 12/11/2024 09:11 AM    HGB 9.1 12/11/2024 09:11 AM    HCT 29.7 12/11/2024 09:11 AM     12/11/2024 09:11 AM    MCV 99.7 12/11/2024 09:11 AM    MCH 30.5 12/11/2024 09:11 AM    MCHC 30.6 12/11/2024 09:11 AM    RDW 18.3 12/11/2024 09:11 AM    METASPCT 1 12/06/2024 05:51 AM    LYMPHOPCT 11 12/11/2024 09:11 AM    MONOPCT 16 12/11/2024 09:11 AM    MYELOPCT 1 12/10/2024 08:03 AM    EOSPCT 1 12/11/2024 09:11 AM    BASOPCT 0 12/11/2024 09:11 AM    MONOSABS 1.15 12/11/2024 09:11 AM    LYMPHSABS 0.84 12/11/2024 09:11 AM    EOSABS 0.05 12/11/2024 09:11 AM    BASOSABS 0.01 12/11/2024 09:11 AM     CMP:    Lab Results   Component Value Date/Time     12/10/2024 08:03 AM    K 4.6 12/10/2024 08:03 AM    CL 96 12/10/2024 08:03 AM    CO2 39 12/10/2024 08:03 AM    BUN 19 12/10/2024 08:03 AM    CREATININE 0.5 12/10/2024 08:03 AM    GFRAA >60 05/27/2017 05:25 AM    LABGLOM >90 12/10/2024 08:03 AM    GLUCOSE 129 12/10/2024 08:03 AM    CALCIUM 9.1 12/10/2024 08:03 AM    BILITOT 0.3 12/10/2024 08:03 AM

## 2024-12-12 NOTE — CARE COORDINATION
12/12/2024 1229 CM note: Pt resides with spouse in 2 story home, bed/bath upstairs.. She has home o2  3-4L NC(5L concentrator) from [facility]. No cpap/bpap. Pt is currently wearing o2@5 L-8L HF NC at rest.  Pt has lung CA, follows with [facility] and started keytruda at end of November.  Pt agreeable for SNF and prefers 1)[facility]-declined pt  2)[facility]-referral placed to liaariana Martinez who is awaiting CTA results and o2 needs. Pt/dtr informed she will need to forgo cancer treatment while at SNF and they voiced understanding. For SNF, NO PRECERT,  however, pt will need to be on 10L o2 or less as SNF has 10 L concentrator. Will need HENS and signed ELDER. Arash MAX

## 2024-12-12 NOTE — PROGRESS NOTES
Admitting Date and Time: 12/2/2024 11:03 AM  Admit Dx: Syncope and collapse [R55]  Anemia [D64.9]  Complication of chemotherapy, initial encounter [T45.1X5A]  Symptomatic anemia [D64.9]  Malignant neoplasm of lung, unspecified laterality, unspecified part of lung (HCC) [C34.90]    Subjective:    12/8 Nurse reports patient still uncomfortable with her breathing    ROS: denies fever, chills, cp, sob, n/v, HA unless stated above.    12/9  pt is laying in bed in no distress, and she has been doing fair still on  8 L o2     12/10 pt is more awake and alert today she feels better     12/11 pt is laying in bed in no distress sleeping easily arousable , was seen this morning and was feeling better but tired and wanted to sleep   But she was going to the bathroom earlier today and her o2 sat dropped to 70/% on o2 , and had to go up to 15 L o2 , and she recovers while sitting in bed  but any movement even in bed she desats significantly , and I discussed with dr Bear and will check CTA r/o PE since her CXR didn't reveal any acute abnormality     12/12 patient is resting in bed in no acute distress she feels better she was 8 L I decreased her oxygen to 5 L and she is still saturating 97% will try to wean her down more patient just gets hypoxic with minimal exertion.  I discussed with nursing staff to increase her oxygen when she does any activity.   predniSONE  20 mg Oral Daily    ipratropium 0.5 mg-albuterol 2.5 mg  1 Dose Inhalation Q4H WA RT    guaiFENesin  600 mg Oral BID    acetylcysteine  4 mL Inhalation Q4H    enoxaparin  30 mg SubCUTAneous Daily    pantoprazole  40 mg Oral QAM AC    metoprolol succinate  12.5 mg Oral Daily    docusate sodium  100 mg Oral Daily    bisacodyl  10 mg Rectal Once    therapeutic multivitamin-minerals  1 tablet Oral Daily    budesonide  1 mg Nebulization BID RT     sodium chloride, , PRN  Benzocaine-Menthol, 1 lozenge, Q2H PRN  albuterol, 2.5 mg, 4x Daily PRN  guaiFENesin, 1,200 mg, BID  Skin staples in the posterior scalp in a presumed area of laceration.  No   evidence of skull fracture.         CTA CHEST W CONTRAST   Final Result   1. No PE.   2. Right upper lobe and right lower lobe treated tumor.  Left lower lobe   subcentimeter pleural base nodule.   3. Moderate emphysema.  Lingular segment focal infiltrate with adjacent   pleural scarring in keeping with post radiotherapy scarring.   4. Age indeterminate mild T11 superior endplate compression fracture.   5. When outside CT exam is received, an addendum report can be issued.         CTA CHEST W CONTRAST    (Results Pending)       Assessment:  Principal Problem:    Symptomatic anemia  Active Problems:    Malignant neoplasm of lung (HCC)    Debilitated    Syncope and collapse    Complication of chemotherapy    COPD exacerbation (HCC)  Resolved Problems:    * No resolved hospital problems. *      Plan: History of present illness from History and Physical:  74 y.o. female with a history of left breast and lung cancer, on chemo for lung cancer, COPD, HTN chronic respiratory faliure on 4 liters chronically presents with near syncope.  When she got up to go to the bathroom last night she felt weak.  This was associated with lightheadedness. She fell. possible  LOC.  She hit her head when she fell and has received stitches in her occiput.  She also has pain in her mid  to palpation she thinks she fell onto this area as well.  Patient is somewhat anxious and changes her mind about medical care in various ways.  I do not trust that she is a good historian but she is not overtly very confused         pre syncope: can not be sure about LOC since patient and witness  seem to be poor historians.  Could have been due to anemia.    No abnormailty on telemetery   Cardiology said:lexiscan once identify cause of anemia. GI consulted , recommended EGD when respiratory status is better I discussed with dr jacob 12/9  about some changes in her

## 2024-12-12 NOTE — PROGRESS NOTES
[facility]  Department of Internal Medicine  Division of Pulmonary, Critical Care and Sleep Medicine  Progress Note      Aroldo VINICIUS Coles Chema DO Devyn Diaz, APRN-CNP  Angeles Cooley, APRN-CNP        Patient's primary pulmonologist: Dr. Wiley     Subjective:  Patient seen and examined.  Patient was seen on 5 L nasal cannula.  She reports exertional dyspnea.  Discussed with RN at bedside.  Patient desaturations in the 70s with any exertion.  CTA of the chest is pending at this time.      OBJECTIVE:     PHYSICAL EXAM:   VITALS:   Vitals:    12/12/24 0653 12/12/24 0735 12/12/24 0806 12/12/24 1041   BP:  (!) 116/50 (!) 116/50    Pulse:  85 85    Resp:  19     Temp:  98.1 °F (36.7 °C)     TempSrc:  Infrared     SpO2: 94% 97%  97%   Weight:       Height:            Intake/Output Summary (Last 24 hours) at 12/12/2024 1432  Last data filed at 12/11/2024 1950  Gross per 24 hour   Intake 120 ml   Output --   Net 120 ml          CONSTITUTIONAL:   Pale, ill-appearing, weak, A&O x 3, NAD  SKIN:     No rash, No suspicious lesions, No skin discoloration  HEENT:     EOMI, MMM, No thrush  NECK:    No bruits, No JVP appreciated  CV:      Sinus,  No murmur, No rubs, No gallops  PULMONARY:   Decreased BS,  No Wheezing, No Rales, No Rhonchi      No noted egophony  ABDOMEN:     Soft, non-tender. BS normal. No R/R/G  EXT:    No deformities .  No clubbing.       No lower extremity edema, No venous stasis  PULSE:   Appears equal and palpable.  PSYCHIATRIC:  Seems appropriate, No acute psychosis  MS:    No fractures, No gross weakness  NEUROLOGIC:   The clinical assessment is non-focal     DATA: IMAGING & TESTING:     LABORATORY TESTS:        PRO-BNP:   Lab Results   Component Value Date    PROBNP 282 12/02/2024    PROBNP 208 (H) 05/27/2017      ABGs:   Lab Results   Component Value

## 2024-12-12 NOTE — PROGRESS NOTES
Physical Therapy  Physical Therapy Treatment Note/Plan of Care    Room #:  0438/0438-02  Patient Name: Ashley Vivas  YOB: 1950  MRN: [id number]    Date of Service: 12/12/2024     Tentative placement recommendation: Subacute Rehab  Equipment recommendation: Bedside commode      Evaluating Physical Therapist: Caden Baltazar PT, DPT #[id number]      Specific Provider Orders/Date/Referring Provider :     12/04/24 1015    PT eval and treat  Start:  12/04/24 1015,   End:  12/04/24 1015,   ONE TIME,   Standing Count:  1 Occurrences,   R       Comments:  Please provide services when f...  Pepe Calvert MD Acknowledge New    Admitting Diagnosis:   Syncope and collapse [R55]  Anemia [D64.9]  Complication of chemotherapy, initial encounter [T45.1X5A]  Symptomatic anemia [D64.9]  Malignant neoplasm of lung, unspecified laterality, unspecified part of lung (HCC) [C34.90]      Surgery: none  Visit Diagnoses         Codes    Syncope, unspecified syncope type     R55            Patient Active Problem List   Diagnosis    Bulging of cervical intervertebral disc without myelopathy    Cervical compression fracture (HCC)    Ulnar neuropathy at elbow    Cervical radiculopathy    COPD, very severe (HCC)    Hypoxemia requiring supplemental oxygen    Malignant neoplasm of upper lobe of right lung (HCC)    Malignant neoplasm of lower lobe of right lung (HCC)    Status post radiation therapy    Fibrinous pleuritis    Pulmonary nodules/lesions, multiple    Paroxysmal SVT (supraventricular tachycardia) (HCC)    Closed fracture of one rib of left side    Post covid-19 condition, unspecified    Stress at home    Chronic respiratory failure with hypoxia and hypercapnia    Need for immunization against influenza    Malignant neoplasm of lung (HCC)    Frailty    Symptomatic anemia    Debilitated    Syncope and collapse    Complication of chemotherapy    COPD exacerbation (HCC)        ASSESSMENT of Current Deficits Patient exhibits  decreased strength, balance, and endurance impairing functional mobility, transfers, gait , gait distance, and tolerance to activity. Fatigues easily this afternoon during session, shortness of breath with desaturation to 86% on 10L spo2 getting up to the bedside commode. At rest patient is on 5L NC maintains above 90% without exertion. Rest breaks given for recovery and education on pursed lip breathing. Patient requires continued skilled therapy to address above deficits and increase mobility and strength with stable vitals upon discharge.      PHYSICAL THERAPY  PLAN OF CARE       Physical therapy plan of care is established based on physician order,  patient diagnosis and clinical assessment    Current Treatment Recommendations:    -Bed Mobility: Lower and upper extremity exercises, and trunk control activities  -Sitting Balance: Incorporate reaching activities to activate trunk muscles , Hands on support to maintain midline , Facilitate active trunk muscle engagement , Facilitate postural control in all planes , and Engage in core activities to allow for movement within base of support   -Standing Balance: Perform strengthening exercises in standing to promote motor control with or without upper extremity support , Instruct patient on adequate base of support to maintain balance, and Challenge balance utilizing reaching  activities beyond center of gravity    -Transfers: Provide instruction on proper hand and foot position for adequate transfer of weight onto lower extremities and use of gait device if needed, Cues for hand placement, technique and safety. Provide stabilization to prevent fall , Facilitate weight shift forward on to lower extremities and provide necessary stabilization of bilateral lower extremities , Support transfer of weight on to lower extremities, and Assist with extension of knees trunk and hip to accept weight transfer   -Gait: Gait training, Standing activities to improve: base of  would benefit from continued skilled Physical Therapy to improve functional independence and quality of life.         Patient's/ family goals   none stated    Time in   200  Time out 225    Total Treatment Time  25 minutes    CPT codes:  Therapeutic activities (47264)   15 minutes  1 unit(s)  Therapeutic exercises (52142)   8 minutes  1 unit(s)  Non billable time 2 minutes     Kate Conde, PTA    #[id number]

## 2024-12-13 LAB
ALBUMIN SERPL-MCNC: 3.3 G/DL (ref 3.5–5.2)
ALP SERPL-CCNC: 66 U/L (ref 35–104)
ALT SERPL-CCNC: 34 U/L (ref 0–32)
ANION GAP SERPL CALCULATED.3IONS-SCNC: 11 MMOL/L (ref 7–16)
AST SERPL-CCNC: 25 U/L (ref 0–31)
BASOPHILS # BLD: 0.02 K/UL (ref 0–0.2)
BASOPHILS NFR BLD: 0 % (ref 0–2)
BILIRUB SERPL-MCNC: <0.2 MG/DL (ref 0–1.2)
BNP SERPL-MCNC: 572 PG/ML (ref 0–450)
BUN SERPL-MCNC: 13 MG/DL (ref 6–23)
CALCIUM SERPL-MCNC: 8.8 MG/DL (ref 8.6–10.2)
CHLORIDE SERPL-SCNC: 96 MMOL/L (ref 98–107)
CO2 SERPL-SCNC: 37 MMOL/L (ref 22–29)
CREAT SERPL-MCNC: 0.4 MG/DL (ref 0.5–1)
EOSINOPHIL # BLD: 0.19 K/UL (ref 0.05–0.5)
EOSINOPHILS RELATIVE PERCENT: 3 % (ref 0–6)
ERYTHROCYTE [DISTWIDTH] IN BLOOD BY AUTOMATED COUNT: 17.7 % (ref 11.5–15)
GFR, ESTIMATED: >90 ML/MIN/1.73M2
GLUCOSE SERPL-MCNC: 107 MG/DL (ref 74–99)
HCT VFR BLD AUTO: 29.9 % (ref 34–48)
HGB BLD-MCNC: 8.8 G/DL (ref 11.5–15.5)
IMM GRANULOCYTES # BLD AUTO: 0.07 K/UL (ref 0–0.58)
IMM GRANULOCYTES NFR BLD: 1 % (ref 0–5)
LYMPHOCYTES NFR BLD: 0.65 K/UL (ref 1.5–4)
LYMPHOCYTES RELATIVE PERCENT: 9 % (ref 20–42)
MCH RBC QN AUTO: 30.8 PG (ref 26–35)
MCHC RBC AUTO-ENTMCNC: 29.4 G/DL (ref 32–34.5)
MCV RBC AUTO: 104.5 FL (ref 80–99.9)
MONOCYTES NFR BLD: 0.82 K/UL (ref 0.1–0.95)
MONOCYTES NFR BLD: 12 % (ref 2–12)
NEUTROPHILS NFR BLD: 75 % (ref 43–80)
NEUTS SEG NFR BLD: 5.21 K/UL (ref 1.8–7.3)
PLATELET # BLD AUTO: 225 K/UL (ref 130–450)
PMV BLD AUTO: 10.7 FL (ref 7–12)
POTASSIUM SERPL-SCNC: 4.4 MMOL/L (ref 3.5–5)
PROT SERPL-MCNC: 5.3 G/DL (ref 6.4–8.3)
RBC # BLD AUTO: 2.86 M/UL (ref 3.5–5.5)
SODIUM SERPL-SCNC: 144 MMOL/L (ref 132–146)
WBC OTHER # BLD: 7 K/UL (ref 4.5–11.5)

## 2024-12-13 PROCEDURE — 6370000000 HC RX 637 (ALT 250 FOR IP): Performed by: INTERNAL MEDICINE

## 2024-12-13 PROCEDURE — 83880 ASSAY OF NATRIURETIC PEPTIDE: CPT

## 2024-12-13 PROCEDURE — 94640 AIRWAY INHALATION TREATMENT: CPT

## 2024-12-13 PROCEDURE — 94669 MECHANICAL CHEST WALL OSCILL: CPT

## 2024-12-13 PROCEDURE — 36415 COLL VENOUS BLD VENIPUNCTURE: CPT

## 2024-12-13 PROCEDURE — 6360000002 HC RX W HCPCS: Performed by: INTERNAL MEDICINE

## 2024-12-13 PROCEDURE — 94660 CPAP INITIATION&MGMT: CPT

## 2024-12-13 PROCEDURE — 85025 COMPLETE CBC W/AUTO DIFF WBC: CPT

## 2024-12-13 PROCEDURE — 99233 SBSQ HOSP IP/OBS HIGH 50: CPT | Performed by: INTERNAL MEDICINE

## 2024-12-13 PROCEDURE — 99232 SBSQ HOSP IP/OBS MODERATE 35: CPT | Performed by: INTERNAL MEDICINE

## 2024-12-13 PROCEDURE — 1200000000 HC SEMI PRIVATE

## 2024-12-13 PROCEDURE — 80053 COMPREHEN METABOLIC PANEL: CPT

## 2024-12-13 PROCEDURE — 6370000000 HC RX 637 (ALT 250 FOR IP): Performed by: CLINICAL NURSE SPECIALIST

## 2024-12-13 PROCEDURE — 2700000000 HC OXYGEN THERAPY PER DAY

## 2024-12-13 RX ADMIN — IPRATROPIUM BROMIDE AND ALBUTEROL SULFATE 1 DOSE: .5; 2.5 SOLUTION RESPIRATORY (INHALATION) at 08:01

## 2024-12-13 RX ADMIN — ACETYLCYSTEINE 400 MG: 100 INHALANT RESPIRATORY (INHALATION) at 22:10

## 2024-12-13 RX ADMIN — IPRATROPIUM BROMIDE AND ALBUTEROL SULFATE 1 DOSE: .5; 2.5 SOLUTION RESPIRATORY (INHALATION) at 18:25

## 2024-12-13 RX ADMIN — PANTOPRAZOLE SODIUM 40 MG: 40 TABLET, DELAYED RELEASE ORAL at 05:30

## 2024-12-13 RX ADMIN — ACETYLCYSTEINE 400 MG: 100 INHALANT RESPIRATORY (INHALATION) at 11:01

## 2024-12-13 RX ADMIN — Medication 1 TABLET: at 09:16

## 2024-12-13 RX ADMIN — IPRATROPIUM BROMIDE AND ALBUTEROL SULFATE 1 DOSE: .5; 2.5 SOLUTION RESPIRATORY (INHALATION) at 14:26

## 2024-12-13 RX ADMIN — ACETYLCYSTEINE 400 MG: 100 INHALANT RESPIRATORY (INHALATION) at 08:01

## 2024-12-13 RX ADMIN — ACETYLCYSTEINE 400 MG: 100 INHALANT RESPIRATORY (INHALATION) at 18:25

## 2024-12-13 RX ADMIN — BUDESONIDE INHALATION 1000 MCG: 0.5 SUSPENSION RESPIRATORY (INHALATION) at 18:25

## 2024-12-13 RX ADMIN — IPRATROPIUM BROMIDE AND ALBUTEROL SULFATE 1 DOSE: .5; 2.5 SOLUTION RESPIRATORY (INHALATION) at 11:01

## 2024-12-13 RX ADMIN — PREDNISONE 20 MG: 20 TABLET ORAL at 09:16

## 2024-12-13 RX ADMIN — GUAIFENESIN 600 MG: 600 TABLET, EXTENDED RELEASE ORAL at 09:20

## 2024-12-13 RX ADMIN — ENOXAPARIN SODIUM 30 MG: 100 INJECTION SUBCUTANEOUS at 22:22

## 2024-12-13 RX ADMIN — ACETYLCYSTEINE 400 MG: 100 INHALANT RESPIRATORY (INHALATION) at 01:44

## 2024-12-13 RX ADMIN — BUDESONIDE INHALATION 1000 MCG: 0.5 SUSPENSION RESPIRATORY (INHALATION) at 08:01

## 2024-12-13 RX ADMIN — ACETYLCYSTEINE 400 MG: 100 INHALANT RESPIRATORY (INHALATION) at 14:26

## 2024-12-13 RX ADMIN — METOPROLOL SUCCINATE 12.5 MG: 25 TABLET, EXTENDED RELEASE ORAL at 09:16

## 2024-12-13 RX ADMIN — DOCUSATE SODIUM 100 MG: 100 CAPSULE, LIQUID FILLED ORAL at 09:16

## 2024-12-13 NOTE — PROGRESS NOTES
Pt lying in bed oxygen at 6.5 l 88- 89%  Oxygen increased to 7L  88- 89 %  Oxygen increased to 7.5L 90%      Patient in chair oxygen dropped to 84% then 81%86%   Oxygen increased to 8l then 90% to 91%

## 2024-12-13 NOTE — PLAN OF CARE
Problem: Discharge Planning  Goal: Discharge to home or other facility with appropriate resources  Outcome: Progressing     Problem: Nutrition Deficit:  Goal: Optimize nutritional status  12/13/2024 0025 by Vika Raines RN  Outcome: Progressing  12/12/2024 1207 by Amy Solomon RD, LD  Outcome: Not Progressing  Flowsheets (Taken 12/12/2024 1204)  Nutrient intake appropriate for improving, restoring, or maintaining nutritional needs:   Assess nutritional status and recommend course of action   Monitor oral intake, labs, and treatment plans   Recommend appropriate diets, oral nutritional supplements, and vitamin/mineral supplements

## 2024-12-13 NOTE — DISCHARGE INSTR - COC
Continuity of Care Form    Patient Name: Ashley Vivas   :  1950  MRN:  [id number]    Admit date:  2024  Discharge date:  24    Code Status Order: Full Code   Advance Directives:   Advance Care Flowsheet Documentation             Admitting Physician:  Pepe Calvert MD  PCP: Samnia Ji MD    Discharging Nurse: [facility] Unit/Room#: 0438/0438-02  Discharging Unit Phone Number: [phone]    Emergency Contact:   Extended Emergency Contact Information  Primary Emergency Contact: Juan C Vivas  Address: [address] [place] [address] [facility]  Home Phone: [phone]  Mobile Phone: [phone]  Relation: Spouse  Secondary Emergency Contact: Ligia Hightower  Address: [address] [place] [address] [place] [address] [facility]  Home Phone: [phone]  Relation: Child    Past Surgical History:  Past Surgical History:   Procedure Laterality Date    BREAST LUMPECTOMY Left     BRONCHOSCOPY  2017    With EBUS    CARPAL TUNNEL RELEASE Bilateral     COLONOSCOPY  2021    EYE SURGERY Right 2023    RIGHT EYE CATARACT EXTRACTION IOL IMPLANT performed by Lida Horvath MD at [facility] OR    EYE SURGERY Left 2023    LEFT EYE CATARACT EXTRACTION IOL IMPLANT performed by Lida Horvath MD at [facility] OR    KNEE ARTHROSCOPY Left     LUNG BIOPSY Right 2017    SKIN BIOPSY      CANCER BASAL TIP NOSE       Immunization History:   Immunization History   Administered Date(s) Administered    COVID-19, PFIZER Bivalent, DO NOT Dilute, (age 12y+), IM, 30 mcg/0.3 mL 10/12/2022    COVID-19, PFIZER PURPLE top, DILUTE for use, (age 12 y+), 30mcg/0.3mL 2021, 2021, 10/18/2021    Influenza Virus Vaccine 10/17/2017    Influenza, FLUZONE High Dose (age 65 y+), IM, Quadv, 0.7mL 2023    Influenza, FLUZONE High Dose, (age 65 y+), IM, Trivalent PF, 0.5mL 2018, 2019, 2024     Pneumococcal, PCV-13, PREVNAR 13, (age 6w+), IM, 0.5mL 10/17/2017    Pneumococcal, PPSV23, PNEUMOVAX 23, (age 2y+), SC/IM, 0.5mL 10/27/2018    TD 5LF, TENIVAC, (age 7y+), IM, 0.5mL 12/02/2024    TDaP, ADACEL (age 10y-64y), BOOSTRIX (age 10y+), IM, 0.5mL 02/12/2020       Active Problems:  Patient Active Problem List   Diagnosis Code    Bulging of cervical intervertebral disc without myelopathy M50.30    Cervical compression fracture ([facility]) S12.9XXA    Ulnar neuropathy at elbow G56.20    Cervical radiculopathy M54.12    COPD, very severe ([facility]) J44.9    Hypoxemia requiring supplemental oxygen R09.02, Z99.81    Malignant neoplasm of upper lobe of right lung ([facility]) C34.11    Malignant neoplasm of lower lobe of right lung ([facility]) C34.31    Status post radiation therapy Z92.3    Fibrinous pleuritis R09.1    Pulmonary nodules/lesions, multiple R91.8    Paroxysmal SVT (supraventricular tachycardia) ([facility]) I47.10    Closed fracture of one rib of left side S22.32XA    Post covid-19 condition, unspecified U09.9    Stress at home F43.9    Chronic respiratory failure with hypoxia and hypercapnia J96.11, J96.12    Need for immunization against influenza Z23    Malignant neoplasm of lung ([facility]) C34.90    Frailty R54    Symptomatic anemia D64.9    Debilitated R53.81    Syncope and collapse R55    Complication of chemotherapy T45.1X5A    COPD exacerbation ([facility]) J44.1       Isolation/Infection:   Isolation            No Isolation          Patient Infection Status       None to display                     Nurse Assessment:  Last Vital Signs: BP (!) 119/51   Pulse 91   Temp 98.1 °F (36.7 °C) (Oral)   Resp 17   Ht 1.575 m (5' 2.01\")   Wt 50.8 kg (112 lb)   SpO2 97% Comment: pt desated to 84% just resting in bed  o2 was increased  BMI 20.48 kg/m²     Last documented pain score (0-10 scale): Pain Level: 0  Last Weight:   Wt Readings from Last 1 Encounters:   12/07/24 50.8 kg (112 lb)     Mental Status:  oriented, alert, and coherent    IV  Bronchodilator Protocol Yes - Inpatient Protocol      Last Admin: 12/11/24 at 2300 (Given)   Dispense Location: Respiratory Therapy     budesonide (PULMICORT) nebulizer suspension 1,000 mcgOrdered Dose: 1 mg  :  Admin Dose: 1,000 mcg  :  Nebulization  :  2 TIMES DAILY RESP  0710 Given 1,000 mcg  1800 Due  Administration Window: 120 minutes from the due time   Ordered Admin Dose: 4 mL = 1,000 mcg of 500 mcg/2 mL   Last Admin: Today 12/14/24 at 0710 (Given)   Dispense Location: Respiratory Therapy       ipratropium 0.5 mg-albuterol 2.5 mg (DUONEB) nebulizer solution 1 DoseDose: 1 Dose  :  Inhalation  :  EVERY 4 HOURS WHILE AWAKE RESP  0710 Given 1 Dose  1000 Due  1400 Due  1800 Due  Administration Window: 120 minutes from the due time   Ordered Admin Dose: 3 mL = 1 Dose of 1 Dose/3 mL   Order Questions/Answers  Initiate RT Bronchodilator Protocol Yes - Inpatient Protocol      Last Admin: Today 12/14/24 at 0710 (Given)        Oxygen Therapy:  is on oxygen at 5-10 L/min per nasal cannula.  Ventilator:    - No ventilator support    Rehab Therapies: Physical Therapy and Occupational Therapy  Weight Bearing Status/Restrictions: No weight bearing restrictions  Other Medical Equipment (for information only, NOT a DME order):  bedside commode  Other Treatments:     Patient's personal belongings (please select all that are sent with patient):  Glasses, Dentures partials, cell phone    RN SIGNATURE:  Electronically signed by Beatriz Shah RN on 12/14/24 at 10:01 AM EST    CASE MANAGEMENT/SOCIAL WORK SECTION    Inpatient Status Date: 12/2/2024    Readmission Risk Assessment Score:  [facility] RISK OF UNPLANNED READMISSION 2.0             17.4 Total Score        Discharging to Facility/ Agency   Name: \[facility]\""   Address:[facility] [address]  Phone:  Fax:    Dialysis Facility (if applicable)   Name:  Address:  Dialysis Schedule:  Phone:  Fax:    / signature: Electronically signed by Maris Carr RN

## 2024-12-13 NOTE — PROGRESS NOTES
Subjective:    The patient is awake and alert, on 7L O2. Daughter present at the bedside. She verbalized her concerns that someone told her she should not be doing chemo or any other forms of treatment for her cancer because it is not good for her. We discussed these concerns and I was able to address them per their satisfaction. Her plan is to be discharged to rehab and then decide if she would like to continue pursuing treatment. Denies pain, no n/v/d.    Objective:    BP (!) 118/42   Pulse 91   Temp 98.1 °F (36.7 °C) (Oral)   Resp 17   Ht 1.575 m (5' 2.01\")   Wt 50.8 kg (112 lb)   SpO2 97% Comment: pt desated to 84% just resting in bed  o2 was increased  BMI 20.48 kg/m²     General: Alert, oriented, no acute distress. Appears ill  HEENT: No thrush or mucositis, EOMI, PERRLA  Heart:  RRR, no murmurs, gallops, or rubs.  Lungs:  Diminished bilat bases, no wheeze, rales or rhonchi. 7L O2  Abd: BS present, nontender, nondistended, no masses  Extrem:  No clubbing, cyanosis, or edema  Lymphatics: No palpable adenopathy in cervical and supraclavicular regions  Skin: Intact, no petechia or purpura    CBC with Differential:    Lab Results   Component Value Date/Time    WBC 8.0 12/12/2024 07:45 AM    RBC 3.00 12/12/2024 07:45 AM    HGB 9.0 12/12/2024 07:45 AM    HCT 30.3 12/12/2024 07:45 AM     12/12/2024 07:45 AM    .0 12/12/2024 07:45 AM    MCH 30.0 12/12/2024 07:45 AM    MCHC 29.7 12/12/2024 07:45 AM    RDW 17.8 12/12/2024 07:45 AM    METASPCT 1 12/06/2024 05:51 AM    LYMPHOPCT 9 12/12/2024 07:45 AM    MONOPCT 12 12/12/2024 07:45 AM    MYELOPCT 1 12/10/2024 08:03 AM    EOSPCT 2 12/12/2024 07:45 AM    BASOPCT 0 12/12/2024 07:45 AM    MONOSABS 0.99 12/12/2024 07:45 AM    LYMPHSABS 0.75 12/12/2024 07:45 AM    EOSABS 0.14 12/12/2024 07:45 AM    BASOSABS 0.01 12/12/2024 07:45 AM     CMP:    Lab Results   Component Value Date/Time     12/12/2024 07:45 AM    K 4.0 12/12/2024 07:45 AM    CL 93  Skin staples in the posterior scalp in a presumed area of laceration.  No   evidence of skull fracture.         CTA CHEST W CONTRAST   Final Result   1. No PE.   2. Right upper lobe and right lower lobe treated tumor.  Left lower lobe   subcentimeter pleural base nodule.   3. Moderate emphysema.  Lingular segment focal infiltrate with adjacent   pleural scarring in keeping with post radiotherapy scarring.   4. Age indeterminate mild T11 superior endplate compression fracture.   5. When outside CT exam is received, an addendum report can be issued.             Assessment:    Principal Problem:    Symptomatic anemia  Active Problems:    Malignant neoplasm of lung (HCC)    Debilitated    Syncope and collapse    Complication of chemotherapy    COPD exacerbation (HCC)  Resolved Problems:    * No resolved hospital problems. *    74 year old female known to Dr. 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 Delgadillo.  PET + lung nodules, two on the right and two on the left,   suspicious for mets. July CT shows increase in size of some nodules. She started carbo/abraxane/keytruda  8/8/24 and completed cycle #4 on 11/7/24. She has had good partial response with resolution of some lung nodules, decreased in size on Oct 24 restaging CT chest/abd. She started Keytruda alone end of November 2024.     Admitted with near syncope , anemia and GI  bleed with. +FOBT.  12/4 Iron 28, % sat 8. Completed IV Ferrlecit x3  12/2 s/p 1 unit PRBC for Hgb 6.6  12/6 s/p 1 unit PRBC for hgb 6.9    12/9 s/p 1 unit PRBC for Hgb 6.3    Plan:  - CBC with diff daily. Maintain Hgb 7.0 or higher, transfuse as needed.  Hgb 8.8 today  - Wean oxygen as tolerated, currently on 7L O2 (baseline 4L)  - Plan to check iron panel and administration as needed at [facility].   - GI evaluation for +FOBT.  Plan is to follow up as outpatient  - PPI therapy   - Continue supportive care  - Plan is

## 2024-12-13 NOTE — PROGRESS NOTES
[facility]  Department of Internal Medicine  Division of Pulmonary, Critical Care and Sleep Medicine  Progress Note      Aroldo VINICIUS Bunnreginaldo DO Devyn Diaz, APRN-CNP  Angeles Cooely, APRN-CNP        Patient's primary pulmonologist: Dr. Wiley     Subjective:  Patient seen and examined.  No new complaints.  Her respiratory status is unchanged.  Seen on 5L nasal cannula.  Patient reports her saturations always drop into the high 70s and low 80s at home and this is not new.  CTA ruled out PE.      OBJECTIVE:     PHYSICAL EXAM:   VITALS:   Vitals:    12/13/24 0144 12/13/24 0215 12/13/24 0530 12/13/24 0915   BP:  (!) 118/42  (!) 119/51   Pulse:  91     Resp:  17     Temp:  98.1 °F (36.7 °C)     TempSrc:  Oral  Oral   SpO2: 98% 98% 97%    Weight:       Height:          No intake or output data in the 24 hours ending 12/13/24 1446         CONSTITUTIONAL:   Pale, ill-appearing, weak, A&O x 3, NAD  SKIN:     No rash, No suspicious lesions, No skin discoloration  HEENT:     EOMI, MMM, No thrush  NECK:    No bruits, No JVP appreciated  CV:      Sinus,  No murmur, No rubs, No gallops  PULMONARY:   Decreased BS,  No Wheezing, No Rales, No Rhonchi      No noted egophony  ABDOMEN:     Soft, non-tender. BS normal. No R/R/G  EXT:    No deformities .  No clubbing.       No lower extremity edema, No venous stasis  PULSE:   Appears equal and palpable.  PSYCHIATRIC:  Seems appropriate, No acute psychosis  MS:    No fractures, No gross weakness  NEUROLOGIC:   The clinical assessment is non-focal     DATA: IMAGING & TESTING:     LABORATORY TESTS:        PRO-BNP:   Lab Results   Component Value Date    PROBNP 572 (H) 12/13/2024    PROBNP 282 12/02/2024      ABGs:   Lab Results   Component Value Date/Time    PH 7.363 12/29/2023 11:38 AM    PO2 66.2 12/29/2023 11:38 AM    PCO2 63.0  treated tumor.  Left lower lobe subcentimeter pleural base nodule. 3. Moderate emphysema.  Lingular segment focal infiltrate with adjacent pleural scarring in keeping with post radiotherapy scarring. 4. Age indeterminate mild T11 superior endplate compression fracture. 5. When outside CT exam is received, an addendum report can be issued.     Assessment:     Acute on chronic hypoxic respiratory failure requiring high flow oxygen  COPD with acute exacerbation, FEV1 26%  Community-acquired pneumonia  Squamous cell lung cancer on Keytruda  Acute on chronic anemia  Likely pulmonary hypertension-echo completed on December 7 shows estimated pulmonary artery systolic pressure is 92  Deconditioning      Plan:     Wean FiO2 as able for sats 88 to 94%, baseline 4L, continue 5L at rest and 10L with exertion   Continue BiPAP at bedtime and as needed  Check amatory pulse ox prior to discharge  Scheduled budesonide, DuoNebs and Mucomyst  Taper steroids   completed Levaquin x 5 days  Incentive spirometry, flutter valve  Hem/onc following   Transfuse per admitting  PT/OT, out of bed as tolerated  DVT prophylaxis with Lovenox    DEV Clark - NP       I saw and evaluated the patient and performed the MDM as documented in my note. Radiographs, labs and medication list were reviewed by me independently. I spoke with bedside nursing, therapists and consultants. The case was discussed in detail and plans for care were reviewed with Angeles MÉNDEZ. I provided the substantive portion of this visit by personally performing the history/MDM component in its entirety.Review of CNP documentation was conducted and revisions were made as appropriate.       Patient seen and examined.  She is anxious to go home and would like to be discharged.  She is planning to go to rehab.  Currently her oxygen saturation 5 L nasal cannula is 95% she does report to me that her oxygen levels do drop some when she is moving around, per her

## 2024-12-13 NOTE — PROGRESS NOTES
syl Ballard provided Sacraments of Anointing of Sick and Eucharist, prayer/blessings for patient.  Family member was present and also received the Eucharist.  Patient spoke of her former stephen community of [place] and [facility] in [place].  She appreciated and was comforted by the prayers and Sacraments of her [employer] stephen.

## 2024-12-13 NOTE — CARE COORDINATION
12/13/2024 1623 CM note: Pt resides with spouse in 2 story home. She has home o2  3-4L NC(5L concentrator) from [facility]. No cpap/bpap. Pt is currently wearing o2@.6.5 L-8L HF NC.  She has lung CA, follows with [facility] and started keytruda at end of November.  Pt agreeable for SNF. [facility] and [facility] declined d/t o2 needs. Pt/dtr informed and request [facility]. Per liaariana Rich, [facility] accepted pt for skilled care and will have bed available in am.  Pt/dtr aware that pt will need to forgo cancer treatment while at SNF and they voiced understanding. For SNF, NO PRECERT, HENS done, will need signed ELDER. Arrangements made for pt to transport to [facility] 12/14/24 at 10am via [facility]. Pt, dtr Ligia , [facility] liaison and teamlead informed. Ambulance sheet in soft chart. Arash MAX

## 2024-12-13 NOTE — PROGRESS NOTES
Physical Therapy    Room #:   0438/0438-02    Date: 2024       Patient Name: Ashley Vivas  : 1950      MRN: [id number]     Patient unavailable for physical therapy treatment due to refusal, too fatigued with breathing treatment donned. Will attempt PT treatment at a later time or date. Thank you.      Kate Conde, PTA

## 2024-12-14 VITALS
SYSTOLIC BLOOD PRESSURE: 113 MMHG | OXYGEN SATURATION: 91 % | RESPIRATION RATE: 21 BRPM | HEART RATE: 98 BPM | BODY MASS INDEX: 20.61 KG/M2 | TEMPERATURE: 98.8 F | HEIGHT: 62 IN | DIASTOLIC BLOOD PRESSURE: 50 MMHG | WEIGHT: 112 LBS

## 2024-12-14 PROCEDURE — 6370000000 HC RX 637 (ALT 250 FOR IP): Performed by: INTERNAL MEDICINE

## 2024-12-14 PROCEDURE — 93246 EXT ECG>7D<15D RECORDING: CPT

## 2024-12-14 PROCEDURE — 94640 AIRWAY INHALATION TREATMENT: CPT

## 2024-12-14 PROCEDURE — 2700000000 HC OXYGEN THERAPY PER DAY

## 2024-12-14 PROCEDURE — 94660 CPAP INITIATION&MGMT: CPT

## 2024-12-14 PROCEDURE — 6360000002 HC RX W HCPCS: Performed by: INTERNAL MEDICINE

## 2024-12-14 PROCEDURE — 6370000000 HC RX 637 (ALT 250 FOR IP): Performed by: CLINICAL NURSE SPECIALIST

## 2024-12-14 PROCEDURE — 99239 HOSP IP/OBS DSCHRG MGMT >30: CPT | Performed by: INTERNAL MEDICINE

## 2024-12-14 RX ORDER — IPRATROPIUM BROMIDE AND ALBUTEROL SULFATE 2.5; .5 MG/3ML; MG/3ML
3 SOLUTION RESPIRATORY (INHALATION)
Qty: 360 ML | Refills: 0 | Status: SHIPPED | OUTPATIENT
Start: 2024-12-14

## 2024-12-14 RX ORDER — ACETYLCYSTEINE 100 MG/ML
4 SOLUTION ORAL; RESPIRATORY (INHALATION) 2 TIMES DAILY
Qty: 100 ML | Refills: 0 | Status: SHIPPED | OUTPATIENT
Start: 2024-12-14

## 2024-12-14 RX ORDER — PREDNISONE 10 MG/1
TABLET ORAL
Qty: 35 TABLET | Refills: 0 | Status: SHIPPED | OUTPATIENT
Start: 2024-12-15 | End: 2025-01-14

## 2024-12-14 RX ADMIN — METOPROLOL SUCCINATE 12.5 MG: 25 TABLET, EXTENDED RELEASE ORAL at 08:43

## 2024-12-14 RX ADMIN — ACETYLCYSTEINE 400 MG: 100 INHALANT RESPIRATORY (INHALATION) at 07:10

## 2024-12-14 RX ADMIN — PANTOPRAZOLE SODIUM 40 MG: 40 TABLET, DELAYED RELEASE ORAL at 06:12

## 2024-12-14 RX ADMIN — IPRATROPIUM BROMIDE AND ALBUTEROL SULFATE 1 DOSE: .5; 2.5 SOLUTION RESPIRATORY (INHALATION) at 07:10

## 2024-12-14 RX ADMIN — DOCUSATE SODIUM 100 MG: 100 CAPSULE, LIQUID FILLED ORAL at 08:43

## 2024-12-14 RX ADMIN — Medication 1 TABLET: at 08:43

## 2024-12-14 RX ADMIN — GUAIFENESIN 600 MG: 600 TABLET, EXTENDED RELEASE ORAL at 08:43

## 2024-12-14 RX ADMIN — BUDESONIDE INHALATION 1000 MCG: 0.5 SUSPENSION RESPIRATORY (INHALATION) at 07:10

## 2024-12-14 RX ADMIN — PREDNISONE 20 MG: 20 TABLET ORAL at 08:43

## 2024-12-14 NOTE — PROGRESS NOTES
isolated   to suggest recurrent or metastatic disease evident   3. Atelectasis and scarring in the left upper lobe lingular segment similar   to prior.   4. Left breast postsurgical changes partially visualized extend outside the   field of view similar to prior without bulky axillary adenopathy.   5. Right 8th rib sclerotic focus mild expansile appearance of likely healed   injury or chronic potential pathologic fracture at this site.         XR CHEST (2 VW)   Final Result   No significant interval changes since the December 9 study.         XR CHEST PORTABLE   Final Result   No acute process.         XR CHEST PORTABLE   Final Result   Cardiomegaly with small bilateral pleural effusions and minimal atelectatic   changes at the lung bases.  Scarring/atelectatic change in the right upper   lobe.         CT HEAD WO CONTRAST   Final Result      1.  No evidence of acute intracranial process.      2.  Findings of presumed small vessel ischemic deep white matter disease.      3.  Skin staples in the posterior scalp in a presumed area of laceration.  No   evidence of skull fracture.         CTA CHEST W CONTRAST   Final Result   1. No PE.   2. Right upper lobe and right lower lobe treated tumor.  Left lower lobe   subcentimeter pleural base nodule.   3. Moderate emphysema.  Lingular segment focal infiltrate with adjacent   pleural scarring in keeping with post radiotherapy scarring.   4. Age indeterminate mild T11 superior endplate compression fracture.   5. When outside CT exam is received, an addendum report can be issued.               Current Facility-Administered Medications:     predniSONE (DELTASONE) tablet 20 mg, 20 mg, Oral, Daily, Shae Bear DO, 20 mg at 12/14/24 0843    0.9 % sodium chloride infusion, , IntraVENous, PRN, Rose Marie Munoz MD    Benzocaine-Menthol (CEPACOL) 1 lozenge, 1 lozenge, Oral, Q2H PRN, Pepe Calvert MD, 1 lozenge at 12/10/24 0516    ipratropium 0.5 mg-albuterol 2.5 mg (DUONEB)  nebulizer solution 1 Dose, 1 Dose, Inhalation, Q4H WA RT, Syd Patel MD, 1 Dose at 12/14/24 0710    guaiFENesin (MUCINEX) extended release tablet 600 mg, 600 mg, Oral, BID, Syd Patel MD, 600 mg at 12/14/24 0843    acetylcysteine (MUCOMYST) 10 % solution 400 mg, 4 mL, Inhalation, Q4H, Syd Patel MD, 400 mg at 12/14/24 0710    enoxaparin Sodium (LOVENOX) injection 30 mg, 30 mg, SubCUTAneous, Daily, Syd Patel MD, 30 mg at 12/13/24 2222    pantoprazole (PROTONIX) tablet 40 mg, 40 mg, Oral, QA AC, [place], Martita Rader MD, 40 mg at 12/14/24 0612    metoprolol succinate (TOPROL XL) extended release tablet 12.5 mg, 12.5 mg, Oral, Daily, Octavia Iverson, APRN - CNS, 12.5 mg at 12/14/24 0843    docusate sodium (COLACE) capsule 100 mg, 100 mg, Oral, Daily, Pepe Calvert MD, 100 mg at 12/14/24 0843    bisacodyl (DULCOLAX) suppository 10 mg, 10 mg, Rectal, Once, Pepe Calvert MD    albuterol (PROVENTIL) (2.5 MG/3ML) 0.083% nebulizer solution 2.5 mg, 2.5 mg, Nebulization, 4x Daily PRN, Pepe Calvert MD, 2.5 mg at 12/11/24 2300    guaiFENesin (MUCINEX) extended release tablet 1,200 mg, 1,200 mg, Oral, BID PRN, Pepe Calvert MD    therapeutic multivitamin-minerals 1 tablet, 1 tablet, Oral, Daily, Pepe Calvert MD, 1 tablet at 12/14/24 0843    budesonide (PULMICORT) nebulizer suspension 1,000 mcg, 1 mg, Nebulization, BID RT, 1,000 mcg at 12/14/24 0710 **AND** [DISCONTINUED] arformoterol tartrate (BROVANA) nebulizer solution 15 mcg, 15 mcg, Nebulization, BID RT, 15 mcg at 12/07/24 0657 **AND** [DISCONTINUED] ipratropium (ATROVENT) 0.02 % nebulizer solution 0.5 mg, 0.5 mg, Nebulization, 4x Daily RT, Pepe Calvert MD, 0.5 mg at 12/07/24 1322    acetaminophen (TYLENOL) tablet 650 mg, 650 mg, Oral, Q4H PRN, Que Chapman DO, 650 mg at 12/08/24 1356    Assessment:    Principal Problem:    Symptomatic anemia  Active Problems:    Malignant

## 2024-12-14 NOTE — DISCHARGE SUMMARY
[facility] Hospitalist       Hospitalist Physician Discharge Summary       [facility]  [address]  [place] [address]  [phone]  Follow up      Samina Ji MD  [address]  [place] [address]  [phone]    Follow up  CALL TO schedule a follow-up appointment on the next scheduled business day.      Activity level: ambulates  with some assist     Diet: No diet orders on file      Condition at discharge: stable but serious     Dispo:ALEX benites     Continue supplemental oxygen via nasal canula @ 5 LPM at rest and 10 L on ambulation .    Continue CPAP / BiPAP during sleep as prior to admission.    Patient ID:  Ashley Vivas  [id number]  74 y.o.  1950    Admit date: 12/2/2024    Discharge date and time:  12/14/2024  6:50 PM    Admission Diagnoses: Principal Problem:    Symptomatic anemia  Active Problems:    Malignant neoplasm of lung (HCC)    Debilitated    Syncope and collapse    Complication of chemotherapy    COPD exacerbation (HCC)  Resolved Problems:    * No resolved hospital problems. *      Discharge Diagnoses: Principal Problem:    Symptomatic anemia  Active Problems:    Malignant neoplasm of lung (HCC)    Debilitated    Syncope and collapse    Complication of chemotherapy    COPD exacerbation (HCC)  Resolved Problems:    * No resolved hospital problems. *      Consults:  IP CONSULT TO CARDIOLOGY  IP CONSULT TO ONCOLOGY  IP CONSULT TO HEM/ONC  IP CONSULT TO GI  IP CONSULT TO GI  IP CONSULT TO PULMONOLOGY  IP CONSULT TO GI    Procedures: none     Hospital Course: This is a 74 years old white lady with significant past medical history of left breast and lung cancer on chemotherapy for her lung cancer.,  COPD chronic 4 L of oxygen at home, hypertension, patient apparently got up and went to the bathroom the night prior to the admission and she felt weak and lightheaded and she fell unsure if she lost consciousness she hit her head  several small calcified splenic artery aneurysms. Bones: Age indeterminate mild compression fracture of the T11 superior endplate.  No suspicious central canal narrowing at this level. Soft tissues: Left breast post lumpectomy changes with surgical clips in place.  Right chest wall Port-A-Cath in place.     1. No PE. 2. Right upper lobe and right lower lobe treated tumor.  Left lower lobe subcentimeter pleural base nodule. 3. Moderate emphysema.  Lingular segment focal infiltrate with adjacent pleural scarring in keeping with post radiotherapy scarring. 4. Age indeterminate mild T11 superior endplate compression fracture. 5. When outside CT exam is received, an addendum report can be issued.         Patient Instructions:   Discharge Medication List as of 12/14/2024 10:20 AM        START taking these medications    Details   ipratropium 0.5 mg-albuterol 2.5 mg (DUONEB) 0.5-2.5 (3) MG/3ML SOLN nebulizer solution Inhale 3 mLs into the lungs every 4 hours (while awake), Disp-360 mL, R-0Normal      acetylcysteine (MUCOMYST) 10 % nebulizer solution Inhale 4 mLs into the lungs 2 times daily, Disp-100 mL, R-0Normal      predniSONE (DELTASONE) 10 MG tablet Take 2 tablets by mouth daily for 10 days, THEN 1 tablet daily for 10 days, THEN 0.5 tablets daily for 10 days. 2 tabs po daily x 10 days., Disp-35 tablet, R-0Normal      pantoprazole (PROTONIX) 40 MG tablet Take 1 tablet by mouth every morning (before breakfast), Disp-30 tablet, R-0Normal           CONTINUE these medications which have CHANGED    Details   metoprolol succinate (TOPROL XL) 25 MG extended release tablet Take 0.5 tablets by mouth daily, Disp-30 tablet, R-0Normal           CONTINUE these medications which have NOT CHANGED    Details   fluticasone-umeclidin-vilant (TRELEGY ELLIPTA) 100-62.5-25 MCG/ACT AEPB inhaler Inhale 1 puff into the lungs dailyHistorical Med      Ensifentrine (OHTUVAYRE) 3 MG/2.5ML SUSP Inhale into the lungs in the morning and at  bedtimeHistorical Med      albuterol sulfate HFA (VENTOLIN HFA) 108 (90 Base) MCG/ACT inhaler Inhale 2 puffs into the lungs 4 times daily as needed for Wheezing, Disp-1 each, R-5Normal      Multiple Vitamins-Minerals (THERAPEUTIC MULTIVITAMIN-MINERALS) tablet Take 1 tablet by mouth dailyHistorical Med      ipratropium (ATROVENT) 0.06 % nasal spray 1 spray by Each Nostril route 2 times daily as needed (to control nasal drip), Disp-15 mL, R-5Normal      guaiFENesin (MUCINEX) 600 MG extended release tablet Take 2 tablets by mouth 2 times daily as needed Drink a full glass of water with each dose to help thin mucus FOR FLUHistorical Med           STOP taking these medications       levoFLOXacin (LEVAQUIN) 750 MG tablet Comments:   Reason for Stopping:         methylPREDNISolone (MEDROL, CORONA,) 4 MG tablet Comments:   Reason for Stopping:         levoFLOXacin (LEVAQUIN) 750 MG tablet Comments:   Reason for Stopping:         methylPREDNISolone (MEDROL DOSEPACK) 4 MG tablet Comments:   Reason for Stopping:         oseltamivir (TAMIFLU) 75 MG capsule Comments:   Reason for Stopping:         levoFLOXacin (LEVAQUIN) 750 MG tablet Comments:   Reason for Stopping:         OXYGEN Comments:   Reason for Stopping:                 Note that more  than 30 minutes was spent in preparing discharge papers, discussing discharge with patient, medication review, etc.    NOTE: This report was transcribed using voice recognition software. Every effort was made to ensure accuracy; however, inadvertent computerized transcription errors may be present.     Signed:  Electronically signed by Rose Marie Munoz MD on 12/14/2024 at 6:50 PM

## 2024-12-14 NOTE — PROGRESS NOTES
Admitting Date and Time: 12/2/2024 11:03 AM  Admit Dx: Syncope and collapse [R55]  Anemia [D64.9]  Complication of chemotherapy, initial encounter [T45.1X5A]  Symptomatic anemia [D64.9]  Malignant neoplasm of lung, unspecified laterality, unspecified part of lung (HCC) [C34.90]    Subjective:    12/8 Nurse reports patient still uncomfortable with her breathing    ROS: denies fever, chills, cp, sob, n/v, HA unless stated above.    12/9  pt is laying in bed in no distress, and she has been doing fair still on  8 L o2     12/10 pt is more awake and alert today she feels better     12/11 pt is laying in bed in no distress sleeping easily arousable , was seen this morning and was feeling better but tired and wanted to sleep   But she was going to the bathroom earlier today and her o2 sat dropped to 70/% on o2 , and had to go up to 15 L o2 , and she recovers while sitting in bed  but any movement even in bed she desats significantly , and I discussed with dr Bear and will check CTA r/o PE since her CXR didn't reveal any acute abnormality     12/12 patient is resting in bed in no acute distress she feels better she was 8 L I decreased her oxygen to 5 L and she is still saturating 97% will try to wean her down more patient just gets hypoxic with minimal exertion.  I discussed with nursing staff to increase her oxygen when she does any activity.    12/13 pt feels better and she ahs been feeling better  tolerating oral intake well , and her o2 was good on 5 L in the 90's till this morning pt was sleeping the resp therapist found her pulse ox 85 % and he increased her o2 to 7 L     predniSONE  20 mg Oral Daily    ipratropium 0.5 mg-albuterol 2.5 mg  1 Dose Inhalation Q4H WA RT    guaiFENesin  600 mg Oral BID    acetylcysteine  4 mL Inhalation Q4H    enoxaparin  30 mg SubCUTAneous Daily    pantoprazole  40 mg Oral QAM AC    metoprolol succinate  12.5 mg Oral Daily    docusate sodium  100 mg Oral Daily    bisacodyl  10 mg  Rectal Once    therapeutic multivitamin-minerals  1 tablet Oral Daily    budesonide  1 mg Nebulization BID RT     sodium chloride, , PRN  Benzocaine-Menthol, 1 lozenge, Q2H PRN  albuterol, 2.5 mg, 4x Daily PRN  guaiFENesin, 1,200 mg, BID PRN  acetaminophen, 650 mg, Q4H PRN         Objective:    BP (!) 126/45   Pulse (!) 107   Temp 98.4 °F (36.9 °C) (Oral)   Resp 17   Ht 1.575 m (5' 2.01\")   Wt 50.8 kg (112 lb)   SpO2 100%   BMI 20.48 kg/m²   General Appearance: Sleeping easily arousable oriented to person, place and time and on o2 7 L feeling good   Skin: warm and dry  Head: normocephalic and atraumatic  Eyes: pupils equal, round, and reactive to light, extraocular eye movements intact, conjunctivae normal  Pulmonary/Chest: Bibasilar fine rales worse on the left   Cardiovascular: normal rate, normal S1 and S2 and no carotid bruits  Abdomen: soft, non-tender, non-distended, normal bowel sounds, no masses or organomegaly  Extremities: no cyanosis, no clubbing and trace edema  Neurologic: no cranial nerve deficit and speech normal      Recent Labs     12/12/24  0745 12/13/24  0658    144   K 4.0 4.4   CL 93* 96*   CO2 42* 37*   BUN 13 13   CREATININE 0.5 0.4*   GLUCOSE 94 107*   CALCIUM 8.8 8.8         Recent Labs     12/12/24  0745 12/13/24  0658   ALKPHOS 60 66   BILITOT 0.2 <0.2   AST 20 25   ALT 41* 34*       Recent Labs     12/11/24  0911 12/12/24  0745 12/13/24  0658   WBC 7.4 8.0 7.0   RBC 2.98* 3.00* 2.86*   HGB 9.1* 9.0* 8.8*   HCT 29.7* 30.3* 29.9*   MCV 99.7 101.0* 104.5*   MCH 30.5 30.0 30.8   MCHC 30.6* 29.7* 29.4*   RDW 18.3* 17.8* 17.7*    231 225   MPV 11.1 10.6 10.7           Radiology:   CTA CHEST W CONTRAST   Final Result   1. No evidence of pulmonary embolism or other acute cardiopulmonary process.   2. Treated area with post treatment changes in the right mid lung and right   suprahilar region similar to prior.  No evidence for nodule or mass isolated   to suggest recurrent or

## 2024-12-17 ENCOUNTER — OUTSIDE SERVICES (OUTPATIENT)
Dept: INTERNAL MEDICINE CLINIC | Age: 74
End: 2024-12-17
Payer: MEDICARE

## 2024-12-17 DIAGNOSIS — K21.9 GASTRO-ESOPHAGEAL REFLUX DISEASE WITHOUT ESOPHAGITIS: ICD-10-CM

## 2024-12-17 DIAGNOSIS — Z74.1 NEED FOR ASSISTANCE WITH PERSONAL CARE: ICD-10-CM

## 2024-12-17 DIAGNOSIS — J44.1 CHRONIC OBSTRUCTIVE PULMONARY DISEASE WITH (ACUTE) EXACERBATION (HCC): ICD-10-CM

## 2024-12-17 DIAGNOSIS — D64.9 ANEMIA, UNSPECIFIED TYPE: ICD-10-CM

## 2024-12-17 DIAGNOSIS — C34.31 MALIGNANT NEOPLASM OF LOWER LOBE, RIGHT BRONCHUS OR LUNG (HCC): ICD-10-CM

## 2024-12-17 DIAGNOSIS — I10 ESSENTIAL (PRIMARY) HYPERTENSION: ICD-10-CM

## 2024-12-17 DIAGNOSIS — M62.81 MUSCLE WEAKNESS (GENERALIZED): ICD-10-CM

## 2024-12-17 DIAGNOSIS — R55 SYNCOPE AND COLLAPSE: Primary | ICD-10-CM

## 2024-12-17 DIAGNOSIS — C50.912 MALIGNANT NEOPLASM OF LEFT FEMALE BREAST, UNSPECIFIED ESTROGEN RECEPTOR STATUS, UNSPECIFIED SITE OF BREAST (HCC): ICD-10-CM

## 2024-12-17 DIAGNOSIS — M62.50 MUSCLE WASTING AND ATROPHY, NOT ELSEWHERE CLASSIFIED, UNSPECIFIED SITE: ICD-10-CM

## 2024-12-17 DIAGNOSIS — C34.11 MALIGNANT NEOPLASM OF UPPER LOBE, RIGHT BRONCHUS OR LUNG (HCC): ICD-10-CM

## 2024-12-17 DIAGNOSIS — I50.42 CHRONIC COMBINED SYSTOLIC AND DIASTOLIC HRT FAIL (HCC): ICD-10-CM

## 2024-12-17 PROCEDURE — 99306 1ST NF CARE HIGH MDM 50: CPT | Performed by: INTERNAL MEDICINE

## 2024-12-29 ENCOUNTER — APPOINTMENT (OUTPATIENT)
Dept: CT IMAGING | Age: 74
DRG: 193 | End: 2024-12-29
Payer: MEDICARE

## 2024-12-29 ENCOUNTER — APPOINTMENT (OUTPATIENT)
Dept: GENERAL RADIOLOGY | Age: 74
DRG: 193 | End: 2024-12-29
Payer: MEDICARE

## 2024-12-29 ENCOUNTER — HOSPITAL ENCOUNTER (INPATIENT)
Age: 74
LOS: 8 days | Discharge: NURSING, REHAB OR LONG-TERM CARE | DRG: 193 | End: 2025-01-06
Attending: EMERGENCY MEDICINE | Admitting: INTERNAL MEDICINE
Payer: MEDICARE

## 2024-12-29 DIAGNOSIS — J96.21 ACUTE ON CHRONIC RESPIRATORY FAILURE WITH HYPOXIA AND HYPERCAPNIA: Primary | ICD-10-CM

## 2024-12-29 DIAGNOSIS — J96.22 ACUTE ON CHRONIC RESPIRATORY FAILURE WITH HYPOXIA AND HYPERCAPNIA: Primary | ICD-10-CM

## 2024-12-29 DIAGNOSIS — J18.9 PNEUMONIA DUE TO INFECTIOUS ORGANISM, UNSPECIFIED LATERALITY, UNSPECIFIED PART OF LUNG: ICD-10-CM

## 2024-12-29 DIAGNOSIS — J44.1 ACUTE EXACERBATION OF CHRONIC OBSTRUCTIVE PULMONARY DISEASE (COPD) (HCC): ICD-10-CM

## 2024-12-29 LAB
AADO2: 267.3 MMHG
ALBUMIN SERPL-MCNC: 3.4 G/DL (ref 3.5–5.2)
ALP SERPL-CCNC: 84 U/L (ref 35–104)
ALT SERPL-CCNC: 25 U/L (ref 0–32)
ANION GAP SERPL CALCULATED.3IONS-SCNC: 5 MMOL/L (ref 7–16)
AST SERPL-CCNC: 22 U/L (ref 0–31)
B PARAP IS1001 DNA NPH QL NAA+NON-PROBE: NOT DETECTED
B PERT DNA SPEC QL NAA+PROBE: NOT DETECTED
B.E.: 8.8 MMOL/L (ref -3–3)
BASOPHILS # BLD: 0.01 K/UL (ref 0–0.2)
BASOPHILS NFR BLD: 0 % (ref 0–2)
BILIRUB SERPL-MCNC: 0.2 MG/DL (ref 0–1.2)
BNP SERPL-MCNC: 511 PG/ML (ref 0–450)
BUN SERPL-MCNC: 12 MG/DL (ref 6–23)
C PNEUM DNA NPH QL NAA+NON-PROBE: NOT DETECTED
CALCIUM SERPL-MCNC: 9 MG/DL (ref 8.6–10.2)
CHLORIDE SERPL-SCNC: 97 MMOL/L (ref 98–107)
CO2 SERPL-SCNC: 37 MMOL/L (ref 22–29)
COHB: 0.8 % (ref 0–1.5)
COMMENT: ABNORMAL
CREAT SERPL-MCNC: 0.5 MG/DL (ref 0.5–1)
CRITICAL: ABNORMAL
D-DIMER QUANTITATIVE: 215 NG/ML DDU (ref 0–230)
DATE ANALYZED: ABNORMAL
DATE OF COLLECTION: ABNORMAL
EOSINOPHIL # BLD: 0.01 K/UL (ref 0.05–0.5)
EOSINOPHILS RELATIVE PERCENT: 0 % (ref 0–6)
ERYTHROCYTE [DISTWIDTH] IN BLOOD BY AUTOMATED COUNT: 20.1 % (ref 11.5–15)
FERRITIN SERPL-MCNC: 135 NG/ML
FIO2: 60 %
FLUAV RNA NPH QL NAA+NON-PROBE: NOT DETECTED
FLUBV RNA NPH QL NAA+NON-PROBE: NOT DETECTED
GFR, ESTIMATED: >90 ML/MIN/1.73M2
GLUCOSE SERPL-MCNC: 132 MG/DL (ref 74–99)
HADV DNA NPH QL NAA+NON-PROBE: NOT DETECTED
HCO3: 35.3 MMOL/L (ref 22–26)
HCOV 229E RNA NPH QL NAA+NON-PROBE: NOT DETECTED
HCOV HKU1 RNA NPH QL NAA+NON-PROBE: NOT DETECTED
HCOV NL63 RNA NPH QL NAA+NON-PROBE: NOT DETECTED
HCOV OC43 RNA NPH QL NAA+NON-PROBE: NOT DETECTED
HCT VFR BLD AUTO: 24.4 % (ref 34–48)
HGB BLD-MCNC: 7.3 G/DL (ref 11.5–15.5)
HHB: 5.5 % (ref 0–5)
HMPV RNA NPH QL NAA+NON-PROBE: NOT DETECTED
HPIV1 RNA NPH QL NAA+NON-PROBE: NOT DETECTED
HPIV2 RNA NPH QL NAA+NON-PROBE: NOT DETECTED
HPIV3 RNA NPH QL NAA+NON-PROBE: NOT DETECTED
HPIV4 RNA NPH QL NAA+NON-PROBE: NOT DETECTED
IMM GRANULOCYTES # BLD AUTO: 0.05 K/UL (ref 0–0.58)
IMM GRANULOCYTES NFR BLD: 1 % (ref 0–5)
IMM RETICS NFR: 14 % (ref 3–15.9)
IRON SATN MFR SERPL: 9 % (ref 15–50)
IRON SERPL-MCNC: 23 UG/DL (ref 37–145)
LAB: ABNORMAL
LACTATE BLDV-SCNC: 1 MMOL/L (ref 0.5–1.9)
LYMPHOCYTES NFR BLD: 0.25 K/UL (ref 1.5–4)
LYMPHOCYTES RELATIVE PERCENT: 2 % (ref 20–42)
Lab: 1615
M PNEUMO DNA NPH QL NAA+NON-PROBE: NOT DETECTED
MAGNESIUM SERPL-MCNC: 2.1 MG/DL (ref 1.6–2.6)
MCH RBC QN AUTO: 31.6 PG (ref 26–35)
MCHC RBC AUTO-ENTMCNC: 29.9 G/DL (ref 32–34.5)
MCV RBC AUTO: 105.6 FL (ref 80–99.9)
METHB: 0.5 % (ref 0–1.5)
MODE: ABNORMAL
MONOCYTES NFR BLD: 0.75 K/UL (ref 0.1–0.95)
MONOCYTES NFR BLD: 7 % (ref 2–12)
NEUTROPHILS NFR BLD: 90 % (ref 43–80)
NEUTS SEG NFR BLD: 9.48 K/UL (ref 1.8–7.3)
O2 SATURATION: 94.4 % (ref 92–98.5)
O2HB: 93.2 % (ref 94–97)
OPERATOR ID: 421
PATIENT TEMP: 37 C
PCO2: 63.4 MMHG (ref 35–45)
PFO2: 1.26 MMHG/%
PH BLOOD GAS: 7.36 (ref 7.35–7.45)
PLATELET # BLD AUTO: 223 K/UL (ref 130–450)
PMV BLD AUTO: 10.4 FL (ref 7–12)
PO2: 75.8 MMHG (ref 75–100)
POTASSIUM SERPL-SCNC: 4.9 MMOL/L (ref 3.5–5)
PROCALCITONIN SERPL-MCNC: 0.13 NG/ML (ref 0–0.08)
PROT SERPL-MCNC: 6.3 G/DL (ref 6.4–8.3)
RBC # BLD AUTO: 2.31 M/UL (ref 3.5–5.5)
RBC # BLD: ABNORMAL 10*6/UL
RETIC HEMOGLOBIN: 30.1 PG (ref 28.2–36.6)
RETICS # AUTO: 0.09 M/UL
RETICS/RBC NFR AUTO: 3.9 % (ref 0.4–1.9)
RI(T): 3.53
RSV RNA NPH QL NAA+NON-PROBE: NOT DETECTED
RV+EV RNA NPH QL NAA+NON-PROBE: NOT DETECTED
SARS-COV-2 RNA NPH QL NAA+NON-PROBE: NOT DETECTED
SODIUM SERPL-SCNC: 139 MMOL/L (ref 132–146)
SOURCE, BLOOD GAS: ABNORMAL
SPECIMEN DESCRIPTION: NORMAL
THB: 7.2 G/DL (ref 11.5–16.5)
TIBC SERPL-MCNC: 263 UG/DL (ref 250–450)
TIME ANALYZED: 1617
TRANSFERRIN SERPL-MCNC: 204 MG/DL (ref 200–360)
TROPONIN I SERPL HS-MCNC: 26 NG/L (ref 0–9)
WBC OTHER # BLD: 10.6 K/UL (ref 4.5–11.5)

## 2024-12-29 PROCEDURE — 6360000002 HC RX W HCPCS: Performed by: EMERGENCY MEDICINE

## 2024-12-29 PROCEDURE — 94640 AIRWAY INHALATION TREATMENT: CPT

## 2024-12-29 PROCEDURE — 84484 ASSAY OF TROPONIN QUANT: CPT

## 2024-12-29 PROCEDURE — 5A0945A ASSISTANCE WITH RESPIRATORY VENTILATION, 24-96 CONSECUTIVE HOURS, HIGH NASAL FLOW/VELOCITY: ICD-10-PCS | Performed by: INTERNAL MEDICINE

## 2024-12-29 PROCEDURE — 71250 CT THORAX DX C-: CPT

## 2024-12-29 PROCEDURE — 0202U NFCT DS 22 TRGT SARS-COV-2: CPT

## 2024-12-29 PROCEDURE — 6370000000 HC RX 637 (ALT 250 FOR IP): Performed by: EMERGENCY MEDICINE

## 2024-12-29 PROCEDURE — 85025 COMPLETE CBC W/AUTO DIFF WBC: CPT

## 2024-12-29 PROCEDURE — 87081 CULTURE SCREEN ONLY: CPT

## 2024-12-29 PROCEDURE — 5A09357 ASSISTANCE WITH RESPIRATORY VENTILATION, LESS THAN 24 CONSECUTIVE HOURS, CONTINUOUS POSITIVE AIRWAY PRESSURE: ICD-10-PCS | Performed by: INTERNAL MEDICINE

## 2024-12-29 PROCEDURE — 96374 THER/PROPH/DIAG INJ IV PUSH: CPT

## 2024-12-29 PROCEDURE — 87449 NOS EACH ORGANISM AG IA: CPT

## 2024-12-29 PROCEDURE — 94660 CPAP INITIATION&MGMT: CPT

## 2024-12-29 PROCEDURE — 82728 ASSAY OF FERRITIN: CPT

## 2024-12-29 PROCEDURE — 87040 BLOOD CULTURE FOR BACTERIA: CPT

## 2024-12-29 PROCEDURE — 30233N1 TRANSFUSION OF NONAUTOLOGOUS RED BLOOD CELLS INTO PERIPHERAL VEIN, PERCUTANEOUS APPROACH: ICD-10-PCS | Performed by: INTERNAL MEDICINE

## 2024-12-29 PROCEDURE — 85045 AUTOMATED RETICULOCYTE COUNT: CPT

## 2024-12-29 PROCEDURE — 80053 COMPREHEN METABOLIC PANEL: CPT

## 2024-12-29 PROCEDURE — 82805 BLOOD GASES W/O2 SATURATION: CPT

## 2024-12-29 PROCEDURE — 99285 EMERGENCY DEPT VISIT HI MDM: CPT

## 2024-12-29 PROCEDURE — 2500000003 HC RX 250 WO HCPCS: Performed by: EMERGENCY MEDICINE

## 2024-12-29 PROCEDURE — 99222 1ST HOSP IP/OBS MODERATE 55: CPT | Performed by: INTERNAL MEDICINE

## 2024-12-29 PROCEDURE — 84145 PROCALCITONIN (PCT): CPT

## 2024-12-29 PROCEDURE — 83735 ASSAY OF MAGNESIUM: CPT

## 2024-12-29 PROCEDURE — 6360000002 HC RX W HCPCS: Performed by: INTERNAL MEDICINE

## 2024-12-29 PROCEDURE — 93005 ELECTROCARDIOGRAM TRACING: CPT | Performed by: EMERGENCY MEDICINE

## 2024-12-29 PROCEDURE — 85379 FIBRIN DEGRADATION QUANT: CPT

## 2024-12-29 PROCEDURE — 83605 ASSAY OF LACTIC ACID: CPT

## 2024-12-29 PROCEDURE — 84466 ASSAY OF TRANSFERRIN: CPT

## 2024-12-29 PROCEDURE — 2580000003 HC RX 258: Performed by: EMERGENCY MEDICINE

## 2024-12-29 PROCEDURE — 2500000003 HC RX 250 WO HCPCS: Performed by: INTERNAL MEDICINE

## 2024-12-29 PROCEDURE — 71045 X-RAY EXAM CHEST 1 VIEW: CPT

## 2024-12-29 PROCEDURE — 2580000003 HC RX 258: Performed by: INTERNAL MEDICINE

## 2024-12-29 PROCEDURE — 2060000000 HC ICU INTERMEDIATE R&B

## 2024-12-29 PROCEDURE — 6370000000 HC RX 637 (ALT 250 FOR IP): Performed by: INTERNAL MEDICINE

## 2024-12-29 PROCEDURE — 83880 ASSAY OF NATRIURETIC PEPTIDE: CPT

## 2024-12-29 PROCEDURE — 83540 ASSAY OF IRON: CPT

## 2024-12-29 RX ORDER — ARFORMOTEROL TARTRATE 15 UG/2ML
15 SOLUTION RESPIRATORY (INHALATION)
Status: DISCONTINUED | OUTPATIENT
Start: 2024-12-29 | End: 2025-01-06 | Stop reason: HOSPADM

## 2024-12-29 RX ORDER — HYDRALAZINE HYDROCHLORIDE 20 MG/ML
10 INJECTION INTRAMUSCULAR; INTRAVENOUS EVERY 6 HOURS PRN
Status: DISCONTINUED | OUTPATIENT
Start: 2024-12-29 | End: 2025-01-06 | Stop reason: HOSPADM

## 2024-12-29 RX ORDER — ACETAMINOPHEN 650 MG/1
650 SUPPOSITORY RECTAL EVERY 6 HOURS PRN
Status: DISCONTINUED | OUTPATIENT
Start: 2024-12-29 | End: 2025-01-06 | Stop reason: HOSPADM

## 2024-12-29 RX ORDER — PANTOPRAZOLE SODIUM 40 MG/1
40 TABLET, DELAYED RELEASE ORAL
Status: DISCONTINUED | OUTPATIENT
Start: 2024-12-30 | End: 2025-01-06

## 2024-12-29 RX ORDER — ONDANSETRON 2 MG/ML
4 INJECTION INTRAMUSCULAR; INTRAVENOUS EVERY 6 HOURS PRN
Status: DISCONTINUED | OUTPATIENT
Start: 2024-12-29 | End: 2025-01-06 | Stop reason: HOSPADM

## 2024-12-29 RX ORDER — CALCIUM CARBONATE 500 MG/1
500 TABLET, CHEWABLE ORAL 3 TIMES DAILY PRN
Status: DISCONTINUED | OUTPATIENT
Start: 2024-12-29 | End: 2025-01-06 | Stop reason: HOSPADM

## 2024-12-29 RX ORDER — POTASSIUM CHLORIDE 1500 MG/1
40 TABLET, EXTENDED RELEASE ORAL PRN
Status: DISCONTINUED | OUTPATIENT
Start: 2024-12-29 | End: 2025-01-06 | Stop reason: HOSPADM

## 2024-12-29 RX ORDER — ACETAMINOPHEN 325 MG/1
650 TABLET ORAL EVERY 6 HOURS PRN
Status: DISCONTINUED | OUTPATIENT
Start: 2024-12-29 | End: 2025-01-06 | Stop reason: HOSPADM

## 2024-12-29 RX ORDER — ONDANSETRON 4 MG/1
4 TABLET, ORALLY DISINTEGRATING ORAL EVERY 8 HOURS PRN
Status: DISCONTINUED | OUTPATIENT
Start: 2024-12-29 | End: 2025-01-06 | Stop reason: HOSPADM

## 2024-12-29 RX ORDER — BUDESONIDE 0.5 MG/2ML
0.5 INHALANT ORAL
Status: DISCONTINUED | OUTPATIENT
Start: 2024-12-29 | End: 2025-01-06 | Stop reason: HOSPADM

## 2024-12-29 RX ORDER — MAGNESIUM SULFATE IN WATER 40 MG/ML
2000 INJECTION, SOLUTION INTRAVENOUS PRN
Status: DISCONTINUED | OUTPATIENT
Start: 2024-12-29 | End: 2025-01-06 | Stop reason: HOSPADM

## 2024-12-29 RX ORDER — SODIUM CHLORIDE 0.9 % (FLUSH) 0.9 %
5-40 SYRINGE (ML) INJECTION PRN
Status: DISCONTINUED | OUTPATIENT
Start: 2024-12-29 | End: 2025-01-06 | Stop reason: HOSPADM

## 2024-12-29 RX ORDER — METOPROLOL SUCCINATE 25 MG/1
12.5 TABLET, EXTENDED RELEASE ORAL DAILY
Status: DISCONTINUED | OUTPATIENT
Start: 2024-12-30 | End: 2025-01-02

## 2024-12-29 RX ORDER — SODIUM CHLORIDE 0.9 % (FLUSH) 0.9 %
5-40 SYRINGE (ML) INJECTION EVERY 12 HOURS SCHEDULED
Status: DISCONTINUED | OUTPATIENT
Start: 2024-12-29 | End: 2025-01-06 | Stop reason: HOSPADM

## 2024-12-29 RX ORDER — GUAIFENESIN 400 MG/1
400 TABLET ORAL 4 TIMES DAILY PRN
Status: DISCONTINUED | OUTPATIENT
Start: 2024-12-29 | End: 2025-01-06 | Stop reason: HOSPADM

## 2024-12-29 RX ORDER — IPRATROPIUM BROMIDE AND ALBUTEROL SULFATE 2.5; .5 MG/3ML; MG/3ML
1 SOLUTION RESPIRATORY (INHALATION)
Status: DISCONTINUED | OUTPATIENT
Start: 2024-12-29 | End: 2025-01-03

## 2024-12-29 RX ORDER — GUAIFENESIN 600 MG/1
1200 TABLET, EXTENDED RELEASE ORAL 2 TIMES DAILY PRN
Status: DISCONTINUED | OUTPATIENT
Start: 2024-12-29 | End: 2024-12-29 | Stop reason: CLARIF

## 2024-12-29 RX ORDER — ENOXAPARIN SODIUM 100 MG/ML
40 INJECTION SUBCUTANEOUS DAILY
Status: DISCONTINUED | OUTPATIENT
Start: 2024-12-30 | End: 2025-01-06 | Stop reason: HOSPADM

## 2024-12-29 RX ORDER — M-VIT,TX,IRON,MINS/CALC/FOLIC 27MG-0.4MG
1 TABLET ORAL DAILY
Status: DISCONTINUED | OUTPATIENT
Start: 2024-12-30 | End: 2025-01-06 | Stop reason: HOSPADM

## 2024-12-29 RX ORDER — 0.9 % SODIUM CHLORIDE 0.9 %
1000 INTRAVENOUS SOLUTION INTRAVENOUS ONCE
Status: COMPLETED | OUTPATIENT
Start: 2024-12-29 | End: 2024-12-29

## 2024-12-29 RX ORDER — IPRATROPIUM BROMIDE AND ALBUTEROL SULFATE 2.5; .5 MG/3ML; MG/3ML
3 SOLUTION RESPIRATORY (INHALATION) ONCE
Status: COMPLETED | OUTPATIENT
Start: 2024-12-29 | End: 2024-12-29

## 2024-12-29 RX ORDER — BENZONATATE 100 MG/1
100 CAPSULE ORAL 3 TIMES DAILY PRN
Status: DISCONTINUED | OUTPATIENT
Start: 2024-12-29 | End: 2025-01-06 | Stop reason: HOSPADM

## 2024-12-29 RX ORDER — POLYETHYLENE GLYCOL 3350 17 G/17G
17 POWDER, FOR SOLUTION ORAL DAILY PRN
Status: DISCONTINUED | OUTPATIENT
Start: 2024-12-29 | End: 2025-01-06 | Stop reason: HOSPADM

## 2024-12-29 RX ORDER — SODIUM CHLORIDE 9 MG/ML
INJECTION, SOLUTION INTRAVENOUS PRN
Status: DISCONTINUED | OUTPATIENT
Start: 2024-12-29 | End: 2025-01-06 | Stop reason: HOSPADM

## 2024-12-29 RX ORDER — POTASSIUM CHLORIDE 7.45 MG/ML
10 INJECTION INTRAVENOUS PRN
Status: DISCONTINUED | OUTPATIENT
Start: 2024-12-29 | End: 2025-01-06 | Stop reason: HOSPADM

## 2024-12-29 RX ORDER — ACETYLCYSTEINE 200 MG/ML
2 SOLUTION ORAL; RESPIRATORY (INHALATION) 2 TIMES DAILY
Status: DISCONTINUED | OUTPATIENT
Start: 2024-12-29 | End: 2025-01-06

## 2024-12-29 RX ADMIN — SODIUM CHLORIDE 1000 ML: 9 INJECTION, SOLUTION INTRAVENOUS at 15:50

## 2024-12-29 RX ADMIN — IPRATROPIUM BROMIDE AND ALBUTEROL SULFATE 1 DOSE: 2.5; .5 SOLUTION RESPIRATORY (INHALATION) at 19:46

## 2024-12-29 RX ADMIN — BUDESONIDE INHALATION 500 MCG: 0.5 SUSPENSION RESPIRATORY (INHALATION) at 19:46

## 2024-12-29 RX ADMIN — CEFEPIME 2000 MG: 2 INJECTION, POWDER, FOR SOLUTION INTRAVENOUS at 22:16

## 2024-12-29 RX ADMIN — WATER 125 MG: 1 INJECTION INTRAMUSCULAR; INTRAVENOUS; SUBCUTANEOUS at 15:51

## 2024-12-29 RX ADMIN — ACETYLCYSTEINE 400 MG: 200 SOLUTION ORAL; RESPIRATORY (INHALATION) at 19:46

## 2024-12-29 RX ADMIN — ARFORMOTEROL TARTRATE 15 MCG: 15 SOLUTION RESPIRATORY (INHALATION) at 19:46

## 2024-12-29 RX ADMIN — IPRATROPIUM BROMIDE AND ALBUTEROL SULFATE 3 DOSE: 2.5; .5 SOLUTION RESPIRATORY (INHALATION) at 16:21

## 2024-12-29 RX ADMIN — SODIUM CHLORIDE, PRESERVATIVE FREE 10 ML: 5 INJECTION INTRAVENOUS at 22:21

## 2024-12-29 ASSESSMENT — LIFESTYLE VARIABLES: HOW OFTEN DO YOU HAVE A DRINK CONTAINING ALCOHOL: MONTHLY OR LESS

## 2024-12-29 ASSESSMENT — PAIN - FUNCTIONAL ASSESSMENT: PAIN_FUNCTIONAL_ASSESSMENT: NONE - DENIES PAIN

## 2024-12-29 NOTE — H&P
Inpatient H&P      PCP:  Samina Ji MD  Admitting Physician:  Brittney Rodas DO  Consultants:  None at this time   Chief Complaint:    Chief Complaint   Patient presents with    Shortness of Breath     From ECF.  Usually wears O2 @ 4L at all times, but ECF bumped her up to 9L.  EMS has patient 6L N/C       History of Present Illness  Ashley Vivas is a 74 y.o. female who presents to [facility] ER complaining of SOB.    Ashley Vivas has a past medical history that includes lung cancer, COPD, hypertension    Ashley presents to ER with complaint of shortness of breath.  She is from nursing facility.  She does have chronic hypoxic respiratory failure and wears 4 L of oxygen at baseline due to COPD and lung cancer.  She has been recently requiring increasing oxygen requirements and is on 6 L upon arrival to the ER.  She admits to cough and sputum production.  There is concern for pneumonia and she was given Rocephin and doxycycline in the ER.  Will be admitted for COPD exacerbation/possible pneumonia. Awaiting CT chest.  Discussed patient's case with ED physician.    ER Course  Upon presentation to the ER, routine labwork was performed which revealed glucose 132, proBNP 511, troponin 26, hemoglobin 7.3.  Imaging results are as outlined below in the Imaging section of this note.   Upon arrival to the ER, patient was 108/50.  The patient received Rocephin, doxycycline, DuoNeb, 1 L normal saline, Solu-Medrol in the emergency room and was admitted to [facility].    Last Hospital Admission -I personally reviewed previous admission from 12/2/2024    Symptomatic anemia  Active Problems:    Malignant neoplasm of lung (HCC)    Debilitated    Syncope and collapse    Complication of chemotherapy    COPD exacerbation (HCC)    Last Echocardiogram -I personally reviewed previous echocardiogram results from 12/7/2020    Left Ventricle: Normal left ventricular systolic function with a visually estimated EF of 55

## 2024-12-29 NOTE — ED PROVIDER NOTES
succ (SOLU-MEDROL) 40 mg in sterile water 1 mL injection (has no administration in time range)   guaiFENesin tablet 400 mg (has no administration in time range)   sodium chloride 0.9 % bolus 1,000 mL (0 mLs IntraVENous Stopped 24 1751)   ipratropium 0.5 mg-albuterol 2.5 mg (DUONEB) nebulizer solution 3 Dose (3 Doses Inhalation Given 24 1621)   methylPREDNISolone sodium succ (SOLU-MEDROL) 125 mg in sterile water 2 mL injection (125 mg IntraVENous Given 24 1551)         ED COURSE:  ED Course as of 24   Sun Dec 29, 2024   144 Patient is hypoxic at 82% on high flow nasal cannula at 12 L/min.  Will start BiPAP. [SS]      ED Course User Index  [SS] Dallas Rojas MD         Consultations:             Internal medicine - will admit the patient    Critical Care: Please note that the withdrawal or failure to initiate urgent interventions for this patient would likely result in a life threatening deterioration or permanent disability.      Accordingly this patient received 60 minutes of critical care time, excluding separately billable procedures.     Independent interpretation of tests:  CXR - bibasilar infiltrates    The patient presents with a new acute problem or complaint.        Counseling:   The emergency provider has spoken with the patient and discussed today’s results, in addition to providing specific details for the plan of care and counseling regarding the diagnosis and prognosis.  Questions are answered at this time and they are agreeable with the plan.    ED Course/Medical Decision Makin y.o. female here with shortness of breath.  Patient arrives from nursing facility with increasing oxygen requirement.  On arrival to the ED she is significantly hypoxic on high flow nasal cannula.  Started BiPAP with subsequent improvement in oxygenation.  Workup concerning for pneumonia. Treated w/ IV abx, nebs, steroids.  Patient ultimately admitted to internal medicine service for further

## 2024-12-30 PROBLEM — J96.22 ACUTE ON CHRONIC RESPIRATORY FAILURE WITH HYPOXIA AND HYPERCAPNIA: Status: ACTIVE | Noted: 2024-12-30

## 2024-12-30 PROBLEM — J96.21 ACUTE ON CHRONIC RESPIRATORY FAILURE WITH HYPOXIA AND HYPERCAPNIA: Status: ACTIVE | Noted: 2024-12-30

## 2024-12-30 LAB
AADO2: 190.1 MMHG
ALBUMIN SERPL-MCNC: 3.4 G/DL (ref 3.5–5.2)
ALP SERPL-CCNC: 81 U/L (ref 35–104)
ALT SERPL-CCNC: 25 U/L (ref 0–32)
ANION GAP SERPL CALCULATED.3IONS-SCNC: 6 MMOL/L (ref 7–16)
AST SERPL-CCNC: 18 U/L (ref 0–31)
B.E.: 6.3 MMOL/L (ref -3–3)
BILIRUB SERPL-MCNC: 0.2 MG/DL (ref 0–1.2)
BUN SERPL-MCNC: 15 MG/DL (ref 6–23)
CALCIUM SERPL-MCNC: 9.1 MG/DL (ref 8.6–10.2)
CHLORIDE SERPL-SCNC: 100 MMOL/L (ref 98–107)
CHOLEST SERPL-MCNC: 213 MG/DL
CO2 SERPL-SCNC: 34 MMOL/L (ref 22–29)
COHB: 1.5 % (ref 0–1.5)
CREAT SERPL-MCNC: 0.3 MG/DL (ref 0.5–1)
CRITICAL: ABNORMAL
DATE ANALYZED: ABNORMAL
DATE OF COLLECTION: ABNORMAL
EKG ATRIAL RATE: 97 BPM
EKG P AXIS: 79 DEGREES
EKG P-R INTERVAL: 108 MS
EKG Q-T INTERVAL: 306 MS
EKG QRS DURATION: 68 MS
EKG QTC CALCULATION (BAZETT): 388 MS
EKG R AXIS: 80 DEGREES
EKG T AXIS: 62 DEGREES
EKG VENTRICULAR RATE: 97 BPM
ERYTHROCYTE [DISTWIDTH] IN BLOOD BY AUTOMATED COUNT: 20 % (ref 11.5–15)
FIO2: 50 %
FOLATE SERPL-MCNC: 17.9 NG/ML (ref 4.8–24.2)
GFR, ESTIMATED: >90 ML/MIN/1.73M2
GLUCOSE SERPL-MCNC: 142 MG/DL (ref 74–99)
HBA1C MFR BLD: 4.8 % (ref 4–5.6)
HCO3: 32.1 MMOL/L (ref 22–26)
HCT VFR BLD AUTO: 23.2 % (ref 34–48)
HDLC SERPL-MCNC: 90 MG/DL
HGB BLD-MCNC: 7 G/DL (ref 11.5–15.5)
HHB: 1.7 % (ref 0–5)
L PNEUMO1 AG UR QL IA.RAPID: NEGATIVE
LAB: ABNORMAL
LACTATE BLDV-SCNC: 1.2 MMOL/L (ref 0.5–1.9)
LDLC SERPL CALC-MCNC: 110 MG/DL
Lab: 1203
MAGNESIUM SERPL-MCNC: 2.1 MG/DL (ref 1.6–2.6)
MCH RBC QN AUTO: 31.8 PG (ref 26–35)
MCHC RBC AUTO-ENTMCNC: 30.2 G/DL (ref 32–34.5)
MCV RBC AUTO: 105.5 FL (ref 80–99.9)
METHB: 0.2 % (ref 0–1.5)
MODE: ABNORMAL
O2 SATURATION: 98.3 % (ref 92–98.5)
O2HB: 96.6 % (ref 94–97)
OPERATOR ID: 1741
PATIENT TEMP: 37 C
PCO2: 55.3 MMHG (ref 35–45)
PEEP/CPAP: 5 CMH2O
PFO2: 2.08 MMHG/%
PH BLOOD GAS: 7.38 (ref 7.35–7.45)
PHOSPHATE SERPL-MCNC: 3.7 MG/DL (ref 2.5–4.5)
PLATELET # BLD AUTO: 223 K/UL (ref 130–450)
PMV BLD AUTO: 10.6 FL (ref 7–12)
PO2: 104.2 MMHG (ref 75–100)
POTASSIUM SERPL-SCNC: 4.5 MMOL/L (ref 3.5–5)
PROCALCITONIN SERPL-MCNC: 0.12 NG/ML (ref 0–0.08)
PROT SERPL-MCNC: 6.4 G/DL (ref 6.4–8.3)
PS: 16 CMH20
RBC # BLD AUTO: 2.2 M/UL (ref 3.5–5.5)
RI(T): 1.82
S PNEUM AG SPEC QL: NEGATIVE
SODIUM SERPL-SCNC: 140 MMOL/L (ref 132–146)
SOURCE, BLOOD GAS: ABNORMAL
SPECIMEN SOURCE: NORMAL
THB: 7.4 G/DL (ref 11.5–16.5)
TIME ANALYZED: 1209
TRIGL SERPL-MCNC: 65 MG/DL
TSH SERPL DL<=0.05 MIU/L-ACNC: 0.14 UIU/ML (ref 0.27–4.2)
VIT B12 SERPL-MCNC: 595 PG/ML (ref 211–946)
VLDLC SERPL CALC-MCNC: 13 MG/DL
WBC OTHER # BLD: 7.6 K/UL (ref 4.5–11.5)

## 2024-12-30 PROCEDURE — 84100 ASSAY OF PHOSPHORUS: CPT

## 2024-12-30 PROCEDURE — 99232 SBSQ HOSP IP/OBS MODERATE 35: CPT | Performed by: STUDENT IN AN ORGANIZED HEALTH CARE EDUCATION/TRAINING PROGRAM

## 2024-12-30 PROCEDURE — 36600 WITHDRAWAL OF ARTERIAL BLOOD: CPT

## 2024-12-30 PROCEDURE — 84145 PROCALCITONIN (PCT): CPT

## 2024-12-30 PROCEDURE — 2580000003 HC RX 258: Performed by: INTERNAL MEDICINE

## 2024-12-30 PROCEDURE — 6360000002 HC RX W HCPCS: Performed by: INTERNAL MEDICINE

## 2024-12-30 PROCEDURE — 82805 BLOOD GASES W/O2 SATURATION: CPT

## 2024-12-30 PROCEDURE — 2060000000 HC ICU INTERMEDIATE R&B

## 2024-12-30 PROCEDURE — 36415 COLL VENOUS BLD VENIPUNCTURE: CPT

## 2024-12-30 PROCEDURE — 83605 ASSAY OF LACTIC ACID: CPT

## 2024-12-30 PROCEDURE — 2500000003 HC RX 250 WO HCPCS: Performed by: INTERNAL MEDICINE

## 2024-12-30 PROCEDURE — 85027 COMPLETE CBC AUTOMATED: CPT

## 2024-12-30 PROCEDURE — 2700000000 HC OXYGEN THERAPY PER DAY

## 2024-12-30 PROCEDURE — 80053 COMPREHEN METABOLIC PANEL: CPT

## 2024-12-30 PROCEDURE — 83036 HEMOGLOBIN GLYCOSYLATED A1C: CPT

## 2024-12-30 PROCEDURE — 82746 ASSAY OF FOLIC ACID SERUM: CPT

## 2024-12-30 PROCEDURE — 87899 AGENT NOS ASSAY W/OPTIC: CPT

## 2024-12-30 PROCEDURE — 82607 VITAMIN B-12: CPT

## 2024-12-30 PROCEDURE — 93010 ELECTROCARDIOGRAM REPORT: CPT | Performed by: INTERNAL MEDICINE

## 2024-12-30 PROCEDURE — 80061 LIPID PANEL: CPT

## 2024-12-30 PROCEDURE — 99222 1ST HOSP IP/OBS MODERATE 55: CPT | Performed by: INTERNAL MEDICINE

## 2024-12-30 PROCEDURE — 94660 CPAP INITIATION&MGMT: CPT

## 2024-12-30 PROCEDURE — 84443 ASSAY THYROID STIM HORMONE: CPT

## 2024-12-30 PROCEDURE — 83735 ASSAY OF MAGNESIUM: CPT

## 2024-12-30 PROCEDURE — 94640 AIRWAY INHALATION TREATMENT: CPT

## 2024-12-30 PROCEDURE — 6370000000 HC RX 637 (ALT 250 FOR IP): Performed by: INTERNAL MEDICINE

## 2024-12-30 RX ADMIN — CEFEPIME 2000 MG: 2 INJECTION, POWDER, FOR SOLUTION INTRAVENOUS at 23:51

## 2024-12-30 RX ADMIN — ACETYLCYSTEINE 400 MG: 200 SOLUTION ORAL; RESPIRATORY (INHALATION) at 22:34

## 2024-12-30 RX ADMIN — CEFEPIME 2000 MG: 2 INJECTION, POWDER, FOR SOLUTION INTRAVENOUS at 13:59

## 2024-12-30 RX ADMIN — ARFORMOTEROL TARTRATE 15 MCG: 15 SOLUTION RESPIRATORY (INHALATION) at 08:03

## 2024-12-30 RX ADMIN — VANCOMYCIN HYDROCHLORIDE 1000 MG: 1 INJECTION, POWDER, LYOPHILIZED, FOR SOLUTION INTRAVENOUS at 01:42

## 2024-12-30 RX ADMIN — METOPROLOL SUCCINATE 12.5 MG: 25 TABLET, EXTENDED RELEASE ORAL at 08:59

## 2024-12-30 RX ADMIN — ARFORMOTEROL TARTRATE 15 MCG: 15 SOLUTION RESPIRATORY (INHALATION) at 22:34

## 2024-12-30 RX ADMIN — IPRATROPIUM BROMIDE AND ALBUTEROL SULFATE 1 DOSE: 2.5; .5 SOLUTION RESPIRATORY (INHALATION) at 08:03

## 2024-12-30 RX ADMIN — BUDESONIDE INHALATION 500 MCG: 0.5 SUSPENSION RESPIRATORY (INHALATION) at 08:04

## 2024-12-30 RX ADMIN — ACETYLCYSTEINE 400 MG: 200 SOLUTION ORAL; RESPIRATORY (INHALATION) at 08:03

## 2024-12-30 RX ADMIN — CEFEPIME 2000 MG: 2 INJECTION, POWDER, FOR SOLUTION INTRAVENOUS at 06:30

## 2024-12-30 RX ADMIN — IPRATROPIUM BROMIDE AND ALBUTEROL SULFATE 1 DOSE: 2.5; .5 SOLUTION RESPIRATORY (INHALATION) at 22:33

## 2024-12-30 RX ADMIN — ENOXAPARIN SODIUM 40 MG: 100 INJECTION SUBCUTANEOUS at 08:59

## 2024-12-30 RX ADMIN — BUDESONIDE INHALATION 500 MCG: 0.5 SUSPENSION RESPIRATORY (INHALATION) at 22:34

## 2024-12-30 RX ADMIN — IPRATROPIUM BROMIDE AND ALBUTEROL SULFATE 1 DOSE: 2.5; .5 SOLUTION RESPIRATORY (INHALATION) at 11:53

## 2024-12-30 RX ADMIN — WATER 40 MG: 1 INJECTION INTRAMUSCULAR; INTRAVENOUS; SUBCUTANEOUS at 01:39

## 2024-12-30 RX ADMIN — IPRATROPIUM BROMIDE AND ALBUTEROL SULFATE 1 DOSE: 2.5; .5 SOLUTION RESPIRATORY (INHALATION) at 16:53

## 2024-12-30 RX ADMIN — WATER 40 MG: 1 INJECTION INTRAMUSCULAR; INTRAVENOUS; SUBCUTANEOUS at 13:54

## 2024-12-30 RX ADMIN — PANTOPRAZOLE SODIUM 40 MG: 40 TABLET, DELAYED RELEASE ORAL at 08:59

## 2024-12-30 RX ADMIN — SODIUM CHLORIDE, PRESERVATIVE FREE 10 ML: 5 INJECTION INTRAVENOUS at 23:51

## 2024-12-30 RX ADMIN — VANCOMYCIN HYDROCHLORIDE 1000 MG: 1 INJECTION, POWDER, LYOPHILIZED, FOR SOLUTION INTRAVENOUS at 22:54

## 2024-12-30 NOTE — ED NOTES
Walked into patients room, Patient requesting to ambulate to bathroom. Patient pulse ox reading 87% on NC. Patient offered a bedpan and explained the risks of ambulating at this time. Patient upset and moving in bed. Patient refusing to keep pulse ox on finger, but agreeable at this time to use bed pan instead of ambulating. Patient took off of bedpan and finally agreeable to pulse ox. Patient O2 sat dropped to 76% on 6L NC. Patient placed back on biPap.

## 2024-12-30 NOTE — ED NOTES
Patient pulled off BiPap and began coughing again. Patient O2 dropped. Patient refusing any oxygen by nursing staff. Patient swatting at RN's hand when attempting to place O2. Patient explained how low her O2 has dropped and the severity. Patient continues to refuse O2. Respiratory was called, upon arrival to room patient O2 in the 50s. Patient yelling at staff \"I will not be intubated I refuse\" Patient eventually agreeable to NRB, but then kept pulling NRB off. High flow NC placed on 8L. Patient O2 sat improved and up to 100%.

## 2024-12-31 LAB
AADO2: 209.1 MMHG
ANION GAP SERPL CALCULATED.3IONS-SCNC: 15 MMOL/L (ref 7–16)
B.E.: 12.6 MMOL/L (ref -3–3)
B.E.: 3.6 MMOL/L (ref -3–3)
B.E.: 4.7 MMOL/L (ref -3–3)
BASOPHILS # BLD: 0 K/UL (ref 0–0.2)
BASOPHILS NFR BLD: 0 % (ref 0–2)
BUN SERPL-MCNC: 21 MG/DL (ref 6–23)
CALCIUM SERPL-MCNC: 9.2 MG/DL (ref 8.6–10.2)
CHLORIDE SERPL-SCNC: 106 MMOL/L (ref 98–107)
CO2 SERPL-SCNC: 23 MMOL/L (ref 22–29)
COHB: 0.9 % (ref 0–1.5)
COHB: 1 % (ref 0–1.5)
COHB: 1.2 % (ref 0–1.5)
CREAT SERPL-MCNC: 0.4 MG/DL (ref 0.5–1)
CRITICAL: ABNORMAL
DATE ANALYZED: ABNORMAL
DATE LAST DOSE: NORMAL
DATE OF COLLECTION: ABNORMAL
EOSINOPHIL # BLD: 0 K/UL (ref 0.05–0.5)
EOSINOPHILS RELATIVE PERCENT: 0 % (ref 0–6)
ERYTHROCYTE [DISTWIDTH] IN BLOOD BY AUTOMATED COUNT: 20.4 % (ref 11.5–15)
FIO2: 50 %
GFR, ESTIMATED: >90 ML/MIN/1.73M2
GLUCOSE SERPL-MCNC: 152 MG/DL (ref 74–99)
HCO3: 30.4 MMOL/L (ref 22–26)
HCO3: 32 MMOL/L (ref 22–26)
HCO3: 40 MMOL/L (ref 22–26)
HCT VFR BLD AUTO: 24.5 % (ref 34–48)
HGB BLD-MCNC: 6.7 G/DL (ref 11.5–15.5)
HHB: 4.5 % (ref 0–5)
HHB: 6.1 % (ref 0–5)
HHB: 8 % (ref 0–5)
LAB: ABNORMAL
LYMPHOCYTES NFR BLD: 0.25 K/UL (ref 1.5–4)
LYMPHOCYTES RELATIVE PERCENT: 2 % (ref 20–42)
Lab: 1200
Lab: 1638
Lab: 840
MCH RBC QN AUTO: 31.2 PG (ref 26–35)
MCHC RBC AUTO-ENTMCNC: 27.3 G/DL (ref 32–34.5)
MCV RBC AUTO: 114 FL (ref 80–99.9)
METHB: 0.6 % (ref 0–1.5)
METHB: 0.7 % (ref 0–1.5)
METHB: 0.7 % (ref 0–1.5)
MICROORGANISM SPEC CULT: NORMAL
MODE: ABNORMAL
MONOCYTES NFR BLD: 0.99 K/UL (ref 0.1–0.95)
MONOCYTES NFR BLD: 8 % (ref 2–12)
NEUTROPHILS NFR BLD: 90 % (ref 43–80)
NEUTS SEG NFR BLD: 11.16 K/UL (ref 1.8–7.3)
O2 SATURATION: 91.9 % (ref 92–98.5)
O2 SATURATION: 93.8 % (ref 92–98.5)
O2 SATURATION: 95.4 % (ref 92–98.5)
O2HB: 90.2 % (ref 94–97)
O2HB: 92.2 % (ref 94–97)
O2HB: 93.9 % (ref 94–97)
OPERATOR ID: 2220
OPERATOR ID: 9072
OPERATOR ID: ABNORMAL
PATIENT TEMP: 37 C
PCO2: 62.4 MMHG (ref 35–45)
PCO2: 68.7 MMHG (ref 35–45)
PCO2: 78.7 MMHG (ref 35–45)
PFO2: 1.4 MMHG/%
PH BLOOD GAS: 7.29 (ref 7.35–7.45)
PH BLOOD GAS: 7.3 (ref 7.35–7.45)
PH BLOOD GAS: 7.32 (ref 7.35–7.45)
PLATELET # BLD AUTO: 203 K/UL (ref 130–450)
PMV BLD AUTO: 12.2 FL (ref 7–12)
PO2: 70.1 MMHG (ref 75–100)
PO2: 75 MMHG (ref 75–100)
PO2: 82 MMHG (ref 75–100)
POTASSIUM SERPL-SCNC: 4.39 MMOL/L (ref 3.5–5)
POTASSIUM SERPL-SCNC: 5.3 MMOL/L (ref 3.5–5)
RBC # BLD AUTO: 2.15 M/UL (ref 3.5–5.5)
RBC # BLD: ABNORMAL 10*6/UL
RI(T): 2.98
RR MECHANICAL: 14 B/MIN
SODIUM SERPL-SCNC: 144 MMOL/L (ref 132–146)
SOURCE, BLOOD GAS: ABNORMAL
SPECIMEN DESCRIPTION: NORMAL
THB: 6.5 G/DL (ref 11.5–16.5)
THB: 6.6 G/DL (ref 11.5–16.5)
THB: 6.9 G/DL (ref 11.5–16.5)
TIME ANALYZED: 1215
TIME ANALYZED: 1642
TIME ANALYZED: 849
TME LAST DOSE: NORMAL H
VANCOMYCIN DOSE: NORMAL MG
VANCOMYCIN SERPL-MCNC: 8.8 UG/ML (ref 5–40)
WBC OTHER # BLD: 12.4 K/UL (ref 4.5–11.5)

## 2024-12-31 PROCEDURE — 85025 COMPLETE CBC W/AUTO DIFF WBC: CPT

## 2024-12-31 PROCEDURE — 36600 WITHDRAWAL OF ARTERIAL BLOOD: CPT

## 2024-12-31 PROCEDURE — 2500000003 HC RX 250 WO HCPCS: Performed by: INTERNAL MEDICINE

## 2024-12-31 PROCEDURE — 99232 SBSQ HOSP IP/OBS MODERATE 35: CPT | Performed by: STUDENT IN AN ORGANIZED HEALTH CARE EDUCATION/TRAINING PROGRAM

## 2024-12-31 PROCEDURE — 6370000000 HC RX 637 (ALT 250 FOR IP): Performed by: INTERNAL MEDICINE

## 2024-12-31 PROCEDURE — 94640 AIRWAY INHALATION TREATMENT: CPT

## 2024-12-31 PROCEDURE — 99233 SBSQ HOSP IP/OBS HIGH 50: CPT | Performed by: INTERNAL MEDICINE

## 2024-12-31 PROCEDURE — 94660 CPAP INITIATION&MGMT: CPT

## 2024-12-31 PROCEDURE — 82805 BLOOD GASES W/O2 SATURATION: CPT

## 2024-12-31 PROCEDURE — 86850 RBC ANTIBODY SCREEN: CPT

## 2024-12-31 PROCEDURE — 86900 BLOOD TYPING SEROLOGIC ABO: CPT

## 2024-12-31 PROCEDURE — 6370000000 HC RX 637 (ALT 250 FOR IP): Performed by: STUDENT IN AN ORGANIZED HEALTH CARE EDUCATION/TRAINING PROGRAM

## 2024-12-31 PROCEDURE — 2580000003 HC RX 258: Performed by: INTERNAL MEDICINE

## 2024-12-31 PROCEDURE — 6360000002 HC RX W HCPCS: Performed by: INTERNAL MEDICINE

## 2024-12-31 PROCEDURE — 84132 ASSAY OF SERUM POTASSIUM: CPT

## 2024-12-31 PROCEDURE — 86901 BLOOD TYPING SEROLOGIC RH(D): CPT

## 2024-12-31 PROCEDURE — 99222 1ST HOSP IP/OBS MODERATE 55: CPT

## 2024-12-31 PROCEDURE — 2060000000 HC ICU INTERMEDIATE R&B

## 2024-12-31 PROCEDURE — 86923 COMPATIBILITY TEST ELECTRIC: CPT

## 2024-12-31 PROCEDURE — 80048 BASIC METABOLIC PNL TOTAL CA: CPT

## 2024-12-31 PROCEDURE — 80202 ASSAY OF VANCOMYCIN: CPT

## 2024-12-31 PROCEDURE — 2700000000 HC OXYGEN THERAPY PER DAY

## 2024-12-31 PROCEDURE — 36415 COLL VENOUS BLD VENIPUNCTURE: CPT

## 2024-12-31 RX ORDER — SODIUM CHLORIDE 9 MG/ML
INJECTION, SOLUTION INTRAVENOUS PRN
Status: DISCONTINUED | OUTPATIENT
Start: 2024-12-31 | End: 2025-01-05

## 2024-12-31 RX ADMIN — METOPROLOL SUCCINATE 12.5 MG: 25 TABLET, EXTENDED RELEASE ORAL at 10:33

## 2024-12-31 RX ADMIN — ACETYLCYSTEINE 400 MG: 200 SOLUTION ORAL; RESPIRATORY (INHALATION) at 21:50

## 2024-12-31 RX ADMIN — ARFORMOTEROL TARTRATE 15 MCG: 15 SOLUTION RESPIRATORY (INHALATION) at 21:50

## 2024-12-31 RX ADMIN — ARFORMOTEROL TARTRATE 15 MCG: 15 SOLUTION RESPIRATORY (INHALATION) at 07:56

## 2024-12-31 RX ADMIN — IPRATROPIUM BROMIDE AND ALBUTEROL SULFATE 1 DOSE: 2.5; .5 SOLUTION RESPIRATORY (INHALATION) at 14:10

## 2024-12-31 RX ADMIN — Medication 1 TABLET: at 10:34

## 2024-12-31 RX ADMIN — BUDESONIDE INHALATION 500 MCG: 0.5 SUSPENSION RESPIRATORY (INHALATION) at 21:50

## 2024-12-31 RX ADMIN — CEFEPIME 2000 MG: 2 INJECTION, POWDER, FOR SOLUTION INTRAVENOUS at 21:45

## 2024-12-31 RX ADMIN — BUDESONIDE INHALATION 500 MCG: 0.5 SUSPENSION RESPIRATORY (INHALATION) at 07:56

## 2024-12-31 RX ADMIN — SODIUM ZIRCONIUM CYCLOSILICATE 10 G: 10 POWDER, FOR SUSPENSION ORAL at 20:57

## 2024-12-31 RX ADMIN — WATER 40 MG: 1 INJECTION INTRAMUSCULAR; INTRAVENOUS; SUBCUTANEOUS at 04:00

## 2024-12-31 RX ADMIN — CEFEPIME 2000 MG: 2 INJECTION, POWDER, FOR SOLUTION INTRAVENOUS at 06:49

## 2024-12-31 RX ADMIN — ENOXAPARIN SODIUM 40 MG: 100 INJECTION SUBCUTANEOUS at 10:33

## 2024-12-31 RX ADMIN — IPRATROPIUM BROMIDE AND ALBUTEROL SULFATE 1 DOSE: 2.5; .5 SOLUTION RESPIRATORY (INHALATION) at 07:56

## 2024-12-31 RX ADMIN — PANTOPRAZOLE SODIUM 40 MG: 40 TABLET, DELAYED RELEASE ORAL at 06:49

## 2024-12-31 RX ADMIN — IPRATROPIUM BROMIDE AND ALBUTEROL SULFATE 1 DOSE: 2.5; .5 SOLUTION RESPIRATORY (INHALATION) at 17:20

## 2024-12-31 RX ADMIN — WATER 40 MG: 1 INJECTION INTRAMUSCULAR; INTRAVENOUS; SUBCUTANEOUS at 14:30

## 2024-12-31 RX ADMIN — ACETYLCYSTEINE 400 MG: 200 SOLUTION ORAL; RESPIRATORY (INHALATION) at 07:56

## 2024-12-31 RX ADMIN — CEFEPIME 2000 MG: 2 INJECTION, POWDER, FOR SOLUTION INTRAVENOUS at 14:33

## 2024-12-31 RX ADMIN — IPRATROPIUM BROMIDE AND ALBUTEROL SULFATE 1 DOSE: 2.5; .5 SOLUTION RESPIRATORY (INHALATION) at 21:50

## 2024-12-31 ASSESSMENT — PAIN SCALES - GENERAL: PAINLEVEL_OUTOF10: 0

## 2024-12-31 NOTE — ACP (ADVANCE CARE PLANNING)
Advance Care Planning   The patient has the following advanced directives on file:  Advance Directives       Power of  Living Will ACP-Advance Directive ACP-Power of     Not on File Filed on 12/10/21 Filed Not on File            The patient has appointed the following active healthcare agents:    Primary Decision Maker: ReynoldsoiladarielJuan C - Spouse - [phone]    Secondary Decision Maker: Ligia Hightower - Child - [phone]    The Patient has the following current code status:    Code Status: Full Code        Yasmin Katz RN  12/31/2024

## 2024-12-31 NOTE — CARE COORDINATION
Chart reviewed and case reviewed in IDR.  Patient with acute respiratory failure.  HF O2 7-9 liters vs Bipap.  Patient on IV Vancomycin and IV Cefepime.  Respiratory panel negative.  History of Eulalio IV squamous cell carcinoma,.  Met with the patient and her family at the bedside to discuss transition of care planning.  Patient admitted from rehab at [facility].  Patient and family do not want to return there.  Prior to that, patient from home with FWW, nebulizer and O2 4L per NC through [facility].  NIXON list provided.  Patient and family would like 1.) [facility], 2.) [facility], or 3.) [facility].  [facility] in [place] is an [facility] living facility.   Call placed to [place], liaison with [facility] and referral left via detailed VM.  Orders received for PT/OT evaluations.  Will continue to follow for further transition of care planning needs.       Yasmin Katz RN.  P:  [phone]          Case Management Assessment  Initial Evaluation    Date/Time of Evaluation: 12/31/2024 1:09 PM  Assessment Completed by: Yasmin Katz RN    If patient is discharged prior to next notation, then this note serves as note for discharge by case management.    Patient Name: Ashley Vivas                   YOB: 1950  Diagnosis: Acute exacerbation of chronic obstructive pulmonary disease (COPD) ([facility]) [J44.1]  Acute on chronic respiratory failure with hypoxia and hypercapnia [J96.21, J96.22]  Pneumonia due to infectious organism, unspecified laterality, unspecified part of lung [J18.9]  Acute on chronic hypoxic respiratory failure [J96.21]                   Date / Time: 12/29/2024  2:13 PM    Patient Admission Status: Inpatient   Readmission Risk (Low < 19, Mod (19-27), High > 27): Readmission Risk Score: 24.2    Current PCP: Samina Ji MD  PCP verified by CM? Yes (Samina Ji MD)    Chart Reviewed: Yes      History Provided by:

## 2024-12-31 NOTE — PLAN OF CARE
Problem: Discharge Planning  Goal: Discharge to home or other facility with appropriate resources  Outcome: Progressing  Flowsheets (Taken 12/30/2024 0804 by Alvaro Mays RN)  Discharge to home or other facility with appropriate resources:   Identify barriers to discharge with patient and caregiver   Arrange for needed discharge resources and transportation as appropriate   Identify discharge learning needs (meds, wound care, etc)     Problem: Safety - Adult  Goal: Free from fall injury  Outcome: Progressing

## 2025-01-01 ENCOUNTER — OUTSIDE SERVICES (OUTPATIENT)
Dept: INTERNAL MEDICINE CLINIC | Age: 75
End: 2025-01-01
Payer: MEDICARE

## 2025-01-01 DIAGNOSIS — I50.42 CHRONIC COMBINED SYSTOLIC AND DIASTOLIC HRT FAIL (HCC): ICD-10-CM

## 2025-01-01 DIAGNOSIS — C34.31 MALIGNANT NEOPLASM OF LOWER LOBE, RIGHT BRONCHUS OR LUNG: ICD-10-CM

## 2025-01-01 DIAGNOSIS — I10 ESSENTIAL (PRIMARY) HYPERTENSION: ICD-10-CM

## 2025-01-01 DIAGNOSIS — M62.50 MUSCLE WASTING AND ATROPHY, NOT ELSEWHERE CLASSIFIED, UNSPECIFIED SITE: ICD-10-CM

## 2025-01-01 DIAGNOSIS — J96.21 ACUTE ON CHRONIC RESPIRATORY FAILURE WITH HYPOXIA AND HYPERCAPNIA: ICD-10-CM

## 2025-01-01 DIAGNOSIS — M62.81 MUSCLE WEAKNESS (GENERALIZED): ICD-10-CM

## 2025-01-01 DIAGNOSIS — J44.1 CHRONIC OBSTRUCTIVE PULMONARY DISEASE WITH (ACUTE) EXACERBATION (HCC): Primary | ICD-10-CM

## 2025-01-01 DIAGNOSIS — J96.22 ACUTE ON CHRONIC RESPIRATORY FAILURE WITH HYPOXIA AND HYPERCAPNIA: ICD-10-CM

## 2025-01-01 DIAGNOSIS — C34.11 MALIGNANT NEOPLASM OF UPPER LOBE, RIGHT BRONCHUS OR LUNG: ICD-10-CM

## 2025-01-01 DIAGNOSIS — K21.9 GASTRO-ESOPHAGEAL REFLUX DISEASE WITHOUT ESOPHAGITIS: ICD-10-CM

## 2025-01-01 DIAGNOSIS — Z74.1 NEED FOR ASSISTANCE WITH PERSONAL CARE: ICD-10-CM

## 2025-01-01 DIAGNOSIS — B97.4 RESPIRATORY SYNCYTIAL VIRUS CAUSING DISEASES CLASSD ELSWHR: ICD-10-CM

## 2025-01-01 DIAGNOSIS — D64.9 ANEMIA, UNSPECIFIED TYPE: ICD-10-CM

## 2025-01-01 PROBLEM — Z71.89 GOALS OF CARE, COUNSELING/DISCUSSION: Status: ACTIVE | Noted: 2025-01-01

## 2025-01-01 PROBLEM — Z51.5 PALLIATIVE CARE ENCOUNTER: Status: ACTIVE | Noted: 2025-01-01

## 2025-01-01 LAB
ANION GAP SERPL CALCULATED.3IONS-SCNC: 0 MMOL/L (ref 7–16)
BUN SERPL-MCNC: 17 MG/DL (ref 6–23)
CALCIUM SERPL-MCNC: 8.4 MG/DL (ref 8.6–10.2)
CHLORIDE SERPL-SCNC: 106 MMOL/L (ref 98–107)
CO2 SERPL-SCNC: 36 MMOL/L (ref 22–29)
CREAT SERPL-MCNC: 0.3 MG/DL (ref 0.5–1)
ERYTHROCYTE [DISTWIDTH] IN BLOOD BY AUTOMATED COUNT: 21.2 % (ref 11.5–15)
GFR, ESTIMATED: >90 ML/MIN/1.73M2
GLUCOSE SERPL-MCNC: 135 MG/DL (ref 74–99)
HCT VFR BLD AUTO: 24.1 % (ref 34–48)
HCT VFR BLD AUTO: 24.1 % (ref 34–48)
HGB BLD-MCNC: 7.1 G/DL (ref 11.5–15.5)
HGB BLD-MCNC: 7.2 G/DL (ref 11.5–15.5)
MCH RBC QN AUTO: 30.1 PG (ref 26–35)
MCHC RBC AUTO-ENTMCNC: 29.9 G/DL (ref 32–34.5)
MCV RBC AUTO: 100.8 FL (ref 80–99.9)
PLATELET # BLD AUTO: 196 K/UL (ref 130–450)
PMV BLD AUTO: 11 FL (ref 7–12)
POTASSIUM SERPL-SCNC: 3.8 MMOL/L (ref 3.5–5)
RBC # BLD AUTO: 2.39 M/UL (ref 3.5–5.5)
SODIUM SERPL-SCNC: 142 MMOL/L (ref 132–146)
WBC OTHER # BLD: 10.1 K/UL (ref 4.5–11.5)

## 2025-01-01 PROCEDURE — 97161 PT EVAL LOW COMPLEX 20 MIN: CPT

## 2025-01-01 PROCEDURE — 99305 1ST NF CARE MODERATE MDM 35: CPT | Performed by: INTERNAL MEDICINE

## 2025-01-01 PROCEDURE — 2500000003 HC RX 250 WO HCPCS: Performed by: INTERNAL MEDICINE

## 2025-01-01 PROCEDURE — P9016 RBC LEUKOCYTES REDUCED: HCPCS

## 2025-01-01 PROCEDURE — 80048 BASIC METABOLIC PNL TOTAL CA: CPT

## 2025-01-01 PROCEDURE — 94640 AIRWAY INHALATION TREATMENT: CPT

## 2025-01-01 PROCEDURE — 36415 COLL VENOUS BLD VENIPUNCTURE: CPT

## 2025-01-01 PROCEDURE — 85018 HEMOGLOBIN: CPT

## 2025-01-01 PROCEDURE — 94660 CPAP INITIATION&MGMT: CPT

## 2025-01-01 PROCEDURE — 99231 SBSQ HOSP IP/OBS SF/LOW 25: CPT

## 2025-01-01 PROCEDURE — 6360000002 HC RX W HCPCS

## 2025-01-01 PROCEDURE — 97530 THERAPEUTIC ACTIVITIES: CPT

## 2025-01-01 PROCEDURE — 85027 COMPLETE CBC AUTOMATED: CPT

## 2025-01-01 PROCEDURE — 2580000003 HC RX 258: Performed by: INTERNAL MEDICINE

## 2025-01-01 PROCEDURE — 2060000000 HC ICU INTERMEDIATE R&B

## 2025-01-01 PROCEDURE — 6370000000 HC RX 637 (ALT 250 FOR IP): Performed by: INTERNAL MEDICINE

## 2025-01-01 PROCEDURE — 6360000002 HC RX W HCPCS: Performed by: INTERNAL MEDICINE

## 2025-01-01 PROCEDURE — 85014 HEMATOCRIT: CPT

## 2025-01-01 PROCEDURE — 36430 TRANSFUSION BLD/BLD COMPNT: CPT

## 2025-01-01 PROCEDURE — 99233 SBSQ HOSP IP/OBS HIGH 50: CPT | Performed by: INTERNAL MEDICINE

## 2025-01-01 PROCEDURE — 2700000000 HC OXYGEN THERAPY PER DAY

## 2025-01-01 RX ORDER — HALOPERIDOL 5 MG/ML
1 INJECTION INTRAMUSCULAR EVERY 6 HOURS PRN
Status: DISCONTINUED | OUTPATIENT
Start: 2025-01-01 | End: 2025-01-02

## 2025-01-01 RX ADMIN — PANTOPRAZOLE SODIUM 40 MG: 40 TABLET, DELAYED RELEASE ORAL at 06:12

## 2025-01-01 RX ADMIN — CEFEPIME 2000 MG: 2 INJECTION, POWDER, FOR SOLUTION INTRAVENOUS at 22:36

## 2025-01-01 RX ADMIN — ARFORMOTEROL TARTRATE 15 MCG: 15 SOLUTION RESPIRATORY (INHALATION) at 19:52

## 2025-01-01 RX ADMIN — IPRATROPIUM BROMIDE AND ALBUTEROL SULFATE 1 DOSE: 2.5; .5 SOLUTION RESPIRATORY (INHALATION) at 19:52

## 2025-01-01 RX ADMIN — WATER 40 MG: 1 INJECTION INTRAMUSCULAR; INTRAVENOUS; SUBCUTANEOUS at 01:14

## 2025-01-01 RX ADMIN — CEFEPIME 2000 MG: 2 INJECTION, POWDER, FOR SOLUTION INTRAVENOUS at 05:03

## 2025-01-01 RX ADMIN — IPRATROPIUM BROMIDE AND ALBUTEROL SULFATE 1 DOSE: 2.5; .5 SOLUTION RESPIRATORY (INHALATION) at 16:06

## 2025-01-01 RX ADMIN — Medication 1 TABLET: at 09:23

## 2025-01-01 RX ADMIN — HALOPERIDOL LACTATE 1 MG: 5 INJECTION, SOLUTION INTRAMUSCULAR at 23:35

## 2025-01-01 RX ADMIN — METOPROLOL SUCCINATE 12.5 MG: 25 TABLET, EXTENDED RELEASE ORAL at 09:23

## 2025-01-01 RX ADMIN — CEFEPIME 2000 MG: 2 INJECTION, POWDER, FOR SOLUTION INTRAVENOUS at 14:17

## 2025-01-01 RX ADMIN — ACETYLCYSTEINE 400 MG: 200 SOLUTION ORAL; RESPIRATORY (INHALATION) at 19:52

## 2025-01-01 RX ADMIN — IPRATROPIUM BROMIDE AND ALBUTEROL SULFATE 1 DOSE: 2.5; .5 SOLUTION RESPIRATORY (INHALATION) at 13:46

## 2025-01-01 RX ADMIN — ENOXAPARIN SODIUM 40 MG: 100 INJECTION SUBCUTANEOUS at 09:22

## 2025-01-01 RX ADMIN — BUDESONIDE INHALATION 500 MCG: 0.5 SUSPENSION RESPIRATORY (INHALATION) at 19:52

## 2025-01-01 RX ADMIN — WATER 40 MG: 1 INJECTION INTRAMUSCULAR; INTRAVENOUS; SUBCUTANEOUS at 14:18

## 2025-01-01 ASSESSMENT — PAIN SCALES - GENERAL: PAINLEVEL_OUTOF10: 0

## 2025-01-02 ENCOUNTER — APPOINTMENT (OUTPATIENT)
Dept: GENERAL RADIOLOGY | Age: 75
DRG: 193 | End: 2025-01-02
Payer: MEDICARE

## 2025-01-02 LAB
ABO/RH: NORMAL
ANTIBODY SCREEN: NEGATIVE
ARM BAND NUMBER: NORMAL
B.E.: 12.2 MMOL/L (ref -3–3)
BLOOD BANK BLOOD PRODUCT EXPIRATION DATE: NORMAL
BLOOD BANK DISPENSE STATUS: NORMAL
BLOOD BANK ISBT PRODUCT BLOOD TYPE: 8400
BLOOD BANK PRODUCT CODE: NORMAL
BLOOD BANK SAMPLE EXPIRATION: NORMAL
BLOOD BANK UNIT TYPE AND RH: NORMAL
BPU ID: NORMAL
COHB: 1.1 % (ref 0–1.5)
COMPONENT: NORMAL
CRITICAL: ABNORMAL
CROSSMATCH RESULT: NORMAL
DATE ANALYZED: ABNORMAL
DATE OF COLLECTION: ABNORMAL
HCO3: 39.1 MMOL/L (ref 22–26)
HHB: 5.4 % (ref 0–5)
LAB: ABNORMAL
Lab: 40
METHB: 0.5 % (ref 0–1.5)
MODE: ABNORMAL
O2 SATURATION: 94.5 % (ref 92–98.5)
O2HB: 93 % (ref 94–97)
OPERATOR ID: 7221
PATIENT TEMP: 37 C
PCO2: 68.7 MMHG (ref 35–45)
PH BLOOD GAS: 7.37 (ref 7.35–7.45)
PO2: 72.7 MMHG (ref 75–100)
SOURCE, BLOOD GAS: ABNORMAL
THB: 8.2 G/DL (ref 11.5–16.5)
TIME ANALYZED: 45
TRANSFUSION STATUS: NORMAL
UNIT DIVISION: 0
UNIT ISSUE DATE/TIME: NORMAL

## 2025-01-02 PROCEDURE — 6360000002 HC RX W HCPCS: Performed by: INTERNAL MEDICINE

## 2025-01-02 PROCEDURE — 82805 BLOOD GASES W/O2 SATURATION: CPT

## 2025-01-02 PROCEDURE — 99233 SBSQ HOSP IP/OBS HIGH 50: CPT | Performed by: INTERNAL MEDICINE

## 2025-01-02 PROCEDURE — 36600 WITHDRAWAL OF ARTERIAL BLOOD: CPT

## 2025-01-02 PROCEDURE — 94660 CPAP INITIATION&MGMT: CPT

## 2025-01-02 PROCEDURE — 99232 SBSQ HOSP IP/OBS MODERATE 35: CPT | Performed by: STUDENT IN AN ORGANIZED HEALTH CARE EDUCATION/TRAINING PROGRAM

## 2025-01-02 PROCEDURE — 97166 OT EVAL MOD COMPLEX 45 MIN: CPT

## 2025-01-02 PROCEDURE — 71045 X-RAY EXAM CHEST 1 VIEW: CPT

## 2025-01-02 PROCEDURE — 2580000003 HC RX 258: Performed by: INTERNAL MEDICINE

## 2025-01-02 PROCEDURE — 2700000000 HC OXYGEN THERAPY PER DAY

## 2025-01-02 PROCEDURE — 6370000000 HC RX 637 (ALT 250 FOR IP): Performed by: STUDENT IN AN ORGANIZED HEALTH CARE EDUCATION/TRAINING PROGRAM

## 2025-01-02 PROCEDURE — 2060000000 HC ICU INTERMEDIATE R&B

## 2025-01-02 PROCEDURE — 6370000000 HC RX 637 (ALT 250 FOR IP): Performed by: INTERNAL MEDICINE

## 2025-01-02 PROCEDURE — 97535 SELF CARE MNGMENT TRAINING: CPT

## 2025-01-02 PROCEDURE — 2500000003 HC RX 250 WO HCPCS: Performed by: INTERNAL MEDICINE

## 2025-01-02 PROCEDURE — 93005 ELECTROCARDIOGRAM TRACING: CPT | Performed by: INTERNAL MEDICINE

## 2025-01-02 PROCEDURE — 94640 AIRWAY INHALATION TREATMENT: CPT

## 2025-01-02 RX ORDER — DIVALPROEX SODIUM 125 MG/1
125 CAPSULE, COATED PELLETS ORAL EVERY 12 HOURS SCHEDULED
Status: DISCONTINUED | OUTPATIENT
Start: 2025-01-02 | End: 2025-01-03

## 2025-01-02 RX ORDER — METOPROLOL SUCCINATE 25 MG/1
25 TABLET, EXTENDED RELEASE ORAL DAILY
Status: DISCONTINUED | OUTPATIENT
Start: 2025-01-03 | End: 2025-01-03

## 2025-01-02 RX ORDER — MECOBALAMIN 5000 MCG
5 TABLET,DISINTEGRATING ORAL NIGHTLY
Status: DISCONTINUED | OUTPATIENT
Start: 2025-01-02 | End: 2025-01-05

## 2025-01-02 RX ADMIN — IPRATROPIUM BROMIDE AND ALBUTEROL SULFATE 1 DOSE: 2.5; .5 SOLUTION RESPIRATORY (INHALATION) at 12:33

## 2025-01-02 RX ADMIN — ENOXAPARIN SODIUM 40 MG: 100 INJECTION SUBCUTANEOUS at 10:47

## 2025-01-02 RX ADMIN — METOPROLOL SUCCINATE 12.5 MG: 25 TABLET, EXTENDED RELEASE ORAL at 10:47

## 2025-01-02 RX ADMIN — ARFORMOTEROL TARTRATE 15 MCG: 15 SOLUTION RESPIRATORY (INHALATION) at 20:02

## 2025-01-02 RX ADMIN — Medication 1 TABLET: at 10:47

## 2025-01-02 RX ADMIN — BUDESONIDE INHALATION 500 MCG: 0.5 SUSPENSION RESPIRATORY (INHALATION) at 08:33

## 2025-01-02 RX ADMIN — WATER 40 MG: 1 INJECTION INTRAMUSCULAR; INTRAVENOUS; SUBCUTANEOUS at 02:51

## 2025-01-02 RX ADMIN — Medication 5 MG: at 20:18

## 2025-01-02 RX ADMIN — DIVALPROEX SODIUM 125 MG: 125 CAPSULE ORAL at 20:18

## 2025-01-02 RX ADMIN — IPRATROPIUM BROMIDE AND ALBUTEROL SULFATE 1 DOSE: 2.5; .5 SOLUTION RESPIRATORY (INHALATION) at 20:02

## 2025-01-02 RX ADMIN — CEFEPIME 2000 MG: 2 INJECTION, POWDER, FOR SOLUTION INTRAVENOUS at 10:55

## 2025-01-02 RX ADMIN — ONDANSETRON 4 MG: 2 INJECTION INTRAMUSCULAR; INTRAVENOUS at 04:19

## 2025-01-02 RX ADMIN — ACETYLCYSTEINE 400 MG: 200 SOLUTION ORAL; RESPIRATORY (INHALATION) at 08:34

## 2025-01-02 RX ADMIN — DIVALPROEX SODIUM 125 MG: 125 CAPSULE ORAL at 10:47

## 2025-01-02 RX ADMIN — ARFORMOTEROL TARTRATE 15 MCG: 15 SOLUTION RESPIRATORY (INHALATION) at 08:33

## 2025-01-02 RX ADMIN — BUDESONIDE INHALATION 500 MCG: 0.5 SUSPENSION RESPIRATORY (INHALATION) at 20:02

## 2025-01-02 RX ADMIN — WATER 40 MG: 1 INJECTION INTRAMUSCULAR; INTRAVENOUS; SUBCUTANEOUS at 16:13

## 2025-01-02 RX ADMIN — ACETYLCYSTEINE 400 MG: 200 SOLUTION ORAL; RESPIRATORY (INHALATION) at 20:02

## 2025-01-02 RX ADMIN — ACETAMINOPHEN 650 MG: 325 TABLET ORAL at 03:10

## 2025-01-02 RX ADMIN — IPRATROPIUM BROMIDE AND ALBUTEROL SULFATE 1 DOSE: 2.5; .5 SOLUTION RESPIRATORY (INHALATION) at 08:34

## 2025-01-02 RX ADMIN — CEFEPIME 2000 MG: 2 INJECTION, POWDER, FOR SOLUTION INTRAVENOUS at 18:26

## 2025-01-02 RX ADMIN — IPRATROPIUM BROMIDE AND ALBUTEROL SULFATE 1 DOSE: 2.5; .5 SOLUTION RESPIRATORY (INHALATION) at 15:51

## 2025-01-02 ASSESSMENT — PAIN DESCRIPTION - LOCATION: LOCATION: HEAD

## 2025-01-02 ASSESSMENT — PAIN DESCRIPTION - DESCRIPTORS: DESCRIPTORS: ACHING;DISCOMFORT

## 2025-01-02 ASSESSMENT — PAIN SCALES - GENERAL: PAINLEVEL_OUTOF10: 6

## 2025-01-02 ASSESSMENT — PAIN - FUNCTIONAL ASSESSMENT: PAIN_FUNCTIONAL_ASSESSMENT: ACTIVITIES ARE NOT PREVENTED

## 2025-01-02 NOTE — CARE COORDINATION
Chart reviewed and case reviewed in IDR.  Patient with COPD exacerbation.  PCO2 68.7, trending down.  IV Solumedrol Q12 hrs. Tachy at 119, patient to 10 L O2 per NC HF.  Depakote sprinkles added BID.  IV Cefepime Q 8 hrs.  W[place], liaison with [facility] [place].  Call also placed to [place] [facility] and detailed VM left for Ligia with number left for return call.  Patient will need to be under 10 L HF O2 for transition of care to [facility].  Updated therapy notes received yesterday and today.  Will continue to follow for further transition of care planning needs.         Yasmin Katz RN.  P:  [phone]

## 2025-01-03 LAB
ANION GAP SERPL CALCULATED.3IONS-SCNC: 6 MMOL/L (ref 7–16)
B.E.: 17.3 MMOL/L (ref -3–3)
BILIRUB UR QL STRIP: NEGATIVE
BUN SERPL-MCNC: 24 MG/DL (ref 6–23)
CALCIUM SERPL-MCNC: 9.1 MG/DL (ref 8.6–10.2)
CHLORIDE SERPL-SCNC: 96 MMOL/L (ref 98–107)
CLARITY UR: CLEAR
CO2 SERPL-SCNC: 37 MMOL/L (ref 22–29)
COHB: 1.3 % (ref 0–1.5)
COLOR UR: YELLOW
CREAT SERPL-MCNC: 0.3 MG/DL (ref 0.5–1)
CRITICAL: ABNORMAL
DATE ANALYZED: ABNORMAL
DATE OF COLLECTION: ABNORMAL
EKG ATRIAL RATE: 121 BPM
EKG P AXIS: 62 DEGREES
EKG P-R INTERVAL: 130 MS
EKG Q-T INTERVAL: 290 MS
EKG QRS DURATION: 94 MS
EKG QTC CALCULATION (BAZETT): 411 MS
EKG R AXIS: 89 DEGREES
EKG T AXIS: 39 DEGREES
EKG VENTRICULAR RATE: 121 BPM
ERYTHROCYTE [DISTWIDTH] IN BLOOD BY AUTOMATED COUNT: 19.5 % (ref 11.5–15)
GFR, ESTIMATED: >90 ML/MIN/1.73M2
GLUCOSE SERPL-MCNC: 133 MG/DL (ref 74–99)
GLUCOSE UR STRIP-MCNC: NEGATIVE MG/DL
HCO3: 45.3 MMOL/L (ref 22–26)
HCT VFR BLD AUTO: 24.2 % (ref 34–48)
HGB BLD-MCNC: 7.2 G/DL (ref 11.5–15.5)
HGB UR QL STRIP.AUTO: NEGATIVE
HHB: 0.6 % (ref 0–5)
KETONES UR STRIP-MCNC: ABNORMAL MG/DL
LAB: ABNORMAL
LEUKOCYTE ESTERASE UR QL STRIP: NEGATIVE
Lab: 1750
MCH RBC QN AUTO: 29.6 PG (ref 26–35)
MCHC RBC AUTO-ENTMCNC: 29.8 G/DL (ref 32–34.5)
MCV RBC AUTO: 99.6 FL (ref 80–99.9)
METHB: 0.7 % (ref 0–1.5)
MICROORGANISM SPEC CULT: NORMAL
MODE: ABNORMAL
NITRITE UR QL STRIP: NEGATIVE
O2 SATURATION: 99.4 % (ref 92–98.5)
O2HB: 97.4 % (ref 94–97)
OPERATOR ID: ABNORMAL
PATIENT TEMP: 37 C
PCO2: 87.6 MMHG (ref 35–45)
PH BLOOD GAS: 7.33 (ref 7.35–7.45)
PH UR STRIP: 6 [PH] (ref 5–9)
PLATELET # BLD AUTO: 223 K/UL (ref 130–450)
PMV BLD AUTO: 10.8 FL (ref 7–12)
PO2: 171.3 MMHG (ref 75–100)
POTASSIUM SERPL-SCNC: 5 MMOL/L (ref 3.5–5)
PROT UR STRIP-MCNC: 30 MG/DL
RBC # BLD AUTO: 2.43 M/UL (ref 3.5–5.5)
RBC #/AREA URNS HPF: NORMAL /HPF
SERVICE CMNT-IMP: NORMAL
SODIUM SERPL-SCNC: 139 MMOL/L (ref 132–146)
SOURCE, BLOOD GAS: ABNORMAL
SP GR UR STRIP: 1.02 (ref 1–1.03)
SPECIMEN DESCRIPTION: NORMAL
THB: 7.2 G/DL (ref 11.5–16.5)
TIME ANALYZED: 1756
UROBILINOGEN UR STRIP-ACNC: 0.2 EU/DL (ref 0–1)
WBC #/AREA URNS HPF: NORMAL /HPF
WBC OTHER # BLD: 9.4 K/UL (ref 4.5–11.5)

## 2025-01-03 PROCEDURE — 6360000002 HC RX W HCPCS: Performed by: INTERNAL MEDICINE

## 2025-01-03 PROCEDURE — 87086 URINE CULTURE/COLONY COUNT: CPT

## 2025-01-03 PROCEDURE — 6370000000 HC RX 637 (ALT 250 FOR IP): Performed by: NURSE PRACTITIONER

## 2025-01-03 PROCEDURE — 99232 SBSQ HOSP IP/OBS MODERATE 35: CPT | Performed by: INTERNAL MEDICINE

## 2025-01-03 PROCEDURE — 94640 AIRWAY INHALATION TREATMENT: CPT

## 2025-01-03 PROCEDURE — 2500000003 HC RX 250 WO HCPCS: Performed by: STUDENT IN AN ORGANIZED HEALTH CARE EDUCATION/TRAINING PROGRAM

## 2025-01-03 PROCEDURE — 6370000000 HC RX 637 (ALT 250 FOR IP): Performed by: STUDENT IN AN ORGANIZED HEALTH CARE EDUCATION/TRAINING PROGRAM

## 2025-01-03 PROCEDURE — 85027 COMPLETE CBC AUTOMATED: CPT

## 2025-01-03 PROCEDURE — 2060000000 HC ICU INTERMEDIATE R&B

## 2025-01-03 PROCEDURE — 93010 ELECTROCARDIOGRAM REPORT: CPT | Performed by: INTERNAL MEDICINE

## 2025-01-03 PROCEDURE — 2580000003 HC RX 258: Performed by: INTERNAL MEDICINE

## 2025-01-03 PROCEDURE — 36415 COLL VENOUS BLD VENIPUNCTURE: CPT

## 2025-01-03 PROCEDURE — 80048 BASIC METABOLIC PNL TOTAL CA: CPT

## 2025-01-03 PROCEDURE — 81001 URINALYSIS AUTO W/SCOPE: CPT

## 2025-01-03 PROCEDURE — 82805 BLOOD GASES W/O2 SATURATION: CPT

## 2025-01-03 PROCEDURE — 6370000000 HC RX 637 (ALT 250 FOR IP)

## 2025-01-03 PROCEDURE — 36600 WITHDRAWAL OF ARTERIAL BLOOD: CPT

## 2025-01-03 PROCEDURE — 2500000003 HC RX 250 WO HCPCS: Performed by: INTERNAL MEDICINE

## 2025-01-03 PROCEDURE — 6360000002 HC RX W HCPCS: Performed by: STUDENT IN AN ORGANIZED HEALTH CARE EDUCATION/TRAINING PROGRAM

## 2025-01-03 PROCEDURE — 94660 CPAP INITIATION&MGMT: CPT

## 2025-01-03 PROCEDURE — 6370000000 HC RX 637 (ALT 250 FOR IP): Performed by: INTERNAL MEDICINE

## 2025-01-03 PROCEDURE — 99232 SBSQ HOSP IP/OBS MODERATE 35: CPT | Performed by: STUDENT IN AN ORGANIZED HEALTH CARE EDUCATION/TRAINING PROGRAM

## 2025-01-03 PROCEDURE — 2700000000 HC OXYGEN THERAPY PER DAY

## 2025-01-03 RX ORDER — PREDNISONE 20 MG/1
20 TABLET ORAL DAILY
Status: DISCONTINUED | OUTPATIENT
Start: 2025-01-03 | End: 2025-01-06

## 2025-01-03 RX ORDER — DOXYCYCLINE 100 MG/1
100 CAPSULE ORAL EVERY 12 HOURS SCHEDULED
Status: DISCONTINUED | OUTPATIENT
Start: 2025-01-03 | End: 2025-01-06

## 2025-01-03 RX ORDER — METOPROLOL SUCCINATE 50 MG/1
50 TABLET, EXTENDED RELEASE ORAL DAILY
Status: DISCONTINUED | OUTPATIENT
Start: 2025-01-04 | End: 2025-01-06 | Stop reason: HOSPADM

## 2025-01-03 RX ORDER — DIVALPROEX SODIUM 125 MG/1
125 CAPSULE, COATED PELLETS ORAL EVERY 8 HOURS SCHEDULED
Status: DISCONTINUED | OUTPATIENT
Start: 2025-01-03 | End: 2025-01-06 | Stop reason: HOSPADM

## 2025-01-03 RX ORDER — IPRATROPIUM BROMIDE AND ALBUTEROL SULFATE 2.5; .5 MG/3ML; MG/3ML
1 SOLUTION RESPIRATORY (INHALATION) EVERY 4 HOURS PRN
Status: DISCONTINUED | OUTPATIENT
Start: 2025-01-03 | End: 2025-01-06 | Stop reason: HOSPADM

## 2025-01-03 RX ADMIN — SODIUM CHLORIDE, PRESERVATIVE FREE 10 ML: 5 INJECTION INTRAVENOUS at 08:01

## 2025-01-03 RX ADMIN — DOXYCYCLINE HYCLATE 100 MG: 100 CAPSULE ORAL at 21:51

## 2025-01-03 RX ADMIN — ACETYLCYSTEINE 400 MG: 200 SOLUTION ORAL; RESPIRATORY (INHALATION) at 21:10

## 2025-01-03 RX ADMIN — DIVALPROEX SODIUM 125 MG: 125 CAPSULE ORAL at 21:51

## 2025-01-03 RX ADMIN — PREDNISONE 20 MG: 20 TABLET ORAL at 11:20

## 2025-01-03 RX ADMIN — Medication 1 TABLET: at 08:05

## 2025-01-03 RX ADMIN — BUDESONIDE INHALATION 500 MCG: 0.5 SUSPENSION RESPIRATORY (INHALATION) at 21:09

## 2025-01-03 RX ADMIN — WATER 40 MG: 1 INJECTION INTRAMUSCULAR; INTRAVENOUS; SUBCUTANEOUS at 03:09

## 2025-01-03 RX ADMIN — DIVALPROEX SODIUM 125 MG: 125 CAPSULE ORAL at 08:05

## 2025-01-03 RX ADMIN — CEFEPIME 2000 MG: 2 INJECTION, POWDER, FOR SOLUTION INTRAVENOUS at 00:50

## 2025-01-03 RX ADMIN — IPRATROPIUM BROMIDE AND ALBUTEROL SULFATE 1 DOSE: 2.5; .5 SOLUTION RESPIRATORY (INHALATION) at 21:10

## 2025-01-03 RX ADMIN — PANTOPRAZOLE SODIUM 40 MG: 40 TABLET, DELAYED RELEASE ORAL at 05:40

## 2025-01-03 RX ADMIN — DIVALPROEX SODIUM 125 MG: 125 CAPSULE ORAL at 16:58

## 2025-01-03 RX ADMIN — SODIUM CHLORIDE, PRESERVATIVE FREE 10 ML: 5 INJECTION INTRAVENOUS at 21:51

## 2025-01-03 RX ADMIN — ENOXAPARIN SODIUM 40 MG: 100 INJECTION SUBCUTANEOUS at 08:04

## 2025-01-03 RX ADMIN — ARFORMOTEROL TARTRATE 15 MCG: 15 SOLUTION RESPIRATORY (INHALATION) at 08:15

## 2025-01-03 RX ADMIN — WATER 1000 MG: 1 INJECTION INTRAMUSCULAR; INTRAVENOUS; SUBCUTANEOUS at 16:58

## 2025-01-03 RX ADMIN — IPRATROPIUM BROMIDE AND ALBUTEROL SULFATE 1 DOSE: 2.5; .5 SOLUTION RESPIRATORY (INHALATION) at 04:05

## 2025-01-03 RX ADMIN — ACETYLCYSTEINE 400 MG: 200 SOLUTION ORAL; RESPIRATORY (INHALATION) at 08:15

## 2025-01-03 RX ADMIN — ARFORMOTEROL TARTRATE 15 MCG: 15 SOLUTION RESPIRATORY (INHALATION) at 21:09

## 2025-01-03 RX ADMIN — ONDANSETRON 4 MG: 2 INJECTION INTRAMUSCULAR; INTRAVENOUS at 08:00

## 2025-01-03 RX ADMIN — BUDESONIDE INHALATION 500 MCG: 0.5 SUSPENSION RESPIRATORY (INHALATION) at 08:15

## 2025-01-03 RX ADMIN — Medication 5 MG: at 21:51

## 2025-01-03 RX ADMIN — CEFEPIME 2000 MG: 2 INJECTION, POWDER, FOR SOLUTION INTRAVENOUS at 09:56

## 2025-01-03 NOTE — CARE COORDINATION
Chart reviewed and case reviewed in IDR.  Patient on 10L HF O2 per NC, Bipap at HS when agreeable.  Patient becoming increasingly confused per nursing.  CO2 37, CXR LLL pneumonia and small right pleural effusion.  Jesus added.  IV maxipime Q 8 hrs, IV Solumedrol Q 12 hrs. Respiratory culture ordered per pulmonology.   Call placed to [facility] yesterday and return call received, no beds this week, per Ligia.  Can follow-up next week if patient is more stable and appropriate for transition of care as she may have beds at that time.  Windy, liaison with [facility] continues to follow as well pending medical stability and bed availability at that time.  Call also placed to Juan C, liaison with [facility] with backdoor referral for transition of care.  He will review and let this CM know if she is appropriate for transition of care.  Await return call.       Yasmin Katz RN.  P:  [phone]

## 2025-01-03 NOTE — CARE COORDINATION
Chart reviewed and case reviewed in IDR.  IV Maxipime Q 8 hrs, IV Solumedrol.  HF 10 L 90% vs Bipap, RR 36 and tachycardic.  Patient with increased confusion, alert to person only.   Backdoor referral made to Juan C, liaison with [facility].  Patient is appropriate and can transition to their facility today if family is agreeable.  Spoke with patient's  Juan C and daughter Ligia and reviewed above.  Both are in agreement [facility].  Can transition to room 108.  Perfect [employer] message sent to Dr Morgan re: possible discharge to [facility].  Per Dr Bear, hold transition of care until tomorrow.  Call received from Dr Morgan.  Antibiotics being changed to IV Rocephin and IV Doxycyline and urines being ordered.  Transition of care tomorrow.  Envelope and ambulance form in the soft chart.  Will continue to follow for further transition of care planning needs.       Yasmin Katz RN.  P:  [phone]

## 2025-01-04 LAB
B.E.: 21.3 MMOL/L (ref -3–3)
COHB: 1.2 % (ref 0–1.5)
CRITICAL ACTION: NORMAL
CRITICAL NOTIFICATION DATE/TIME: NORMAL
CRITICAL NOTIFICATION: NORMAL
CRITICAL VALUE READ BACK: YES
CRITICAL: ABNORMAL
DATE ANALYZED: ABNORMAL
DATE OF COLLECTION: ABNORMAL
FLOW RATE: 13
HCO3: 49.6 MMOL/L (ref 22–26)
HHB: 23.8 % (ref 0–5)
LAB: ABNORMAL
Lab: 235
METHB: 0.6 % (ref 0–1.5)
MICROORGANISM SPEC CULT: NO GROWTH
MODE: ABNORMAL
O2 DELIVERY DEVICE: ABNORMAL
O2 SATURATION: 75.8 % (ref 92–98.5)
O2HB: 74.4 % (ref 94–97)
OPERATOR ID: 2860
PATIENT TEMP: 37 C
PCO2: 96 MMHG (ref 35–45)
PH BLOOD GAS: 7.33 (ref 7.35–7.45)
PO2: 43.1 MMHG (ref 75–100)
POC HCO3: 47.7 MMOL/L (ref 22–26)
POC O2 SATURATION: 99.3 % (ref 92–98.5)
POC PCO2: 83.2 MM HG (ref 35–45)
POC PH: 7.37 (ref 7.35–7.45)
POC PO2: 166.2 MM HG (ref 60–80)
POSITIVE BASE EXCESS, ART: 20.3 MMOL/L (ref 0–3)
SERVICE CMNT-IMP: NORMAL
SOURCE, BLOOD GAS: ABNORMAL
SPECIMEN DESCRIPTION: NORMAL
THB: 6.8 G/DL (ref 11.5–16.5)
TIME ANALYZED: 241

## 2025-01-04 PROCEDURE — 6370000000 HC RX 637 (ALT 250 FOR IP): Performed by: INTERNAL MEDICINE

## 2025-01-04 PROCEDURE — 6370000000 HC RX 637 (ALT 250 FOR IP)

## 2025-01-04 PROCEDURE — 6370000000 HC RX 637 (ALT 250 FOR IP): Performed by: STUDENT IN AN ORGANIZED HEALTH CARE EDUCATION/TRAINING PROGRAM

## 2025-01-04 PROCEDURE — 6360000002 HC RX W HCPCS

## 2025-01-04 PROCEDURE — 82803 BLOOD GASES ANY COMBINATION: CPT

## 2025-01-04 PROCEDURE — 6360000002 HC RX W HCPCS: Performed by: INTERNAL MEDICINE

## 2025-01-04 PROCEDURE — 6360000002 HC RX W HCPCS: Performed by: STUDENT IN AN ORGANIZED HEALTH CARE EDUCATION/TRAINING PROGRAM

## 2025-01-04 PROCEDURE — 94660 CPAP INITIATION&MGMT: CPT

## 2025-01-04 PROCEDURE — 82805 BLOOD GASES W/O2 SATURATION: CPT

## 2025-01-04 PROCEDURE — 99233 SBSQ HOSP IP/OBS HIGH 50: CPT | Performed by: INTERNAL MEDICINE

## 2025-01-04 PROCEDURE — 2500000003 HC RX 250 WO HCPCS: Performed by: STUDENT IN AN ORGANIZED HEALTH CARE EDUCATION/TRAINING PROGRAM

## 2025-01-04 PROCEDURE — 2500000003 HC RX 250 WO HCPCS: Performed by: INTERNAL MEDICINE

## 2025-01-04 PROCEDURE — 94640 AIRWAY INHALATION TREATMENT: CPT

## 2025-01-04 PROCEDURE — 36600 WITHDRAWAL OF ARTERIAL BLOOD: CPT

## 2025-01-04 PROCEDURE — 2700000000 HC OXYGEN THERAPY PER DAY

## 2025-01-04 PROCEDURE — 99232 SBSQ HOSP IP/OBS MODERATE 35: CPT | Performed by: STUDENT IN AN ORGANIZED HEALTH CARE EDUCATION/TRAINING PROGRAM

## 2025-01-04 PROCEDURE — 2060000000 HC ICU INTERMEDIATE R&B

## 2025-01-04 PROCEDURE — 6370000000 HC RX 637 (ALT 250 FOR IP): Performed by: NURSE PRACTITIONER

## 2025-01-04 RX ORDER — DIPHENHYDRAMINE HYDROCHLORIDE 50 MG/ML
25 INJECTION INTRAMUSCULAR; INTRAVENOUS
Status: COMPLETED | OUTPATIENT
Start: 2025-01-04 | End: 2025-01-04

## 2025-01-04 RX ADMIN — ARFORMOTEROL TARTRATE 15 MCG: 15 SOLUTION RESPIRATORY (INHALATION) at 07:25

## 2025-01-04 RX ADMIN — IPRATROPIUM BROMIDE AND ALBUTEROL SULFATE 1 DOSE: 2.5; .5 SOLUTION RESPIRATORY (INHALATION) at 20:11

## 2025-01-04 RX ADMIN — PREDNISONE 20 MG: 20 TABLET ORAL at 08:15

## 2025-01-04 RX ADMIN — PANTOPRAZOLE SODIUM 40 MG: 40 TABLET, DELAYED RELEASE ORAL at 05:47

## 2025-01-04 RX ADMIN — DIVALPROEX SODIUM 125 MG: 125 CAPSULE ORAL at 14:36

## 2025-01-04 RX ADMIN — ENOXAPARIN SODIUM 40 MG: 100 INJECTION SUBCUTANEOUS at 08:16

## 2025-01-04 RX ADMIN — WATER 1000 MG: 1 INJECTION INTRAMUSCULAR; INTRAVENOUS; SUBCUTANEOUS at 14:37

## 2025-01-04 RX ADMIN — DIVALPROEX SODIUM 125 MG: 125 CAPSULE ORAL at 05:47

## 2025-01-04 RX ADMIN — ACETYLCYSTEINE 400 MG: 200 SOLUTION ORAL; RESPIRATORY (INHALATION) at 07:27

## 2025-01-04 RX ADMIN — ACETYLCYSTEINE 400 MG: 200 SOLUTION ORAL; RESPIRATORY (INHALATION) at 20:11

## 2025-01-04 RX ADMIN — BUDESONIDE INHALATION 500 MCG: 0.5 SUSPENSION RESPIRATORY (INHALATION) at 20:11

## 2025-01-04 RX ADMIN — DOXYCYCLINE HYCLATE 100 MG: 100 CAPSULE ORAL at 08:16

## 2025-01-04 RX ADMIN — ARFORMOTEROL TARTRATE 15 MCG: 15 SOLUTION RESPIRATORY (INHALATION) at 20:11

## 2025-01-04 RX ADMIN — BUDESONIDE INHALATION 500 MCG: 0.5 SUSPENSION RESPIRATORY (INHALATION) at 07:26

## 2025-01-04 RX ADMIN — SODIUM CHLORIDE, PRESERVATIVE FREE 10 ML: 5 INJECTION INTRAVENOUS at 08:16

## 2025-01-04 RX ADMIN — METOPROLOL SUCCINATE 50 MG: 50 TABLET, EXTENDED RELEASE ORAL at 08:15

## 2025-01-04 RX ADMIN — DIPHENHYDRAMINE HYDROCHLORIDE 25 MG: 50 INJECTION INTRAMUSCULAR; INTRAVENOUS at 04:57

## 2025-01-04 RX ADMIN — Medication 1 TABLET: at 08:16

## 2025-01-04 RX ADMIN — IPRATROPIUM BROMIDE AND ALBUTEROL SULFATE 1 DOSE: 2.5; .5 SOLUTION RESPIRATORY (INHALATION) at 07:24

## 2025-01-04 NOTE — PLAN OF CARE
Problem: Discharge Planning  Goal: Discharge to home or other facility with appropriate resources  Outcome: Progressing     Problem: Safety - Adult  Goal: Free from fall injury  Outcome: Progressing  Flowsheets (Taken 1/4/2025 1043)  Free From Fall Injury: Instruct family/caregiver on patient safety     Problem: Pain  Goal: Verbalizes/displays adequate comfort level or baseline comfort level  Outcome: Progressing     Problem: Skin/Tissue Integrity  Goal: Absence of new skin breakdown  Description: 1.  Monitor for areas of redness and/or skin breakdown  2.  Assess vascular access sites hourly  3.  Every 4-6 hours minimum:  Change oxygen saturation probe site  4.  Every 4-6 hours:  If on nasal continuous positive airway pressure, respiratory therapy assess nares and determine need for appliance change or resting period.  Outcome: Progressing

## 2025-01-05 ENCOUNTER — APPOINTMENT (OUTPATIENT)
Dept: CT IMAGING | Age: 75
DRG: 193 | End: 2025-01-05
Payer: MEDICARE

## 2025-01-05 PROBLEM — R41.0 DELIRIUM, ACUTE: Status: ACTIVE | Noted: 2025-01-05

## 2025-01-05 PROBLEM — R41.82 ACUTE ALTERATION IN MENTAL STATUS: Status: ACTIVE | Noted: 2025-01-05

## 2025-01-05 LAB
ANION GAP SERPL CALCULATED.3IONS-SCNC: 2 MMOL/L (ref 7–16)
BASOPHILS # BLD: 0 K/UL (ref 0–0.2)
BASOPHILS NFR BLD: 0 % (ref 0–2)
BUN SERPL-MCNC: 19 MG/DL (ref 6–23)
CALCIUM SERPL-MCNC: 9 MG/DL (ref 8.6–10.2)
CHLORIDE SERPL-SCNC: 95 MMOL/L (ref 98–107)
CO2 SERPL-SCNC: 46 MMOL/L (ref 22–29)
CREAT SERPL-MCNC: 0.4 MG/DL (ref 0.5–1)
EOSINOPHIL # BLD: 0.11 K/UL (ref 0.05–0.5)
EOSINOPHILS RELATIVE PERCENT: 1 % (ref 0–6)
ERYTHROCYTE [DISTWIDTH] IN BLOOD BY AUTOMATED COUNT: 19.4 % (ref 11.5–15)
GFR, ESTIMATED: >90 ML/MIN/1.73M2
GLUCOSE SERPL-MCNC: 127 MG/DL (ref 74–99)
HCT VFR BLD AUTO: 19.9 % (ref 34–48)
HCT VFR BLD AUTO: 26.1 % (ref 34–48)
HGB BLD-MCNC: 5.8 G/DL (ref 11.5–15.5)
HGB BLD-MCNC: 8.2 G/DL (ref 11.5–15.5)
LYMPHOCYTES NFR BLD: 0.67 K/UL (ref 1.5–4)
LYMPHOCYTES RELATIVE PERCENT: 8 % (ref 20–42)
MCH RBC QN AUTO: 29.7 PG (ref 26–35)
MCHC RBC AUTO-ENTMCNC: 29.1 G/DL (ref 32–34.5)
MCV RBC AUTO: 102.1 FL (ref 80–99.9)
MICROORGANISM SPEC CULT: ABNORMAL
MICROORGANISM/AGENT SPEC: ABNORMAL
MONOCYTES NFR BLD: 0.56 K/UL (ref 0.1–0.95)
MONOCYTES NFR BLD: 6 % (ref 2–12)
MYELOCYTES ABSOLUTE COUNT: 0.11 K/UL
MYELOCYTES: 1 %
NEUTROPHILS NFR BLD: 84 % (ref 43–80)
NEUTS SEG NFR BLD: 7.35 K/UL (ref 1.8–7.3)
PLATELET # BLD AUTO: 196 K/UL (ref 130–450)
PMV BLD AUTO: 10.4 FL (ref 7–12)
POTASSIUM SERPL-SCNC: 4 MMOL/L (ref 3.5–5)
RBC # BLD AUTO: 1.95 M/UL (ref 3.5–5.5)
RBC # BLD: ABNORMAL 10*6/UL
SERVICE CMNT-IMP: ABNORMAL
SODIUM SERPL-SCNC: 143 MMOL/L (ref 132–146)
SPECIMEN DESCRIPTION: ABNORMAL
WBC OTHER # BLD: 8.8 K/UL (ref 4.5–11.5)

## 2025-01-05 PROCEDURE — 86901 BLOOD TYPING SEROLOGIC RH(D): CPT

## 2025-01-05 PROCEDURE — 6370000000 HC RX 637 (ALT 250 FOR IP)

## 2025-01-05 PROCEDURE — 99222 1ST HOSP IP/OBS MODERATE 55: CPT | Performed by: PSYCHIATRY & NEUROLOGY

## 2025-01-05 PROCEDURE — 2500000003 HC RX 250 WO HCPCS: Performed by: INTERNAL MEDICINE

## 2025-01-05 PROCEDURE — 70450 CT HEAD/BRAIN W/O DYE: CPT

## 2025-01-05 PROCEDURE — 85025 COMPLETE CBC W/AUTO DIFF WBC: CPT

## 2025-01-05 PROCEDURE — 2700000000 HC OXYGEN THERAPY PER DAY

## 2025-01-05 PROCEDURE — P9016 RBC LEUKOCYTES REDUCED: HCPCS

## 2025-01-05 PROCEDURE — 2060000000 HC ICU INTERMEDIATE R&B

## 2025-01-05 PROCEDURE — 99232 SBSQ HOSP IP/OBS MODERATE 35: CPT | Performed by: STUDENT IN AN ORGANIZED HEALTH CARE EDUCATION/TRAINING PROGRAM

## 2025-01-05 PROCEDURE — 36430 TRANSFUSION BLD/BLD COMPNT: CPT

## 2025-01-05 PROCEDURE — 99222 1ST HOSP IP/OBS MODERATE 55: CPT | Performed by: NURSE PRACTITIONER

## 2025-01-05 PROCEDURE — 6360000002 HC RX W HCPCS: Performed by: INTERNAL MEDICINE

## 2025-01-05 PROCEDURE — 6370000000 HC RX 637 (ALT 250 FOR IP): Performed by: STUDENT IN AN ORGANIZED HEALTH CARE EDUCATION/TRAINING PROGRAM

## 2025-01-05 PROCEDURE — 85014 HEMATOCRIT: CPT

## 2025-01-05 PROCEDURE — 85018 HEMOGLOBIN: CPT

## 2025-01-05 PROCEDURE — 6370000000 HC RX 637 (ALT 250 FOR IP): Performed by: INTERNAL MEDICINE

## 2025-01-05 PROCEDURE — 99233 SBSQ HOSP IP/OBS HIGH 50: CPT | Performed by: INTERNAL MEDICINE

## 2025-01-05 PROCEDURE — 6370000000 HC RX 637 (ALT 250 FOR IP): Performed by: NURSE PRACTITIONER

## 2025-01-05 PROCEDURE — 94660 CPAP INITIATION&MGMT: CPT

## 2025-01-05 PROCEDURE — 36415 COLL VENOUS BLD VENIPUNCTURE: CPT

## 2025-01-05 PROCEDURE — 86923 COMPATIBILITY TEST ELECTRIC: CPT

## 2025-01-05 PROCEDURE — 86850 RBC ANTIBODY SCREEN: CPT

## 2025-01-05 PROCEDURE — 94640 AIRWAY INHALATION TREATMENT: CPT

## 2025-01-05 PROCEDURE — 80048 BASIC METABOLIC PNL TOTAL CA: CPT

## 2025-01-05 PROCEDURE — 6360000002 HC RX W HCPCS: Performed by: STUDENT IN AN ORGANIZED HEALTH CARE EDUCATION/TRAINING PROGRAM

## 2025-01-05 PROCEDURE — 2500000003 HC RX 250 WO HCPCS: Performed by: STUDENT IN AN ORGANIZED HEALTH CARE EDUCATION/TRAINING PROGRAM

## 2025-01-05 PROCEDURE — 86900 BLOOD TYPING SEROLOGIC ABO: CPT

## 2025-01-05 RX ORDER — SODIUM CHLORIDE 9 MG/ML
INJECTION, SOLUTION INTRAVENOUS PRN
Status: DISCONTINUED | OUTPATIENT
Start: 2025-01-05 | End: 2025-01-06 | Stop reason: HOSPADM

## 2025-01-05 RX ADMIN — Medication 3 MG: at 18:37

## 2025-01-05 RX ADMIN — Medication 1 TABLET: at 09:42

## 2025-01-05 RX ADMIN — DOXYCYCLINE HYCLATE 100 MG: 100 CAPSULE ORAL at 20:53

## 2025-01-05 RX ADMIN — IPRATROPIUM BROMIDE AND ALBUTEROL SULFATE 1 DOSE: 2.5; .5 SOLUTION RESPIRATORY (INHALATION) at 08:29

## 2025-01-05 RX ADMIN — BUDESONIDE INHALATION 500 MCG: 0.5 SUSPENSION RESPIRATORY (INHALATION) at 20:31

## 2025-01-05 RX ADMIN — METOPROLOL SUCCINATE 50 MG: 50 TABLET, EXTENDED RELEASE ORAL at 09:43

## 2025-01-05 RX ADMIN — DIVALPROEX SODIUM 125 MG: 125 CAPSULE ORAL at 20:53

## 2025-01-05 RX ADMIN — SODIUM CHLORIDE, PRESERVATIVE FREE 10 ML: 5 INJECTION INTRAVENOUS at 20:53

## 2025-01-05 RX ADMIN — DIVALPROEX SODIUM 125 MG: 125 CAPSULE ORAL at 14:07

## 2025-01-05 RX ADMIN — PREDNISONE 20 MG: 20 TABLET ORAL at 09:43

## 2025-01-05 RX ADMIN — ACETYLCYSTEINE 400 MG: 200 SOLUTION ORAL; RESPIRATORY (INHALATION) at 20:31

## 2025-01-05 RX ADMIN — Medication 3 MG: at 01:09

## 2025-01-05 RX ADMIN — ACETYLCYSTEINE 400 MG: 200 SOLUTION ORAL; RESPIRATORY (INHALATION) at 08:29

## 2025-01-05 RX ADMIN — ARFORMOTEROL TARTRATE 15 MCG: 15 SOLUTION RESPIRATORY (INHALATION) at 20:31

## 2025-01-05 RX ADMIN — DIVALPROEX SODIUM 125 MG: 125 CAPSULE ORAL at 06:00

## 2025-01-05 RX ADMIN — DOXYCYCLINE HYCLATE 100 MG: 100 CAPSULE ORAL at 09:43

## 2025-01-05 RX ADMIN — WATER 1000 MG: 1 INJECTION INTRAMUSCULAR; INTRAVENOUS; SUBCUTANEOUS at 14:07

## 2025-01-05 RX ADMIN — ARFORMOTEROL TARTRATE 15 MCG: 15 SOLUTION RESPIRATORY (INHALATION) at 08:29

## 2025-01-05 RX ADMIN — PANTOPRAZOLE SODIUM 40 MG: 40 TABLET, DELAYED RELEASE ORAL at 06:00

## 2025-01-05 RX ADMIN — SODIUM CHLORIDE, PRESERVATIVE FREE 10 ML: 5 INJECTION INTRAVENOUS at 09:44

## 2025-01-05 RX ADMIN — BUDESONIDE INHALATION 500 MCG: 0.5 SUSPENSION RESPIRATORY (INHALATION) at 08:29

## 2025-01-05 ASSESSMENT — PAIN SCALES - GENERAL: PAINLEVEL_OUTOF10: 0

## 2025-01-05 NOTE — PLAN OF CARE
I was notified about patient's increasing agitation and paranoia, she was on scheduled melatonin and depakote.  Psychiatry consulted for further management.  This am I spoke with patient's daughter she requested Neurology evaluation.  CT scan head ordered for increasing Confusion.

## 2025-01-05 NOTE — PLAN OF CARE
Problem: Discharge Planning  Goal: Discharge to home or other facility with appropriate resources  1/5/2025 1306 by Tea Estrada RN  Outcome: Progressing     Problem: Safety - Adult  Goal: Free from fall injury  1/5/2025 1306 by Tea Estrada RN  Outcome: Progressing     Problem: Pain  Goal: Verbalizes/displays adequate comfort level or baseline comfort level  1/5/2025 1306 by Tea Estrada RN  Outcome: Progressing  Flowsheets (Taken 1/5/2025 0300 by Anahi Blackwood RN)  Verbalizes/displays adequate comfort level or baseline comfort level:   Encourage patient to monitor pain and request assistance   Assess pain using appropriate pain scale   Administer analgesics based on type and severity of pain and evaluate response   Implement non-pharmacological measures as appropriate and evaluate response   Consider cultural and social influences on pain and pain management   Notify Licensed Independent Practitioner if interventions unsuccessful or patient reports new pain     Problem: Skin/Tissue Integrity  Goal: Absence of new skin breakdown  Description: 1.  Monitor for areas of redness and/or skin breakdown  2.  Assess vascular access sites hourly  3.  Every 4-6 hours minimum:  Change oxygen saturation probe site  4.  Every 4-6 hours:  If on nasal continuous positive airway pressure, respiratory therapy assess nares and determine need for appliance change or resting period.  1/5/2025 1306 by Tea Estrada RN  Outcome: Progressing

## 2025-01-05 NOTE — PLAN OF CARE
Problem: Discharge Planning  Goal: Discharge to home or other facility with appropriate resources  1/5/2025 0209 by Anahi Blackwood RN  Outcome: Progressing  1/5/2025 0209 by Anahi Blackwood RN  Outcome: Progressing  Flowsheets (Taken 1/5/2025 0158)  Discharge to home or other facility with appropriate resources:   Identify barriers to discharge with patient and caregiver   Arrange for needed discharge resources and transportation as appropriate   Identify discharge learning needs (meds, wound care, etc)     Problem: Safety - Adult  Goal: Free from fall injury  1/5/2025 0209 by Anahi Blackwood RN  Outcome: Progressing  1/5/2025 0209 by Anahi Blackwood RN  Outcome: Progressing  Flowsheets (Taken 1/5/2025 0206)  Free From Fall Injury:   Instruct family/caregiver on patient safety   Based on caregiver fall risk screen, instruct family/caregiver to ask for assistance with transferring infant if caregiver noted to have fall risk factors     Problem: Pain  Goal: Verbalizes/displays adequate comfort level or baseline comfort level  1/5/2025 0209 by Anahi Blackwood RN  Outcome: Progressing  1/5/2025 0209 by Anahi Blackwood RN  Outcome: Progressing  Flowsheets (Taken 1/5/2025 0100)  Verbalizes/displays adequate comfort level or baseline comfort level:   Encourage patient to monitor pain and request assistance   Assess pain using appropriate pain scale   Administer analgesics based on type and severity of pain and evaluate response   Implement non-pharmacological measures as appropriate and evaluate response   Consider cultural and social influences on pain and pain management   Notify Licensed Independent Practitioner if interventions unsuccessful or patient reports new pain     Problem: Skin/Tissue Integrity  Goal: Absence of new skin breakdown  Description: 1.  Monitor for areas of redness and/or skin breakdown  2.  Assess vascular access sites hourly  3.  Every 4-6 hours minimum:  Change oxygen saturation probe site  4.

## 2025-01-05 NOTE — CONSULTS
NEUROLOGY CONSULT NOTE      Requesting Physician: Naty Morgan MD    Reason for Consult:  Evaluate for AMS, family requesting neurology consult.     History of Present Illness:  Ashley Vivas is a 74 y.o. female  with h/o HTN, COPD, left breast cancer, and lung cancer who [place] [facility] [place] with presentation of shortness of breath.  Patient was residing at a nursing facility with history of COPD on 4 L oxygen at baseline.  Patient also with upper and lower lobe lung cancer.  Patient had been experiencing worsening cough for several days prior to admission.  There was no report of any associated fever, chills, hemoptysis, chest pain, abdominal pain, nausea, vomiting, headache, dizziness, bowel or bladder dysfunction, gait or balance disturbance or dizziness/vertigo.  It was noted the patient had been on immunotherapy for her lung cancer at time of presentation.Labs done in the ED showed normal electrolytes except for increased CO2 and decreased creatinine.  Glucose was 127.  CBC was within normal limits except for bilateral hemoglobin/hematocrit of 5.8/19.9.  UA performed showed no evidence of infection. Patient did have CT scan of the head done today showing no acute intracranial abnormalities.  Since admission patient has had reported agitation and behavior change.  Psychiatry was consulted for evaluation with recommendation made for Depakote sprinkles to help kidneys from patient's behavior changes with believed to be results of delirium.  According to family at bedside and nursing staff patient has been refusing care and is very fixated on her BiPAP machine which was recommended to help with her oxygenation.  She adamantly refused for the \"ball\" (BiPAP mask) on her face.  She stated that she has not been eating well and feels that if she can go back to rehab and start eating she would be better.       Past Medical History:        Diagnosis Date    Cancer (HCC) 2020    LT 
[facility]      Patient:  Ashley Vivas 74 y.o. female MRN: [id number]     Date of Service: 12/30/2024     CC: acute on chronic hypoxic respiratory failure    Subjective     Ashley is a 74F with a past medical history of stage IV squamous cell cancer and COPD on 4L home oxygen who was admitted on 12/29 for concerns of acute on chronic hypoxic respiratory failure. She was having increasing productive cough with increasing oxygen requirements. Patient is currently on Bipap with FiO2 50%.    She currently follows with Dr. Wiley and finished Keytruda treatment in November.     She says her breathing is better than when first admitted. She denies increased cough, wheezing and hemoptysis.     Objective     Physical Exam:  Vitals: BP (!) 141/72   Pulse 100   Temp 98.7 °F (37.1 °C) (Temporal)   Resp 16   Ht 1.549 m (5' 1\")   Wt 51.7 kg (114 lb)   SpO2 99%   BMI 21.54 kg/m²     I & O - 24hr: No intake/output data recorded.   General Appearance: alert, appears stated age, and cooperative  Neck: no adenopathy, no carotid bruit, no JVD, supple, symmetrical, trachea midline, and thyroid not enlarged, symmetric, no tenderness/mass/nodules  Lung: wheezes bibasilar  Heart: regular rate and rhythm, S1, S2 normal, no murmur, click, rub or gallop  Abdomen: soft, non-tender; bowel sounds normal; no masses,  no organomegaly  Extremities:  extremities normal, atraumatic, no cyanosis or edema  Neurologic: Mental status: Alert, oriented, thought content appropriate    Pertinent Labs & Imaging Studies     CBC:   Lab Results   Component Value Date/Time    WBC 7.6 12/30/2024 07:52 AM    RBC 2.20 12/30/2024 07:52 AM    HGB 7.0 12/30/2024 07:52 AM    HCT 23.2 12/30/2024 07:52 AM    .5 12/30/2024 07:52 AM    MCH 31.8 12/30/2024 07:52 AM    MCHC 30.2 12/30/2024 07:52 AM    RDW 20.0 12/30/2024 07:52 AM     12/30/2024 07:52 AM    MPV 10.6 12/30/2024 07:52 AM     CMP:    Lab Results   Component Value 
advanced directives, patient considering  Code status Full Code  Advanced Directives: no POA or living will in [facility]  Surrogate/Legal NOK:  Juan C Vivas (spouse) [phone]  Ligia Hightower (child) [phone]      Spiritual assessment: no spiritual distress identified  Bereavement and grief: to be determined  Referrals to: none today  SUBJECTIVE:     Current medical issues leading to Palliative Medicine involvement include   Active Hospital Problems    Diagnosis Date Noted    Acute on chronic respiratory failure with hypoxia and hypercapnia [J96.21, J96.22] 12/30/2024    Acute on chronic hypoxic respiratory failure [J96.21] 12/29/2024    COPD exacerbation (HCC) [J44.1] 12/09/2024    Acute on chronic anemia [D64.9] 12/02/2024    Malignant neoplasm of lung (HCC) [C34.90] 11/28/2024    Chronic respiratory failure with hypoxia and hypercapnia [J96.11, J96.12] 12/02/2023       Details of Conversation:   Chart reviewed.  Patient seen at bedside, alternates BiPAP and high flow NC, A&O x4, calm and cooperative.  Role of palliative medicine introduced.  Discussed current medical condition and treatment plan.  Patient states she was doing well until completing Keytruda, and has had issues since.  She had an appointment with oncology to review imaging and treatment plans moving forward.  Her goal is to continue cancer treatments.  She states managing her and her 's health is becoming too much, is interested in a nursing facility at discharge.  Case management made aware.  Discussed surrogate decision makers, patient has no advanced directives.  She states her  Juan C is \"not always with it \".  She believes her daughter Ligia would be best for her HCPOA, but she does not want to fill out paperwork at this time as she believes it will cause turmoil among her other child.  I explained the importance of discussing her wishes with her family if she does not complete advance directives, and it is in her best 
history includes Cancer in her mother; Inflam Bowel Dis in her father.     MEDICATIONS:    Prior to Admission medications    Medication Sig Start Date End Date Taking? Authorizing Provider   ipratropium 0.5 mg-albuterol 2.5 mg (DUONEB) 0.5-2.5 (3) MG/3ML SOLN nebulizer solution Inhale 3 mLs into the lungs every 4 hours (while awake) 12/14/24   Rose Marie Munoz MD   acetylcysteine (MUCOMYST) 10 % nebulizer solution Inhale 4 mLs into the lungs 2 times daily 12/14/24   Rose Marie Munoz MD   predniSONE (DELTASONE) 10 MG tablet Take 2 tablets by mouth daily for 10 days, THEN 1 tablet daily for 10 days, THEN 0.5 tablets daily for 10 days. 2 tabs po daily x 10 days. 12/15/24 1/14/25  Rose Marie Munoz MD   metoprolol succinate (TOPROL XL) 25 MG extended release tablet Take 0.5 tablets by mouth daily 12/6/24   Pepe Calvert MD   pantoprazole (PROTONIX) 40 MG tablet Take 1 tablet by mouth every morning (before breakfast) 12/6/24   Pepe Calvert MD   fluticasone-umeclidin-vilant (TRELEGY ELLIPTA) 100-62.5-25 MCG/ACT AEPB inhaler Inhale 1 puff into the lungs daily    Harper Harden MD   Ensifentrine (OHTUVAYRE) 3 MG/2.5ML SUSP Inhale into the lungs in the morning and at bedtime    Harper Harden MD   albuterol sulfate HFA (VENTOLIN HFA) 108 (90 Base) MCG/ACT inhaler Inhale 2 puffs into the lungs 4 times daily as needed for Wheezing 1/2/24   Juan C Wiley DO   Multiple Vitamins-Minerals (THERAPEUTIC MULTIVITAMIN-MINERALS) tablet Take 1 tablet by mouth daily    Harper Harden MD   ipratropium (ATROVENT) 0.06 % nasal spray 1 spray by Each Nostril route 2 times daily as needed (to control nasal drip) 11/29/23   Juan C Wiley DO   guaiFENesin (MUCINEX) 600 MG extended release tablet Take 2 tablets by mouth 2 times daily as needed Drink a full glass of water with each dose to help thin mucus FOR FLU    Provider, MD Harper       ALLERGIES: Celebrex [celecoxib], Neosporin 
    1. Developed posterior left lower lobe opacification to be compatible with pneumonia 2. Otherwise no significant change by noncontrast exam, with some redemonstrated findings as above     XR CHEST PORTABLE    Result Date: 12/29/2024  EXAMINATION: ONE XRAY VIEW OF THE CHEST 12/29/2024 3:23 pm COMPARISON: 08/30/2017 HISTORY: ORDERING SYSTEM PROVIDED HISTORY: Shortness of Breath TECHNOLOGIST PROVIDED HISTORY: Reason for exam:->Shortness of Breath FINDINGS: The lungs are without acute focal process.  There is no effusion or pneumothorax. The cardiomediastinal silhouette is without acute process. The osseous structures are without acute process.  Right-sided port a catheter tip in the mid SVC.     No acute process.     CTA CHEST W CONTRAST    Result Date: 12/12/2024  EXAMINATION: CTA OF THE CHEST 12/12/2024 11:39 am TECHNIQUE: CTA of the chest was performed after the administration of intravenous contrast.  Multiplanar reformatted images are provided for review.  MIP images are provided for review. Automated exposure control, iterative reconstruction, and/or weight based adjustment of the mA/kV was utilized to reduce the radiation dose to as low as reasonably achievable. COMPARISON: CT chest dated 12/02/2024 HISTORY: ORDERING SYSTEM PROVIDED HISTORY: SOB / COPD and lung cancer TECHNOLOGIST PROVIDED HISTORY: Reason for exam:->SOB / COPD and lung cancer Additional Contrast?->1 FINDINGS: Pulmonary Arteries: Pulmonary arteries are adequately opacified for evaluation.  No evidence of intraluminal filling defect to suggest pulmonary embolism.  Main pulmonary artery is normal in caliber. Mediastinum: No evidence of mediastinal lymphadenopathy.  The heart and pericardium demonstrate no acute abnormality.  There is no acute abnormality of the thoracic aorta. Lungs/pleura: Treated area with post treatment changes in the right mid lung and right suprahilar region similar to prior.  Atelectasis and scarring in the left upper

## 2025-01-06 VITALS
DIASTOLIC BLOOD PRESSURE: 51 MMHG | TEMPERATURE: 97.6 F | SYSTOLIC BLOOD PRESSURE: 122 MMHG | HEART RATE: 90 BPM | BODY MASS INDEX: 21.52 KG/M2 | HEIGHT: 61 IN | WEIGHT: 114 LBS | OXYGEN SATURATION: 99 % | RESPIRATION RATE: 18 BRPM

## 2025-01-06 LAB
ANION GAP SERPL CALCULATED.3IONS-SCNC: 4 MMOL/L (ref 7–16)
BASOPHILS # BLD: 0 K/UL (ref 0–0.2)
BASOPHILS NFR BLD: 0 % (ref 0–2)
BUN SERPL-MCNC: 12 MG/DL (ref 6–23)
CALCIUM SERPL-MCNC: 9.3 MG/DL (ref 8.6–10.2)
CHLORIDE SERPL-SCNC: 98 MMOL/L (ref 98–107)
CO2 SERPL-SCNC: 43 MMOL/L (ref 22–29)
CREAT SERPL-MCNC: 0.3 MG/DL (ref 0.5–1)
EOSINOPHIL # BLD: 0.29 K/UL (ref 0.05–0.5)
EOSINOPHILS RELATIVE PERCENT: 3 % (ref 0–6)
ERYTHROCYTE [DISTWIDTH] IN BLOOD BY AUTOMATED COUNT: 19.9 % (ref 11.5–15)
GFR, ESTIMATED: >90 ML/MIN/1.73M2
GLUCOSE SERPL-MCNC: 167 MG/DL (ref 74–99)
HCT VFR BLD AUTO: 25.8 % (ref 34–48)
HGB BLD-MCNC: 8 G/DL (ref 11.5–15.5)
LYMPHOCYTES NFR BLD: 0.95 K/UL (ref 1.5–4)
LYMPHOCYTES RELATIVE PERCENT: 10 % (ref 20–42)
MCH RBC QN AUTO: 30.2 PG (ref 26–35)
MCHC RBC AUTO-ENTMCNC: 31 G/DL (ref 32–34.5)
MCV RBC AUTO: 97.4 FL (ref 80–99.9)
METAMYELOCYTES ABSOLUTE COUNT: 0.1 K/UL (ref 0–0.12)
METAMYELOCYTES: 1 % (ref 0–1)
MONOCYTES NFR BLD: 0.48 K/UL (ref 0.1–0.95)
MONOCYTES NFR BLD: 5 % (ref 2–12)
MYELOCYTES ABSOLUTE COUNT: 0.1 K/UL
MYELOCYTES: 1 %
NEUTROPHILS NFR BLD: 78 % (ref 43–80)
NEUTS SEG NFR BLD: 7.41 K/UL (ref 1.8–7.3)
PLATELET # BLD AUTO: 196 K/UL (ref 130–450)
PMV BLD AUTO: 10.4 FL (ref 7–12)
POTASSIUM SERPL-SCNC: 3.9 MMOL/L (ref 3.5–5)
PROMYELOCYTES ABSOLUTE COUNT: 0.19 K/UL
PROMYELOCYTES: 2 %
RBC # BLD AUTO: 2.65 M/UL (ref 3.5–5.5)
RBC # BLD: ABNORMAL 10*6/UL
RBC # BLD: ABNORMAL 10*6/UL
SODIUM SERPL-SCNC: 145 MMOL/L (ref 132–146)
WBC OTHER # BLD: 9.5 K/UL (ref 4.5–11.5)

## 2025-01-06 PROCEDURE — 85025 COMPLETE CBC W/AUTO DIFF WBC: CPT

## 2025-01-06 PROCEDURE — 99239 HOSP IP/OBS DSCHRG MGMT >30: CPT | Performed by: STUDENT IN AN ORGANIZED HEALTH CARE EDUCATION/TRAINING PROGRAM

## 2025-01-06 PROCEDURE — 99233 SBSQ HOSP IP/OBS HIGH 50: CPT | Performed by: INTERNAL MEDICINE

## 2025-01-06 PROCEDURE — 2500000003 HC RX 250 WO HCPCS: Performed by: STUDENT IN AN ORGANIZED HEALTH CARE EDUCATION/TRAINING PROGRAM

## 2025-01-06 PROCEDURE — 36415 COLL VENOUS BLD VENIPUNCTURE: CPT

## 2025-01-06 PROCEDURE — 80048 BASIC METABOLIC PNL TOTAL CA: CPT

## 2025-01-06 PROCEDURE — 99231 SBSQ HOSP IP/OBS SF/LOW 25: CPT

## 2025-01-06 PROCEDURE — 2500000003 HC RX 250 WO HCPCS: Performed by: INTERNAL MEDICINE

## 2025-01-06 PROCEDURE — 6370000000 HC RX 637 (ALT 250 FOR IP)

## 2025-01-06 PROCEDURE — 6360000002 HC RX W HCPCS: Performed by: INTERNAL MEDICINE

## 2025-01-06 PROCEDURE — 6370000000 HC RX 637 (ALT 250 FOR IP): Performed by: INTERNAL MEDICINE

## 2025-01-06 PROCEDURE — 6370000000 HC RX 637 (ALT 250 FOR IP): Performed by: STUDENT IN AN ORGANIZED HEALTH CARE EDUCATION/TRAINING PROGRAM

## 2025-01-06 PROCEDURE — 6360000002 HC RX W HCPCS: Performed by: STUDENT IN AN ORGANIZED HEALTH CARE EDUCATION/TRAINING PROGRAM

## 2025-01-06 PROCEDURE — 6370000000 HC RX 637 (ALT 250 FOR IP): Performed by: NURSE PRACTITIONER

## 2025-01-06 PROCEDURE — 94660 CPAP INITIATION&MGMT: CPT

## 2025-01-06 PROCEDURE — 94640 AIRWAY INHALATION TREATMENT: CPT

## 2025-01-06 PROCEDURE — 2700000000 HC OXYGEN THERAPY PER DAY

## 2025-01-06 RX ORDER — DIVALPROEX SODIUM 125 MG/1
125 CAPSULE, COATED PELLETS ORAL EVERY 8 HOURS SCHEDULED
Status: ON HOLD | DISCHARGE
Start: 2025-01-06

## 2025-01-06 RX ORDER — POLYETHYLENE GLYCOL 3350 17 G/17G
17 POWDER, FOR SOLUTION ORAL DAILY PRN
Status: ON HOLD | DISCHARGE
Start: 2025-01-06 | End: 2025-02-05

## 2025-01-06 RX ORDER — PREDNISONE 10 MG/1
10 TABLET ORAL DAILY
Status: DISCONTINUED | OUTPATIENT
Start: 2025-01-07 | End: 2025-01-06 | Stop reason: HOSPADM

## 2025-01-06 RX ORDER — BUDESONIDE 0.5 MG/2ML
0.5 INHALANT ORAL
Status: ON HOLD | DISCHARGE
Start: 2025-01-06

## 2025-01-06 RX ORDER — DOXYCYCLINE 100 MG/1
100 CAPSULE ORAL EVERY 12 HOURS SCHEDULED
Status: ON HOLD | DISCHARGE
Start: 2025-01-06 | End: 2025-01-09

## 2025-01-06 RX ORDER — ARFORMOTEROL TARTRATE 15 UG/2ML
15 SOLUTION RESPIRATORY (INHALATION)
Status: ON HOLD | DISCHARGE
Start: 2025-01-06

## 2025-01-06 RX ORDER — BENZONATATE 100 MG/1
100 CAPSULE ORAL 3 TIMES DAILY PRN
Status: ON HOLD | DISCHARGE
Start: 2025-01-06 | End: 2025-01-13

## 2025-01-06 RX ORDER — PREDNISONE 10 MG/1
TABLET ORAL
Status: ON HOLD | DISCHARGE
Start: 2025-01-06 | End: 2025-01-22

## 2025-01-06 RX ORDER — METOPROLOL SUCCINATE 50 MG/1
50 TABLET, EXTENDED RELEASE ORAL DAILY
Status: ON HOLD | DISCHARGE
Start: 2025-01-07

## 2025-01-06 RX ORDER — CEFDINIR 300 MG/1
300 CAPSULE ORAL 2 TIMES DAILY
Status: ON HOLD | DISCHARGE
Start: 2025-01-06 | End: 2025-01-09

## 2025-01-06 RX ORDER — CALCIUM CARBONATE 500 MG/1
500 TABLET, CHEWABLE ORAL 3 TIMES DAILY PRN
Status: ON HOLD | DISCHARGE
Start: 2025-01-06 | End: 2025-02-05

## 2025-01-06 RX ADMIN — ACETYLCYSTEINE 400 MG: 200 SOLUTION ORAL; RESPIRATORY (INHALATION) at 09:50

## 2025-01-06 RX ADMIN — Medication 1 TABLET: at 07:58

## 2025-01-06 RX ADMIN — WATER 1000 MG: 1 INJECTION INTRAMUSCULAR; INTRAVENOUS; SUBCUTANEOUS at 14:43

## 2025-01-06 RX ADMIN — PANTOPRAZOLE SODIUM 40 MG: 40 TABLET, DELAYED RELEASE ORAL at 05:49

## 2025-01-06 RX ADMIN — BUDESONIDE INHALATION 500 MCG: 0.5 SUSPENSION RESPIRATORY (INHALATION) at 09:50

## 2025-01-06 RX ADMIN — IPRATROPIUM BROMIDE AND ALBUTEROL SULFATE 1 DOSE: 2.5; .5 SOLUTION RESPIRATORY (INHALATION) at 03:16

## 2025-01-06 RX ADMIN — DOXYCYCLINE HYCLATE 100 MG: 100 CAPSULE ORAL at 07:58

## 2025-01-06 RX ADMIN — ONDANSETRON 4 MG: 2 INJECTION INTRAMUSCULAR; INTRAVENOUS at 15:43

## 2025-01-06 RX ADMIN — DIVALPROEX SODIUM 125 MG: 125 CAPSULE ORAL at 14:43

## 2025-01-06 RX ADMIN — PREDNISONE 20 MG: 20 TABLET ORAL at 07:58

## 2025-01-06 RX ADMIN — ARFORMOTEROL TARTRATE 15 MCG: 15 SOLUTION RESPIRATORY (INHALATION) at 09:50

## 2025-01-06 RX ADMIN — DIVALPROEX SODIUM 125 MG: 125 CAPSULE ORAL at 05:49

## 2025-01-06 RX ADMIN — SODIUM CHLORIDE, PRESERVATIVE FREE 10 ML: 5 INJECTION INTRAVENOUS at 07:59

## 2025-01-06 RX ADMIN — CALCIUM CARBONATE (ANTACID) CHEW TAB 500 MG 500 MG: 500 CHEW TAB at 02:29

## 2025-01-06 RX ADMIN — METOPROLOL SUCCINATE 50 MG: 50 TABLET, EXTENDED RELEASE ORAL at 07:58

## 2025-01-06 ASSESSMENT — PAIN SCALES - GENERAL
PAINLEVEL_OUTOF10: 0

## 2025-01-06 NOTE — PLAN OF CARE
Problem: Discharge Planning  Goal: Discharge to home or other facility with appropriate resources  Outcome: Progressing     Problem: Safety - Adult  Goal: Free from fall injury  Outcome: Progressing     Problem: Pain  Goal: Verbalizes/displays adequate comfort level or baseline comfort level  Outcome: Progressing     Problem: Skin/Tissue Integrity  Goal: Absence of new skin breakdown  Description: 1.  Monitor for areas of redness and/or skin breakdown  2.  Assess vascular access sites hourly  3.  Every 4-6 hours minimum:  Change oxygen saturation probe site  4.  Every 4-6 hours:  If on nasal continuous positive airway pressure, respiratory therapy assess nares and determine need for appliance change or resting period.  Outcome: Progressing      [Follow-Up: _____] : a [unfilled] follow-up visit

## 2025-01-06 NOTE — PROGRESS NOTES
Hospitalist Progress Note      Chief Complaint:  had concerns including Shortness of Breath (From ECF.  Usually wears O2 @ 4L at all times, but ECF bumped her up to 9L.  EMS has patient 6L N/C).    Admission Date: 2024     SYNOPSIS:Ashley presents to ER with complaint of shortness of breath. She is from nursing facility. She does have chronic hypoxic respiratory failure and wears 4 L of oxygen at baseline due to COPD and lung cancer. She has been recently requiring increasing oxygen requirements and is on 6 L upon arrival to the ER. She admits to cough and sputum production. There is concern for pneumonia and she was given Rocephin and doxycycline in the ER.   Admitted for acute on chronic hypoxic respiratory failure secondary to COPD exacerbation, possible pneumonia.  Pulmonology consulted and following  He was notified by nursing staff was found to be hypoxic with SpO2 around 46%, patient was placed on BiPAP, ABG was done, hypoxia, hypercapnia, improved ABG after placing on BiPAP.    : 1 unit PRBC transfused for hb 6.7    : Increasing confusion likely secondary to delirium, melatonin nightly along with Depakote started    1/3: Will stop cefepime, stopped IV steroids, urine analysis was negative for UTI    : Patient refusing LTAC, wants to go [facility] now, Charge nurse to follow up with CM regarding [facility]    SUBJECTIVE:    Patient seen and examined at bedside, not in acute distress, Family present at bedside answered all questions, stable overnight. No overnight issues reported     Temp (24hrs), Av.3 °F (36.3 °C), Min:97 °F (36.1 °C), Max:98.2 °F (36.8 °C)    DIET: ADULT DIET; Regular  CODE: DNR-CCA  No intake or output data in the 24 hours ending 25 1358        OBJECTIVE:    BP (!) 115/47   Pulse 94   Temp 97.3 °F (36.3 °C) (Temporal)   Resp 20   Ht 1.549 m (5' 1\")   Wt 51.7 kg (114 lb)   SpO2 96%   BMI 21.54 kg/m²     General appearance: No apparent distress, appears stated age 
        Hospitalist Progress Note      Chief Complaint:  had concerns including Shortness of Breath (From ECF.  Usually wears O2 @ 4L at all times, but ECF bumped her up to 9L.  EMS has patient 6L N/C).    Admission Date: 2024     SYNOPSIS:Ashley presents to ER with complaint of shortness of breath. She is from nursing facility. She does have chronic hypoxic respiratory failure and wears 4 L of oxygen at baseline due to COPD and lung cancer. She has been recently requiring increasing oxygen requirements and is on 6 L upon arrival to the ER. She admits to cough and sputum production. There is concern for pneumonia and she was given Rocephin and doxycycline in the ER.   Admitted for acute on chronic hypoxic respiratory failure secondary to COPD exacerbation, possible pneumonia.  Pulmonology consulted and following  He was notified by nursing staff was found to be hypoxic with SpO2 around 46%, patient was placed on BiPAP, ABG was done, hypoxia, hypercapnia, improved ABG after placing on BiPAP.    : 1 unit PRBC transfused for hb 6.7    : Increasing confusion likely secondary to delirium, melatonin nightly along with Depakote started    SUBJECTIVE:    Patient seen and examined at bedside, not in acute distress, spoke with nursing staff patient had episodes of confusion overnight, spoke with patient's daughter who was worried about her mentation, updated on treatment plan records reviewed.   Stable overnight. No overnight issues reported     Temp (24hrs), Av °F (36.7 °C), Min:97.6 °F (36.4 °C), Max:98.5 °F (36.9 °C)    DIET: ADULT DIET; Regular  CODE: DNR-CCA  No intake or output data in the 24 hours ending 25 1503      OBJECTIVE:    BP (!) 133/54   Pulse (!) 119   Temp 98 °F (36.7 °C) (Oral)   Resp 16   Ht 1.549 m (5' 1\")   Wt 51.7 kg (114 lb)   SpO2 98%   BMI 21.54 kg/m²     General appearance: No apparent distress, appears stated age and cooperative.   HEENT:  Normocephalic. 
        Hospitalist Progress Note      Chief Complaint:  had concerns including Shortness of Breath (From ECF.  Usually wears O2 @ 4L at all times, but ECF bumped her up to 9L.  EMS has patient 6L N/C).    Admission Date: 2024     SYNOPSIS:Ashley presents to ER with complaint of shortness of breath. She is from nursing facility. She does have chronic hypoxic respiratory failure and wears 4 L of oxygen at baseline due to COPD and lung cancer. She has been recently requiring increasing oxygen requirements and is on 6 L upon arrival to the ER. She admits to cough and sputum production. There is concern for pneumonia and she was given Rocephin and doxycycline in the ER.   Admitted for acute on chronic hypoxic respiratory failure secondary to COPD exacerbation, possible pneumonia.  Pulmonology consulted and following  He was notified by nursing staff was found to be hypoxic with SpO2 around 46%, patient was placed on BiPAP, ABG was done, hypoxia, hypercapnia, improved ABG after placing on BiPAP.    SUBJECTIVE:    Patient seen and examined at bedside, not in acute distress  Records reviewed.   Stable overnight. No overnight issues reported     Temp (24hrs), Av.8 °F (36.6 °C), Min:97.5 °F (36.4 °C), Max:98.1 °F (36.7 °C)    DIET: ADULT DIET; Regular  CODE: Full Code  No intake or output data in the 24 hours ending 24 570    OBJECTIVE:    BP (!) 118/59   Pulse 100   Temp 97.8 °F (36.6 °C)   Resp 20   Ht 1.549 m (5' 1\")   Wt 51.7 kg (114 lb)   SpO2 100%   BMI 21.54 kg/m²     General appearance: No apparent distress, appears stated age and cooperative.   HEENT:  Normocephalic. Conjunctivae/corneas clear. Moist mucosa   Neck: Supple. No jugular venous distention. Thyroid not visible, non tender   Respiratory: Diminished breath sounds bilaterally   cardiovascular: Regular heart beats, normal S1-S2. No M/G/R  Abdomen: Non distended, soft, non tender, no visceromegaly, no mass, normal bowel sounds 
     Pulmonary Critical Care Medicine           PULMONARY  CRITICAL CARE   SERVICE DAILY PROGRESS  NOTE     2025   Hospital LOS:  LOS: 7 days     Impression / Recommendations:  Acute on chronic respiratory insufficiency secondary to AECOPD leading to Co2 narcosis   Stage IV sq cell CA     -Pt has been consistently refusing NIPPV , continues to be confused and disoriented but protecting the airway   -Used BiPAP for about an hour  - Continue encouraging the use of NIPPV   - Continue brovanva and pulmicort   - Continue duonebs   - sputum cx- NGTD , on ceftrriaxone and doxy   - PT/OT , OOB to chair   - Continue and wean supplemental O2 , goal SPO2 > 88%       Interval History/Event Changes:  Mood seems to be a little better today  Used BiPAP for about an hour  Family at the bedside, discussed, answered all the questions    Allergies  Allergies   Allergen Reactions    Celebrex [Celecoxib] Other (See Comments)     Lowered blood pressure and itching with a rash    Neosporin [Neomycin-Polymyxin-Gramicidin] Rash    Augmentin [Amoxicillin-Pot Clavulanate] Nausea And Vomiting    Benzalkonium Chloride Itching and Rash       Review of Systems    A pertinent review of systems was performed and was otherwise non-contributory.    Vitals-     BP (!) 110/45   Pulse 84   Temp 97.5 °F (36.4 °C) (Oral)   Resp 22   Ht 1.549 m (5' 1\")   Wt 51.7 kg (114 lb)   SpO2 93%   BMI 21.54 kg/m²    Tmax: Temp (24hrs), Av.5 °F (36.4 °C), Min:97.1 °F (36.2 °C), Max:97.8 °F (36.6 °C)      Hemodynamics:  Cuff:   Systolic (24hrs), Av , Min:110 , Max:135   /Diastolic (24hrs), Av, Min:45, Max:61    Cuff MAP:MAP (mmHg)  Av.4  Min: 68  Max: 108  P:   Pulse  Av.1  Min: 80  Max: 102      Airway:     Observed RR: Resp  Av.5  Min: 18  Max: 22   Observed O2 sats: SpO2  Av.3 %  Min: 54 %  Max: 100 %    No intake or output data in the 24 hours ending 25 1609      Physical exam-  General appearance: Thinly build , 
   12/30/24 0803   NIV Type   $NIV $Daily Charge   Ventilator ID [id number]   NIV Started/Stopped On   Equipment Type V60   Mode Bilevel   Mask Type Full face mask   Assessment   Pulse 96   Heart Rate Source Monitor   Respirations 15   BP (!) 141/72   SpO2 99 %   Level of Consciousness 0   Comfort Level Fair   Using Accessory Muscles No   Mask Compliance Fair   Skin Protection for O2 Device Yes   Orientation Middle   Location Nose   Intervention(s) Skin Barrier   Settings/Measurements   PIP Observed 13 cm H20   IPAP (S)  16 cmH20  (TO MAINTAIN TIDAL VOLUMES)   CPAP/EPAP 5 cmH2O   Vt (Measured) 394 mL   Rate Ordered 14   Insp Rise Time (%) 3 %   FiO2  50 %   Minute Volume (L/min) 6.6 Liters   Mask Leak (lpm) 25 lpm   Patient's Home Machine No   Alarm Settings   Alarms On Y   Low Pressure (cmH2O) 8 cmH2O   High Pressure (cmH2O) 25 cmH2O   RR Low (bpm) 14   RR High (bpm) 40 br/min       
   12/30/24 0939   NIV Type   NIV Started/Stopped Off  (PATIENT TOOK OFF NIV, 7 L HFNC)   Assessment   Pulse (!) 113   Respirations 19   SpO2 (!) 89 %       
   12/30/24 0943   NIV Type   NIV Started/Stopped On   Equipment Type V60  (PATIENT STATES SHE IS NOW AGREEABLE TO WEAR NIV AT THIS TIME)   Mode Bilevel   Mask Type Full face mask   Mask Size Small   Assessment   Heart Rate Source Monitor   Level of Consciousness 0   Comfort Level Fair   Using Accessory Muscles No   Mask Compliance Fair   Skin Assessment Clean, dry, & intact   Settings/Measurements   PIP Observed 15 cm H20   IPAP 16 cmH20   CPAP/EPAP 5 cmH2O   Vt (Measured) 495 mL   Rate Ordered 14   Insp Rise Time (%) 3 %   FiO2  50 %   I Time/ I Time % 0.9 s   Minute Volume (L/min) 7.6 Liters   Mask Leak (lpm) 25 lpm   Patient's Home Machine No   Alarm Settings   Alarms On Y   Low Pressure (cmH2O) 8 cmH2O   High Pressure (cmH2O) 25 cmH2O   RR Low (bpm) 14   RR High (bpm) 40 br/min       
   12/30/24 1153   NIV Type   Ventilator ID [id number]  (remains on NIV)   NIV Started/Stopped On   Equipment Type v60   Mode Bilevel   Mask Type Full face mask   Mask Size Small   Assessment   Pulse (!) 101   Heart Rate Source Monitor   Respirations 21   SpO2 97 %   Level of Consciousness 0   Comfort Level Fair   Using Accessory Muscles No   Mask Compliance Fair   Skin Assessment Clean, dry, & intact   Settings/Measurements   PIP Observed 15 cm H20   IPAP 16 cmH20   CPAP/EPAP 5 cmH2O   Vt (Measured) 532 mL   Rate Ordered 14   Insp Rise Time (%) 3 %   FiO2  50 %   I Time/ I Time % 0.9 s   Minute Volume (L/min) 9.7 Liters   Mask Leak (lpm) 24 lpm   Patient's Home Machine No   Alarm Settings   Alarms On Y   Low Pressure (cmH2O) 8 cmH2O   High Pressure (cmH2O) 25 cmH2O   RR Low (bpm) 14   RR High (bpm) 40 br/min       
   12/30/24 1227   NIV Type   NIV Started/Stopped Off  (off niv to 7L HFNC)   Assessment   Pulse 100   Respirations 16   SpO2 99 %   Settings/Measurements   O2 Flow Rate (L/min) 7 L/min   Oxygen Therapy/Pulse Ox   O2 Therapy Oxygen   O2 Device High flow nasal cannula       
  [facility]  Department of Pulmonary, Critical Care and Sleep Medicine  [facility]  Department of Internal Medicine  Progress Note    Aroldo MÉNDEZ        Patients Primary Pulmonologist is:     SUBJECTIVE:  Patient seen and examined.  Overall reports that her breathing has improved.  She is upset stating that nobody had ever explained the BiPAP to her or how it worked.       OBJECTIVE:  Vitals:    01/01/25 0723 01/01/25 1109 01/01/25 1511 01/01/25 1606   BP: (!) 135/57 (!) 127/50 135/62    Pulse: 90 99 100 89   Resp: 17 17 18 14   Temp: 97.9 °F (36.6 °C) 98.2 °F (36.8 °C) 98 °F (36.7 °C)    TempSrc: Oral Oral Oral    SpO2: 100% 100% 93% 92%   Weight:       Height:         Constitutional: Alert,     EENT: EOMI REGINE. MMM. No icterus. No thrush.     Neck:  Trachea was midline.   Respiratory: CTA bilaterally, no use of accessory muscles  Cardiovascular: Regular, No murmur. No rubs.      Pulses:  Equal bilaterally.    Abdomen: Soft without organomegaly. No rebound, rigidity.  No guarding.  Lymphatic: No lymphadenopathy.  Musculoskeletal: Without weakness or gross deficits  Extremities:  No lower extremity edema. Reflexes appear adequate.   Skin:  Warm and dry.  No skin rashes.   Neurological/Psychiatric: No acute psychosis. Cranial nerves are intact.        DATA:    Monitor Strips:  Reviewed & discusses with technical team. No changes noted.    RADIOLOGY:  No new chest imaging today      CBC with Differential:    Lab Results   Component Value Date/Time    WBC 10.1 01/01/2025 08:05 AM    RBC 2.39 01/01/2025 08:05 AM    HGB 7.1 01/01/2025 08:05 AM    HGB 7.2 01/01/2025 08:05 AM    HCT 24.1 01/01/2025 08:05 AM    HCT 24.1 01/01/2025 08:05 AM     01/01/2025 08:05 AM    .8 01/01/2025 08:05 AM    MCH 30.1 01/01/2025 08:05 AM    [facility] 
  [facility]  Department of Pulmonary, Critical Care and Sleep Medicine  [facility] [place]  Department of Internal Medicine  Progress Note    Aroldo Diaz APRN  Angeles Cremalouzeynep APRN        Patients Primary Pulmonologist is: Dr. Wiley     SUBJECTIVE:  Patient seen and examined.  She was seen sitting up on bedside chair on 10 L high flow nasal cannula.  She is having intermittent periods of confusion.   Discussed case with RN and primary team.            OBJECTIVE:  Vitals:    01/03/25 0435 01/03/25 0808 01/03/25 0817 01/03/25 1119   BP:  (!) 158/66  (!) 112/54   Pulse:  100 (!) 128 (!) 109   Resp: (!) 36 20 24 20   Temp:  97.8 °F (36.6 °C)  98 °F (36.7 °C)   TempSrc:  Oral  Oral   SpO2:  99% 98% 96%   Weight:       Height:         Constitutional: Alert, intermittently confused      EENT: EOMI REGINE. MMM. No icterus. No thrush.     Neck:  Trachea was midline.   Respiratory: Few bibasilar rales greater on left , no use of accessory muscles  Cardiovascular: Regular, No murmur. No rubs.      Pulses: equal bilaterally   Abdomen: Soft without organomegaly. No rebound, rigidity.  No guarding.  Lymphatic: No lymphadenopathy.  Musculoskeletal: Without weakness or gross deficits  Extremities:  No lower extremity edema. Reflexes appear adequate.   Skin:  Warm and dry.  No skin rashes.   Neurological/Psychiatric: periods of  confused       DATA:    Monitor Strips:  Reviewed & discusses with technical team. No changes noted.    RADIOLOGY:  No new chest imaging today      CBC with Differential:    Lab Results   Component Value Date/Time    WBC 9.4 01/03/2025 04:58 AM    RBC 2.43 01/03/2025 04:58 AM    HGB 7.2 01/03/2025 04:58 AM    HCT 24.2 01/03/2025 04:58 AM     01/03/2025 04:58 AM    MCV 99.6 01/03/2025 04:58 AM    MCH 29.6 01/03/2025 04:58 AM    MCHC 29.8 01/03/2025 04:58 
  [facility]  Department of Pulmonary, Critical Care and Sleep Medicine  [facility] [place]  Department of Internal Medicine  Progress Note    Aroldo MÉNDEZ        Patients Primary Pulmonologist is:     SUBJECTIVE:  Patient seen and examined.  Overall her breathing does feel better than when seen in the ER yesterday.  There was an issue earlier today where she was off her oxygen and had a significant desaturation.       OBJECTIVE:  Vitals:    12/31/24 1030 12/31/24 1105 12/31/24 1410 12/31/24 1720   BP: (!) 133/55 (!) 135/54 (!) 118/59    Pulse: (!) 105 100 100    Resp:  22 20    Temp:  97.8 °F (36.6 °C) 97.8 °F (36.6 °C)    TempSrc:  Oral     SpO2: 94% 94% 100% 100%   Weight:       Height:         Constitutional: Alert,     EENT: EOMI REGINE. MMM. No icterus. No thrush.     Neck:  Trachea was midline.   Respiratory: CTA bilaterally, no use of accessory muscles  Cardiovascular: Regular, No murmur. No rubs.      Pulses:  Equal bilaterally.    Abdomen: Soft without organomegaly. No rebound, rigidity.  No guarding.  Lymphatic: No lymphadenopathy.  Musculoskeletal: Without weakness or gross deficits  Extremities:  No lower extremity edema. Reflexes appear adequate.   Skin:  Warm and dry.  No skin rashes.   Neurological/Psychiatric: No acute psychosis. Cranial nerves are intact.        DATA:    Monitor Strips:  Reviewed & discusses with technical team. No changes noted.    RADIOLOGY:  No new chest imaging today      CBC with Differential:    Lab Results   Component Value Date/Time    WBC 12.4 12/31/2024 03:11 PM    RBC 2.15 12/31/2024 03:11 PM    HGB 6.7 12/31/2024 03:11 PM    HCT 24.5 12/31/2024 03:11 PM     12/31/2024 03:11 PM    .0 12/31/2024 03:11 PM    MCH 31.2 12/31/2024 03:11 PM    MCHC 27.3 12/31/2024 03:11 PM    RDW 20.4 
  [facility]  Department of Pulmonary, Critical Care and Sleep Medicine  [facility]  Department of Internal Medicine  Progress Note    SUBJECTIVE:    No CP, called 911 on the staff   Tolerating current oxygen therapy   Reviewed overnight events with staff.  On 8 liters typically on 4   No complaints of pain at this time.     OBJECTIVE:  Vitals:    01/06/25 1114 01/06/25 1115 01/06/25 1309 01/06/25 1412   BP:  (!) 122/51     Pulse:  90     Resp:  18     Temp:  97.6 °F (36.4 °C)     TempSrc:  Oral     SpO2: 97% 99%  99%   Weight:       Height:   1.549 m (5' 0.98\")      Constitutional: Alert,     EENT: EOMI REGINE. MMM. No icterus. No thrush.     Neck: No thyromegaly. No elevated JVP. Trachea was midline.   Respiratory: Symmetrical.  Breath sounds were clear.    Cardiovascular: + murmur. No rubs.      Pulses:  Equal bilaterally.    Abdomen: Soft without organomegaly. No rebound, rigidity.  No guarding.  Lymphatic: No lymphadenopathy.  Musculoskeletal: With weakness or gross deficits  Extremities:  trace lower extremity edema. Reflexes appear adequate.   Skin:  Warm and dry.  No skin rashes.   Neurological/Psychiatric: No acute psychosis. Cranial nerves are intact.        DATA:  The data collected below information that was obtained, reviewed, analyzed and interpreted today. Imaging test are reviewed with the radiologist during weekly conference rounds. Comparison to previous images are always explored.     Monitor Strips:  My interpretation and reviewed of the cardiac monitor and further discussion with technical team reveals no changes noted and the patient is in sinus rhythm     RADIOLOGY:  My interpretation and review of the current films reveals  Left lower lower lobe pneumonia.  Small right pleural effusion.     CT  HEAD: 1. No acute intracranial abnormality. 2. Mild generalized cerebral atrophy with periventricular white matter  changes. 3. Nonspecific 
  Palliative Care Department  [phone]  Palliative Care Progress Note  Provider Shweta Mcdwoell, APRN - CNP     Ashley Vivas  [id number]  Hospital Day: 4  Date of Initial Consult: 12/30/24  Referring Provider: Geoffrey Shaikh MD   Palliative Medicine was consulted for assistance with: Goals of Care    HPI:   Ashley Vivas is a 74 y.o. with a medical history of stage IV squamous cell lung cancer ([facility]), COPD on 4L O2 baseline, hypertension who was admitted on 12/29/2024 from nursing facility with a CHIEF COMPLAINT of shortness of breath.  She wears 4 L of O2 at baseline due to COPD and lung cancer, but has been requiring increased oxygen up to 6 L upon arrival to ER, with increased cough and sputum production.  She recently finished Keytruda treatment in November.  Was recently admitted 3 weeks ago at [facility] for symptomatic anemia and fall, no GI workup at that time due to respiratory status.  Workup in ER reveals H&H 7.3/24.4, WBC 10.6, pCO2 63.4, CXR negative, CT chest compatible with pneumonia.  Patient placed on BiPAP, but became agitated and pulled it off refusing to allow nursing to replace, SpO2 dropped in the 50s, eventually agreed to high flow NC.  Pulmonology on consult, patient alternates BiPAP with high flow NC.  Palliative medicine consulted for goals of care.    ASSESSMENT/PLAN:     Pertinent Hospital Diagnoses     Acute on chronic hypoxic respiratory failure  COPD exacerbation  Anemia  Stage IV squamous cell lung CA      Palliative Care Encounter / Counseling Regarding Goals of Care  Please see detailed goals of care discussion as below  At this time, Ashley Vivas, Does have capacity for medical decision-making.  Capacity is time limited and situation/question specific  During encounter no surrogate medical decision-maker  Outcome of goals of care meeting:   Continue current medical management  Change CODE STATUS DNR CCA  Spiritual consult placed for assistance with 
  [facility]  Department of Pulmonary, Critical Care and Sleep Medicine  [facility]  Department of Internal Medicine  Progress Note    Aroldo MÉNDEZ        Patients Primary Pulmonologist is:     SUBJECTIVE:  Patient seen and examined.  She was seen on 9L HFNC, in no acute distress.  She did not wear BIPAP over night.  Encouraged compliance.   She reports feeling short of breath, but also states she wants to go home.         OBJECTIVE:  Vitals:    01/02/25 0100 01/02/25 0753 01/02/25 1047 01/02/25 1103   BP:  135/82 (!) 133/54 (!) 133/54   Pulse:  (!) 122 (!) 119 (!) 119   Resp: 19 16  16   Temp:  98.5 °F (36.9 °C)  98 °F (36.7 °C)   TempSrc:  Oral  Oral   SpO2:  94%  98%   Weight:       Height:         Constitutional: Alert,     EENT: EOMI REGINE. MMM. No icterus. No thrush.     Neck:  Trachea was midline.   Respiratory: Diminished breath sounds, no use of accessory muscles  Cardiovascular: Regular, No murmur. No rubs.      Pulses:  Equal bilaterally.    Abdomen: Soft without organomegaly. No rebound, rigidity.  No guarding.  Lymphatic: No lymphadenopathy.  Musculoskeletal: Without weakness or gross deficits  Extremities:  No lower extremity edema. Reflexes appear adequate.   Skin:  Warm and dry.  No skin rashes.   Neurological/Psychiatric: No acute psychosis. Cranial nerves are intact.        DATA:    Monitor Strips:  Reviewed & discusses with technical team. No changes noted.    RADIOLOGY:  No new chest imaging today      CBC with Differential:    Lab Results   Component Value Date/Time    WBC 10.1 01/01/2025 08:05 AM    RBC 2.39 01/01/2025 08:05 AM    HGB 7.1 01/01/2025 08:05 AM    HGB 7.2 01/01/2025 08:05 AM    HCT 24.1 01/01/2025 08:05 AM    HCT 24.1 01/01/2025 08:05 AM     01/01/2025 08:05 AM    .8 01/01/2025 08:05 AM 
 Patient yelling from bedroom,\" I'm getting out of here tonight!\". When staff approached patient, patient began yelling at staff and took off high flow nasal cannula . Patient spo2 at 82% on room air, patient declined to place nasal cannula back on and called local police department from her cell phone. After much encouragement from staff patient agreed to place nasal cannula back on, spo2 at 90% on 7l o2 HF nasal cannula. Patient continues to refuse abg blood draws as ordered at this time. Patient family member called and voiced concerns over patient increase in confusion and irritability.patient family member Ligia Sultana , updated of status and continuation of care  
 visited Mrs. Vivas as a consult for ACP.  We talked about her desire to name her children as decision makers.  At this time she would like to review the ACP documents and information with her daughter.      If additional support is requested or needed please reach out to Spiritual Health ([phone]).    Chap. Mauricio Rogel MDIV, BCC    
0730-Messaged Dr. Morgan to notify there are no lab orders in for this morning and hemoglobin yesterday was 7. New orders placed.       1200- Contacted lab to check on status of labs that were placed to be drawn.  stated will be down to draw labs shortly  
4 Eyes Skin Assessment     NAME:  Ashley Vivas  YOB: 1950  MEDICAL RECORD NUMBER:  [id number]    The patient is being assessed for  Admission    I agree that at least one RN has performed a thorough Head to Toe Skin Assessment on the patient. ALL assessment sites listed below have been assessed.      Areas assessed by both nurses:    Head, Face, Ears, Shoulders, Back, Chest, Arms, Elbows, Hands, Sacrum. Buttock, Coccyx, Ischium, Legs. Feet and Heels, and Under Medical Devices         Does the Patient have a Wound? No noted wound(s)       [place] Prevention initiated by RN: No  Wound Care Orders initiated by RN: No    Pressure Injury (Stage 3,4, Unstageable, DTI, NWPT, and Complex wounds) if present, place Wound referral order by RN under : No    New Ostomies, if present place, Ostomy referral order under : No     Nurse 1 eSignature: Electronically signed by Lata Nuñez RN on 12/30/24 at 6:27 PM EST    **SHARE this note so that the co-signing nurse can place an eSignature**    Nurse 2 eSignature: Electronically signed by Luma Coy RN on 12/30/24 at 6:28 PM EST   
After much encouragement, patient agreed to be placed back on bipap mask  
Antibiotic Extended Infusion Policy     This patient is on medication that requires renal, weight, and/or indication dose adjustment.      Date Body Weight IBW  Adjusted BW SCr  CrCl Dialysis status BMI   12/29/2024 51.7 kg (114 lb) Ideal body weight: 47.8 kg (105 lb 6.1 oz)  Adjusted ideal body weight: 49.4 kg (108 lb 13.2 oz) Serum creatinine: 0.5 mg/dL 12/29/24 1501  Estimated creatinine clearance: 72 mL/min N/a Body mass index is 21.54 kg/m².       Pharmacy has dose-adjusted the following medication(s):    Ordered Medication: Cefepime 2000mg q12h     Order Changed/converted to: Cefepime 2000mg q8h    These changes were made per protocol according to the [facility]   Automatic Extended Infusion Dose Adjustment Policy.     *Please note this dose may need readjusted if patient's condition changes.    Please contact pharmacy with any questions regarding these changes.    Holly aPrr RPH  12/29/2024  9:36 PM    
Bilevel Settings   01/04/25 0857   NIV Type   Ventilator ID [id number]   NIV Started/Stopped On   Equipment Type [email or web address]   Mode Bilevel   Mask Type Full face mask   Mask Size Small   Assessment   Level of Consciousness 0   Comfort Level Good   Using Accessory Muscles No   Mask Compliance Good   Skin Assessment Clean, dry, & intact   Skin Protection for O2 Device Yes   Orientation Middle   Location Nose   Intervention(s) Skin Barrier   Breath Sounds   Respiratory Pattern Regular   Settings/Measurements   PIP Observed 17 cm H20   IPAP 18 cmH20   CPAP/EPAP 5 cmH2O   Vt (Measured) 385 mL   Rate Ordered 18   Insp Rise Time (%) 4 %   O2 Flow Rate (L/min) 9 L/min   FiO2  60 %   I Time/ I Time % 0.8 s   Minute Volume (L/min) 6 Liters   Mask Leak (lpm) 35 lpm   Patient's Home Machine No   Alarm Settings   Alarms On Y   Low Pressure (cmH2O) 8 cmH2O   High Pressure (cmH2O) 30 cmH2O   Apnea (secs) 20 secs   RR Low (bpm) 13   RR High (bpm) 38 br/min     Date: 1/4/2025    Time: 9:01 AM    Patient Placed On BIPAP/CPAP/ Non-Invasive Ventilation?  Yes    If no must comment.  Facial area red/color change? No           If YES are Blister/Lesion present?No   If yes must notify nursing staff  BIPAP/CPAP skin barrier?  Yes    Skin barrier type:cassidy Darby RCP  
Call placed to Marylou  on call regarding plan for discharge, as family notified Dr Morgan they do not [facility] to [facility]   
Comprehensive Nutrition Assessment    Type and Reason for Visit:  Initial, LOS    Nutrition Recommendations/Plan:   Continue Regular diet as tolerated, Goal PO intake >75% est kcal pro needs  Added Ensure Plus High Protein 2/2 increased needs with cancer     Malnutrition Assessment:  Malnutrition Status:  At risk for malnutrition (01/06/25 1315)    Context:  Chronic Illness     Findings of the 6 clinical characteristics of malnutrition:  Energy Intake:   suspected PO intake <75% kcal protein needs  Weight Loss:  Unable to assess (Documentation endorses estimated weight hx ~52kg within 2-3 year wt hx)     Body Fat Loss:  Unable to assess (remote visit and RD unable to preform NFPE - Noted BMI 21 and RD suspect moderate to mild musle and fat wasting 2/2 to cancer; RD to reevaluate for malnturition at followup)     Muscle Mass Loss:  Unable to assess    Fluid Accumulation:  Unable to assess     Strength:  Not Performed    Nutrition Assessment:    Pt admitted for acute on chronic hypoxic respiratory failure, presents to the ER with SOB and increasing oxygen requirements. Noted put with palliative care consult; s/p radiation, chemotherapy followed outpatient; anemia 2/2 to infection and chemo. Tolerating regular diet and documentation endorses 25-50% intake at meals.    Nutrition Related Findings:    -.2 L I&O; AAOx0; GCS 15; soft abdomen with active bowel sounds; LBM 12/31; soft abdomen no guarding; skin intact. Wound Type: None       Current Nutrition Intake & Therapies:    Average Meal Intake: 26-50%  Average Supplements Intake: None Ordered  ADULT DIET; Regular    Anthropometric Measures:  Height: 154.9 cm (5' 0.98\")  Ideal Body Weight (IBW): 105 lbs (48 kg)    Admission Body Weight: 51.7 kg (113 lb 15.7 oz)  Current Body Weight: 51.7 kg (113 lb 15.7 oz), 108.6 % IBW. Weight Source: Stated  Current BMI (kg/m2): 21.5  Usual Body Weight:  (UTO poor doucmented wt hx on file)  Weight Adjustment For: No 
Critical hemoglobin 6.7 notified Dr. Morgan. New orders received.    
Date: 12/29/2024    Time: 4:48 PM    Patient Placed On BIPAP/CPAP/ Non-Invasive Ventilation?  Yes    If no must comment.  Facial area red/color change? No           If YES are Blister/Lesion present?No   If yes must notify nursing staff  BIPAP/CPAP skin barrier?  No    Skin barrier type: na   12/29/24 1440   NIV Type   $NIV $Daily Charge   NIV Started/Stopped On   Mode Biphasic   Mask Type Full face mask   Mask Size Small   Assessment   SpO2 (!) 84 %   Level of Consciousness 0   Comfort Level Good   Using Accessory Muscles Yes   Mask Compliance Good   Skin Assessment Clean, dry, & intact   Skin Protection for O2 Device No   Settings/Measurements   IPAP 14 cmH20   CPAP/EPAP 5 cmH2O   Vt (Measured) 347 mL   Rate Ordered 14   Insp Rise Time (%) 3 %   FiO2  60 %   Minute Volume (L/min) 6.52 Liters   Mask Leak (lpm) 45 lpm   Patient's Home Machine No            Comments:        Brea Gutierrez RCP  
Dr jacob at bedside, rt to draw abg   
Family member ,Ligia Sultana ,at bedside, patient agreed to abg as ordered and to be placed on bipap at this time.  
Hgb re-draw 8.2. Perfect Serve sent sent to Rosie Ferris NP to see if second unit of PRBCs may be held at this time.  
IV leaking.  Merrem paused.   
In pt chart for possible admin to floor   
Message sent to Dr. Shaikh regarding pt asking about different code status options/changes and if palliative wanted to be consulted.   
Neuro consult sent to Dr Lester  
Notified Dr Morgan daughter rony would like to speak with her, number provided   
Notified Dr Morgan hgb 5.8 and carbon dioxide 46  
Notified Dr. Castro, pt co2 43 and if pt okay for discharge.  
Notified Dr. Keys of abg results.   
Notified Dr. Morgan of k 5.3 new order received.   
Notified Dr. Morgan of positive blood cultures.  
Notified Dr. Sheprad and Dr. Morgan of [facility]  
Notified Michelle Wei of [facility] consult  
Nurse to nurse called to [facility]. Per facility, leave peripheral IV in place  
Occupational Therapy  Date:2025  Patient Name: Ashley Vivas  MRN: [id number]  : 1950  Room: 18/8518-A     OT order received and appreciated.  Chart reviewed.  Hold OT evaluation, awaiting clearance for OOB activity due to \"compression deformities with the latter most noted at T12\" from CT of chest.  Nursing aware and reaching out to attending for POC.  Will follow.    Jamil Ferris OTR/L #[id number]    
Ok for palliative re consult per Dr Morgan  
[facility]  -         [facility]   IN-PATIENT SERVICE   -    Progress Note    12/30/2024    11:28 AM    Name:   Ashley Vivas  MRN:     [id number]     Acct:      [id number]   Room:   12/12  IP Day:  1  Admit Date:  12/29/2024  2:13 PM    PCP:   Samina Ji MD  Code Status:  Full Code    Subjective:     C/C:   Chief Complaint   Patient presents with    Shortness of Breath     From ECF.  Usually wears O2 @ 4L at all times, but ECF bumped her up to 9L.  EMS has patient 6L N/C   -  Interval History Status: not changed.     Pt on bipap in ED       4l at baseline   From ECF     Brief History:     Ashley presents to ER with complaint of shortness of breath.  She is from nursing facility.  She does have chronic hypoxic respiratory failure and wears 4 L of oxygen at baseline due to COPD and lung cancer.  She has been recently requiring increasing oxygen requirements and is on 6 L upon arrival to the ER.  She admits to cough and sputum production.  There is concern for pneumonia and she was given Rocephin and doxycycline in the ER.  Will be admitted for COPD exacerbation/possible pneumonia. Awaiting CT chest.  Discussed patient's case with ED physician.    Review of Systems:     Constitutional:  negative for chills, fevers, sweats  Respiratory:  positive for cough, dyspnea on exertion, shortness of breath, wheezing  Cardiovascular:  negative for chest pain, chest pressure/discomfort, lower extremity edema, palpitations  Gastrointestinal:  negative for abdominal pain, constipation, diarrhea, nausea, vomiting  Neurological:  negative for dizziness, headache    Medications:     Allergies:    Allergies   Allergen Reactions    Celebrex [Celecoxib] Other (See Comments)     Lowered blood pressure and itching with a rash    Neosporin [Neomycin-Polymyxin-Gramicidin] Rash    Augmentin [Amoxicillin-Pot Clavulanate] Nausea And Vomiting    Benzalkonium Chloride Itching and Rash       Current Meds:   Scheduled Meds:    
PATIENT REMOVED PARTIAL PRIOR TO NIV USE.   
Palliative consult sent to Kate Rutherford via perfect serve   
Patient continues to pull off high flow nasal cannula, stating I'm leaving here right now, I'm calling my daughter to pick me up. Patient family member called for update. Patient family member voiced concerns over increase in confusion and recent blood gases. Patient family   
Patient does not want to go on BiPap tonight. I told her to call if she changes her mind. I also let her know that she would benefit from wearing it for a few hours.   
Patient family member ,Ligia Sultana, called out to nurses station voiced that the patient called her on cell phone and said \"back of her head was bleeding\". Nursing staff approached patient, sitting in bed awake with cell phone in hand,  and no bleeding or drainage noted. Patient family member voiced that the patient had prior staples from a fall a few months ago. Patient currently in bed awake, bed alarm on and virtual tele sitter activated.   
Patient placed on Bipap.  
Patient received the Sacrament of the Anointing of the Sick from Father Chapito Rosario on 12/31/2024.    If additional support is requested or needed please reach out to Spiritual Health ([phone]).    Chap. Mauricio Rogel MDIV, BCC    
Patient refusing Bipap at this time. Patient educated about the necessity of wearing the Bipap however she continues to refuse.  
Patient removed BiPap and refusing to put it back on  
Patient requesting for Bipap to be removed.   
Per primary patient is cleared for PT/OT to work with.  Perfect serve sent to both PT and OT notifying them to work with the patient  
Pharmacy Consultation Note  (Antibiotic Dosing and Monitoring)    Initial consult date: 12/29/24  Consulting physician/provider: Dr. Rodas  Drug: Vancomycin  Indication: nosocomial pneumonia     Age/  Gender Height Weight IBW  Allergy Information   74 y.o./female 154.9 cm (5' 1\") 51.7 kg (114 lb)     Ideal body weight: 47.8 kg (105 lb 6.1 oz)  Adjusted ideal body weight: 49.4 kg (108 lb 13.2 oz)   Celebrex [celecoxib], Neosporin [neomycin-polymyxin-gramicidin], Augmentin [amoxicillin-pot clavulanate], and Benzalkonium chloride      Renal Function:  Recent Labs     12/29/24  1501   BUN 12   CREATININE 0.5     No intake or output data in the 24 hours ending 12/29/24 2146    Vancomycin Monitoring:  Trough:  No results for input(s): \"VANCOTROUGH\" in the last 72 hours.  Random:  No results for input(s): \"VANCORANDOM\" in the last 72 hours.    Vancomycin Administration Times:  Recent vancomycin administrations        No vancomycin IV orders with administrations found.            Orders not given:            vancomycin (VANCOCIN) 1,000 mg in sodium chloride 0.9 % 250 mL IVPB ([id number])                    Assessment:  Patient is a 74 y.o. female who has been initiated on vancomycin  Estimated Creatinine Clearance: 72 mL/min (based on SCr of 0.5 mg/dL).  To dose vancomycin, pharmacy will be utilizing  trough based dosing    Plan:  Vancomycin 1000 mg load ordered  Will start vancomycin 1000 mg IV every 24 hours  Will check vancomycin levels when appropriate  Will continue to monitor renal function   Pharmacy to follow    Thank you for your consult    Cyndee Grider PharmD BCPS 12/29/2024 9:46 PM  [phone]    
Pharmacy Consultation Note  (Antibiotic Dosing and Monitoring)    Initial consult date: 12/29/24  Consulting physician/provider: Dr. Rodas  Drug: Vancomycin  Indication: nosocomial pneumonia     Age/  Gender Height Weight IBW  Allergy Information   74 y.o./female 154.9 cm (5' 1\") 51.7 kg (114 lb)     Ideal body weight: 47.8 kg (105 lb 6.1 oz)  Adjusted ideal body weight: 49.4 kg (108 lb 13.2 oz)   Celebrex [celecoxib], Neosporin [neomycin-polymyxin-gramicidin], Augmentin [amoxicillin-pot clavulanate], and Benzalkonium chloride      Renal Function:  Recent Labs     12/29/24  1501 12/30/24  0752   BUN 12 15   CREATININE 0.5 0.3*       Intake/Output Summary (Last 24 hours) at 12/30/2024 2226  Last data filed at 12/30/2024 1417  Gross per 24 hour   Intake 350 ml   Output --   Net 350 ml       Vancomycin Monitoring:  Trough:  No results for input(s): \"VANCOTROUGH\" in the last 72 hours.  Random:  No results for input(s): \"VANCORANDOM\" in the last 72 hours.    Vancomycin Administration Times:  Recent vancomycin administrations                     vancomycin (VANCOCIN) 1,000 mg in sodium chloride 0.9 % 250 mL IVPB ([id number]) (mg) 1,000 mg New Bag 12/30/24 0142                      Assessment:  Patient is a 74 y.o. female who has been initiated on vancomycin  Estimated Creatinine Clearance: 120 mL/min (A) (based on SCr of 0.3 mg/dL (L)).  To dose vancomycin, pharmacy will be utilizing  trough based dosing    Plan:  Will continue vancomycin 1000 mg IV every 24 hours  Will check vancomycin levels when appropriate  Will continue to monitor renal function   Pharmacy to follow    Thank you for your consult    Cyndee Grider PharmD BCPS 12/30/2024 10:26 PM  [phone]    
Pharmacy Consultation Note  (Antibiotic Dosing and Monitoring)    Initial consult date: 12/29/24  Consulting physician/provider: Dr. Rodas  Drug: Vancomycin  Indication: nosocomial pneumonia     Age/  Gender Height Weight IBW  Allergy Information   74 y.o./female 154.9 cm (5' 1\") 51.7 kg (114 lb)     Ideal body weight: 47.8 kg (105 lb 6.1 oz)  Adjusted ideal body weight: 49.4 kg (108 lb 13.2 oz)   Celebrex [celecoxib], Neosporin [neomycin-polymyxin-gramicidin], Augmentin [amoxicillin-pot clavulanate], and Benzalkonium chloride      Renal Function:  Recent Labs     12/29/24  1501 12/30/24  0752   BUN 12 15   CREATININE 0.5 0.3*     No intake or output data in the 24 hours ending 12/30/24 0915    Vancomycin Monitoring:  Trough:  No results for input(s): \"VANCOTROUGH\" in the last 72 hours.  Random:  No results for input(s): \"VANCORANDOM\" in the last 72 hours.    Vancomycin Administration Times:  Recent vancomycin administrations                     vancomycin (VANCOCIN) 1,000 mg in sodium chloride 0.9 % 250 mL IVPB ([id number]) (mg) 1,000 mg New Bag 12/30/24 0142                        Assessment:  Patient is a 74 y.o. female who has been initiated on vancomycin  Estimated Creatinine Clearance: 120 mL/min (A) (based on SCr of 0.3 mg/dL (L)).  To dose vancomycin, pharmacy will be utilizing  trough based dosing    Plan:  Continue vancomycin 1000 mg IV every 24 hours  Will check vancomycin levels when appropriate  Will continue to monitor renal function   Pharmacy to follow    Thank you for your consult    Sergio Menjivar, Juan Manuel, [facility] 12/30/2024 9:17 AM  [phone]      
Pharmacy Consultation Note  (Antibiotic Dosing and Monitoring)    Initial consult date: 12/29/24  Consulting physician/provider: Dr. Rodas  Drug: Vancomycin  Indication: nosocomial pneumonia     Vancomycin has been discontinued. Clinical pharmacy will sign off, please reconsult if further assistance is needed.     Neetu Emery, PharmD, BCPS, BCCCP 12/31/2024 1:14 PM   Ext [phone]      
Physical Therapy    PT consult to evaluate/treat received and appreciated.  Pt chart reviewed and evaluation attempted.      CT CHEST 12/30/24 states \"Stable with osteoporosis and compression deformities with the latter most noted at T12.\"  Pt also had age indeterminate T11 superior endplate compression fracture on recent admission.  Will need activity clearance/orders prior to OOB.  RN aware.     Will check back as able.  Thank you.        Eduar Cancino, PT, DPT   [id number]      
Physical Therapy  Facility/Department: [facility]  Physical Therapy Initial Assessment    Name: Ashley Vivas  : 1950  MRN: [id number]  Date of Service: 2025    Patient Diagnosis(es): The primary encounter diagnosis was Acute on chronic respiratory failure with hypoxia and hypercapnia. Diagnoses of Acute exacerbation of chronic obstructive pulmonary disease (COPD) (HCC) and Pneumonia due to infectious organism, unspecified laterality, unspecified part of lung were also pertinent to this visit.  Past Medical History:  has a past medical history of Cancer (HCC), COPD (chronic obstructive pulmonary disease) (HCC), History of Holter monitoring, Hypertension, Lung cancer, lower lobe (HCC), and Lung cancer, upper lobe (HCC).  Past Surgical History:  has a past surgical history that includes Carpal tunnel release (Bilateral); Knee arthroscopy (Left); Lung biopsy (Right, 2017); skin biopsy; bronchoscopy (2017); Colonoscopy (2021); Eye surgery (Right, 2023); Breast lumpectomy (Left, ); and Eye surgery (Left, 2023).          Referring provider:  Naty Morgan MD    PT Order:  PT eval and treat     Evaluating PT:  Luma Sands PT, DPT PT [id number]    Room #:  8518/8518-A  Diagnosis:  Acute exacerbation of chronic obstructive pulmonary disease (COPD) (HCC) [J44.1]  Acute on chronic respiratory failure with hypoxia and hypercapnia [J96.21, J96.22]  Pneumonia due to infectious organism, unspecified laterality, unspecified part of lung [J18.9]  Acute on chronic hypoxic respiratory failure [J96.21]  Precautions:  fall risk, high flow O2 (12L)  Equipment Needs:  none    SUBJECTIVE:    Pt admitted from nursing facility.  Pt reported needing assist with all mobility.  Pt reported she wished she was performing more mobility at the facility.  Pt reported at times she would ambulate without device at facility.     OBJECTIVE:   Initial Evaluation  Date:  Treatment Short Term/ Long 
Placed pt back on bipap.   
Placed pt on BiPAP   
Placed pt on bipap   
Psych consult sent to Martita Daly  
Pt called 911 Patient was complaining about not feeling safe and that she does not trust her family either. Family at bedside attempted to re assure pt that \" we are all working to try and get you better\".  She asked to have someone come and sit with her to make her feel safe. Called Dr. Morgan about pts concerns.  Psych consult ordered. And sitter ordered to bedside.   
Pt observed for 15 minutes not reaction suspected blood continued   
Pt refusing ABG, notified Dr. Bear, and MANSOOR.   
Pt refusing to wear bipap tonight.  
Pt removed BiPAP and replaced nasal cannula   
Pt removed BiPAP place back on high flow nasal cannula 8 liters saturation 100%  
Pt stated she \"I will try the bipap again for 30 min will that be enough\" I explained that she needs to wear it as long as she possibly can in order for it to be effective pt verbalized understanding.   
Pt took BiPAP off back on high flow nasal cannula.   
Pt took a walk in room and saturation dropped to 64 % place pt on bibap and her saturation recovered to 94%  
Pt wanted off the BiPAP placed High Flow nasal canula   
Pt wore bipap fro 1 hour educated on the need to try and keep using it she stated she will try again later.   
Pulmonary and NP notified of patients pCO2 at 96.0, respiratory convinced her to put Bipap on, patient stated \"will only wear for one hour\".    
RN entered the patient’s room and observed the following: Patient unresponsive to questions diaphoretic,labored breathing.Oxygen Saturation: 46%, Heart Rate: 105 bpm. Patient was noted to have received a breathing treatment at 0756. Respiratory therapy had not yet returned to transition the patient back to 10 L oxygen. Oxygen setup was found to be disconnected. RN immediately placed the patient on BiPAP with FiO2 at 50%. Over the course of  6 minutes, oxygen saturation improved to 100%.     Dr. Morgan was notified stat. Stat ABG ordered. Patient regained responsiveness and began answering orientation questions appropriately.       
Report called to tha on [facility]  
Spiritual Health History and Assessment/Progress Note  [facility]    Initial Encounter, Spiritual/Emotional Needs, Advance Care Planning, Palliative Care,  ,  ,      Name: Ashley Vivas MRN: [id number]    Age: 74 y.o.     Sex: female   Language: English   Yazidism: Baptism   Acute on chronic hypoxic respiratory failure     Date: 1/2/2025                           Spiritual Assessment began in [facility]        Referral/Consult From: Rounding, Palliative Care   Encounter Overview/Reason: Initial Encounter, Spiritual/Emotional Needs, Advance Care Planning, Palliative Care  Service Provided For: Patient    Pauly, Belief, Meaning:   Patient identifies as spiritual and has beliefs or practices that help with coping during difficult times  Family/Friends No family/friends present      Importance and Influence:  Patient has spiritual/personal beliefs that influence decisions regarding their health  Family/Friends No family/friends present    Community:  Patient feels well-supported. Support system includes: Spouse/Partner, Children, and Extended family  Family/Friends No family/friends present    Assessment and Plan of Care:     Patient Interventions include: Facilitated expression of thoughts and feelings, Explored spiritual coping/struggle/distress, Affirmed coping skills/support systems, and Other: We talked about her family and pauly.   visited Mrs. Vivas as a consult for ACP.  We talked about her desire to name her children as decision makers.  At this time she would like to review the ACP documents and information with her daughter.    Family/Friends Interventions include: No family/friends present    Patient Plan of Care: Spiritual Care available upon further referral  Family/Friends Plan of Care: No family/friends present    Electronically signed by JORDAN Perera on 1/2/2025 at 10:45 AM    
Spiritual Health History and Assessment/Progress Note  [facility]    (P) Palliative Care,  ,  ,      Name: Ashley Vivas MRN: [id number]    Age: 74 y.o.     Sex: female   Language: English   Episcopalian: Anglican   Acute on chronic hypoxic respiratory failure     Date: 12/31/2024                           Spiritual Assessment began in [facility]        Referral/Consult From: (P) Palliative Care   Encounter Overview/Reason: (P) Palliative Care  Service Provided For: (P) Patient and family together    Pauly, Belief, Meaning:   Patient has beliefs or practices that help with coping during difficult times  Family/Friends have beliefs or practices that help with coping during difficult times      Importance and Influence:  Patient has no beliefs influential to healthcare decision-making identified during this visit  Family/Friends have no beliefs influential to healthcare decision-making identified during this visit    Community:  Patient feels well-supported. Support system includes: Children and Extended family  Family/Friends feel well-supported. Support system includes: Children and Extended family    Assessment and Plan of Care:     Patient Interventions include: Facilitated expression of thoughts and feelings  Family/Friends Interventions include: Facilitated expression of thoughts and feelings    Patient Plan of Care: Spiritual Care available upon further referral  Family/Friends Plan of Care: Spiritual Care available upon further referral    Electronically signed by Chaplain Jocelyn on 12/31/2024 at 4:08 PM   
The pt is refusing to wear the bipap.  
Titrated O2 to 40% on bipap.   
chronically ill appearing, alert, Mild conversational dyspnea, disoriented   Head: Normocephalic, without obvious abnormality, atraumatic   Eyes:Pupils bilateral equal and reactive, EOM intact, conjunctiva - no icterus , no injection   Throat: Clear, no lesions, no tonsillar eythema or edema  Neck: Supple, symmetrical, trachea midline, no lymphadenopathy, no JVD, no carotid bruits, no thyromegaly   Lungs: Bilateral  Air movement severely diminished on bilateral lung fields. Few expiratory wheezes specially with forced exhalation. No crackles ,  resonant to percussion.   Heart: RRR, S1, S2 normal, no murmur, click, rub or gallop   Abdomen: soft, non-tender, nondistended. Bowel sounds normal, no hepatomeagly  Extremities: extremities normal, atraumatic, no cyanosis, no edema  Musculoskeletal - No deformities   Skin: Skin color, texture, turgor normal. No rashes or lesions   Neurological: No focal deficits, cranial nerves grossly intact, sensations intact   Psychiatric : Mood and effect normal , alert and oriented times 4         Routine labs:    Recent Labs     01/03/25  0458 01/05/25  0800   WBC 9.4 8.8   HGB 7.2* 5.8*   HCT 24.2* 19.9*    196     Recent Labs     01/03/25  0458 01/05/25  0800    143   K 5.0 4.0   CL 96* 95*   CO2 37* 46*   BUN 24* 19   CREATININE 0.3* 0.4*     No results for input(s): \"GLU\" in the last 72 hours.        Other Laboratory - Imaging Studies:     Reviewed and as per electronic record. CxR/CT images reviewed by me when available.     Total time spent for this encounter was 51 minutes including but not limited to patient exam , family discussion , discussion with consultants , chart review and documentation.         Devyn Castro MD  01/05/25            
goals of care meeting:   Continue current medical management  Continue DNR-CCA  Patient considering DNI  Discharging today  Code status DNR-CCA  Advanced Directives: no POA or living will in [facility]  Surrogate/Legal NOK:  Juan C Vivas (spouse) [phone]  Ligia Hightower (child) [phone]      Spiritual assessment: no spiritual distress identified  Bereavement and grief: to be determined  Referrals to: none today  SUBJECTIVE:     Current medical issues leading to Palliative Medicine involvement include   Active Hospital Problems    Diagnosis Date Noted    Acute alteration in mental status [R41.82] 01/05/2025    Delirium, acute [R41.0] 01/05/2025    Palliative care encounter [Z51.5] 01/01/2025    Goals of care, counseling/discussion [Z71.89] 01/01/2025    Acute on chronic respiratory failure with hypoxia and hypercapnia [J96.21, J96.22] 12/30/2024    Acute on chronic hypoxic respiratory failure [J96.21] 12/29/2024    COPD exacerbation (HCC) [J44.1] 12/09/2024    Acute on chronic anemia [D64.9] 12/02/2024    Malignant neoplasm of lung (HCC) [C34.90] 11/28/2024    Chronic respiratory failure with hypoxia and hypercapnia [J96.11, J96.12] 12/02/2023       Details of Conversation:   Chart reviewed.  Patient seen at bedside, awake alert and oriented.  She is fixated on BiPAP machine which she continues to refuse, and believes the staff are attempting to force her.  Currently on 7 L high flow, respirations unlabored.  Palliative medicine was reconsulted for goals of care.  Patient states her goal is to be discharged to facility, and to build up her strength and to continue current treatment plan.  She states \"I will go to [facility] before I ever come back to this hospital\". Patient currently DNR CCA, discussed her wishes in the event she may need intubation for respiratory distress.  She states she needs to think about this further, but if it is for short-term she is \"probably okay with it\".  Will continue DNR-CCA.  Patient 
    Scheduled Medications    [START ON 1/4/2025] metoprolol succinate  50 mg Oral Daily    predniSONE  20 mg Oral Daily    cefTRIAXone (ROCEPHIN) IV  1,000 mg IntraVENous Q24H    doxycycline hyclate  100 mg Oral 2 times per day    melatonin  5 mg Oral Nightly    divalproex  125 mg Oral 2 times per day    acetylcysteine  2 mL Inhalation BID    therapeutic multivitamin-minerals  1 tablet Oral Daily    pantoprazole  40 mg Oral QAM AC    budesonide  0.5 mg Nebulization BID RT    arformoterol tartrate  15 mcg Nebulization BID RT    sodium chloride flush  5-40 mL IntraVENous 2 times per day    enoxaparin  40 mg SubCUTAneous Daily     PRN Meds: ipratropium 0.5 mg-albuterol 2.5 mg, sodium chloride, benzocaine-menthol, benzonatate, calcium carbonate, hydrALAZINE, melatonin, sodium chloride flush, sodium chloride, potassium chloride **OR** potassium alternative oral replacement **OR** potassium chloride, magnesium sulfate, ondansetron **OR** ondansetron, polyethylene glycol, acetaminophen **OR** acetaminophen, guaiFENesin    Labs:     Recent Labs     12/31/24  1511 01/01/25  0805 01/03/25  0458   WBC 12.4* 10.1 9.4   HGB 6.7* 7.2*  7.1* 7.2*   HCT 24.5* 24.1*  24.1* 24.2*    196 223       Recent Labs     12/31/24  1511 01/01/25  0805 01/03/25  0458    142 139   K 5.3* 3.8 5.0    106 96*   CO2 23 36* 37*   BUN 21 17 24*   CREATININE 0.4* 0.3* 0.3*   CALCIUM 9.2 8.4* 9.1       No results for input(s): \"ALKPHOS\", \"ALT\", \"AST\", \"BILITOT\", \"AMYLASE\", \"LIPASE\" in the last 72 hours.    Invalid input(s): \"PROT\", \"ALB\"      No results for input(s): \"INR\" in the last 72 hours.    No results for input(s): \"CKTOTAL\", \"TROPONINI\" in the last 72 hours.    Chronic labs:    Lab Results   Component Value Date    CHOL 213 (H) 12/30/2024    TRIG 65 12/30/2024    HDL 90 12/30/2024    TSH 0.14 (L) 12/30/2024    INR 1.0 08/30/2017    LABA1C 4.8 12/30/2024       Radiology: REVIEWED 
day    enoxaparin  40 mg SubCUTAneous Daily    methylPREDNISolone  40 mg IntraVENous Q12H    cefepime  2,000 mg IntraVENous q8h     PRN Meds: [id number], sodium chloride, benzocaine-menthol, benzonatate, calcium carbonate, hydrALAZINE, melatonin, sodium chloride flush, sodium chloride, potassium chloride **OR** potassium alternative oral replacement **OR** potassium chloride, magnesium sulfate, ondansetron **OR** ondansetron, polyethylene glycol, acetaminophen **OR** acetaminophen, guaiFENesin    Labs:     Recent Labs     12/30/24  0752 12/31/24  1511 01/01/25  0805   WBC 7.6 12.4* 10.1   HGB 7.0* 6.7* 7.2*  7.1*   HCT 23.2* 24.5* 24.1*  24.1*    203 196       Recent Labs     12/30/24  0752 12/31/24  1200 12/31/24  1511 01/01/25  0805     --  144 142   K 4.5 4.39 5.3* 3.8     --  106 106   CO2 34*  --  23 36*   BUN 15  --  21 17   CREATININE 0.3*  --  0.4* 0.3*   CALCIUM 9.1  --  9.2 8.4*   PHOS 3.7  --   --   --        Recent Labs     12/30/24  0752   ALKPHOS 81   ALT 25   AST 18   BILITOT 0.2       No results for input(s): \"INR\" in the last 72 hours.    No results for input(s): \"CKTOTAL\", \"TROPONINI\" in the last 72 hours.    Chronic labs:    Lab Results   Component Value Date    CHOL 213 (H) 12/30/2024    TRIG 65 12/30/2024    HDL 90 12/30/2024    TSH 0.14 (L) 12/30/2024    INR 1.0 08/30/2017    LABA1C 4.8 12/30/2024       Radiology: REVIEWED DAILY    +++++++++++++++++++++++++++++++++++++++++++++++++  Naty Morgan MD  [facility]   [place], [place]  +++++++++++++++++++++++++++++++++++++++++++++++++  NOTE: This report was transcribed using voice recognition software. Every effort was made to ensure accuracy; however, inadvertent computerized transcription errors may be present.       
status delirium: Multifactorial, melatonin scheduled along with Depakote started  Will order UA to rule out UTI  IV steroids changed to oral, cefepime changed to ceftriaxone and doxycycline    Hypertension: On metoprolol, tachycardia noted, will increase the dose of metoprolol from 12.5 Mg daily to 25 Mg p.o. daily    DVT prophylaxis: Lovenox     DISPOSITION: Await Neuro eval, palliative consult for goals of care discussion, appreciate  assistance in discharge planning    Medications:  REVIEWED DAILY    Infusion Medications    sodium chloride      sodium chloride      sodium chloride       Scheduled Medications    melatonin  3 mg Oral QPM    metoprolol succinate  50 mg Oral Daily    predniSONE  20 mg Oral Daily    cefTRIAXone (ROCEPHIN) IV  1,000 mg IntraVENous Q24H    doxycycline hyclate  100 mg Oral 2 times per day    divalproex  125 mg Oral 3 times per day    acetylcysteine  2 mL Inhalation BID    therapeutic multivitamin-minerals  1 tablet Oral Daily    pantoprazole  40 mg Oral QAM AC    budesonide  0.5 mg Nebulization BID RT    arformoterol tartrate  15 mcg Nebulization BID RT    sodium chloride flush  5-40 mL IntraVENous 2 times per day    [Held by provider] enoxaparin  40 mg SubCUTAneous Daily     PRN Meds: sodium chloride, ipratropium 0.5 mg-albuterol 2.5 mg, sodium chloride, benzocaine-menthol, benzonatate, calcium carbonate, hydrALAZINE, melatonin, sodium chloride flush, sodium chloride, potassium chloride **OR** potassium alternative oral replacement **OR** potassium chloride, magnesium sulfate, ondansetron **OR** ondansetron, polyethylene glycol, acetaminophen **OR** acetaminophen, guaiFENesin    Labs:     Recent Labs     01/03/25  0458 01/05/25  0800   WBC 9.4 8.8   HGB 7.2* 5.8*   HCT 24.2* 19.9*    196       Recent Labs     01/03/25  0458 01/05/25  0800    143   K 5.0 4.0   CL 96* 95*   CO2 37* 46*   BUN 24* 19   CREATININE 0.3* 0.4*   CALCIUM 9.1 9.0       No results for 
noted performance deficits, personal factors, co-morbidities, assistance required, and other factors as noted in the clinical evaluation and functional testing.     Time In: 1148  Time Out: 1216  Total Treatment Time: 13 minutes    Min Units   OT Eval Low 14515       OT Eval Moderate 97166  x 1   OT Eval High 23371       OT Re-Eval 24576       Therapeutic Ex 96507       Therapeutic Activities 90928  4 0   ADL/Self Care 94674  9 1   Orthotic Management 20001       Neuro Re-Ed 49008       Non-Billable Time          Evaluation Time includes thorough review of current medical information, gathering information on past medical history/social history and prior level of function, completion of standardized testing/informal observation of tasks, assessment of data and education on plan of care and goals.          Lito Torres OTR/L [id number]

## 2025-01-06 NOTE — DISCHARGE INSTR - COC
Physical Therapy  Weight Bearing Status/Restrictions: No weight bearing restrictions  Other Medical Equipment (for information only, NOT a DME order):  oxygen  Other Treatments: ***    Patient's personal belongings (please select all that are sent with patient):  Glasses    RN SIGNATURE:  Electronically signed by Savi Lee RN on 1/6/25 at 2:52 PM EST    CASE MANAGEMENT/SOCIAL WORK SECTION    Inpatient Status Date: ***    Readmission Risk Assessment Score:  [facility] RISK OF UNPLANNED READMISSION 2.0             24.1 Total Score        Discharging to Facility/ Agency   Name:   Address:  Phone:  Fax:    Dialysis Facility (if applicable)   Name:  Address:  Dialysis Schedule:  Phone:  Fax:    / signature: {Esignature:[id number]}    PHYSICIAN SECTION    Prognosis: {Prognosis:3670942950}    Condition at Discharge: { Patient Condition:786330395}    Rehab Potential (if transferring to Rehab): {Prognosis:2839587207}    Recommended Labs or Other Treatments After Discharge: ***    Physician Certification: I certify the above information and transfer of Ashley Vivas  is necessary for the continuing treatment of the diagnosis listed and that she requires {Admit to Appropriate Level of Care:83972} for {GREATER/LESS:401379448} 30 days.     Update Admission H&P: {CHP DME Changes in HandP:381806565}    PHYSICIAN SIGNATURE:  {Esignature:[id number]}

## 2025-01-06 NOTE — ACP (ADVANCE CARE PLANNING)
Advance Care Planning     Palliative Team Advance Care Planning (ACP) Conversation    Date of Conversation: 01/06/25    Individuals present for the conversation: Patient     ACP documents on file prior to discussion:  -None    Previously completed document/s not on file:  HCPOA document was completed previously but it is not available for review. This writer requested a copy of the document for patient's chart.     Healthcare Decision Maker:    Primary Decision Maker: Juan C Vivas - Spouse - [phone]    Secondary Decision Maker: KatjaLigia - Child - [phone]     Conversation Summary:  Pt was given blank advance directive forms by [employer] on 1/2/25. At that time she wanted to review them with her daughter. Follow up visit today. Pt is unsure about completing new forms, she focused visit on not wanting to use bipap, states she is claustrophobic. Support provided.     Resuscitation Status:   Code Status: DNR-CCA     Documentation Completed:  -No new documents completed.    I spent 16 minutes with the patient and/or surrogate decision maker discussing the patient's wishes and goals.      ANMOL Khan

## 2025-01-06 NOTE — CARE COORDINATION
Chart reviewed and case reviewed in IDR.  Patient remains on 9-10 L HF, requiring more oxygen with activity.  Spoke with the patient's daughter Ligia and Dr Morgan re: transition of care planning.  Patient and family are now again agreeable to transition to [facility] in [place].  Ligia stated she spoke with Juan C and got her mother's questions answered and concerns addressed.  Spoke with Juan C and patient does still meet criteria and can transition to them today.  Call placed to [facility] and spoke with Joanne to arrange transportation.  Ambulance arranged for a 4:30 pm pick-up.  Patient to transition to [address].  Bedside nurse Savi notified.  Call placed to Ligia to notify of above.  She is in agreement.  Ambulance form in the soft chart.  Will continue to follow for further transition of care planning needs.       Yasmin Katz RN.  P:  [phone]

## 2025-01-06 NOTE — DISCHARGE SUMMARY
patient's daughter regarding transition of care, family agreeable for patient to be discharged to [facility].    Patient was stable from medical standpoint to be discharged to [facility].    Physical Exam:    General appearance: No apparent distress   HEENT:  Normocephalic. Conjunctivae/corneas clear. Moist mucosa   Neck: Supple. No jugular venous distention. Thyroid not visible, non tender   Respiratory: Normal respiratory effort. Clear to auscultation bilaterally. No stridor/wheezing/rhonchi/crackles   Cardiovascular: Regular heart beats, normal S1-S2. No M/G/R  Abdomen: Non distended, soft, non tender, no visceromegaly, no mass, normal bowel sounds   Musculoskeletal: No clubbing, cyanosis, no bilateral lower extremity edema. Brisk capillary refill.   Skin:  No rashes  on visible skin  Neurologic: awake, alert Following commands. No focal neurological deficit.      Consults:   IP CONSULT TO PULMONOLOGY  PHARMACY TO DOSE VANCOMYCIN  IP CONSULT TO PALLIATIVE CARE  IP CONSULT TO SPIRITUAL SERVICES  IP CONSULT TO PSYCHIATRY  IP CONSULT TO NEUROLOGY  IP CONSULT TO ONCOLOGY    Discharge Diagnoses:  Acute on chronic hypoxic respiratory failure  Acute exacerbation of COPD  Anemia  Altered mental status secondary to delirium    Discharge Instructions / Follow up:  Follow-up with PCP within 1 week of discharge, follow-up with pulmonology as outpatient.    Future Appointments   Date Time Provider Department Center   5/20/2025  1:45 PM Juan C Wiley DO [facility] None   11/19/2025  1:45 PM Juan C Wiley DO [facility] None     Continued appropriate risk factor modification of blood pressure, diabetes and serum lipids will remain essential to reducing risk of future atherosclerotic development    Activity: activity as tolerated    Significant labs:  CBC:   Recent Labs     01/05/25  0800 01/05/25  1934 01/06/25  0644   WBC 8.8  --  9.5   RBC 1.95*  --  2.65*   HGB 5.8* 8.2* 8.0*   HCT 19.9* 26.1* 25.8*   .1*  --  97.4

## 2025-01-09 LAB
ABO/RH: NORMAL
ANTIBODY SCREEN: NEGATIVE
ARM BAND NUMBER: NORMAL
BLOOD BANK BLOOD PRODUCT EXPIRATION DATE: NORMAL
BLOOD BANK DISPENSE STATUS: NORMAL
BLOOD BANK ISBT PRODUCT BLOOD TYPE: 8400
BLOOD BANK PRODUCT CODE: NORMAL
BLOOD BANK SAMPLE EXPIRATION: NORMAL
BLOOD BANK UNIT TYPE AND RH: NORMAL
BPU ID: NORMAL
COMPONENT: NORMAL
CROSSMATCH RESULT: NORMAL
TRANSFUSION STATUS: NORMAL
UNIT DIVISION: 0
UNIT ISSUE DATE/TIME: NORMAL

## 2025-01-15 ENCOUNTER — HOSPITAL ENCOUNTER (OUTPATIENT)
Dept: CT IMAGING | Age: 75
Discharge: HOME | End: 2025-01-17
Attending: INTERNAL MEDICINE

## 2025-01-15 PROCEDURE — 70450 CT HEAD/BRAIN W/O DYE: CPT

## 2025-01-28 ENCOUNTER — APPOINTMENT (OUTPATIENT)
Dept: GENERAL RADIOLOGY | Age: 75
DRG: 189 | End: 2025-01-28
Payer: MEDICARE

## 2025-01-28 ENCOUNTER — HOSPITAL ENCOUNTER (INPATIENT)
Age: 75
LOS: 7 days | Discharge: NURSING, REHAB OR LONG-TERM CARE | DRG: 189 | End: 2025-02-04
Attending: EMERGENCY MEDICINE | Admitting: STUDENT IN AN ORGANIZED HEALTH CARE EDUCATION/TRAINING PROGRAM
Payer: MEDICARE

## 2025-01-28 ENCOUNTER — APPOINTMENT (OUTPATIENT)
Dept: CT IMAGING | Age: 75
DRG: 189 | End: 2025-01-28
Payer: MEDICARE

## 2025-01-28 DIAGNOSIS — J96.00 ACUTE RESPIRATORY FAILURE, UNSPECIFIED WHETHER WITH HYPOXIA OR HYPERCAPNIA: ICD-10-CM

## 2025-01-28 DIAGNOSIS — J44.1 COPD WITH ACUTE EXACERBATION (HCC): Primary | ICD-10-CM

## 2025-01-28 LAB
AADO2: 242.2 MMHG
ALBUMIN SERPL-MCNC: 3.7 G/DL (ref 3.5–5.2)
ALP SERPL-CCNC: 60 U/L (ref 35–104)
ALT SERPL-CCNC: 42 U/L (ref 0–32)
ANION GAP SERPL CALCULATED.3IONS-SCNC: 5 MMOL/L (ref 7–16)
AST SERPL-CCNC: 33 U/L (ref 0–31)
B.E.: 11.9 MMOL/L (ref -3–3)
B.E.: 9.3 MMOL/L (ref -3–3)
BASOPHILS # BLD: 0 K/UL (ref 0–0.2)
BASOPHILS NFR BLD: 0 % (ref 0–2)
BILIRUB SERPL-MCNC: <0.2 MG/DL (ref 0–1.2)
BNP SERPL-MCNC: 1816 PG/ML (ref 0–450)
BUN SERPL-MCNC: 16 MG/DL (ref 6–23)
CALCIUM SERPL-MCNC: 9.1 MG/DL (ref 8.6–10.2)
CHLORIDE SERPL-SCNC: 99 MMOL/L (ref 98–107)
CO2 SERPL-SCNC: 38 MMOL/L (ref 22–29)
COHB: 0.1 % (ref 0–1.5)
COHB: 1 % (ref 0–1.5)
COMMENT: ABNORMAL
CREAT SERPL-MCNC: 0.4 MG/DL (ref 0.5–1)
CRITICAL: ABNORMAL
CRITICAL: ABNORMAL
DATE ANALYZED: ABNORMAL
DATE ANALYZED: ABNORMAL
DATE OF COLLECTION: ABNORMAL
DATE OF COLLECTION: ABNORMAL
EOSINOPHIL # BLD: 0.07 K/UL (ref 0.05–0.5)
EOSINOPHILS RELATIVE PERCENT: 1 % (ref 0–6)
ERYTHROCYTE [DISTWIDTH] IN BLOOD BY AUTOMATED COUNT: 19.3 % (ref 11.5–15)
FIO2: 70 %
GFR, ESTIMATED: >90 ML/MIN/1.73M2
GLUCOSE SERPL-MCNC: 106 MG/DL (ref 74–99)
HCO3: 36.8 MMOL/L (ref 22–26)
HCO3: 38.7 MMOL/L (ref 22–26)
HCT VFR BLD AUTO: 30.3 % (ref 34–48)
HGB BLD-MCNC: 8.7 G/DL (ref 11.5–15.5)
HHB: 0.2 % (ref 0–5)
HHB: 0.6 % (ref 0–5)
LAB: ABNORMAL
LAB: ABNORMAL
LYMPHOCYTES NFR BLD: 1.28 K/UL (ref 1.5–4)
LYMPHOCYTES RELATIVE PERCENT: 16 % (ref 20–42)
Lab: 1913
Lab: 410
MCH RBC QN AUTO: 31.8 PG (ref 26–35)
MCHC RBC AUTO-ENTMCNC: 28.7 G/DL (ref 32–34.5)
MCV RBC AUTO: 110.6 FL (ref 80–99.9)
METHB: 0.2 % (ref 0–1.5)
METHB: 0.3 % (ref 0–1.5)
MODE: ABNORMAL
MODE: ABNORMAL
MONOCYTES NFR BLD: 1.15 K/UL (ref 0.1–0.95)
MONOCYTES NFR BLD: 15 % (ref 2–12)
MYELOCYTES ABSOLUTE COUNT: 0.07 K/UL
MYELOCYTES: 1 %
NEUTROPHILS NFR BLD: 68 % (ref 43–80)
NEUTS SEG NFR BLD: 5.33 K/UL (ref 1.8–7.3)
NUCLEATED RED BLOOD CELLS: 1 PER 100 WBC
O2 SATURATION: 99.4 % (ref 92–98.5)
O2 SATURATION: 99.8 % (ref 92–98.5)
O2HB: 98.5 % (ref 94–97)
O2HB: 99.1 % (ref 94–97)
OPERATOR ID: 2962
OPERATOR ID: 421
PATIENT TEMP: 37 C
PATIENT TEMP: 37 C
PCO2: 65.2 MMHG (ref 35–45)
PCO2: 70.7 MMHG (ref 35–45)
PFO2: 2.42 MMHG/%
PH BLOOD GAS: 7.33 (ref 7.35–7.45)
PH BLOOD GAS: 7.39 (ref 7.35–7.45)
PLATELET # BLD AUTO: 261 K/UL (ref 130–450)
PMV BLD AUTO: 11 FL (ref 7–12)
PO2: 169.3 MMHG (ref 75–100)
PO2: 184 MMHG (ref 75–100)
POTASSIUM SERPL-SCNC: 4.6 MMOL/L (ref 3.5–5)
POTASSIUM SERPL-SCNC: 5.2 MMOL/L (ref 3.5–5)
PROT SERPL-MCNC: 5.9 G/DL (ref 6.4–8.3)
RBC # BLD AUTO: 2.74 M/UL (ref 3.5–5.5)
RBC # BLD: ABNORMAL 10*6/UL
RI(T): 1.43
SODIUM SERPL-SCNC: 142 MMOL/L (ref 132–146)
SOURCE, BLOOD GAS: ABNORMAL
SOURCE, BLOOD GAS: ABNORMAL
THB: 9 G/DL (ref 11.5–16.5)
THB: 9.4 G/DL (ref 11.5–16.5)
TIME ANALYZED: 1919
TIME ANALYZED: 414
TROPONIN I SERPL HS-MCNC: 25 NG/L (ref 0–9)
TROPONIN I SERPL HS-MCNC: 27 NG/L (ref 0–9)
WBC OTHER # BLD: 7.9 K/UL (ref 4.5–11.5)

## 2025-01-28 PROCEDURE — 83880 ASSAY OF NATRIURETIC PEPTIDE: CPT

## 2025-01-28 PROCEDURE — 2500000003 HC RX 250 WO HCPCS: Performed by: RADIOLOGY

## 2025-01-28 PROCEDURE — 96374 THER/PROPH/DIAG INJ IV PUSH: CPT

## 2025-01-28 PROCEDURE — 71045 X-RAY EXAM CHEST 1 VIEW: CPT

## 2025-01-28 PROCEDURE — 84132 ASSAY OF SERUM POTASSIUM: CPT

## 2025-01-28 PROCEDURE — 82805 BLOOD GASES W/O2 SATURATION: CPT

## 2025-01-28 PROCEDURE — 5A09357 ASSISTANCE WITH RESPIRATORY VENTILATION, LESS THAN 24 CONSECUTIVE HOURS, CONTINUOUS POSITIVE AIRWAY PRESSURE: ICD-10-PCS | Performed by: INTERNAL MEDICINE

## 2025-01-28 PROCEDURE — 93005 ELECTROCARDIOGRAM TRACING: CPT | Performed by: EMERGENCY MEDICINE

## 2025-01-28 PROCEDURE — 5A0955A ASSISTANCE WITH RESPIRATORY VENTILATION, GREATER THAN 96 CONSECUTIVE HOURS, HIGH NASAL FLOW/VELOCITY: ICD-10-PCS | Performed by: INTERNAL MEDICINE

## 2025-01-28 PROCEDURE — 6370000000 HC RX 637 (ALT 250 FOR IP)

## 2025-01-28 PROCEDURE — 6360000004 HC RX CONTRAST MEDICATION: Performed by: RADIOLOGY

## 2025-01-28 PROCEDURE — 71275 CT ANGIOGRAPHY CHEST: CPT

## 2025-01-28 PROCEDURE — 94640 AIRWAY INHALATION TREATMENT: CPT

## 2025-01-28 PROCEDURE — 94660 CPAP INITIATION&MGMT: CPT

## 2025-01-28 PROCEDURE — 6360000002 HC RX W HCPCS

## 2025-01-28 PROCEDURE — 6370000000 HC RX 637 (ALT 250 FOR IP): Performed by: EMERGENCY MEDICINE

## 2025-01-28 PROCEDURE — 85025 COMPLETE CBC W/AUTO DIFF WBC: CPT

## 2025-01-28 PROCEDURE — 2140000000 HC CCU INTERMEDIATE R&B

## 2025-01-28 PROCEDURE — 80053 COMPREHEN METABOLIC PANEL: CPT

## 2025-01-28 PROCEDURE — 99222 1ST HOSP IP/OBS MODERATE 55: CPT

## 2025-01-28 PROCEDURE — 2500000003 HC RX 250 WO HCPCS

## 2025-01-28 PROCEDURE — 99285 EMERGENCY DEPT VISIT HI MDM: CPT

## 2025-01-28 PROCEDURE — 2700000000 HC OXYGEN THERAPY PER DAY

## 2025-01-28 PROCEDURE — 84484 ASSAY OF TROPONIN QUANT: CPT

## 2025-01-28 RX ORDER — BENZONATATE 100 MG/1
100 CAPSULE ORAL 3 TIMES DAILY PRN
Status: ON HOLD | COMMUNITY

## 2025-01-28 RX ORDER — LORAZEPAM 0.5 MG/1
0.5 TABLET ORAL EVERY 6 HOURS PRN
Status: ON HOLD | COMMUNITY
End: 2025-02-18

## 2025-01-28 RX ORDER — PANTOPRAZOLE SODIUM 40 MG/1
40 TABLET, DELAYED RELEASE ORAL
Status: DISCONTINUED | OUTPATIENT
Start: 2025-01-29 | End: 2025-02-04 | Stop reason: HOSPADM

## 2025-01-28 RX ORDER — IPRATROPIUM BROMIDE AND ALBUTEROL SULFATE 2.5; .5 MG/3ML; MG/3ML
1 SOLUTION RESPIRATORY (INHALATION)
Status: DISCONTINUED | OUTPATIENT
Start: 2025-01-28 | End: 2025-02-04 | Stop reason: HOSPADM

## 2025-01-28 RX ORDER — ACETAMINOPHEN 325 MG/1
650 TABLET ORAL EVERY 4 HOURS PRN
Status: ON HOLD | COMMUNITY

## 2025-01-28 RX ORDER — LORAZEPAM 0.5 MG/1
0.5 TABLET ORAL EVERY 6 HOURS PRN
Status: DISCONTINUED | OUTPATIENT
Start: 2025-01-28 | End: 2025-02-04 | Stop reason: HOSPADM

## 2025-01-28 RX ORDER — BUDESONIDE 0.5 MG/2ML
0.5 INHALANT ORAL
Status: DISCONTINUED | OUTPATIENT
Start: 2025-01-28 | End: 2025-02-04 | Stop reason: HOSPADM

## 2025-01-28 RX ORDER — HEPARIN SODIUM 5000 [USP'U]/ML
5000 INJECTION, SOLUTION INTRAVENOUS; SUBCUTANEOUS EVERY 8 HOURS SCHEDULED
Status: DISCONTINUED | OUTPATIENT
Start: 2025-01-28 | End: 2025-02-04 | Stop reason: HOSPADM

## 2025-01-28 RX ORDER — ALBUTEROL SULFATE 0.83 MG/ML
2.5 SOLUTION RESPIRATORY (INHALATION) EVERY 4 HOURS PRN
Status: DISCONTINUED | OUTPATIENT
Start: 2025-01-28 | End: 2025-02-04 | Stop reason: HOSPADM

## 2025-01-28 RX ORDER — ONDANSETRON 4 MG/1
4 TABLET, FILM COATED ORAL EVERY 8 HOURS PRN
Status: ON HOLD | COMMUNITY

## 2025-01-28 RX ORDER — IOPAMIDOL 755 MG/ML
75 INJECTION, SOLUTION INTRAVASCULAR
Status: COMPLETED | OUTPATIENT
Start: 2025-01-28 | End: 2025-01-28

## 2025-01-28 RX ORDER — ENOXAPARIN SODIUM 300 MG/3ML
40 INJECTION INTRAVENOUS; SUBCUTANEOUS DAILY
Status: ON HOLD | COMMUNITY
End: 2025-02-03 | Stop reason: HOSPADM

## 2025-01-28 RX ORDER — PREDNISONE 20 MG/1
20 TABLET ORAL DAILY
Status: ON HOLD | COMMUNITY
End: 2025-02-03 | Stop reason: HOSPADM

## 2025-01-28 RX ORDER — BISACODYL 10 MG
10 SUPPOSITORY, RECTAL RECTAL DAILY
Status: ON HOLD | COMMUNITY

## 2025-01-28 RX ORDER — ARFORMOTEROL TARTRATE 15 UG/2ML
15 SOLUTION RESPIRATORY (INHALATION)
Status: DISCONTINUED | OUTPATIENT
Start: 2025-01-28 | End: 2025-01-31

## 2025-01-28 RX ORDER — SODIUM CHLORIDE 0.9 % (FLUSH) 0.9 %
10 SYRINGE (ML) INJECTION PRN
Status: DISCONTINUED | OUTPATIENT
Start: 2025-01-28 | End: 2025-02-04 | Stop reason: HOSPADM

## 2025-01-28 RX ORDER — IPRATROPIUM BROMIDE AND ALBUTEROL SULFATE 2.5; .5 MG/3ML; MG/3ML
1 SOLUTION RESPIRATORY (INHALATION)
Status: COMPLETED | OUTPATIENT
Start: 2025-01-28 | End: 2025-01-28

## 2025-01-28 RX ADMIN — ARFORMOTEROL TARTRATE 15 MCG: 15 SOLUTION RESPIRATORY (INHALATION) at 14:00

## 2025-01-28 RX ADMIN — IOPAMIDOL 75 ML: 755 INJECTION, SOLUTION INTRAVENOUS at 04:34

## 2025-01-28 RX ADMIN — WATER 40 MG: 1 INJECTION INTRAMUSCULAR; INTRAVENOUS; SUBCUTANEOUS at 14:29

## 2025-01-28 RX ADMIN — BUDESONIDE INHALATION 500 MCG: 0.5 SUSPENSION RESPIRATORY (INHALATION) at 13:57

## 2025-01-28 RX ADMIN — IPRATROPIUM BROMIDE AND ALBUTEROL SULFATE 1 DOSE: 2.5; .5 SOLUTION RESPIRATORY (INHALATION) at 02:48

## 2025-01-28 RX ADMIN — IPRATROPIUM BROMIDE AND ALBUTEROL SULFATE 1 DOSE: 2.5; .5 SOLUTION RESPIRATORY (INHALATION) at 02:55

## 2025-01-28 RX ADMIN — BUDESONIDE INHALATION 500 MCG: 0.5 SUSPENSION RESPIRATORY (INHALATION) at 18:45

## 2025-01-28 RX ADMIN — IPRATROPIUM BROMIDE AND ALBUTEROL SULFATE 1 DOSE: 2.5; .5 SOLUTION RESPIRATORY (INHALATION) at 13:57

## 2025-01-28 RX ADMIN — IPRATROPIUM BROMIDE AND ALBUTEROL SULFATE 1 DOSE: 2.5; .5 SOLUTION RESPIRATORY (INHALATION) at 18:45

## 2025-01-28 RX ADMIN — ARFORMOTEROL TARTRATE 15 MCG: 15 SOLUTION RESPIRATORY (INHALATION) at 18:45

## 2025-01-28 RX ADMIN — IPRATROPIUM BROMIDE AND ALBUTEROL SULFATE 1 DOSE: 2.5; .5 SOLUTION RESPIRATORY (INHALATION) at 02:50

## 2025-01-28 RX ADMIN — Medication 10 ML: at 04:36

## 2025-01-28 RX ADMIN — SODIUM ZIRCONIUM CYCLOSILICATE 10 G: 10 POWDER, FOR SUSPENSION ORAL at 06:47

## 2025-01-28 ASSESSMENT — PAIN - FUNCTIONAL ASSESSMENT
PAIN_FUNCTIONAL_ASSESSMENT: NONE - DENIES PAIN
PAIN_FUNCTIONAL_ASSESSMENT: NONE - DENIES PAIN

## 2025-01-28 NOTE — PROGRESS NOTES
Date: 1/28/2025    Time: 5:21 AM    Patient Placed On BIPAP/CPAP/ Non-Invasive Ventilation?  Yes    If no must comment.  Facial area red/color change? No           If YES are Blister/Lesion present?No   If yes must notify nursing staff  BIPAP/CPAP skin barrier?  Yes    Skin barrier type:mepilexlite       Comments:        Annette Bateman RCP

## 2025-01-28 NOTE — ED NOTES
HPI:  1/28/25, Time: 2:34 AM DELORES Vivas is a 74 y.o. female history of cancer COPD history of hypertension history of lung cancer presenting to the ED for hypoxia, beginning 1 day ago.  The complaint has been persistent, moderate in severity, and worsened by nothing.  Patient presenting here because of reportedly having low pulse ox at facility.  Per report from EMS patient's symptoms started yesterday morning.  Patient reporting no complaints of chest pain or shortness of breath she does wear 6 L of oxygen at facility reportedly pulse ox is in the 80s for nursing staff.  Patient reporting no abdominal pain no vomiting she reports no diarrhea she reports no leg pain but she does have edema to her lower extremities.  Patient reporting no hemoptysis.  She reports no syncopal event.    ROS:   Pertinent positives and negatives are stated within HPI, all other systems reviewed and are negative.  --------------------------------------------- PAST HISTORY ---------------------------------------------  Past Medical History:  has a past medical history of Cancer (HCC), COPD (chronic obstructive pulmonary disease) (HCC), History of Holter monitoring, Hypertension, Lung cancer, lower lobe (HCC), and Lung cancer, upper lobe (HCC).    Past Surgical History:  has a past surgical history that includes Carpal tunnel release (Bilateral); Knee arthroscopy (Left); Lung biopsy (Right, 07/25/2017); skin biopsy; bronchoscopy (08/30/2017); Colonoscopy (04/01/2021); Eye surgery (Right, 11/07/2023); Breast lumpectomy (Left, 2020); and Eye surgery (Left, 12/5/2023).    Social History:  reports that she has quit smoking. Her smoking use included cigarettes. She has a 50 pack-year smoking history. She has never used smokeless tobacco. She reports that she does not drink alcohol and does not use drugs.    Family History: family history includes Cancer in her mother; Inflam Bowel Dis in her father.     The patient’s home

## 2025-01-28 NOTE — ED NOTES
Pt was on high flow nasal cannula at 9 lpm and suddenly desaturated to 88-90%. Pt was immediately switched back to BIPAP.

## 2025-01-28 NOTE — ED NOTES
Pt sitting up in chair at bedside, dyspneic and assisted back to bed. Encouraged not to get up OOB to chair.  Saturations were 82% upon return to bed.  O2 increased to 9 liters per Dr. Rizvi.  Assisted up in bed.

## 2025-01-28 NOTE — ED NOTES
Patient states her O2 is always low and we will not be able to fix it. States she wears 6-8L NC all of the time. She states she feels at her baseline and does not feel short of breath at all. She says she even went to physical therapy today and felt good.

## 2025-01-28 NOTE — ED PROVIDER NOTES
[facility] EMERGENCY DEPARTMENT  EMERGENCY DEPARTMENT ENCOUNTER        Pt Name: Aslhey Vivas  MRN: [id number]  Birthdate 1950  Date of evaluation: 1/28/2025  Provider: David Cam DO  PCP: Samina Ji MD  Note Started: 2:49 AM EST 1/28/25    CHIEF COMPLAINT       Chief Complaint   Patient presents with    Shortness of Breath     Per ecf patient was hypoxic all day for them on her 6 liters nasal cannula. Pt denies cough or shortness breath.        HISTORY OF PRESENT ILLNESS: 1 or more Elements   History From: Patient and EMS    Limitations to history : Altered Mental Status    Ashley Vivas is a 74 y.o. female with COPD, hypoxemia, lung cancer, fibrinous pleuritis, pulm paroxysmal SVT, chronic respiratory failure, COPD, who presents via EMS from nursing facility where she was stated to be hypoxic on today and refusing her BiPAP.  Upon arrival patient states she has no complaints, she believes that she was sent here as a prank.  She refuses to answer most questions would just like to speak with her daughter.      Nursing Notes were all reviewed and agreed with or any disagreements were addressed in the HPI.        REVIEW OF EXTERNAL NOTES :         Patient was seen in the pulmonology office on 11/26/2024      REVIEW OF SYSTEMS :           Positives and Pertinent negatives as per HPI.     SURGICAL HISTORY     Past Surgical History:   Procedure Laterality Date    BREAST LUMPECTOMY Left 2020    BRONCHOSCOPY  08/30/2017    With EBUS    CARPAL TUNNEL RELEASE Bilateral     COLONOSCOPY  04/01/2021    EYE SURGERY Right 11/07/2023    RIGHT EYE CATARACT EXTRACTION IOL IMPLANT performed by Lida Horvath MD at [facility] OR    EYE SURGERY Left 12/5/2023    LEFT EYE CATARACT EXTRACTION IOL IMPLANT performed by Ldia Horvath MD at [facility] OR    KNEE ARTHROSCOPY Left     LUNG BIOPSY Right 07/25/2017    SKIN BIOPSY      CANCER BASAL TIP NOSE       CURRENTMEDICATIONS   disease are progression in the interval.   4. Stable compression deformity of T12.         XR CHEST PORTABLE   Final Result   Cardiomegaly with stable patchy airspace disease in the lower lung fields   bilaterally with small bilateral pleural effusions.           No results found.    No results found.    PROCEDURES   Unless otherwise noted below, none       EMERGENCY DEPARTMENT COURSE    Vitals:    Vitals:    01/28/25 0534 01/28/25 0535 01/28/25 0559 01/28/25 0603   BP: 118/60   (!) 96/47   Pulse: 84  77 81   Resp: 17  17 21   Temp:       TempSrc:       SpO2:  99% 95% 94%   Weight:       Height:           Patient was given the following medications:  Medications   sodium chloride flush 0.9 % injection 10 mL (10 mLs IntraVENous Given 1/28/25 5106)   ipratropium 0.5 mg-albuterol 2.5 mg (DUONEB) nebulizer solution 1 Dose (1 Dose Inhalation Given 1/28/25 0255)   iopamidol (ISOVUE-370) 76 % injection 75 mL (75 mLs IntraVENous Given 1/28/25 7974)           Is this patient to be included in the SEP-1 Core Measure due to severe sepsis or septic shock?   No   Exclusion criteria - the patient is NOT to be included for SEP-1 Core Measure due to:  2+ SIRS criteria are not met        Medical Decision Making/Differential Diagnosis:    CC/HPI Summary, Social Determinants of health, Records Reviewed, DDx, testing done/not done, ED Course, Reassessment, disposition considerations/shared decision making with patient, consults, disposition:            Ashley Vivas is a 74 y.o. female who presents with complaints of hypoxia at the nursing facility where she was refusing BiPAP.  Upon arrival patient was tachypneic, hypoxic, and hypertensive.  Upon arrival patient has reduced air movement bilaterally, heart rate was regular rhythm without murmurs rubs or gallop, there is no lower extremity edema.  Abdomen was soft nontender.  Differential includes COPD, exacerbation pneumonia reactive airway disease, myocardial infarction,

## 2025-01-28 NOTE — H&P
History & Physical      PCP: Samina Ji MD    Date of Admission: 1/28/2025    Chief Complaint:  had concerns including Shortness of Breath (Per [facility] patient was hypoxic all day for them on her 6 liters nasal cannula. Pt denies cough or shortness breath. ).      ASSESSMENT:    Ashley Vivas is a 74 y.o. female patient of Samina Ji MD with history of stage IV squamous cell [facility] ([facility]), COPD on 4L O2 baseline, hypertension chronic hypoxic respiratory failure and wears 4 L of oxygen at baseline due to COPD and lung cancer presented to [facility] on 1/28/2025 with worsening shortness of breath and hypoxia refusing her BiPAP.     COPD exacerbation:  Resumed breathing treatments  Consulted pulmonology  Morning ABG showed hypercapnia  Abg ordered  PRN bipap for now  Steroids scheduled  Will try to wean oxygen    Diet: ADULT DIET; Regular; 4 carb choices (60 gm/meal)  Code Status: limited  DVT Prophylaxis: Lovenox  Disposition: []Med/Surg [x] Intermediate [] ICU/CCU  Admit status: [] Observation [x] Inpatient     Sanaz Banerjee MD    History Of Present Illness:    Ashley Vivas is a 74 y.o. female who presents with complaints of hypoxia at the nursing facility where she was refusing BiPAP.  Upon arrival patient was tachypneic, hypoxic, and hypertensive.  Upon arrival patient has reduced air movement bilaterally, heart rate was regular rhythm without murmurs rubs or gallop, there is no lower extremity edema.  Abdomen was soft nontender.  Differential includes COPD, exacerbation pneumonia reactive airway disease, myocardial infarction, pericarditis, endocarditis, upper respiratory infection patient's daughter arrived and she became much more cooperative.  ABG showed respiratory acidosis BiPAP was ordered.  Patient was noted to be hyperkalemic ordered Lokelma, and anemic.  Troponin initial was 27 pending at this time.  Chest x-ray showed bilateral small pleural  \"stroke alert\" been called?->No What reading provider will be dictating this exam?->CRC FINDINGS: BRAIN/VENTRICLES: There is no acute intracranial hemorrhage, mass effect or midline shift. No abnormal extra-axial fluid collection.  The gray-white differentiation is maintained without evidence of an acute infarct. There is prominence of the ventricles and sulci due to global parenchymal volume loss. There are nonspecific areas of hypoattenuation within the periventricular and subcortical white matter, which likely represent chronic microvascular ischemic change. There is a mild generalized cerebral atrophy with periventricular white matter changes. ORBITS: The visualized portion of the orbits demonstrate no acute abnormality. SINUSES: The visualized paranasal sinuses and mastoid air cells demonstrate no acute abnormality. SOFT TISSUES/SKULL: No acute abnormality of the visualized skull or soft tissues.     1. No acute intracranial abnormality. 2. Mild generalized cerebral atrophy with periventricular white matter changes. 3. Nonspecific white matter changes, likely representing chronic microvascular ischemic change.     XR CHEST PORTABLE    Result Date: 1/2/2025  EXAMINATION: ONE XRAY VIEW OF THE CHEST 1/2/2025 5:36 pm COMPARISON: 12/29/2024 HISTORY: ORDERING SYSTEM PROVIDED HISTORY: increased shortness of breath, hypoxemia TECHNOLOGIST PROVIDED HISTORY: Reason for exam:->increased shortness of breath, hypoxemia FINDINGS: Left lower lobe airspace opacity consistent with pneumonia..  Small right pleural effusion.  No pneumothorax.  The cardiomediastinal silhouette is without acute process. The osseous structures are without acute process. Positioning of right-sided port a catheter.     Left lower lower lobe pneumonia. Small right pleural effusion.     CT CHEST WO CONTRAST    Result Date: 12/30/2024  EXAMINATION: CT OF THE CHEST WITHOUT CONTRAST 12/29/2024 11:49 pm TECHNIQUE: CT of the chest was performed without the

## 2025-01-29 LAB
ANION GAP SERPL CALCULATED.3IONS-SCNC: 9 MMOL/L (ref 7–16)
B PARAP IS1001 DNA NPH QL NAA+NON-PROBE: NOT DETECTED
B PERT DNA SPEC QL NAA+PROBE: NOT DETECTED
BUN SERPL-MCNC: 14 MG/DL (ref 6–23)
C PNEUM DNA NPH QL NAA+NON-PROBE: NOT DETECTED
CALCIUM SERPL-MCNC: 9.1 MG/DL (ref 8.6–10.2)
CHLORIDE SERPL-SCNC: 101 MMOL/L (ref 98–107)
CO2 SERPL-SCNC: 35 MMOL/L (ref 22–29)
CREAT SERPL-MCNC: 0.3 MG/DL (ref 0.5–1)
ERYTHROCYTE [DISTWIDTH] IN BLOOD BY AUTOMATED COUNT: 19.1 % (ref 11.5–15)
FLUAV RNA NPH QL NAA+NON-PROBE: NOT DETECTED
FLUAV RNA RESP QL NAA+PROBE: NOT DETECTED
FLUBV RNA NPH QL NAA+NON-PROBE: NOT DETECTED
FLUBV RNA RESP QL NAA+PROBE: NOT DETECTED
GFR, ESTIMATED: >90 ML/MIN/1.73M2
GLUCOSE SERPL-MCNC: 123 MG/DL (ref 74–99)
HADV DNA NPH QL NAA+NON-PROBE: NOT DETECTED
HCOV 229E RNA NPH QL NAA+NON-PROBE: NOT DETECTED
HCOV HKU1 RNA NPH QL NAA+NON-PROBE: NOT DETECTED
HCOV NL63 RNA NPH QL NAA+NON-PROBE: NOT DETECTED
HCOV OC43 RNA NPH QL NAA+NON-PROBE: NOT DETECTED
HCT VFR BLD AUTO: 29.6 % (ref 34–48)
HGB BLD-MCNC: 8.9 G/DL (ref 11.5–15.5)
HMPV RNA NPH QL NAA+NON-PROBE: NOT DETECTED
HPIV1 RNA NPH QL NAA+NON-PROBE: NOT DETECTED
HPIV2 RNA NPH QL NAA+NON-PROBE: NOT DETECTED
HPIV3 RNA NPH QL NAA+NON-PROBE: NOT DETECTED
HPIV4 RNA NPH QL NAA+NON-PROBE: NOT DETECTED
M PNEUMO DNA NPH QL NAA+NON-PROBE: NOT DETECTED
MCH RBC QN AUTO: 32.1 PG (ref 26–35)
MCHC RBC AUTO-ENTMCNC: 30.1 G/DL (ref 32–34.5)
MCV RBC AUTO: 106.9 FL (ref 80–99.9)
PLATELET # BLD AUTO: 238 K/UL (ref 130–450)
PMV BLD AUTO: 10.4 FL (ref 7–12)
POTASSIUM SERPL-SCNC: 4.7 MMOL/L (ref 3.5–5)
RBC # BLD AUTO: 2.77 M/UL (ref 3.5–5.5)
RSV RNA NPH QL NAA+NON-PROBE: DETECTED
RV+EV RNA NPH QL NAA+NON-PROBE: NOT DETECTED
SARS-COV-2 RNA NPH QL NAA+NON-PROBE: NOT DETECTED
SARS-COV-2 RNA RESP QL NAA+PROBE: NOT DETECTED
SODIUM SERPL-SCNC: 145 MMOL/L (ref 132–146)
SOURCE: NORMAL
SPECIMEN DESCRIPTION: ABNORMAL
SPECIMEN DESCRIPTION: NORMAL
WBC OTHER # BLD: 7 K/UL (ref 4.5–11.5)

## 2025-01-29 PROCEDURE — 2500000003 HC RX 250 WO HCPCS

## 2025-01-29 PROCEDURE — 6360000002 HC RX W HCPCS

## 2025-01-29 PROCEDURE — 85027 COMPLETE CBC AUTOMATED: CPT

## 2025-01-29 PROCEDURE — 94640 AIRWAY INHALATION TREATMENT: CPT

## 2025-01-29 PROCEDURE — 87636 SARSCOV2 & INF A&B AMP PRB: CPT

## 2025-01-29 PROCEDURE — 80048 BASIC METABOLIC PNL TOTAL CA: CPT

## 2025-01-29 PROCEDURE — 2700000000 HC OXYGEN THERAPY PER DAY

## 2025-01-29 PROCEDURE — 6370000000 HC RX 637 (ALT 250 FOR IP)

## 2025-01-29 PROCEDURE — 99232 SBSQ HOSP IP/OBS MODERATE 35: CPT

## 2025-01-29 PROCEDURE — 36415 COLL VENOUS BLD VENIPUNCTURE: CPT

## 2025-01-29 PROCEDURE — 94660 CPAP INITIATION&MGMT: CPT

## 2025-01-29 PROCEDURE — 2500000003 HC RX 250 WO HCPCS: Performed by: RADIOLOGY

## 2025-01-29 PROCEDURE — 0202U NFCT DS 22 TRGT SARS-COV-2: CPT

## 2025-01-29 PROCEDURE — 2140000000 HC CCU INTERMEDIATE R&B

## 2025-01-29 RX ADMIN — ARFORMOTEROL TARTRATE 15 MCG: 15 SOLUTION RESPIRATORY (INHALATION) at 08:50

## 2025-01-29 RX ADMIN — HEPARIN SODIUM 5000 UNITS: 5000 INJECTION INTRAVENOUS; SUBCUTANEOUS at 20:39

## 2025-01-29 RX ADMIN — BUDESONIDE INHALATION 500 MCG: 0.5 SUSPENSION RESPIRATORY (INHALATION) at 08:50

## 2025-01-29 RX ADMIN — LORAZEPAM 0.5 MG: 0.5 TABLET ORAL at 00:39

## 2025-01-29 RX ADMIN — WATER 40 MG: 1 INJECTION INTRAMUSCULAR; INTRAVENOUS; SUBCUTANEOUS at 00:40

## 2025-01-29 RX ADMIN — BUDESONIDE INHALATION 500 MCG: 0.5 SUSPENSION RESPIRATORY (INHALATION) at 20:59

## 2025-01-29 RX ADMIN — IPRATROPIUM BROMIDE AND ALBUTEROL SULFATE 1 DOSE: 2.5; .5 SOLUTION RESPIRATORY (INHALATION) at 16:38

## 2025-01-29 RX ADMIN — ARFORMOTEROL TARTRATE 15 MCG: 15 SOLUTION RESPIRATORY (INHALATION) at 20:59

## 2025-01-29 RX ADMIN — PANTOPRAZOLE SODIUM 40 MG: 40 TABLET, DELAYED RELEASE ORAL at 10:47

## 2025-01-29 RX ADMIN — IPRATROPIUM BROMIDE AND ALBUTEROL SULFATE 1 DOSE: 2.5; .5 SOLUTION RESPIRATORY (INHALATION) at 08:50

## 2025-01-29 RX ADMIN — HEPARIN SODIUM 5000 UNITS: 5000 INJECTION INTRAVENOUS; SUBCUTANEOUS at 14:23

## 2025-01-29 RX ADMIN — IPRATROPIUM BROMIDE AND ALBUTEROL SULFATE 1 DOSE: 2.5; .5 SOLUTION RESPIRATORY (INHALATION) at 20:59

## 2025-01-29 RX ADMIN — WATER 40 MG: 1 INJECTION INTRAMUSCULAR; INTRAVENOUS; SUBCUTANEOUS at 20:40

## 2025-01-29 RX ADMIN — WATER 40 MG: 1 INJECTION INTRAMUSCULAR; INTRAVENOUS; SUBCUTANEOUS at 12:14

## 2025-01-29 RX ADMIN — IPRATROPIUM BROMIDE AND ALBUTEROL SULFATE 1 DOSE: 2.5; .5 SOLUTION RESPIRATORY (INHALATION) at 12:46

## 2025-01-29 RX ADMIN — Medication 10 ML: at 20:40

## 2025-01-29 NOTE — PROGRESS NOTES
New consult sent to pulmonology for acute hypoxic hypercapnic resp failure using answering service.  Nery ARNOLD RN

## 2025-01-29 NOTE — PROGRESS NOTES
tablet Take 1 tablet by mouth every 8 hours as needed for Nausea or Vomiting    ProviderHarper MD   predniSONE (DELTASONE) 20 MG tablet Take 1 tablet by mouth daily    Harper Harden MD   arformoterol tartrate (BROVANA) 15 MCG/2ML NEBU Take 2 mLs by nebulization in the morning and 2 mLs in the evening. 1/6/25   Naty Morgan MD   benzocaine-menthol (CEPACOL SORE THROAT) 15-3.6 MG lozenge Take 1 lozenge by mouth every 2 hours as needed for Sore Throat 1/6/25   Naty Morgan MD   budesonide (PULMICORT) 0.5 MG/2ML nebulizer suspension Take 2 mLs by nebulization in the morning and 2 mLs in the evening. 1/6/25   Naty Morgan MD   calcium carbonate (TUMS) 500 MG chewable tablet Take 1 tablet by mouth 3 times daily as needed for Heartburn 1/6/25 2/5/25  Naty Morgan MD   melatonin 3 MG TABS tablet Take 1 tablet by mouth every evening 1/6/25   Naty Morgan MD   metoprolol succinate (TOPROL XL) 50 MG extended release tablet Take 1 tablet by mouth daily 1/7/25   Naty Morgan MD   polyethylene glycol (GLYCOLAX) 17 g packet Take 1 packet by mouth daily as needed for Constipation 1/6/25 2/5/25  Naty Morgan MD   divalproex (DEPAKOTE SPRINKLE) 125 MG DR capsule Take 1 capsule by mouth every 8 hours  Patient not taking: Reported on 1/28/2025 1/6/25   Naty Morgan MD   ipratropium 0.5 mg-albuterol 2.5 mg (DUONEB) 0.5-2.5 (3) MG/3ML SOLN nebulizer solution Inhale 3 mLs into the lungs every 4 hours (while awake)  Patient not taking: Reported on 1/28/2025 12/14/24   Rose Marie Munoz MD   acetylcysteine (MUCOMYST) 10 % nebulizer solution Inhale 4 mLs into the lungs 2 times daily  Patient not taking: Reported on 1/28/2025 12/14/24   Rose Marie Munoz MD   pantoprazole (PROTONIX) 40 MG tablet Take 1 tablet by mouth every morning (before breakfast) 12/6/24   Pepe Calvert MD   fluticasone-umeclidin-vilant (TRELEGY ELLIPTA) 100-62.5-25 MCG/ACT AEPB inhaler Inhale 1 puff into the lungs daily  Patient not taking:  CONTRAST    Result Date: 12/30/2024  EXAMINATION: CT OF THE CHEST WITHOUT CONTRAST 12/29/2024 11:49 pm TECHNIQUE: CT of the chest was performed without the administration of intravenous contrast. Multiplanar reformatted images are provided for review. Automated exposure control, iterative reconstruction, and/or weight based adjustment of the mA/kV was utilized to reduce the radiation dose to as low as reasonably achievable. COMPARISON: 17 days HISTORY: ORDERING SYSTEM PROVIDED HISTORY: r/o pneumonia TECHNOLOGIST PROVIDED HISTORY: Reason for exam:->r/o pneumonia What reading provider will be dictating this exam?->CRC FINDINGS: Mediastinum: No significant change Lungs/pleura: Chronic lung disease.  Pleural and pulmonary scar underlying left breast region treatment.  Consolidated density developed in the posterior left base ow ever is now evident.  There is advanced pulmonary emphysema.  Focal density superior segment right lower lobe redemonstrated which favors scar.  Soft tissue mass density in the right upper lobe is redemonstrated without significant change.  Upper right lateral small cavitary lesion on image 37 is redemonstrated.  May represent scar but neoplasm is not excluded and attention on follow-up recommended. Upper Abdomen: No acute noncontrast organ abnormality is identified. Soft Tissues/Bones: Stable with osteoporosis and compression deformities with the latter most noted at T12     1. Developed posterior left lower lobe opacification to be compatible with pneumonia 2. Otherwise no significant change by noncontrast exam, with some redemonstrated findings as above     XR CHEST PORTABLE    Result Date: 12/29/2024  EXAMINATION: ONE XRAY VIEW OF THE CHEST 12/29/2024 3:23 pm COMPARISON: 08/30/2017 HISTORY: ORDERING SYSTEM PROVIDED HISTORY: Shortness of Breath TECHNOLOGIST PROVIDED HISTORY: Reason for exam:->Shortness of Breath FINDINGS: The lungs are without acute focal process.  There is no effusion or

## 2025-01-29 NOTE — PROGRESS NOTES
Comprehensive Nutrition Assessment    Type and Reason for Visit:  Initial, Wound, Consult    Nutrition Recommendations/Plan:   Recommend and start Ensure plus high protein supplement TID and Juan wound healing supplement BID to help meet increased nutritional needs from wound healing.           Malnutrition Assessment:  Malnutrition Status:  At risk for malnutrition (01/29/25 1028)    Context:  Acute Illness     Findings of the 6 clinical characteristics of malnutrition:  Energy Intake:  Mild decrease in energy intake (since admission)  Weight Loss:  Unable to assess (d/t limited actual weight history)     Body Fat Loss:  Unable to assess (d/t COVID rule out isolation)     Muscle Mass Loss:  Unable to assess (d/t COVID rule out isolation)    Fluid Accumulation:  No fluid accumulation     Strength:  Not Performed    Nutrition Assessment:    Patient adm from nursing facility w/ SOB ; noted COPD with acute exacerbation and acute respiratory failure ; hx of stage IV squamous cell lung cancer and breast CA (s/p lumpectomy - 2020) ; s/p chemotherapy and radiation ; hx of COPD/SVT/fibrinous pleuritis ; noted COVID-19 rule out ; Chest x-ray showed bilateral small pleural effusions ; wound noted ;  will provide recommendations    Nutrition Related Findings:    I&Os WNL, no edema, decreased appetite, wound, active BS ; Wound Type: Open Wounds, Wound Consult Pending (wound x 1 noted to sacrum)       Current Nutrition Intake & Therapies:    Average Meal Intake: Unable to assess (no meals recorded in flowsheets at this time)     ADULT DIET; Regular; 4 carb choices (60 gm/meal)  ADULT ORAL NUTRITION SUPPLEMENT; Breakfast, Lunch, Dinner; Wound Healing Oral Supplement    Anthropometric Measures:  Height: 152.4 cm (5')  Ideal Body Weight (IBW): 100 lbs (45 kg)       Current Body Weight: 51.7 kg (114 lb) (1/28, estimated ; unable to obtain bedscale weight at this time), 114 % IBW.    Current BMI (kg/m2): 22.3  Usual Body Weight:   (UTO ; poor long term actual weight history ; EMR shows past weights of 112# bedscale on 12/2/24 and 114# no method on 5/21/24)                          BMI Categories: Underweight (BMI less than 22) age over 65    Estimated Daily Nutrient Needs:  Energy Requirements Based On: Formula  Weight Used for Energy Requirements: Current  Energy (kcal/day): 6699-1206 ( x 1.2-1.3 SF)  Weight Used for Protein Requirements: Current  Protein (g/day): 65-80 (1.3-1.5g/kg CBW)  Method Used for Fluid Requirements: 1 ml/kcal  Fluid (ml/day): 6342-7257    Nutrition Diagnosis:   Increased nutrient needs related to increase demand for energy/nutrients as evidenced by wounds    Nutrition Interventions:   Food and/or Nutrient Delivery: Continue Current Diet, Modify Oral Nutrition Supplement  Nutrition Education/Counseling: Education/Counseling not indicated  Coordination of Nutrition Care: Continue to monitor while inpatient       Goals:  Goals: Meet at least 75% of estimated needs, by next RD assessment  Type of Goal: New goal  Previous Goal Met: New Goal    Nutrition Monitoring and Evaluation:   Behavioral-Environmental Outcomes: None Identified  Food/Nutrient Intake Outcomes: Food and Nutrient Intake, Supplement Intake  Physical Signs/Symptoms Outcomes: Biochemical Data, Chewing or Swallowing, GI Status, Fluid Status or Edema, Hemodynamic Status, Meal Time Behavior, Skin, Weight, Nutrition Focused Physical Findings    Discharge Planning:    No discharge needs at this time     Dewey Helm, LIMA, LD  Contact: [phone]

## 2025-01-29 NOTE — ED NOTES
Patient found sitting in a chair with monitor off. Patient educated on fall risk. Patient brought back to bed with 2 RN.

## 2025-01-29 NOTE — ED NOTES
Pt has a Hx of COPD, lung cancer and breast cancer. Pt lives at home with her . Pt is on supplemental oxygen at home at 4 lpm via nasal cannula. Pt ambulates without assistive devices. Pt was brought to ED with SOB.

## 2025-01-29 NOTE — PROGRESS NOTES
4 Eyes Skin Assessment     NAME:  Ashley Vivas  YOB: 1950  MEDICAL RECORD NUMBER:  [id number]    The patient is being assessed for  Admission    I agree that at least one RN has performed a thorough Head to Toe Skin Assessment on the patient. ALL assessment sites listed below have been assessed.      Areas assessed by both nurses:    Head, Face, Ears, Shoulders, Back, Chest, Arms, Elbows, Hands, Sacrum. Buttock, Coccyx, Ischium, Legs. Feet and Heels, and Under Medical Devices         Does the Patient have a Wound? Yes wound(s) were present on assessment. LDA wound assessment was Initiated and completed by RN  Blanchable heels- boggy, dry --> bilateral heel medix boots on  Wound- nonblanchable area on bottom  Healed area in bottom  Ecchymosis on abdomen       Clayton Prevention initiated by RN: Yes  Wound Care Orders initiated by RN: Yes    Pressure Injury (Stage 3,4, Unstageable, DTI, NWPT, and Complex wounds) if present, place Wound referral order by RN under : Yes    New Ostomies, if present place, Ostomy referral order under : No     Nurse 1 eSignature: Electronically signed by MAC CARLOS RN on 1/29/25 at 8:04 AM EST    **SHARE this note so that the co-signing nurse can place an eSignature**    Nurse 2 eSignature: Electronically signed by Nery ARNOLD RN on 1/29/25 at 1:01 PM EST

## 2025-01-30 LAB
ANION GAP SERPL CALCULATED.3IONS-SCNC: 8 MMOL/L (ref 7–16)
BUN SERPL-MCNC: 23 MG/DL (ref 6–23)
CALCIUM SERPL-MCNC: 9.6 MG/DL (ref 8.6–10.2)
CHLORIDE SERPL-SCNC: 98 MMOL/L (ref 98–107)
CO2 SERPL-SCNC: 34 MMOL/L (ref 22–29)
CREAT SERPL-MCNC: 0.4 MG/DL (ref 0.5–1)
CRITICAL ACTION: NORMAL
CRITICAL NOTIFICATION DATE/TIME: NORMAL
CRITICAL NOTIFICATION: NORMAL
CRITICAL VALUE READ BACK: YES
ERYTHROCYTE [DISTWIDTH] IN BLOOD BY AUTOMATED COUNT: 18.8 % (ref 11.5–15)
FIO2: 50
GFR, ESTIMATED: >90 ML/MIN/1.73M2
GLUCOSE SERPL-MCNC: 119 MG/DL (ref 74–99)
HCT VFR BLD AUTO: 29.6 % (ref 34–48)
HGB BLD-MCNC: 8.8 G/DL (ref 11.5–15.5)
MCH RBC QN AUTO: 31.9 PG (ref 26–35)
MCHC RBC AUTO-ENTMCNC: 29.7 G/DL (ref 32–34.5)
MCV RBC AUTO: 107.2 FL (ref 80–99.9)
O2 DELIVERY DEVICE: ABNORMAL
PLATELET # BLD AUTO: 253 K/UL (ref 130–450)
PMV BLD AUTO: 10.9 FL (ref 7–12)
POC HCO3: 41.9 MMOL/L (ref 22–26)
POC O2 SATURATION: 96.2 % (ref 92–98.5)
POC PCO2: 69.5 MM HG (ref 35–45)
POC PH: 7.39 (ref 7.35–7.45)
POC PO2: 88.6 MM HG (ref 60–80)
POSITIVE BASE EXCESS, ART: 14.8 MMOL/L (ref 0–3)
POTASSIUM SERPL-SCNC: 5.1 MMOL/L (ref 3.5–5)
RBC # BLD AUTO: 2.76 M/UL (ref 3.5–5.5)
SODIUM SERPL-SCNC: 140 MMOL/L (ref 132–146)
WBC OTHER # BLD: 7.5 K/UL (ref 4.5–11.5)

## 2025-01-30 PROCEDURE — 36415 COLL VENOUS BLD VENIPUNCTURE: CPT

## 2025-01-30 PROCEDURE — 94640 AIRWAY INHALATION TREATMENT: CPT

## 2025-01-30 PROCEDURE — 2500000003 HC RX 250 WO HCPCS

## 2025-01-30 PROCEDURE — 99232 SBSQ HOSP IP/OBS MODERATE 35: CPT

## 2025-01-30 PROCEDURE — 99222 1ST HOSP IP/OBS MODERATE 55: CPT

## 2025-01-30 PROCEDURE — 85027 COMPLETE CBC AUTOMATED: CPT

## 2025-01-30 PROCEDURE — 6360000002 HC RX W HCPCS

## 2025-01-30 PROCEDURE — 80048 BASIC METABOLIC PNL TOTAL CA: CPT

## 2025-01-30 PROCEDURE — 6370000000 HC RX 637 (ALT 250 FOR IP)

## 2025-01-30 PROCEDURE — 94660 CPAP INITIATION&MGMT: CPT

## 2025-01-30 PROCEDURE — 2700000000 HC OXYGEN THERAPY PER DAY

## 2025-01-30 PROCEDURE — 82803 BLOOD GASES ANY COMBINATION: CPT

## 2025-01-30 PROCEDURE — 2140000000 HC CCU INTERMEDIATE R&B

## 2025-01-30 PROCEDURE — 2500000003 HC RX 250 WO HCPCS: Performed by: RADIOLOGY

## 2025-01-30 PROCEDURE — 36600 WITHDRAWAL OF ARTERIAL BLOOD: CPT

## 2025-01-30 PROCEDURE — 99232 SBSQ HOSP IP/OBS MODERATE 35: CPT | Performed by: INTERNAL MEDICINE

## 2025-01-30 RX ORDER — ACETYLCYSTEINE 200 MG/ML
600 SOLUTION ORAL; RESPIRATORY (INHALATION)
Status: DISCONTINUED | OUTPATIENT
Start: 2025-01-30 | End: 2025-02-04 | Stop reason: HOSPADM

## 2025-01-30 RX ORDER — MORPHINE SULFATE 2 MG/ML
1 INJECTION, SOLUTION INTRAMUSCULAR; INTRAVENOUS EVERY 4 HOURS PRN
Status: DISCONTINUED | OUTPATIENT
Start: 2025-01-30 | End: 2025-02-04 | Stop reason: HOSPADM

## 2025-01-30 RX ORDER — DIVALPROEX SODIUM 125 MG/1
125 CAPSULE, COATED PELLETS ORAL EVERY 8 HOURS SCHEDULED
Status: DISCONTINUED | OUTPATIENT
Start: 2025-01-30 | End: 2025-02-04 | Stop reason: HOSPADM

## 2025-01-30 RX ORDER — METOPROLOL SUCCINATE 50 MG/1
50 TABLET, EXTENDED RELEASE ORAL DAILY
Status: DISCONTINUED | OUTPATIENT
Start: 2025-01-30 | End: 2025-02-04 | Stop reason: HOSPADM

## 2025-01-30 RX ADMIN — WATER 40 MG: 1 INJECTION INTRAMUSCULAR; INTRAVENOUS; SUBCUTANEOUS at 20:18

## 2025-01-30 RX ADMIN — WATER 40 MG: 1 INJECTION INTRAMUSCULAR; INTRAVENOUS; SUBCUTANEOUS at 12:08

## 2025-01-30 RX ADMIN — IPRATROPIUM BROMIDE AND ALBUTEROL SULFATE 1 DOSE: 2.5; .5 SOLUTION RESPIRATORY (INHALATION) at 10:56

## 2025-01-30 RX ADMIN — METOPROLOL SUCCINATE 50 MG: 50 TABLET, EXTENDED RELEASE ORAL at 10:40

## 2025-01-30 RX ADMIN — ACETYLCYSTEINE 600 MG: 200 SOLUTION ORAL; RESPIRATORY (INHALATION) at 20:40

## 2025-01-30 RX ADMIN — ARFORMOTEROL TARTRATE 15 MCG: 15 SOLUTION RESPIRATORY (INHALATION) at 20:40

## 2025-01-30 RX ADMIN — IPRATROPIUM BROMIDE AND ALBUTEROL SULFATE 1 DOSE: 2.5; .5 SOLUTION RESPIRATORY (INHALATION) at 07:50

## 2025-01-30 RX ADMIN — BUDESONIDE INHALATION 500 MCG: 0.5 SUSPENSION RESPIRATORY (INHALATION) at 20:40

## 2025-01-30 RX ADMIN — SODIUM ZIRCONIUM CYCLOSILICATE 10 G: 10 POWDER, FOR SUSPENSION ORAL at 12:07

## 2025-01-30 RX ADMIN — IPRATROPIUM BROMIDE AND ALBUTEROL SULFATE 1 DOSE: 2.5; .5 SOLUTION RESPIRATORY (INHALATION) at 20:40

## 2025-01-30 RX ADMIN — IPRATROPIUM BROMIDE AND ALBUTEROL SULFATE 1 DOSE: 2.5; .5 SOLUTION RESPIRATORY (INHALATION) at 14:49

## 2025-01-30 RX ADMIN — PANTOPRAZOLE SODIUM 40 MG: 40 TABLET, DELAYED RELEASE ORAL at 05:36

## 2025-01-30 RX ADMIN — HEPARIN SODIUM 5000 UNITS: 5000 INJECTION INTRAVENOUS; SUBCUTANEOUS at 05:36

## 2025-01-30 RX ADMIN — HEPARIN SODIUM 5000 UNITS: 5000 INJECTION INTRAVENOUS; SUBCUTANEOUS at 15:42

## 2025-01-30 RX ADMIN — ARFORMOTEROL TARTRATE 15 MCG: 15 SOLUTION RESPIRATORY (INHALATION) at 07:50

## 2025-01-30 RX ADMIN — HEPARIN SODIUM 5000 UNITS: 5000 INJECTION INTRAVENOUS; SUBCUTANEOUS at 22:40

## 2025-01-30 RX ADMIN — ACETYLCYSTEINE 600 MG: 200 SOLUTION ORAL; RESPIRATORY (INHALATION) at 10:56

## 2025-01-30 RX ADMIN — Medication 10 ML: at 20:19

## 2025-01-30 RX ADMIN — BUDESONIDE INHALATION 500 MCG: 0.5 SUSPENSION RESPIRATORY (INHALATION) at 07:50

## 2025-01-30 NOTE — PROGRESS NOTES
Spiritual Health History and Assessment/Progress Note  [facility]    (P) Initial Encounter, Spiritual/Emotional Needs,  , (P) New Diagnosis,      Name: Ashley Vivas MRN: [id number]    Age: 74 y.o.     Sex: female   Language: English   Anglican: Scientologist   COPD exacerbation (HCC)     Date: 1/30/2025                           Spiritual Assessment began in [facility]        Referral/Consult From: (P) Other (comment), Rounding ()   Encounter Overview/Reason: (P) Initial Encounter, Spiritual/Emotional Needs  Service Provided For: (P) Patient    Pauly, Belief, Meaning:   Patient identifies as spiritual, is connected with a pauly tradition or spiritual practice, and has beliefs or practices that help with coping during difficult times  Family/Friends No family/friends present      Importance and Influence:  Patient has spiritual/personal beliefs that influence decisions regarding their health  Family/Friends No family/friends present    Community:  Patient is connected with a spiritual community and feels well-supported. Support system includes: Spouse/Partner, Children, Pauly Community, and Extended family  Family/Friends No family/friends present    Assessment and Plan of Care:     Patient Interventions include: Facilitated expression of thoughts and feelings, Explored spiritual coping/struggle/distress, Engaged in theological reflection, Affirmed coping skills/support systems, and Facilitated life review and/ or legacy  Family/Friends Interventions include: No family/friends present    Patient Plan of Care: Other: The  will continue to follow as needed.  Family/Friends Plan of Care: Other: The   will continue to follow as needed.    Electronically signed by Chaplain Zoë on 1/30/2025 at 10:56 AM

## 2025-01-30 NOTE — PROGRESS NOTES
01/30/25 1449   NIV Type   NIV Started/Stopped (S)  Off  (patient requested off niv and placed on airvo 50l/50%)   Assessment   Pulse 82   Respirations 24   SpO2 94 %   Settings/Measurements   O2 Flow Rate (L/min) 50 L/min   FiO2  50 %

## 2025-01-30 NOTE — PROGRESS NOTES
Dr. Banerjee notified of ABG results. Patient placed on bipap by respiratory therapist at this time.

## 2025-01-30 NOTE — ACP (ADVANCE CARE PLANNING)
Advance Care Planning   Healthcare Decision Maker:    Primary Decision Maker: Juan C Vivas - Spouse - [phone]    Secondary Decision Maker: Ligia Hightower - Child - [phone]    Click here to complete Healthcare Decision Makers including selection of the Healthcare Decision Maker Relationship (ie \"Primary\").  Today we documented Decision Maker(s) consistent with Legal Next of Kin hierarchy.       Electronically signed by Erin Matthews RN on 1/30/2025 at 10:14 AM

## 2025-01-30 NOTE — CONSULTS
Palliative Care Department  [phone]  Palliative Care Initial Consult  Provider Shweta Mcdowell, APRN - CNP     Ashley Vivas  [id number]  Hospital Day: 3  Date of Initial Consult: 1/30/25  Referring Provider: Sanaz Banerjee MD   Palliative Medicine was consulted for assistance with: Goals of Care    HPI:   Ashley Vivas is a 74 y.o. with a medical history of stage IV squamous cell [facility] ([facility]), COPD on 4L O2 baseline, hypertension who was admitted on 1/28/2025 from nursing facility with a CHIEF COMPLAINT of shortness of breath.  She wears 4 L of O2 at baseline due to COPD and lung cancer, but has been requiring increased oxygen up to 6 L at nursing facility and still hypoxic.  She recently finished Keytruda treatment in November.  Workup in ER reveals BNP 1816, CXR with cardiomegaly and small bilateral pleural effusions, CTA negative for PE, stable nodular densities, chronic emphysema changes.  RSV +. ABG 7.33, CO2 70.7, HCO3 36.8, placed on BiPAP.  Admitted for COPD exacerbation.  Pulmonology consulted, as needed BiPAP.  Palliative medicine consulted for goals of care.    ASSESSMENT/PLAN:     Pertinent Hospital Diagnoses     Acute on chronic hypoxic respiratory failure  COPD exacerbation  RSV+  Stage IV squamous cell lung CA      Palliative Care Encounter / Counseling Regarding Goals of Care  Please see detailed goals of care discussion as below  At this time, Ashley Vivas, Does have capacity for medical decision-making.  Capacity is time limited and situation/question specific  During encounter no surrogate medical decision-maker  Outcome of goals of care meeting:   Change CODE STATUS to limited (DNR-CCA/DNI)  Continue current medical management  Code status Limited (DNR-CCA/DNI)  Advanced Directives: no POA or living will in [facility]  Surrogate/Legal NOK:  Juan C Vivas (spouse) [phone]  Ligia Hightower (child) [phone]      Spiritual assessment: no spiritual distress

## 2025-01-30 NOTE — PLAN OF CARE
Problem: Discharge Planning  Goal: Discharge to home or other facility with appropriate resources  Outcome: Progressing  Flowsheets (Taken 1/29/2025 2038)  Discharge to home or other facility with appropriate resources: Identify barriers to discharge with patient and caregiver     Problem: Nutrition Deficit:  Goal: Optimize nutritional status  Outcome: Progressing     Problem: Safety - Adult  Goal: Free from fall injury  Outcome: Progressing

## 2025-01-30 NOTE — CONSULTS
[facility]  Department of Internal Medicine  Division of Pulmonary, Critical Care and Sleep Medicine  Consult Note    Patient: Ashley Vivas  MRN: [id number]  : 1950    Encounter Time: 2:45 PM     Date of Admission: 2025  2:50 AM    Primary Care Physician: Samina Ji MD    Reason for Consultation: RSV pneumonia      HISTORY OF PRESENT ILLNESS : Ashley Vivas 74 y.o. female was seen in consultation regarding the above chief compliant.    Ashley Vivas is a 74 y.o. female patient of Samina Ji MD with history of stage IV squamous cell [facility] ([facility]), COPD on 4L O2 baseline, hypertension chronic hypoxic respiratory failure and wears 4 L of oxygen at baseline due to COPD and lung cancer presented to [facility] on 2025 with worsening shortness of breath at the nursing facility where she was refusing BiPAP.  ABG showed respiratory acidosis BiPAP was ordered. Patient was noted to be hyperkalemic ordered Lokelma, and anemic. Troponin initial was 27 pending at this time. Chest x-ray showed bilateral small pleural effusions. CT showed above finding without acute evidence of pulmonary disease changes were chronic. and hypoxia refusing her BiPAP.  RSV +on panel. Recent admission     PAST MEDICAL HISTORY:  has a past medical history of Cancer (HCC), COPD (chronic obstructive pulmonary disease) (HCC), History of Holter monitoring, Hypertension, Lung cancer, lower lobe (HCC), and Lung cancer, upper lobe (HCC).    SURGICAL HISTORY:  has a past surgical history that includes Carpal tunnel release (Bilateral); Knee arthroscopy (Left); Lung biopsy (Right, 2017); skin biopsy; bronchoscopy (2017); Colonoscopy (2021); Eye surgery (Right, 2023); Breast lumpectomy (Left, ); and Eye surgery (Left, 2023).     SOCIAL HISTORY:  reports that she has quit smoking. Her smoking use included cigarettes. She

## 2025-01-30 NOTE — ACP (ADVANCE CARE PLANNING)
1/30:  Transition of care: Pt presented to the Er for SOb from [facility].  Pt uis on 50 L/Airvo, Iv Morphine PRN, Iv Solu-medrol & SQ Heparin.  Pulmonary & Palliative consulted.  Pt is in ISO-Contact/Droplet for RSV.  CM spoke with both pt & her daughter to discuss CM role & dc planning.  Per pt she is interested to return to [facility].  Per daughter if [facility] not available would look at  [facility].  CM sent referral & left vmail for [facility].  Will need to verify pt's Medicare days.  Sw/CM will continue to follow.   Electronically signed by Erin Matthews RN on 1/30/2025 at 10:12 AM   Case Management Assessment  Initial Evaluation    Date/Time of Evaluation: 1/30/2025 10:13 AM  Assessment Completed by: Erin Matthews RN    If patient is discharged prior to next notation, then this note serves as note for discharge by case management.    Patient Name: Ashley iVvas                   YOB: 1950  Diagnosis: COPD exacerbation (HCC) [J44.1];COPD with acute exacerbation (HCC) [J44.1];Acute respiratory failure, unspecified whether with hypoxia or hypercapnia [J96.00]                   Date / Time: 1/28/2025  2:50 AM    Patient Admission Status: Inpatient   Readmission Risk (Low < 19, Mod (19-27), High > 27): Readmission Risk Score: 28.5    Current PCP: Samina Ji MD  PCP verified by CM? Yes    Chart Reviewed: Yes      History Provided by: Patient, Child/Family  Patient Orientation: Alert and Oriented, Person, Place, Situation, Self    Patient Cognition: Alert    Hospitalization in the last 30 days (Readmission):  Yes    If yes, Readmission Assessment in  Navigator will be completed.    Advance Directives:      Code Status: Limited   Patient's Primary Decision Maker is:      Primary Decision Maker: Juan C Vivas - Spouse - [phone]    Secondary Decision Maker: Ligia Hightower - Child - [phone]    Discharge Planning:    Patient lives with: Spouse/Significant Other Type

## 2025-01-30 NOTE — PROGRESS NOTES
in the left mastoid air cells. SOFT TISSUES/SKULL:  No acute abnormality of the visualized skull or soft tissues.     1. No acute intracranial abnormality. 2. Mild periventricular white matter changes consistent with chronic microvascular disease. 3. Fluid in the left mastoid air cells.     CT HEAD WO CONTRAST    Result Date: 1/15/2025  EXAMINATION: CT OF THE HEAD WITHOUT CONTRAST  1/15/2025 11:30 am TECHNIQUE: CT of the head was performed without the administration of intravenous contrast. Automated exposure control, iterative reconstruction, and/or weight based adjustment of the mA/kV was utilized to reduce the radiation dose to as low as reasonably achievable. COMPARISON: None.  01/05/2025 ORDERING SYSTEM PROVIDED HISTORY: FALLING TECHNOLOGIST PROVIDED HISTORY: Reason for Exam:->Fall, Head injury What is the patient's sedation requirement?->No Sedation Height:152.4cm Weight:50.803kg Reason for exam:->fall Has a \"code stroke\" or \"stroke alert\" been called?->No FINDINGS: BRAIN/VENTRICLES: There is no acute intracranial hemorrhage, mass effect or midline shift.  No abnormal extra-axial fluid collection.  The gray-white differentiation is maintained without evidence of an acute infarct.  There is no evidence of hydrocephalus.   Mild periventricular white matter changes consistent chronic microvascular disease.  Is ORBITS: The visualized portion of the orbits demonstrate no acute abnormality. SINUSES: The visualized paranasal sinuses and mastoid air cells normally aerated.  Fluid present in the left mastoid air cells. SOFT TISSUES/SKULL:  No acute abnormality of the visualized skull or soft tissues.    IMPRESSION: 1. No acute intracranial abnormality. 2. Mild periventricular white matter changes consistent with chronic microvascular disease. 3. Fluid in the left mastoid air cells. Interpreted by: Jimmy Fischer MD Signed by: Jimmy Fischer MD 1/15/25 Final result    CT HEAD WO CONTRAST    Result Date:  PORTABLE    Result Date: 12/29/2024  EXAMINATION: ONE XRAY VIEW OF THE CHEST 12/29/2024 3:23 pm COMPARISON: 08/30/2017 HISTORY: ORDERING SYSTEM PROVIDED HISTORY: Shortness of Breath TECHNOLOGIST PROVIDED HISTORY: Reason for exam:->Shortness of Breath FINDINGS: The lungs are without acute focal process.  There is no effusion or pneumothorax. The cardiomediastinal silhouette is without acute process. The osseous structures are without acute process.  Right-sided port a catheter tip in the mid SVC.     No acute process.         Reviewed Labs, EKG and Imaging personally    +++++++++++++++++++++++++++++++++++++++++++++++++  Sanaz Banerjee MD    [place], [place]  +++++++++++++++++++++++++++++++++++++++++++++++++  NOTE: This report was transcribed using voice recognition software. Every effort was made to ensure accuracy; however, inadvertent computerized transcription errors may be present.

## 2025-01-30 NOTE — PROGRESS NOTES
01/30/25 1220   NIV Type   NIV Started/Stopped On   Equipment Type v60   Mode Bilevel   Mask Type Full face mask   Mask Size Small   Assessment   Pulse 86   Heart Rate Source Monitor   Respirations 18   SpO2 96 %   Level of Consciousness 0   Comfort Level Good   Using Accessory Muscles No   Mask Compliance Good   Skin Assessment Clean, dry, & intact   Settings/Measurements   PIP Observed 14 cm H20   IPAP 14 cmH20   CPAP/EPAP 5 cmH2O   Vt (Measured) 401 mL   Rate Ordered 16   Insp Rise Time (%) 2 %   FiO2  50 %   I Time/ I Time % 0.75 s   Minute Volume (L/min) 6.2 Liters   Mask Leak (lpm) 34 lpm   Patient's Home Machine No   Alarm Settings   Alarms On Y   Low Pressure (cmH2O) 8 cmH2O   High Pressure (cmH2O) 30 cmH2O   RR Low (bpm) 16   RR High (bpm) 35 br/min

## 2025-01-30 NOTE — PLAN OF CARE
Problem: Discharge Planning  Goal: Discharge to home or other facility with appropriate resources  Outcome: Progressing     Problem: Nutrition Deficit:  Goal: Optimize nutritional status  Outcome: Progressing     Problem: Safety - Adult  Goal: Free from fall injury  Outcome: Progressing

## 2025-01-31 LAB
ANION GAP SERPL CALCULATED.3IONS-SCNC: 7 MMOL/L (ref 7–16)
BUN SERPL-MCNC: 28 MG/DL (ref 6–23)
CALCIUM SERPL-MCNC: 8.9 MG/DL (ref 8.6–10.2)
CHLORIDE SERPL-SCNC: 101 MMOL/L (ref 98–107)
CO2 SERPL-SCNC: 37 MMOL/L (ref 22–29)
CREAT SERPL-MCNC: 0.4 MG/DL (ref 0.5–1)
EKG ATRIAL RATE: 91 BPM
EKG P AXIS: 68 DEGREES
EKG P-R INTERVAL: 130 MS
EKG Q-T INTERVAL: 338 MS
EKG QRS DURATION: 72 MS
EKG QTC CALCULATION (BAZETT): 415 MS
EKG R AXIS: 91 DEGREES
EKG T AXIS: 44 DEGREES
EKG VENTRICULAR RATE: 91 BPM
ERYTHROCYTE [DISTWIDTH] IN BLOOD BY AUTOMATED COUNT: 19.1 % (ref 11.5–15)
GFR, ESTIMATED: >90 ML/MIN/1.73M2
GLUCOSE SERPL-MCNC: 166 MG/DL (ref 74–99)
HCT VFR BLD AUTO: 27.2 % (ref 34–48)
HGB BLD-MCNC: 8.1 G/DL (ref 11.5–15.5)
MCH RBC QN AUTO: 31.9 PG (ref 26–35)
MCHC RBC AUTO-ENTMCNC: 29.8 G/DL (ref 32–34.5)
MCV RBC AUTO: 107.1 FL (ref 80–99.9)
PLATELET # BLD AUTO: 232 K/UL (ref 130–450)
PMV BLD AUTO: 11.2 FL (ref 7–12)
POTASSIUM SERPL-SCNC: 4.6 MMOL/L (ref 3.5–5)
RBC # BLD AUTO: 2.54 M/UL (ref 3.5–5.5)
SODIUM SERPL-SCNC: 145 MMOL/L (ref 132–146)
WBC OTHER # BLD: 7.5 K/UL (ref 4.5–11.5)

## 2025-01-31 PROCEDURE — 99232 SBSQ HOSP IP/OBS MODERATE 35: CPT | Performed by: INTERNAL MEDICINE

## 2025-01-31 PROCEDURE — 6360000002 HC RX W HCPCS

## 2025-01-31 PROCEDURE — 6360000002 HC RX W HCPCS: Performed by: INTERNAL MEDICINE

## 2025-01-31 PROCEDURE — 85027 COMPLETE CBC AUTOMATED: CPT

## 2025-01-31 PROCEDURE — 36415 COLL VENOUS BLD VENIPUNCTURE: CPT

## 2025-01-31 PROCEDURE — 2140000000 HC CCU INTERMEDIATE R&B

## 2025-01-31 PROCEDURE — 99231 SBSQ HOSP IP/OBS SF/LOW 25: CPT

## 2025-01-31 PROCEDURE — 94660 CPAP INITIATION&MGMT: CPT

## 2025-01-31 PROCEDURE — 93010 ELECTROCARDIOGRAM REPORT: CPT | Performed by: INTERNAL MEDICINE

## 2025-01-31 PROCEDURE — 80048 BASIC METABOLIC PNL TOTAL CA: CPT

## 2025-01-31 PROCEDURE — 6370000000 HC RX 637 (ALT 250 FOR IP)

## 2025-01-31 PROCEDURE — 2500000003 HC RX 250 WO HCPCS

## 2025-01-31 PROCEDURE — 94640 AIRWAY INHALATION TREATMENT: CPT

## 2025-01-31 PROCEDURE — 2700000000 HC OXYGEN THERAPY PER DAY

## 2025-01-31 RX ORDER — METHYLPREDNISOLONE 4 MG/1
4 TABLET ORAL
Status: COMPLETED | OUTPATIENT
Start: 2025-02-01 | End: 2025-02-03

## 2025-01-31 RX ORDER — METHYLPREDNISOLONE 4 MG/1
4 TABLET ORAL
Status: DISCONTINUED | OUTPATIENT
Start: 2025-02-01 | End: 2025-02-04 | Stop reason: HOSPADM

## 2025-01-31 RX ORDER — METHYLPREDNISOLONE 4 MG/1
24 TABLET ORAL ONCE
Status: COMPLETED | OUTPATIENT
Start: 2025-01-31 | End: 2025-01-31

## 2025-01-31 RX ORDER — METHYLPREDNISOLONE 4 MG/1
8 TABLET ORAL NIGHTLY
Status: COMPLETED | OUTPATIENT
Start: 2025-02-01 | End: 2025-02-01

## 2025-01-31 RX ORDER — METHYLPREDNISOLONE 4 MG/1
4 TABLET ORAL
Status: COMPLETED | OUTPATIENT
Start: 2025-02-01 | End: 2025-02-02

## 2025-01-31 RX ORDER — METHYLPREDNISOLONE 4 MG/1
4 TABLET ORAL NIGHTLY
Status: DISCONTINUED | OUTPATIENT
Start: 2025-02-02 | End: 2025-02-04 | Stop reason: HOSPADM

## 2025-01-31 RX ADMIN — ACETYLCYSTEINE 600 MG: 200 SOLUTION ORAL; RESPIRATORY (INHALATION) at 07:55

## 2025-01-31 RX ADMIN — METOPROLOL SUCCINATE 50 MG: 50 TABLET, EXTENDED RELEASE ORAL at 07:57

## 2025-01-31 RX ADMIN — HEPARIN SODIUM 5000 UNITS: 5000 INJECTION INTRAVENOUS; SUBCUTANEOUS at 14:22

## 2025-01-31 RX ADMIN — BUDESONIDE INHALATION 500 MCG: 0.5 SUSPENSION RESPIRATORY (INHALATION) at 07:54

## 2025-01-31 RX ADMIN — IPRATROPIUM BROMIDE AND ALBUTEROL SULFATE 1 DOSE: 2.5; .5 SOLUTION RESPIRATORY (INHALATION) at 15:15

## 2025-01-31 RX ADMIN — ARFORMOTEROL TARTRATE 15 MCG: 15 SOLUTION RESPIRATORY (INHALATION) at 07:54

## 2025-01-31 RX ADMIN — METHYLPREDNISOLONE 24 MG: 4 TABLET ORAL at 17:40

## 2025-01-31 RX ADMIN — WATER 40 MG: 1 INJECTION INTRAMUSCULAR; INTRAVENOUS; SUBCUTANEOUS at 05:11

## 2025-01-31 RX ADMIN — ACETYLCYSTEINE 600 MG: 200 SOLUTION ORAL; RESPIRATORY (INHALATION) at 19:21

## 2025-01-31 RX ADMIN — PANTOPRAZOLE SODIUM 40 MG: 40 TABLET, DELAYED RELEASE ORAL at 05:10

## 2025-01-31 RX ADMIN — BUDESONIDE INHALATION 500 MCG: 0.5 SUSPENSION RESPIRATORY (INHALATION) at 19:21

## 2025-01-31 RX ADMIN — WATER 40 MG: 1 INJECTION INTRAMUSCULAR; INTRAVENOUS; SUBCUTANEOUS at 14:22

## 2025-01-31 RX ADMIN — HEPARIN SODIUM 5000 UNITS: 5000 INJECTION INTRAVENOUS; SUBCUTANEOUS at 05:10

## 2025-01-31 RX ADMIN — IPRATROPIUM BROMIDE AND ALBUTEROL SULFATE 1 DOSE: 2.5; .5 SOLUTION RESPIRATORY (INHALATION) at 19:21

## 2025-01-31 RX ADMIN — IPRATROPIUM BROMIDE AND ALBUTEROL SULFATE 1 DOSE: 2.5; .5 SOLUTION RESPIRATORY (INHALATION) at 07:54

## 2025-01-31 RX ADMIN — IPRATROPIUM BROMIDE AND ALBUTEROL SULFATE 1 DOSE: 2.5; .5 SOLUTION RESPIRATORY (INHALATION) at 11:23

## 2025-01-31 NOTE — PROGRESS NOTES
[facility]  Department of Internal Medicine  Division of Pulmonary, Critical Care and Sleep Medicine  Consult Note    Patient: Ashley Vivas  MRN: [id number]  : 1950    Encounter Time: 4:03 PM     Date of Admission: 2025  2:50 AM    Primary Care Physician: Samina Ji MD    Reason for Consultation: RSV pneumonia      SUBJECTIVE:    Ok  No pain      OBJECTIVE:     PHYSICAL EXAM:   VITALS:   Vitals:    25 1140 25 1158 25 1417 25 1515   BP:   (!) 144/63    Pulse:   84    Resp:   19    Temp:   97.5 °F (36.4 °C)    TempSrc:   Axillary    SpO2: 100% 100% 100% 98%   Weight:       Height:            Intake/Output Summary (Last 24 hours) at 2025 1603  Last data filed at 2025 0925  Gross per 24 hour   Intake 180 ml   Output --   Net 180 ml        CONSTITUTIONAL:   A&O x 3, NAD  SKIN:     No rash, No suspicious lesions, No skin discoloration  HEENT:     EOMI, MMM, No thrush  NECK:    No bruits, No JVP appreciated  CV:      Sinus,  No murmur, No rubs, No gallops  PULMONARY:   Couse BS,  No Wheezing, No Rales, No Rhonchi      No noted egophony  ABDOMEN:     Soft, non-tender. BS normal. No R/R/G  EXT:    No deformities .  No clubbing.       No lower extremity edema, No venous stasis  PULSE:   Appears equal and palpable.  PSYCHIATRIC:  Seems appropriate, No acute psychosis  MS:    No fractures, No gross weakness  NEUROLOGIC:   The clinical assessment is non-focal     DATA: IMAGING & TESTING:     LABORATORY TESTS:    CBC:   Lab Results   Component Value Date/Time    WBC 7.5 2025 05:00 AM    RBC 2.54 2025 05:00 AM    HGB 8.1 2025 05:00 AM    HCT 27.2 2025 05:00 AM    .1 2025 05:00 AM    MCH 31.9 2025 05:00 AM    MCHC 29.8 2025 05:00 AM    RDW 19.1 2025 05:00 AM     2025 05:00 AM    MPV 11.2 2025 05:00 AM     CMP:    Lab Results   Component Value Date/Time      01/31/2025 05:00 AM    K 4.6 01/31/2025 05:00 AM     01/31/2025 05:00 AM    CO2 37 01/31/2025 05:00 AM    BUN 28 01/31/2025 05:00 AM    CREATININE 0.4 01/31/2025 05:00 AM    GFRAA >60 05/27/2017 05:25 AM    LABGLOM >90 01/31/2025 05:00 AM    GLUCOSE 166 01/31/2025 05:00 AM    CALCIUM 8.9 01/31/2025 05:00 AM    BILITOT <0.2 01/28/2025 02:48 AM    ALKPHOS 60 01/28/2025 02:48 AM    AST 33 01/28/2025 02:48 AM    ALT 42 01/28/2025 02:48 AM     Magnesium:    Lab Results   Component Value Date/Time    MG 2.1 12/30/2024 07:52 AM     Phosphorus:    Lab Results   Component Value Date/Time    PHOS 3.7 12/30/2024 07:52 AM        PRO-BNP:   Lab Results   Component Value Date    PROBNP 1,816 (H) 01/28/2025    PROBNP 511 (H) 12/29/2024      ABGs:   Lab Results   Component Value Date/Time    PH 7.391 01/28/2025 07:13 PM    PO2 169.3 01/28/2025 07:13 PM    PCO2 65.2 01/28/2025 07:13 PM     Hemoglobin A1C: No components found for: \"HGBA1C\"    IMAGING:  Imaging tests were completed and reviewed and discussed radiology and care team involved and reveals   CTA PULMONARY W CONTRAST    Result Date: 1/28/2025  1. No evidence of pulmonary embolism. 2. Interval improvement in the nodular density within the right upper lung field in the interval. This areas slightly smaller on today's examination than when compared to the prior examination. 3. The remainder of the nodular densities throughout the lung fields are all stable and unchanged.  Underlying chronic and emphysematous changes are present with no new areas of disease are progression in the interval. 4. Stable compression deformity of T12.       Assessment: Ashley Vivas is a 74 y.o. female patient of Samina Ji MD with history of stage IV squamous cell lung cancer ([facility]), COPD on 4L O2 baseline, hypertension chronic hypoxic respiratory failure and wears 4 L of oxygen at baseline due to COPD and lung cancer presented to [facility] on [facility]/28/2025

## 2025-01-31 NOTE — PLAN OF CARE
Problem: Discharge Planning  Goal: Discharge to home or other facility with appropriate resources  1/31/2025 1246 by Nishant Sierra RN  Outcome: Progressing  Flowsheets (Taken 1/31/2025 1113)  Discharge to home or other facility with appropriate resources:   Identify barriers to discharge with patient and caregiver   Identify discharge learning needs (meds, wound care, etc)   Arrange for needed discharge resources and transportation as appropriate   Refer to discharge planning if patient needs post-hospital services based on physician order or complex needs related to functional status, cognitive ability or social support system  1/31/2025 0023 by Shweta Steiner RN  Outcome: Progressing  Flowsheets (Taken 1/30/2025 2014)  Discharge to home or other facility with appropriate resources: Identify barriers to discharge with patient and caregiver     Problem: Nutrition Deficit:  Goal: Optimize nutritional status  1/31/2025 1246 by Nishant Sierra RN  Outcome: Progressing  1/31/2025 0023 by Shweta Steiner RN  Outcome: Progressing     Problem: Safety - Adult  Goal: Free from fall injury  1/31/2025 1246 by Nishant Sierra RN  Outcome: Progressing  1/31/2025 0023 by Shweta Steiner RN  Outcome: Progressing     Problem: ABCDS Injury Assessment  Goal: Absence of physical injury  1/31/2025 1246 by Nishant Sierra RN  Outcome: Progressing  1/31/2025 0023 by Shweta Steiner RN  Outcome: Progressing

## 2025-01-31 NOTE — PROGRESS NOTES
Palliative Care Department  [phone]  Palliative Care Initial Consult  Provider Shweta Mcdowell, APRN - CNP     Ashley Vivas  [id number]  Hospital Day: 4  Date of Initial Consult: 1/30/25  Referring Provider: Sanaz Banerjee MD   Palliative Medicine was consulted for assistance with: Goals of Care    HPI:   Ashley Vivas is a 74 y.o. with a medical history of stage IV squamous cell lung cancer ([facility]), COPD on 4L O2 baseline, hypertension who was admitted on 1/28/2025 from nursing facility with a CHIEF COMPLAINT of shortness of breath.  She wears 4 L of O2 at baseline due to COPD and lung cancer, but has been requiring increased oxygen up to 6 L at nursing facility and still hypoxic.  She recently finished Keytruda treatment in November.  Workup in ER reveals BNP 1816, CXR with cardiomegaly and small bilateral pleural effusions, CTA negative for PE, stable nodular densities, chronic emphysema changes.  RSV +. ABG 7.33, CO2 70.7, HCO3 36.8, placed on BiPAP.  Admitted for COPD exacerbation.  Pulmonology consulted, as needed BiPAP.  Palliative medicine consulted for goals of care.    ASSESSMENT/PLAN:     Pertinent Hospital Diagnoses     Acute on chronic hypoxic respiratory failure  COPD exacerbation  RSV+  Stage IV squamous cell lung CA      Palliative Care Encounter / Counseling Regarding Goals of Care  Please see detailed goals of care discussion as below  At this time, Ashley Vivas, Does have capacity for medical decision-making.  Capacity is time limited and situation/question specific  During encounter no surrogate medical decision-maker  Outcome of goals of care meeting:   Continue CODE STATUS to limited (DNR-CCA/DNI)  Continue current medical management  Code status Limited (DNR-CCA/DNI)  Advanced Directives: no POA or living will in [facility]  Surrogate/Legal NOK:  Juan C Vivas (spouse) [phone]  Ligia Hightower (child) [phone]      Spiritual assessment: no spiritual distress

## 2025-01-31 NOTE — DISCHARGE INSTR - COC
Continuity of Care Form    Patient Name: Ashley Vivas   :  1950  MRN:  [id number]    Admit date:  2025  Discharge date:  2025    Code Status Order: Limited   Advance Directives:   Advance Care Flowsheet Documentation             Admitting Physician:  John Forbes MD  PCP: Samina Ji MD    Discharging Nurse: Vianey Warner  Discharging Hospital Unit/Room#: 6415/6415-B  Discharging Unit Phone Number: [phone]    Emergency Contact:   Extended Emergency Contact Information  Primary Emergency Contact: Juan C Vivas  Address: [address] [place] [address] [facility]  Home Phone: [phone]  Mobile Phone: [phone]  Relation: Spouse  Secondary Emergency Contact: RachelstephanyLigia  Address: [address] [place] [address] [place] [address] [facility]  Home Phone: [phone]  Relation: Child    Past Surgical History:  Past Surgical History:   Procedure Laterality Date    BREAST LUMPECTOMY Left     BRONCHOSCOPY  2017    With EBUS    CARPAL TUNNEL RELEASE Bilateral     COLONOSCOPY  2021    EYE SURGERY Right 2023    RIGHT EYE CATARACT EXTRACTION IOL IMPLANT performed by Lida Horvath MD at [facility] OR    EYE SURGERY Left 2023    LEFT EYE CATARACT EXTRACTION IOL IMPLANT performed by Lida Horvath MD at [facility] OR    KNEE ARTHROSCOPY Left     LUNG BIOPSY Right 2017    SKIN BIOPSY      CANCER BASAL TIP NOSE       Immunization History:   Immunization History   Administered Date(s) Administered    COVID-19, PFIZER Bivalent, DO NOT Dilute, (age 12y+), IM, 30 mcg/0.3 mL 10/12/2022    COVID-19, PFIZER PURPLE top, DILUTE for use, (age 12 y+), 30mcg/0.3mL 2021, 2021, 10/18/2021    Influenza Virus Vaccine 10/17/2017    Influenza, FLUZONE High Dose (age 65 y+), IM, Quadv, 0.7mL 2023    Influenza, FLUZONE High Dose, (age 65 y+), IM, Trivalent PF, 0.5mL 2018, 2019, 2024

## 2025-01-31 NOTE — PLAN OF CARE
Problem: Discharge Planning  Goal: Discharge to home or other facility with appropriate resources  1/31/2025 0023 by Shweta Steiner, RN  Outcome: Progressing  Flowsheets (Taken 1/30/2025 2014)  Discharge to home or other facility with appropriate resources: Identify barriers to discharge with patient and caregiver  1/30/2025 1741 by Rosario Mason, MANSOOR  Outcome: Progressing     Problem: Nutrition Deficit:  Goal: Optimize nutritional status  1/31/2025 0023 by Shweta Steiner, RN  Outcome: Progressing  1/30/2025 1741 by Rosario Mason, MANSOOR  Outcome: Progressing     Problem: Safety - Adult  Goal: Free from fall injury  1/31/2025 0023 by Shweta Steiner, RN  Outcome: Progressing  1/30/2025 1741 by Rosario Mason, MANSOOR  Outcome: Progressing     Problem: ABCDS Injury Assessment  Goal: Absence of physical injury  Outcome: Progressing

## 2025-01-31 NOTE — PROGRESS NOTES
PCP: Samina Ji MD    Date of Admission: 1/28/2025    Chief Complaint:  had concerns including Shortness of Breath (Per [facility] patient was hypoxic all day for them on her 6 liters nasal cannula. Pt denies cough or shortness breath. ).      ASSESSMENT:    Ashley Vivas is a 74 y.o. female patient of Samina Ji MD with history of stage IV squamous cell lung [facility] ([facility]), COPD on 4L O2 baseline, hypertension chronic hypoxic respiratory failure and wears 4 L of oxygen at baseline due to COPD and lung cancer presented to [facility] on 1/28/2025 with worsening shortness of breath and hypoxia refusing her BiPAP.     COPD exacerbation:  Resumed breathing treatments  Pulmonology following, appreciate recommendations.   PRN/nightly NIV  Continue IV steroids.   Positive for RSV, isolation   Patient is currently on airvo  Wean oxygen as able.   Ambulate     Discharge planning, pending clinical course.         Diet: ADULT DIET; Regular; 4 carb choices (60 gm/meal)  ADULT ORAL NUTRITION SUPPLEMENT; Lunch, Dinner; Wound Healing Oral Supplement  ADULT ORAL NUTRITION SUPPLEMENT; Breakfast, Lunch, Dinner; Standard High Calorie/High Protein Oral Supplement  Code Status: limited (no to everything)  DVT Prophylaxis: heparin   Disposition: []Med/Surg [x] Intermediate [] ICU/CCU  Admit status: [] Observation [x] Inpatient     Trupti Landers MD    History Of Present Illness:    Ashley Vivas is a 74 y.o. female who presents with complaints of hypoxia at the nursing facility where she was refusing BiPAP.  Upon arrival patient was tachypneic, hypoxic, and hypertensive.  Upon arrival patient has reduced air movement bilaterally, heart rate was regular rhythm without murmurs rubs or gallop, there is no lower extremity edema.  Abdomen was soft nontender.  Differential includes COPD, exacerbation pneumonia reactive airway disease, myocardial infarction, pericarditis, endocarditis, upper  significant change by noncontrast exam, with some redemonstrated findings as above     XR CHEST PORTABLE    Result Date: 12/29/2024  EXAMINATION: ONE XRAY VIEW OF THE CHEST 12/29/2024 3:23 pm COMPARISON: 08/30/2017 HISTORY: ORDERING SYSTEM PROVIDED HISTORY: Shortness of Breath TECHNOLOGIST PROVIDED HISTORY: Reason for exam:->Shortness of Breath FINDINGS: The lungs are without acute focal process.  There is no effusion or pneumothorax. The cardiomediastinal silhouette is without acute process. The osseous structures are without acute process.  Right-sided port a catheter tip in the mid SVC.     No acute process.         Reviewed Labs, EKG and Imaging personally    +++++++++++++++++++++++++++++++++++++++++++++++++  Trupti Landers MD    [place], [place]  +++++++++++++++++++++++++++++++++++++++++++++++++  NOTE: This report was transcribed using voice recognition software. Every effort was made to ensure accuracy; however, inadvertent computerized transcription errors may be present.

## 2025-02-01 PROCEDURE — 2140000000 HC CCU INTERMEDIATE R&B

## 2025-02-01 PROCEDURE — 6370000000 HC RX 637 (ALT 250 FOR IP)

## 2025-02-01 PROCEDURE — 99231 SBSQ HOSP IP/OBS SF/LOW 25: CPT | Performed by: INTERNAL MEDICINE

## 2025-02-01 PROCEDURE — 6360000002 HC RX W HCPCS

## 2025-02-01 PROCEDURE — 94640 AIRWAY INHALATION TREATMENT: CPT

## 2025-02-01 PROCEDURE — 94660 CPAP INITIATION&MGMT: CPT

## 2025-02-01 PROCEDURE — 2500000003 HC RX 250 WO HCPCS: Performed by: RADIOLOGY

## 2025-02-01 PROCEDURE — 6360000002 HC RX W HCPCS: Performed by: INTERNAL MEDICINE

## 2025-02-01 PROCEDURE — 2700000000 HC OXYGEN THERAPY PER DAY

## 2025-02-01 RX ADMIN — IPRATROPIUM BROMIDE AND ALBUTEROL SULFATE 1 DOSE: 2.5; .5 SOLUTION RESPIRATORY (INHALATION) at 20:04

## 2025-02-01 RX ADMIN — METHYLPREDNISOLONE 4 MG: 4 TABLET ORAL at 09:03

## 2025-02-01 RX ADMIN — METOPROLOL SUCCINATE 50 MG: 50 TABLET, EXTENDED RELEASE ORAL at 09:03

## 2025-02-01 RX ADMIN — METHYLPREDNISOLONE 4 MG: 4 TABLET ORAL at 12:23

## 2025-02-01 RX ADMIN — IPRATROPIUM BROMIDE AND ALBUTEROL SULFATE 1 DOSE: 2.5; .5 SOLUTION RESPIRATORY (INHALATION) at 12:20

## 2025-02-01 RX ADMIN — IPRATROPIUM BROMIDE AND ALBUTEROL SULFATE 1 DOSE: 2.5; .5 SOLUTION RESPIRATORY (INHALATION) at 16:36

## 2025-02-01 RX ADMIN — IPRATROPIUM BROMIDE AND ALBUTEROL SULFATE 1 DOSE: 2.5; .5 SOLUTION RESPIRATORY (INHALATION) at 08:33

## 2025-02-01 RX ADMIN — METHYLPREDNISOLONE 4 MG: 4 TABLET ORAL at 17:47

## 2025-02-01 RX ADMIN — HEPARIN SODIUM 5000 UNITS: 5000 INJECTION INTRAVENOUS; SUBCUTANEOUS at 16:03

## 2025-02-01 RX ADMIN — BUDESONIDE INHALATION 500 MCG: 0.5 SUSPENSION RESPIRATORY (INHALATION) at 08:34

## 2025-02-01 RX ADMIN — Medication 10 ML: at 09:04

## 2025-02-01 RX ADMIN — ACETYLCYSTEINE 600 MG: 200 SOLUTION ORAL; RESPIRATORY (INHALATION) at 20:04

## 2025-02-01 RX ADMIN — BUDESONIDE INHALATION 500 MCG: 0.5 SUSPENSION RESPIRATORY (INHALATION) at 20:04

## 2025-02-01 RX ADMIN — METHYLPREDNISOLONE 8 MG: 4 TABLET ORAL at 23:13

## 2025-02-01 RX ADMIN — ACETYLCYSTEINE 600 MG: 200 SOLUTION ORAL; RESPIRATORY (INHALATION) at 08:34

## 2025-02-01 RX ADMIN — PANTOPRAZOLE SODIUM 40 MG: 40 TABLET, DELAYED RELEASE ORAL at 09:04

## 2025-02-01 NOTE — PROGRESS NOTES
HOSPITALIST PROGRESS NOTES     ADMITTING DATE AND TIME: 1/28/2025  2:50 AM  had concerns including Shortness of Breath (Per [facility] patient was hypoxic all day for them on her 6 liters nasal cannula. Pt denies cough or shortness breath. ).    ADMIT DX: COPD exacerbation (HCC) [J44.1]  COPD with acute exacerbation (HCC) [J44.1]  Acute respiratory failure, unspecified whether with hypoxia or hypercapnia [J96.00]      SUBJECTIVE:  Patient is being followed for COPD exacerbation (HCC) [J44.1]  COPD with acute exacerbation (HCC) [J44.1]  Acute respiratory failure, unspecified whether with hypoxia or hypercapnia [J96.00]     Patient was seen examined and evaluated  Recent lab results, charts and pertinent diagnostic imaging reviewed   Ancillary service notes reviewed   NAD    Care reviewed with nursing staff, medical and surgical specialty care, primary care and the patient's family as available.   ROS: denies fever, chills, cp, sob, n/v, HA unless stated above.      methylPREDNISolone  4 mg Oral QAM AC    methylPREDNISolone  4 mg Oral Lunch    methylPREDNISolone  4 mg Oral Dinner    methylPREDNISolone  8 mg Oral Nightly    [START ON 2/2/2025] methylPREDNISolone  4 mg Oral Nightly    divalproex  125 mg Oral 3 times per day    metoprolol succinate  50 mg Oral Daily    acetylcysteine  600 mg Inhalation BID RT    ipratropium 0.5 mg-albuterol 2.5 mg  1 Dose Inhalation Q4H WA RT    budesonide  0.5 mg Nebulization BID RT    pantoprazole  40 mg Oral QAM AC    heparin (porcine)  5,000 Units SubCUTAneous 3 times per day     morphine, 1 mg, Q4H PRN  sodium chloride flush, 10 mL, PRN  albuterol, 2.5 mg, Q4H PRN  LORazepam, 0.5 mg, Q6H PRN         OBJECTIVE:    BP (!) 143/65   Pulse 83   Temp 98.3 °F (36.8 °C) (Temporal)   Resp 13   Ht 1.524 m (5')   Wt 52 kg (114 lb 10.2 oz)   SpO2 97%   BMI 22.39 kg/m²  regurgitation. Severely elevated RVSP, consistent with severe pulmonary hypertension. Est RA pressure is 3 mmHg. The estimated PASP is 92 mmHg.    Left Atrium: Left atrium size is normal.    Right Atrium: Right atrium size is normal.    Pericardium: Trivial pericardial effusion present. No indication of cardiac tamponade.    Image quality is adequate.    Signed by: lAlen Farah MD on 12/7/2024  3:01 PM      SYNOPSIS:  74 y.o. female who presents with complaints of hypoxia at the nursing facility where she was refusing BiPAP. Upon arrival patient was tachypneic, hypoxic, and hypertensive. Upon arrival patient has reduced air movement bilaterally, heart rate was regular rhythm without murmurs rubs or gallop, there is no lower extremity edema. Abdomen was soft nontender.  ABG showed respiratory acidosis BiPAP was ordered. Patient was noted to be hyperkalemic, and anemic. Troponin initial was 27 pending at this time. Chest x-ray showed bilateral small pleural effusions. CT showed above finding without acute evidence of pulmonary disease changes were chronic.                                                   ASSESSMENT AND PLAN:    Principal Problem:    COPD with acute exacerbation (HCC)  Resolved Problems:    * No resolved hospital problems. *    Acute on chronic hypoxic and hypercapnic respiratory failure  Acute COPD exacerbation  RSV positive  Underlying stage IV squamous cell carcinoma, COPD  Continue breathing treatment and steroid  Ambulate  Agreed to wear BPAP  Wean off the high flow NC    DISPOSITION: Pending the above                                                                 This note was generated by the epic EMR system/Dragon speech recognition and may contain inherent errors or omissions not intended by the user. Grammatical errors, random word insertions, deletions, pronoun errors and incomplete sentences are occasional consequences of this technology due to software limitations. Not all errors are

## 2025-02-01 NOTE — PROGRESS NOTES
[facility]  Department of Internal Medicine  Division of Pulmonary, Critical Care and Sleep Medicine  Consult Note    Patient: Ashley Vivas  MRN: [id number]  : 1950    Encounter Time: 4:37 PM     Date of Admission: 2025  2:50 AM    Primary Care Physician: Samina Ji MD    Reason for Consultation: RSV pneumonia      SUBJECTIVE:    Ok  No pain      OBJECTIVE:     PHYSICAL EXAM:   VITALS:   Vitals:    25 0800 25 0834 25 1150 25 1220   BP: (!) 148/58  (!) 143/65    Pulse: 75  83    Resp: 15  13    Temp: 98.1 °F (36.7 °C)  98.3 °F (36.8 °C)    TempSrc: Temporal  Temporal    SpO2: 98% 98% 98% 97%   Weight:       Height:          No intake or output data in the 24 hours ending 25 1637       CONSTITUTIONAL:   A&O x 3, NAD  SKIN:     No rash, No suspicious lesions, No skin discoloration  HEENT:     EOMI, MMM, No thrush  NECK:    No bruits, No JVP appreciated  CV:      Sinus,  No murmur, No rubs, No gallops  PULMONARY:   Couse BS,  No Wheezing, No Rales, No Rhonchi      No noted egophony  ABDOMEN:     Soft, non-tender. BS normal. No R/R/G  EXT:    No deformities .  No clubbing.       No lower extremity edema, No venous stasis  PULSE:   Appears equal and palpable.  PSYCHIATRIC:  Seems appropriate, No acute psychosis  MS:    No fractures, No gross weakness  NEUROLOGIC:   The clinical assessment is non-focal     DATA: IMAGING & TESTING:     LABORATORY TESTS:    CBC:   Lab Results   Component Value Date/Time    WBC 7.5 2025 05:00 AM    RBC 2.54 2025 05:00 AM    HGB 8.1 2025 05:00 AM    HCT 27.2 2025 05:00 AM    .1 2025 05:00 AM    MCH 31.9 2025 05:00 AM    MCHC 29.8 2025 05:00 AM    RDW 19.1 2025 05:00 AM     2025 05:00 AM    MPV 11.2 2025 05:00 AM     CMP:    Lab Results   Component Value Date/Time     2025 05:00 AM    K 4.6 2025 05:00 AM

## 2025-02-01 NOTE — PLAN OF CARE
Problem: Discharge Planning  Goal: Discharge to home or other facility with appropriate resources  Outcome: Progressing     Problem: Nutrition Deficit:  Goal: Optimize nutritional status  Outcome: Progressing     Problem: Safety - Adult  Goal: Free from fall injury  Outcome: Progressing     Problem: ABCDS Injury Assessment  Goal: Absence of physical injury  Outcome: Progressing

## 2025-02-02 LAB
ANION GAP SERPL CALCULATED.3IONS-SCNC: 7 MMOL/L (ref 7–16)
BASOPHILS # BLD: 0 K/UL (ref 0–0.2)
BASOPHILS NFR BLD: 0 % (ref 0–2)
BUN SERPL-MCNC: 18 MG/DL (ref 6–23)
CALCIUM SERPL-MCNC: 9.1 MG/DL (ref 8.6–10.2)
CHLORIDE SERPL-SCNC: 100 MMOL/L (ref 98–107)
CO2 SERPL-SCNC: 36 MMOL/L (ref 22–29)
CREAT SERPL-MCNC: 0.4 MG/DL (ref 0.5–1)
EOSINOPHIL # BLD: 0 K/UL (ref 0.05–0.5)
EOSINOPHILS RELATIVE PERCENT: 0 % (ref 0–6)
ERYTHROCYTE [DISTWIDTH] IN BLOOD BY AUTOMATED COUNT: 18.9 % (ref 11.5–15)
FOLATE SERPL-MCNC: 16.2 NG/ML (ref 4.8–24.2)
GFR, ESTIMATED: >90 ML/MIN/1.73M2
GLUCOSE SERPL-MCNC: 109 MG/DL (ref 74–99)
HCT VFR BLD AUTO: 26.2 % (ref 34–48)
HGB BLD-MCNC: 7.7 G/DL (ref 11.5–15.5)
LYMPHOCYTES NFR BLD: 0.59 K/UL (ref 1.5–4)
LYMPHOCYTES RELATIVE PERCENT: 6 % (ref 20–42)
MAGNESIUM SERPL-MCNC: 2.1 MG/DL (ref 1.6–2.6)
MCH RBC QN AUTO: 32 PG (ref 26–35)
MCHC RBC AUTO-ENTMCNC: 29.4 G/DL (ref 32–34.5)
MCV RBC AUTO: 108.7 FL (ref 80–99.9)
MONOCYTES NFR BLD: 0.67 K/UL (ref 0.1–0.95)
MONOCYTES NFR BLD: 7 % (ref 2–12)
NEUTROPHILS NFR BLD: 87 % (ref 43–80)
NEUTS SEG NFR BLD: 8.43 K/UL (ref 1.8–7.3)
NUCLEATED RED BLOOD CELLS: 1 PER 100 WBC
PLATELET # BLD AUTO: 213 K/UL (ref 130–450)
PMV BLD AUTO: 10.7 FL (ref 7–12)
POTASSIUM SERPL-SCNC: 4.7 MMOL/L (ref 3.5–5)
RBC # BLD AUTO: 2.41 M/UL (ref 3.5–5.5)
RBC # BLD: ABNORMAL 10*6/UL
SODIUM SERPL-SCNC: 143 MMOL/L (ref 132–146)
T4 FREE SERPL-MCNC: 1.2 NG/DL (ref 0.9–1.7)
TSH SERPL DL<=0.05 MIU/L-ACNC: 0.55 UIU/ML (ref 0.27–4.2)
VIT B12 SERPL-MCNC: 500 PG/ML (ref 211–946)
WBC OTHER # BLD: 9.7 K/UL (ref 4.5–11.5)

## 2025-02-02 PROCEDURE — 6360000002 HC RX W HCPCS

## 2025-02-02 PROCEDURE — 94640 AIRWAY INHALATION TREATMENT: CPT

## 2025-02-02 PROCEDURE — 80048 BASIC METABOLIC PNL TOTAL CA: CPT

## 2025-02-02 PROCEDURE — 2140000000 HC CCU INTERMEDIATE R&B

## 2025-02-02 PROCEDURE — 36415 COLL VENOUS BLD VENIPUNCTURE: CPT

## 2025-02-02 PROCEDURE — 84439 ASSAY OF FREE THYROXINE: CPT

## 2025-02-02 PROCEDURE — 2500000003 HC RX 250 WO HCPCS: Performed by: RADIOLOGY

## 2025-02-02 PROCEDURE — 83735 ASSAY OF MAGNESIUM: CPT

## 2025-02-02 PROCEDURE — 6370000000 HC RX 637 (ALT 250 FOR IP)

## 2025-02-02 PROCEDURE — 82607 VITAMIN B-12: CPT

## 2025-02-02 PROCEDURE — 2700000000 HC OXYGEN THERAPY PER DAY

## 2025-02-02 PROCEDURE — 82746 ASSAY OF FOLIC ACID SERUM: CPT

## 2025-02-02 PROCEDURE — 85025 COMPLETE CBC W/AUTO DIFF WBC: CPT

## 2025-02-02 PROCEDURE — 99231 SBSQ HOSP IP/OBS SF/LOW 25: CPT | Performed by: INTERNAL MEDICINE

## 2025-02-02 PROCEDURE — 94660 CPAP INITIATION&MGMT: CPT

## 2025-02-02 PROCEDURE — 99233 SBSQ HOSP IP/OBS HIGH 50: CPT | Performed by: INTERNAL MEDICINE

## 2025-02-02 PROCEDURE — 6370000000 HC RX 637 (ALT 250 FOR IP): Performed by: INTERNAL MEDICINE

## 2025-02-02 PROCEDURE — 6360000002 HC RX W HCPCS: Performed by: INTERNAL MEDICINE

## 2025-02-02 PROCEDURE — 84443 ASSAY THYROID STIM HORMONE: CPT

## 2025-02-02 RX ADMIN — ACETYLCYSTEINE 600 MG: 200 SOLUTION ORAL; RESPIRATORY (INHALATION) at 08:22

## 2025-02-02 RX ADMIN — IPRATROPIUM BROMIDE AND ALBUTEROL SULFATE 1 DOSE: 2.5; .5 SOLUTION RESPIRATORY (INHALATION) at 08:22

## 2025-02-02 RX ADMIN — Medication 10 ML: at 07:56

## 2025-02-02 RX ADMIN — ACETYLCYSTEINE 600 MG: 200 SOLUTION ORAL; RESPIRATORY (INHALATION) at 20:33

## 2025-02-02 RX ADMIN — BUDESONIDE INHALATION 500 MCG: 0.5 SUSPENSION RESPIRATORY (INHALATION) at 08:22

## 2025-02-02 RX ADMIN — BENZOCAINE AND MENTHOL 1 LOZENGE: 15; 3.6 LOZENGE ORAL at 21:23

## 2025-02-02 RX ADMIN — IPRATROPIUM BROMIDE AND ALBUTEROL SULFATE 1 DOSE: 2.5; .5 SOLUTION RESPIRATORY (INHALATION) at 12:15

## 2025-02-02 RX ADMIN — METHYLPREDNISOLONE 4 MG: 4 TABLET ORAL at 16:57

## 2025-02-02 RX ADMIN — IPRATROPIUM BROMIDE AND ALBUTEROL SULFATE 1 DOSE: 2.5; .5 SOLUTION RESPIRATORY (INHALATION) at 20:33

## 2025-02-02 RX ADMIN — Medication 10 ML: at 21:24

## 2025-02-02 RX ADMIN — METHYLPREDNISOLONE 4 MG: 4 TABLET ORAL at 21:22

## 2025-02-02 RX ADMIN — METHYLPREDNISOLONE 4 MG: 4 TABLET ORAL at 07:51

## 2025-02-02 RX ADMIN — IPRATROPIUM BROMIDE AND ALBUTEROL SULFATE 1 DOSE: 2.5; .5 SOLUTION RESPIRATORY (INHALATION) at 16:42

## 2025-02-02 RX ADMIN — BENZOCAINE AND MENTHOL 1 LOZENGE: 15; 3.6 LOZENGE ORAL at 07:55

## 2025-02-02 RX ADMIN — HEPARIN SODIUM 5000 UNITS: 5000 INJECTION INTRAVENOUS; SUBCUTANEOUS at 21:23

## 2025-02-02 RX ADMIN — METHYLPREDNISOLONE 4 MG: 4 TABLET ORAL at 11:36

## 2025-02-02 RX ADMIN — PANTOPRAZOLE SODIUM 40 MG: 40 TABLET, DELAYED RELEASE ORAL at 07:51

## 2025-02-02 RX ADMIN — METOPROLOL SUCCINATE 50 MG: 50 TABLET, EXTENDED RELEASE ORAL at 07:51

## 2025-02-02 NOTE — PROGRESS NOTES
HOSPITALIST PROGRESS NOTES     ADMITTING DATE AND TIME: 1/28/2025  2:50 AM  had concerns including Shortness of Breath (Per [facility] patient was hypoxic all day for them on her 6 liters nasal cannula. Pt denies cough or shortness breath. ).    ADMIT DX: COPD exacerbation (HCC) [J44.1]  COPD with acute exacerbation (HCC) [J44.1]  Acute respiratory failure, unspecified whether with hypoxia or hypercapnia [J96.00]      SUBJECTIVE:  Patient is being followed for COPD exacerbation (HCC) [J44.1]  COPD with acute exacerbation (HCC) [J44.1]  Acute respiratory failure, unspecified whether with hypoxia or hypercapnia [J96.00]     Patient was seen examined and evaluated  Recent lab results, charts and pertinent diagnostic imaging reviewed   Ancillary service notes reviewed   Emotional   High oxygen requirement     Care reviewed with nursing staff, medical and surgical specialty care, primary care and the patient's family as available.   ROS: denies fever, chills, cp, sob, n/v, HA unless stated above.      methylPREDNISolone  4 mg Oral QAM AC    methylPREDNISolone  4 mg Oral Lunch    methylPREDNISolone  4 mg Oral Dinner    methylPREDNISolone  4 mg Oral Nightly    divalproex  125 mg Oral 3 times per day    metoprolol succinate  50 mg Oral Daily    acetylcysteine  600 mg Inhalation BID RT    ipratropium 0.5 mg-albuterol 2.5 mg  1 Dose Inhalation Q4H WA RT    budesonide  0.5 mg Nebulization BID RT    pantoprazole  40 mg Oral QAM AC    heparin (porcine)  5,000 Units SubCUTAneous 3 times per day     benzocaine-menthol, 1 lozenge, Q2H PRN  morphine, 1 mg, Q4H PRN  sodium chloride flush, 10 mL, PRN  albuterol, 2.5 mg, Q4H PRN  LORazepam, 0.5 mg, Q6H PRN         OBJECTIVE:    BP (!) 120/54   Pulse 83   Temp 98.2 °F (36.8 °C) (Temporal)   Resp 17   Ht 1.524 m (5')   Wt 53.2 kg (117 lb 4.6 oz)   SpO2 96%   BMI

## 2025-02-02 NOTE — PLAN OF CARE
Problem: Discharge Planning  Goal: Discharge to home or other facility with appropriate resources  2/2/2025 1112 by Rocío Yuong RN  Outcome: Progressing  2/2/2025 0233 by Candace Luo RN  Outcome: Progressing  Flowsheets (Taken 2/1/2025 2145)  Discharge to home or other facility with appropriate resources: Identify barriers to discharge with patient and caregiver     Problem: Nutrition Deficit:  Goal: Optimize nutritional status  2/2/2025 1112 by Rocío Young RN  Outcome: Progressing  2/2/2025 0233 by Candace Luo RN  Outcome: Progressing     Problem: Safety - Adult  Goal: Free from fall injury  2/2/2025 1112 by Rocío Young RN  Outcome: Progressing  2/2/2025 0233 by Candace Luo RN  Outcome: Progressing     Problem: ABCDS Injury Assessment  Goal: Absence of physical injury  2/2/2025 1112 by Rocío Young RN  Outcome: Progressing  2/2/2025 0233 by Candace Luo, RN  Outcome: Progressing

## 2025-02-02 NOTE — PLAN OF CARE
Problem: Discharge Planning  Goal: Discharge to home or other facility with appropriate resources  2/2/2025 0233 by Candace Luo RN  Outcome: Progressing  Flowsheets (Taken 2/1/2025 2145)  Discharge to home or other facility with appropriate resources: Identify barriers to discharge with patient and caregiver  2/1/2025 1348 by Rocío Young RN  Outcome: Progressing     Problem: Nutrition Deficit:  Goal: Optimize nutritional status  2/2/2025 0233 by Candace Luo RN  Outcome: Progressing  2/1/2025 1348 by Rocío Young RN  Outcome: Progressing     Problem: Safety - Adult  Goal: Free from fall injury  2/2/2025 0233 by Candace Luo RN  Outcome: Progressing  2/1/2025 1348 by Rocío Young RN  Outcome: Progressing     Problem: ABCDS Injury Assessment  Goal: Absence of physical injury  2/2/2025 0233 by Candace Luo RN  Outcome: Progressing  2/1/2025 1348 by Rocío Young RN  Outcome: Progressing

## 2025-02-02 NOTE — PROGRESS NOTES
[facility]  Department of Internal Medicine  Division of Pulmonary, Critical Care and Sleep Medicine  Consult Note    Patient: Ashley Vivas  MRN: [id number]  : 1950    Encounter Time: 10:34 AM     Date of Admission: 2025  2:50 AM    Primary Care Physician: Samina Ji MD    Primary Pulmonologist: Dr. Wiley    Reason for Consultation: RSV pneumonia      SUBJECTIVE: Ashley is seen and examined at the bedside. She is frustrated about her current state of health and wants out of the hospital. We did have an extensive discussion regarding the need to wean her oxygen requirements down prior to discharge. Home O2 requirement 4L    OBJECTIVE:     PHYSICAL EXAM:   VITALS:   Vitals:    25 0318 25 0526 25 0816 25 0822   BP: 123/62  (!) 120/54    Pulse: 90  83    Resp: 18  17    Temp: 97.8 °F (36.6 °C)  98.2 °F (36.8 °C)    TempSrc: Temporal  Temporal    SpO2: 95%  97% 96%   Weight:  53.2 kg (117 lb 4.6 oz)     Height:            Intake/Output Summary (Last 24 hours) at 2025 1034  Last data filed at 2025 1919  Gross per 24 hour   Intake 520 ml   Output --   Net 520 ml          CONSTITUTIONAL:   A&O x 3, NAD  SKIN:     No rash, No suspicious lesions, No skin discoloration  HEENT:     EOMI, MMM, No thrush  NECK:    No bruits, No JVP appreciated  CV:      Sinus,  No murmur, No rubs, No gallops  PULMONARY:   Couse BS,  No Wheezing, No Rales, No Rhonchi      No noted egophony  ABDOMEN:     Soft, non-tender. BS normal. No R/R/G  EXT:    No deformities .  No clubbing.       No lower extremity edema, No venous stasis  PULSE:   Appears equal and palpable.  PSYCHIATRIC:  Seems appropriate, No acute psychosis  MS:    No fractures, No gross weakness  NEUROLOGIC:   The clinical assessment is non-focal     DATA: IMAGING & TESTING:     LABORATORY TESTS:    CBC:   Lab Results   Component Value Date/Time    WBC 7.5 2025 05:00 AM    RBC 2.54

## 2025-02-03 LAB
ANION GAP SERPL CALCULATED.3IONS-SCNC: 7 MMOL/L (ref 7–16)
B PARAP IS1001 DNA NPH QL NAA+NON-PROBE: NOT DETECTED
B PERT DNA SPEC QL NAA+PROBE: NOT DETECTED
BASOPHILS # BLD: 0 K/UL (ref 0–0.2)
BASOPHILS NFR BLD: 0 % (ref 0–2)
BUN SERPL-MCNC: 14 MG/DL (ref 6–23)
C PNEUM DNA NPH QL NAA+NON-PROBE: NOT DETECTED
CALCIUM SERPL-MCNC: 8.8 MG/DL (ref 8.6–10.2)
CHLORIDE SERPL-SCNC: 98 MMOL/L (ref 98–107)
CO2 SERPL-SCNC: 36 MMOL/L (ref 22–29)
CREAT SERPL-MCNC: 0.3 MG/DL (ref 0.5–1)
EOSINOPHIL # BLD: 0 K/UL (ref 0.05–0.5)
EOSINOPHILS RELATIVE PERCENT: 0 % (ref 0–6)
ERYTHROCYTE [DISTWIDTH] IN BLOOD BY AUTOMATED COUNT: 18.9 % (ref 11.5–15)
FLUAV RNA NPH QL NAA+NON-PROBE: NOT DETECTED
FLUBV RNA NPH QL NAA+NON-PROBE: NOT DETECTED
GFR, ESTIMATED: >90 ML/MIN/1.73M2
GLUCOSE SERPL-MCNC: 130 MG/DL (ref 74–99)
HADV DNA NPH QL NAA+NON-PROBE: NOT DETECTED
HCOV 229E RNA NPH QL NAA+NON-PROBE: NOT DETECTED
HCOV HKU1 RNA NPH QL NAA+NON-PROBE: NOT DETECTED
HCOV NL63 RNA NPH QL NAA+NON-PROBE: NOT DETECTED
HCOV OC43 RNA NPH QL NAA+NON-PROBE: NOT DETECTED
HCT VFR BLD AUTO: 25.8 % (ref 34–48)
HGB BLD-MCNC: 7.8 G/DL (ref 11.5–15.5)
HMPV RNA NPH QL NAA+NON-PROBE: NOT DETECTED
HPIV1 RNA NPH QL NAA+NON-PROBE: NOT DETECTED
HPIV2 RNA NPH QL NAA+NON-PROBE: NOT DETECTED
HPIV3 RNA NPH QL NAA+NON-PROBE: NOT DETECTED
HPIV4 RNA NPH QL NAA+NON-PROBE: NOT DETECTED
LYMPHOCYTES NFR BLD: 0.13 K/UL (ref 1.5–4)
LYMPHOCYTES RELATIVE PERCENT: 2 % (ref 20–42)
M PNEUMO DNA NPH QL NAA+NON-PROBE: NOT DETECTED
MAGNESIUM SERPL-MCNC: 2 MG/DL (ref 1.6–2.6)
MCH RBC QN AUTO: 32.2 PG (ref 26–35)
MCHC RBC AUTO-ENTMCNC: 30.2 G/DL (ref 32–34.5)
MCV RBC AUTO: 106.6 FL (ref 80–99.9)
METAMYELOCYTES ABSOLUTE COUNT: 0.07 K/UL (ref 0–0.12)
METAMYELOCYTES: 1 % (ref 0–1)
MONOCYTES NFR BLD: 0.47 K/UL (ref 0.1–0.95)
MONOCYTES NFR BLD: 6 % (ref 2–12)
MYELOCYTES ABSOLUTE COUNT: 0.07 K/UL
MYELOCYTES: 1 %
NEUTROPHILS NFR BLD: 90 % (ref 43–80)
NEUTS SEG NFR BLD: 6.76 K/UL (ref 1.8–7.3)
PLATELET # BLD AUTO: 208 K/UL (ref 130–450)
PMV BLD AUTO: 10.8 FL (ref 7–12)
POTASSIUM SERPL-SCNC: 4.4 MMOL/L (ref 3.5–5)
RBC # BLD AUTO: 2.42 M/UL (ref 3.5–5.5)
RBC # BLD: ABNORMAL 10*6/UL
RSV RNA NPH QL NAA+NON-PROBE: NOT DETECTED
RV+EV RNA NPH QL NAA+NON-PROBE: NOT DETECTED
SARS-COV-2 RNA NPH QL NAA+NON-PROBE: NOT DETECTED
SODIUM SERPL-SCNC: 141 MMOL/L (ref 132–146)
SPECIMEN DESCRIPTION: NORMAL
WBC OTHER # BLD: 7.5 K/UL (ref 4.5–11.5)

## 2025-02-03 PROCEDURE — 6370000000 HC RX 637 (ALT 250 FOR IP)

## 2025-02-03 PROCEDURE — 6360000002 HC RX W HCPCS: Performed by: INTERNAL MEDICINE

## 2025-02-03 PROCEDURE — 97161 PT EVAL LOW COMPLEX 20 MIN: CPT

## 2025-02-03 PROCEDURE — 97530 THERAPEUTIC ACTIVITIES: CPT

## 2025-02-03 PROCEDURE — 6360000002 HC RX W HCPCS

## 2025-02-03 PROCEDURE — 97165 OT EVAL LOW COMPLEX 30 MIN: CPT

## 2025-02-03 PROCEDURE — 36415 COLL VENOUS BLD VENIPUNCTURE: CPT

## 2025-02-03 PROCEDURE — 85025 COMPLETE CBC W/AUTO DIFF WBC: CPT

## 2025-02-03 PROCEDURE — 2500000003 HC RX 250 WO HCPCS: Performed by: RADIOLOGY

## 2025-02-03 PROCEDURE — 94660 CPAP INITIATION&MGMT: CPT

## 2025-02-03 PROCEDURE — 2700000000 HC OXYGEN THERAPY PER DAY

## 2025-02-03 PROCEDURE — 6370000000 HC RX 637 (ALT 250 FOR IP): Performed by: INTERNAL MEDICINE

## 2025-02-03 PROCEDURE — 0202U NFCT DS 22 TRGT SARS-COV-2: CPT

## 2025-02-03 PROCEDURE — 80048 BASIC METABOLIC PNL TOTAL CA: CPT

## 2025-02-03 PROCEDURE — 99231 SBSQ HOSP IP/OBS SF/LOW 25: CPT | Performed by: INTERNAL MEDICINE

## 2025-02-03 PROCEDURE — 2140000000 HC CCU INTERMEDIATE R&B

## 2025-02-03 PROCEDURE — 99233 SBSQ HOSP IP/OBS HIGH 50: CPT | Performed by: INTERNAL MEDICINE

## 2025-02-03 PROCEDURE — 83735 ASSAY OF MAGNESIUM: CPT

## 2025-02-03 PROCEDURE — 94640 AIRWAY INHALATION TREATMENT: CPT

## 2025-02-03 RX ORDER — METHYLPREDNISOLONE 4 MG/1
4 TABLET ORAL
Qty: 2 TABLET | Refills: 0 | Status: SHIPPED | OUTPATIENT
Start: 2025-02-04 | End: 2025-02-06

## 2025-02-03 RX ORDER — METHYLPREDNISOLONE 4 MG/1
4 TABLET ORAL NIGHTLY
Qty: 2 TABLET | Refills: 0 | Status: SHIPPED | OUTPATIENT
Start: 2025-02-03 | End: 2025-02-05

## 2025-02-03 RX ADMIN — HEPARIN SODIUM 5000 UNITS: 5000 INJECTION INTRAVENOUS; SUBCUTANEOUS at 06:27

## 2025-02-03 RX ADMIN — METHYLPREDNISOLONE 4 MG: 4 TABLET ORAL at 13:22

## 2025-02-03 RX ADMIN — HEPARIN SODIUM 5000 UNITS: 5000 INJECTION INTRAVENOUS; SUBCUTANEOUS at 13:14

## 2025-02-03 RX ADMIN — ACETYLCYSTEINE 600 MG: 200 SOLUTION ORAL; RESPIRATORY (INHALATION) at 19:21

## 2025-02-03 RX ADMIN — DIVALPROEX SODIUM 125 MG: 125 CAPSULE ORAL at 13:14

## 2025-02-03 RX ADMIN — PANTOPRAZOLE SODIUM 40 MG: 40 TABLET, DELAYED RELEASE ORAL at 06:28

## 2025-02-03 RX ADMIN — HEPARIN SODIUM 5000 UNITS: 5000 INJECTION INTRAVENOUS; SUBCUTANEOUS at 20:38

## 2025-02-03 RX ADMIN — METOPROLOL SUCCINATE 50 MG: 50 TABLET, EXTENDED RELEASE ORAL at 09:07

## 2025-02-03 RX ADMIN — LORAZEPAM 0.5 MG: 0.5 TABLET ORAL at 13:14

## 2025-02-03 RX ADMIN — METHYLPREDNISOLONE 4 MG: 4 TABLET ORAL at 22:45

## 2025-02-03 RX ADMIN — BUDESONIDE INHALATION 500 MCG: 0.5 SUSPENSION RESPIRATORY (INHALATION) at 19:21

## 2025-02-03 RX ADMIN — Medication 10 ML: at 20:39

## 2025-02-03 RX ADMIN — METHYLPREDNISOLONE 4 MG: 4 TABLET ORAL at 06:28

## 2025-02-03 RX ADMIN — DIVALPROEX SODIUM 125 MG: 125 CAPSULE ORAL at 20:38

## 2025-02-03 RX ADMIN — DIVALPROEX SODIUM 125 MG: 125 CAPSULE ORAL at 06:28

## 2025-02-03 RX ADMIN — IPRATROPIUM BROMIDE AND ALBUTEROL SULFATE 1 DOSE: 2.5; .5 SOLUTION RESPIRATORY (INHALATION) at 15:42

## 2025-02-03 RX ADMIN — IPRATROPIUM BROMIDE AND ALBUTEROL SULFATE 1 DOSE: 2.5; .5 SOLUTION RESPIRATORY (INHALATION) at 19:21

## 2025-02-03 RX ADMIN — IPRATROPIUM BROMIDE AND ALBUTEROL SULFATE 1 DOSE: 2.5; .5 SOLUTION RESPIRATORY (INHALATION) at 12:16

## 2025-02-03 RX ADMIN — BENZOCAINE AND MENTHOL 1 LOZENGE: 15; 3.6 LOZENGE ORAL at 06:33

## 2025-02-03 ASSESSMENT — PAIN SCALES - GENERAL
PAINLEVEL_OUTOF10: 0
PAINLEVEL_OUTOF10: 0

## 2025-02-03 NOTE — PROGRESS NOTES
HOSPITALIST PROGRESS NOTES     ADMITTING DATE AND TIME: 1/28/2025  2:50 AM  had concerns including Shortness of Breath (Per [facility] patient was hypoxic all day for them on her 6 liters nasal cannula. Pt denies cough or shortness breath. ).    ADMIT DX: COPD exacerbation (HCC) [J44.1]  COPD with acute exacerbation (HCC) [J44.1]  Acute respiratory failure, unspecified whether with hypoxia or hypercapnia [J96.00]      SUBJECTIVE:  Patient is being followed for COPD exacerbation (HCC) [J44.1]  COPD with acute exacerbation (HCC) [J44.1]  Acute respiratory failure, unspecified whether with hypoxia or hypercapnia [J96.00]     Patient was seen examined and evaluated  Recent lab results, charts and pertinent diagnostic imaging reviewed   Ancillary service notes reviewed   Emotional   High oxygen requirement     Care reviewed with nursing staff, medical and surgical specialty care, primary care and the patient's family as available.   ROS: denies fever, chills, cp, sob, n/v, HA unless stated above.      methylPREDNISolone  4 mg Oral QAM AC    methylPREDNISolone  4 mg Oral Lunch    methylPREDNISolone  4 mg Oral Nightly    divalproex  125 mg Oral 3 times per day    metoprolol succinate  50 mg Oral Daily    acetylcysteine  600 mg Inhalation BID RT    ipratropium 0.5 mg-albuterol 2.5 mg  1 Dose Inhalation Q4H WA RT    budesonide  0.5 mg Nebulization BID RT    pantoprazole  40 mg Oral QAM AC    heparin (porcine)  5,000 Units SubCUTAneous 3 times per day     benzocaine-menthol, 1 lozenge, Q2H PRN  morphine, 1 mg, Q4H PRN  sodium chloride flush, 10 mL, PRN  albuterol, 2.5 mg, Q4H PRN  LORazepam, 0.5 mg, Q6H PRN         OBJECTIVE:    BP (!) 110/41   Pulse 71   Temp 97.5 °F (36.4 °C) (Oral)   Resp 16   Ht 1.524 m (5')   Wt 54.2 kg (119 lb 7.8 oz)   SpO2 100%   BMI 23.34 kg/m²     General Appearance: alert

## 2025-02-03 NOTE — PLAN OF CARE
Problem: Discharge Planning  Goal: Discharge to home or other facility with appropriate resources  2/3/2025 1019 by Candace Luo RN  Outcome: Progressing  2/3/2025 0038 by Paul Saini RN  Outcome: Progressing  Flowsheets (Taken 2/2/2025 1945)  Discharge to home or other facility with appropriate resources:   Identify barriers to discharge with patient and caregiver   Arrange for needed discharge resources and transportation as appropriate     Problem: Nutrition Deficit:  Goal: Optimize nutritional status  2/3/2025 1019 by Candace Luo RN  Outcome: Progressing  2/3/2025 0038 by Paul Saini RN  Outcome: Progressing     Problem: Safety - Adult  Goal: Free from fall injury  2/3/2025 1019 by Candace Luo RN  Outcome: Progressing  2/3/2025 0038 by Paul Saini RN  Outcome: Progressing  Flowsheets (Taken 2/2/2025 2230)  Free From Fall Injury: Instruct family/caregiver on patient safety     Problem: ABCDS Injury Assessment  Goal: Absence of physical injury  2/3/2025 1019 by Candace Luo RN  Outcome: Progressing  2/3/2025 0038 by Paul Saini RN  Outcome: Progressing  Flowsheets (Taken 2/2/2025 2230)  Absence of Physical Injury: Implement safety measures based on patient assessment

## 2025-02-03 NOTE — PROGRESS NOTES
Physical Therapy  Physical Therapy Initial Assessment     Name: Ashley Vivas  : 1950  MRN: [id number]      Date of Service: 2/3/2025    Evaluating PT:  Nicolasa Esteban PT, DPT [id number]    Room #:  6415/6415-B  Diagnosis:  COPD exacerbation (HCC) [J44.1]  COPD with acute exacerbation (HCC) [J44.1]  Acute respiratory failure, unspecified whether with hypoxia or hypercapnia [J96.00]  PMHx/PSHx:    Past Medical History:   Diagnosis Date    Cancer (HCC)     LT BREAST    COPD (chronic obstructive pulmonary disease) (HCC)     History of Holter monitoring 2021    Hypertension     Lung cancer, lower lobe (HCC) 2017    Lung cancer, upper lobe (HCC) 2017      Procedure/Surgery:  none this admission  Precautions:  Falls, contact iso, droplet iso, HF O2, monitor O2, TSM  Equipment Needs:  TBD    SUBJECTIVE:    Pt admitted from facility where she reports limited mobility d/t O2 requirements.     OBJECTIVE:   Initial Evaluation  Date: 2/3/25 Treatment Short Term/ Long Term   Goals   AM-PAC 6 Clicks      Was pt agreeable to Eval/treatment? yes     Does pt have pain? No c/o pain     Bed Mobility  Rolling: SBA  Supine to sit: SBA  Sit to supine: SBA  Scooting: SBA  Rolling: Independent   Supine to sit: Independent   Sit to supine: Independent   Scooting: Independent    Transfers Sit to stand: Abiodun  Stand to sit: Abiodun  Stand pivot: Abiodun  Sit to stand: Independent   Stand to sit: Independent   Stand pivot: Independent    Ambulation    Few side steps with no AD Abiodun  >100 feet with no AD Independent    Stair negotiation: ascended and descended  NT  TBD   ROM BUE:  Defer to OT note  BLE:  WFL     Strength BUE:  Defer to OT note  BLE:  3+/5  WNL   Balance Sitting EOB:  SBA  Dynamic Standing:  Abiodun  Sitting EOB:  Independent   Dynamic Standing:  Independent      Pt is A & O x 3-4  Sensation:  B feet tingling reported  Edema:  none noted    Vitals:  SPO2 on 7L: 84-90%  SPO2 on 8L: 87-91%      Patient

## 2025-02-03 NOTE — PROGRESS NOTES
Occupational Therapy  OCCUPATIONAL THERAPY INITIAL EVALUATION    [facility]  [address] [place] [address], [place]      Date:2/3/2025                                                Patient Name: Ashley Vivas  MRN: [id number]  : 1950  Room: [facility]    Evaluating OT: Jamil Ferris OTR/L #[id number]     Referring Provider: Trupti Landers MD   Specific Provider Orders/Date: OT eval and treat 25    Diagnosis: COPD exacerbation (HCC) [J44.1]  COPD with acute exacerbation (HCC) [J44.1]  Acute respiratory failure, unspecified whether with hypoxia or hypercapnia [J96.00]   Pt admitted to hospital with SOB / hypoxia from nursing facility        Pertinent Medical History:  has a past medical history of Cancer (HCC), COPD (chronic obstructive pulmonary disease) (HCC), History of Holter monitoring, Hypertension, Lung cancer, lower lobe (HCC), and Lung cancer, upper lobe (HCC).       Precautions:  Fall Risk, droplet / contact isolation (RSV), TSM, O2, continuous pulse ox, bed alarm    Assessment of current deficits    [x] Functional mobility  [x]ADLs  [x] Strength               []Cognition    [x] Functional transfers   [x] IADLs         [x] Safety Awareness   [x]Endurance    [] Fine Coordination              [x] Balance      [] Vision/perception   []Sensation     []Gross Motor Coordination  [] ROM  [] Delirium                   [] Motor Control     OT PLAN OF CARE   OT POC based on physician orders, patient diagnosis and results of clinical assessment    Frequency/Duration 1-5 days/wk for 2 weeks PRN   Specific OT Treatment Interventions to include:   * Instruction/training on adapted ADL techniques and AE recommendations to increase functional independence within precautions       * Training on energy conservation strategies, correct breathing pattern and techniques to improve independence/tolerance for self-care routine  * Functional transfer/mobility  to HOB (+ desat; 5+ minutes to recover) - skilled cuing on hand placement, posture, body mechanics, energy conservation techniques and safety.  Therapist facilitated self-care retraining: simulated UB/LB self-care tasks while cuing on modified techniques, posture, safety and energy conservation techniques. Skilled monitoring of HR, O2 sats and pts response to treatment (+ desaturation with minimal activity 86%; 5+ minutes to recover to 92% on 8L; cuing on pursed lip breathing techniques - nursing aware). Respiratory status severely impacting functional activity and safety.        Rehab Potential: Good  for established goals     Patient / Family Goal: Regain [place]    Patient and/or family were instructed on functional diagnosis, prognosis/goals and OT plan of care. Demonstrated Fair understanding.     Eval Complexity: Low    Time In: 1105  Time Out: 1130  Total Treatment Time: 10 minutes    Min Units   OT Eval Low 97165  x  1   OT Eval Medium 09746      OT Eval High 51338      OT Re-Eval 37057       Therapeutic Ex 14785       Therapeutic Activities 64117  8  1   ADL/Self Care 25286  2     Orthotic Management 88028       Manual [id number]     Neuro Re-Ed 66063       Non-Billable Time          Evaluation Time additionally includes thorough review of current medical information, gathering information on past medical history/social history and prior level of function, interpretation of standardized testing/informal observation of tasks, assessment of data and development of plan of care and goals.          Jamil Ferris OTR/L #[id number]

## 2025-02-03 NOTE — PLAN OF CARE
Problem: Discharge Planning  Goal: Discharge to home or other facility with appropriate resources  2/3/2025 0038 by Paul Saini RN  Outcome: Progressing  Flowsheets (Taken 2/2/2025 1945)  Discharge to home or other facility with appropriate resources:   Identify barriers to discharge with patient and caregiver   Arrange for needed discharge resources and transportation as appropriate  2/2/2025 1112 by Rocío Young RN  Outcome: Progressing     Problem: Nutrition Deficit:  Goal: Optimize nutritional status  2/3/2025 0038 by Paul Saini RN  Outcome: Progressing  2/2/2025 1112 by Rocío Young RN  Outcome: Progressing     Problem: Safety - Adult  Goal: Free from fall injury  2/3/2025 0038 by Paul Saini RN  Outcome: Progressing  Flowsheets (Taken 2/2/2025 2230)  Free From Fall Injury: Instruct family/caregiver on patient safety  2/2/2025 1112 by Rocío Young RN  Outcome: Progressing     Problem: ABCDS Injury Assessment  Goal: Absence of physical injury  2/3/2025 0038 by Paul Saini RN  Outcome: Progressing  Flowsheets (Taken 2/2/2025 2230)  Absence of Physical Injury: Implement safety measures based on patient assessment  2/2/2025 1112 by Rocío Young RN  Outcome: Progressing

## 2025-02-04 VITALS
RESPIRATION RATE: 24 BRPM | SYSTOLIC BLOOD PRESSURE: 126 MMHG | WEIGHT: 112.43 LBS | TEMPERATURE: 97.1 F | HEART RATE: 88 BPM | HEIGHT: 60 IN | OXYGEN SATURATION: 97 % | DIASTOLIC BLOOD PRESSURE: 55 MMHG | BODY MASS INDEX: 22.07 KG/M2

## 2025-02-04 LAB
ANION GAP SERPL CALCULATED.3IONS-SCNC: 11 MMOL/L (ref 7–16)
BASOPHILS # BLD: 0.04 K/UL (ref 0–0.2)
BASOPHILS NFR BLD: 1 % (ref 0–2)
BUN SERPL-MCNC: 20 MG/DL (ref 6–23)
CALCIUM SERPL-MCNC: 8.8 MG/DL (ref 8.6–10.2)
CHLORIDE SERPL-SCNC: 99 MMOL/L (ref 98–107)
CO2 SERPL-SCNC: 32 MMOL/L (ref 22–29)
CREAT SERPL-MCNC: 0.4 MG/DL (ref 0.5–1)
EOSINOPHIL # BLD: 0.05 K/UL (ref 0.05–0.5)
EOSINOPHILS RELATIVE PERCENT: 1 % (ref 0–6)
ERYTHROCYTE [DISTWIDTH] IN BLOOD BY AUTOMATED COUNT: 18.7 % (ref 11.5–15)
GFR, ESTIMATED: >90 ML/MIN/1.73M2
GLUCOSE SERPL-MCNC: 121 MG/DL (ref 74–99)
HCT VFR BLD AUTO: 27.8 % (ref 34–48)
HGB BLD-MCNC: 8.4 G/DL (ref 11.5–15.5)
IMM GRANULOCYTES # BLD AUTO: 0.22 K/UL (ref 0–0.58)
IMM GRANULOCYTES NFR BLD: 3 % (ref 0–5)
LYMPHOCYTES NFR BLD: 0.48 K/UL (ref 1.5–4)
LYMPHOCYTES RELATIVE PERCENT: 6 % (ref 20–42)
MAGNESIUM SERPL-MCNC: 2.2 MG/DL (ref 1.6–2.6)
MCH RBC QN AUTO: 32.1 PG (ref 26–35)
MCHC RBC AUTO-ENTMCNC: 30.2 G/DL (ref 32–34.5)
MCV RBC AUTO: 106.1 FL (ref 80–99.9)
MONOCYTES NFR BLD: 0.79 K/UL (ref 0.1–0.95)
MONOCYTES NFR BLD: 10 % (ref 2–12)
NEUTROPHILS NFR BLD: 81 % (ref 43–80)
NEUTS SEG NFR BLD: 6.62 K/UL (ref 1.8–7.3)
PLATELET # BLD AUTO: 224 K/UL (ref 130–450)
PMV BLD AUTO: 10.8 FL (ref 7–12)
POTASSIUM SERPL-SCNC: 4.5 MMOL/L (ref 3.5–5)
RBC # BLD AUTO: 2.62 M/UL (ref 3.5–5.5)
RBC # BLD: ABNORMAL 10*6/UL
SODIUM SERPL-SCNC: 142 MMOL/L (ref 132–146)
WBC OTHER # BLD: 8.2 K/UL (ref 4.5–11.5)

## 2025-02-04 PROCEDURE — 97535 SELF CARE MNGMENT TRAINING: CPT

## 2025-02-04 PROCEDURE — 99232 SBSQ HOSP IP/OBS MODERATE 35: CPT | Performed by: INTERNAL MEDICINE

## 2025-02-04 PROCEDURE — 99239 HOSP IP/OBS DSCHRG MGMT >30: CPT | Performed by: INTERNAL MEDICINE

## 2025-02-04 PROCEDURE — 6360000002 HC RX W HCPCS

## 2025-02-04 PROCEDURE — 83735 ASSAY OF MAGNESIUM: CPT

## 2025-02-04 PROCEDURE — 36415 COLL VENOUS BLD VENIPUNCTURE: CPT

## 2025-02-04 PROCEDURE — 94640 AIRWAY INHALATION TREATMENT: CPT

## 2025-02-04 PROCEDURE — 6370000000 HC RX 637 (ALT 250 FOR IP)

## 2025-02-04 PROCEDURE — 6360000002 HC RX W HCPCS: Performed by: INTERNAL MEDICINE

## 2025-02-04 PROCEDURE — 97530 THERAPEUTIC ACTIVITIES: CPT

## 2025-02-04 PROCEDURE — 85025 COMPLETE CBC W/AUTO DIFF WBC: CPT

## 2025-02-04 PROCEDURE — 2700000000 HC OXYGEN THERAPY PER DAY

## 2025-02-04 PROCEDURE — 80048 BASIC METABOLIC PNL TOTAL CA: CPT

## 2025-02-04 RX ADMIN — ACETYLCYSTEINE 600 MG: 200 SOLUTION ORAL; RESPIRATORY (INHALATION) at 09:21

## 2025-02-04 RX ADMIN — METOPROLOL SUCCINATE 50 MG: 50 TABLET, EXTENDED RELEASE ORAL at 10:12

## 2025-02-04 RX ADMIN — IPRATROPIUM BROMIDE AND ALBUTEROL SULFATE 1 DOSE: 2.5; .5 SOLUTION RESPIRATORY (INHALATION) at 09:19

## 2025-02-04 RX ADMIN — BUDESONIDE INHALATION 500 MCG: 0.5 SUSPENSION RESPIRATORY (INHALATION) at 09:21

## 2025-02-04 RX ADMIN — HEPARIN SODIUM 5000 UNITS: 5000 INJECTION INTRAVENOUS; SUBCUTANEOUS at 06:41

## 2025-02-04 RX ADMIN — PANTOPRAZOLE SODIUM 40 MG: 40 TABLET, DELAYED RELEASE ORAL at 06:42

## 2025-02-04 RX ADMIN — DIVALPROEX SODIUM 125 MG: 125 CAPSULE ORAL at 06:42

## 2025-02-04 RX ADMIN — IPRATROPIUM BROMIDE AND ALBUTEROL SULFATE 1 DOSE: 2.5; .5 SOLUTION RESPIRATORY (INHALATION) at 12:14

## 2025-02-04 RX ADMIN — METHYLPREDNISOLONE 4 MG: 4 TABLET ORAL at 06:42

## 2025-02-04 NOTE — PROGRESS NOTES
[facility]  Department of Internal Medicine  Division of Pulmonary, Critical Care and Sleep Medicine  Consult Note    Patient: Ashley Vivas  MRN: [id number]  : 1950    Encounter Time: 8:39 PM     Date of Admission: 2025  2:50 AM    Primary Care Physician: Samina Ji MD    Primary Pulmonologist: Dr. Wiley    Reason for Consultation: RSV pneumonia      SUBJECTIVE: Ashley is seen and examined at the bedside.  She is currently sitting in the chair.  Remains on 7 L of oxygen.  Anxious about planning for discharge.  She is more oriented today than on my examination yesterday    OBJECTIVE:     PHYSICAL EXAM:   VITALS:   Vitals:    25 1530 25 1559 25 1716 25   BP:  (!) 127/47  113/63   Pulse:  79  89   Resp:  18  20   Temp:  98.1 °F (36.7 °C)  96.9 °F (36.1 °C)   TempSrc:    Temporal   SpO2: 100%  99% 92%   Weight:       Height:            Intake/Output Summary (Last 24 hours) at 2/3/2025 2039  Last data filed at 2/3/2025 1810  Gross per 24 hour   Intake 300 ml   Output 150 ml   Net 150 ml          CONSTITUTIONAL:   A&O x 3, NAD  SKIN:     No rash, No suspicious lesions, No skin discoloration  HEENT:     EOMI, MMM, No thrush  NECK:    No bruits, No JVP appreciated  CV:      Sinus,  No murmur, No rubs, No gallops  PULMONARY:   Couse BS,  No Wheezing, No Rales, No Rhonchi      No noted egophony  ABDOMEN:     Soft, non-tender. BS normal. No R/R/G  EXT:    No deformities .  No clubbing.       No lower extremity edema, No venous stasis  PULSE:   Appears equal and palpable.  PSYCHIATRIC:  Seems appropriate, No acute psychosis  MS:    No fractures, No gross weakness  NEUROLOGIC:   The clinical assessment is non-focal     DATA: IMAGING & TESTING:     LABORATORY TESTS:    CBC:   Lab Results   Component Value Date/Time    WBC 7.5 2025 05:05 AM    RBC 2.42 2025 05:05 AM    HGB 7.8 2025 05:05 AM    HCT 25.8 2025 05:05 AM

## 2025-02-04 NOTE — PROGRESS NOTES
OCCUPATIONAL THERAPY TREATMENT NOTE    [facility]  OT BEDSIDE TREATMENT NOTE      Date:2025  Patient Name: Ashley Vivas  MRN: [id number]  : 1950  Room: [address] [place] [address]     Evaluating OT: Jamil Ferris OTR/L #[id number]      Referring Provider: Trupti Landers MD   Specific Provider Orders/Date: OT eval and treat 25     Diagnosis: COPD exacerbation (HCC) [J44.1]  COPD with acute exacerbation (HCC) [J44.1]  Acute respiratory failure, unspecified whether with hypoxia or hypercapnia [J96.00]   Pt admitted to hospital with SOB / hypoxia from nursing facility         Pertinent Medical History:  has a past medical history of Cancer (HCC), COPD (chronic obstructive pulmonary disease) (HCC), History of Holter monitoring, Hypertension, Lung cancer, lower lobe (HCC), and Lung cancer, upper lobe (HCC).         Precautions:  Fall Risk, droplet / contact isolation (RSV), TSM, O2, continuous pulse ox, bed alarm     Assessment of current deficits    [x] Functional mobility          [x]ADLs           [x] Strength                  []Cognition    [x] Functional transfers        [x] IADLs         [x] Safety Awareness   [x]Endurance    [] Fine Coordination                        [x] Balance      [] Vision/perception   []Sensation      []Gross Motor Coordination            [] ROM           [] Delirium                   [] Motor Control      OT PLAN OF CARE   OT POC based on physician orders, patient diagnosis and results of clinical assessment     Frequency/Duration 1-5 days/wk for 2 weeks PRN   Specific OT Treatment Interventions to include:   * Instruction/training on adapted ADL techniques and AE recommendations to increase functional independence within precautions       * Training on energy conservation strategies, correct breathing pattern and techniques to improve independence/tolerance for self-care routine  * Functional transfer/mobility training/DME recommendations for increased

## 2025-02-04 NOTE — PROGRESS NOTES
Sent a message to Dr. Beatty, PT/OT did see her yesterday.  OT are signed up to see her today.  Pulmonology did say she is ok for discharge.  She has a noon  time today.

## 2025-02-04 NOTE — CARE COORDINATION
Social Work/ Case Management Transition of Care Planning (Marylou Bradshaw, [phone]):     Per report and chart review Pt is on 50L [place]. Pt on IV Solu-medrol q8. SubQ Heparin 3x daily. Palliative signed off. Pulmonology following. PT/OT pending. Pt can discharge back to [facility] in [place], they will have an available bed Sunday, Pt has used Medicare 21 days. Destination/ELDER complete. Envelope in soft chart, transport form will need completed after therapy. SW/CM to follow.  Marylou Bradshaw, ANMOL  1/31/2025    
Social Work/ Case Management Transition of Care Planning (Marylou Bradshaw, [phone]):     Discharge order noted, SW spoke with pulmonology who is ok with dc. Pt going to [facility], they are picking up at 1200. Family aware of discharge today. RN aware. Ambulette form and envelope in soft chart. 7000 complete. Destination/ELDER complete.   Marylou Bradshaw, ANMOL  2/4/2025    
Social Work/ Case Management Transition of Care Planning (Marylou Bradshaw, GUERITAW [phone]):     Per report and chart review Pt is on 7L NC. Pt had sitter discontinued this morning at 9:15am, and replace with APRIL, CHET spoke with RN who will make sure the order is discontinued. Pulmonology is following. Pt can discharge back to [facility] in [place]. Pt has used Medicare 21 days. Destination/ELDER complete. Envelope in soft chart, transport form will need completed after therapy. CHET/DELON to follow.  Marylou Bradshaw, ANMOL  2/3/2025    
Bipolar 1 disorder    Bipolar 1 disorder    Other schizophrenia

## 2025-02-04 NOTE — DISCHARGE SUMMARY
[facility] Hospitalist Physician Discharge Summary       [facility]  [address]  [facility] [address] [phone]        Shae Bear, DO  [address] [address]  [phone]    Follow up in 1 week(s)  Hospital followup    Samina Ji MD  [address]  [facility] [address]  [phone]    Schedule an appointment as soon as possible for a visit in 1 week(s)  Hospital followup    Activity level: As tolerated     Dispo: above       Condition on discharge: Stable     Patient ID:  Ashley Vivas  [id number]  74 y.o.  1950    Patient's PCP: Samina Ji MD    Admit Date: 1/28/2025     Discharge Date: 2/4/2025 02/04/25    Admitting Physician: John Forbes MD     Discharge Physician: Rogerio Williamson MD     Admission Diagnoses: Principal Problem:    COPD with acute exacerbation (HCC)  Resolved Problems:    * No resolved hospital problems. *      Discharge Diagnoses: Principal Problem:    COPD with acute exacerbation (HCC)  Resolved Problems:    * No resolved hospital problems. *      Consults:  IP CONSULT TO INTERNAL MEDICINE  IP CONSULT TO SOCIAL WORK  IP CONSULT TO DIETITIAN  IP CONSULT TO SPIRITUAL SERVICES  IP CONSULT TO PULMONOLOGY  IP CONSULT TO PALLIATIVE CARE    Hospital Course:   Patient Ashley Vivas is a 74 y.o. presented with COPD exacerbation (HCC) [J44.1]  COPD with acute exacerbation (HCC) [J44.1]  Acute respiratory failure, unspecified whether with hypoxia or hypercapnia [J96.00]     74 y.o. female who presents with complaints of hypoxia at the nursing facility where she was refusing BiPAP. Upon arrival patient was tachypneic, hypoxic, and hypertensive. Upon arrival patient has reduced air movement bilaterally, heart rate was regular rhythm without murmurs rubs or gallop, there is no lower extremity edema. Abdomen was soft nontender.  ABG showed respiratory acidosis BiPAP was ordered. Patient was  Ensifentrine     Trelegy Ellipta 100-62.5-25 MCG/ACT Aepb inhaler  Generic drug: fluticasone-umeclidin-vilant               Where to Get Your Medications        These medications were sent to [facility] [address] - P [phone] - F [phone]  [address], [facility] [address]      Phone: [phone]   methylPREDNISolone 4 MG tablet  methylPREDNISolone 4 MG tablet         INTERNAL MEDICINE FOLLOW UP/INSTRUCTIONS:  Follow-up with primary care physician within 1 week of discharge from hospital  Please review changes to pre-hospital admission medications and prescriptions for new medications upon discharge from the hospital with PCP  Please review results of labs and imaging studies with PCP  Follow-up with consultants as directed by them   If recurrence or worsening of symptoms please call primary care physician or return to the ER immediately  Diet: No diet orders on file      39 minutes was spent in preparing discharge papers, discussing discharge with patient, medication review, etc.    This note was generated by the epic EMR system/Dragon speech recognition and may contain inherent errors or omissions not intended by the user. Grammatical errors, random word insertions, deletions, pronoun errors and incomplete sentences are occasional consequences of this technology due to software limitations. Not all errors are caught and corrected. If there are questions or concerns about the content of this note or information contained within the body of this dictation they should be addressed directly with the author for clarification.     Signed:  Electronically signed by Rogerio Williamson MD on 2/4/2025 at 12:43 PM

## 2025-02-04 NOTE — PROGRESS NOTES
Telemetry just called to advise her O2 Sat was 84% on 7L  I went in to check her, she was just up, which causes her O2 sat to drop.  She is 87% now and on her way back up.  Nighttime RN said she takes 5+ minutes to recover. Will recheck during my med pass.

## 2025-02-04 NOTE — PROGRESS NOTES
AVS was faxed to [phone].    Attempted to call N-N report to [phone], no answer.  Will tray again before Tamiette picks her up..    2nd Attempt made for N-N Report, no answer.

## 2025-02-05 NOTE — PROGRESS NOTES
[facility]  Department of Internal Medicine  Division of Pulmonary, Critical Care and Sleep Medicine  Consult Note    Patient: Ashley Vivas  MRN: [id number]  : 1950    Encounter Time: 7:07 PM     Date of Admission: 2025  2:50 AM    Primary Care Physician: Samina Ji MD    Primary Pulmonologist: Dr. Wiley    Reason for Consultation: RSV pneumonia      SUBJECTIVE: Ashley is seen and examined at the bedside.  She is currently sitting in the chair.  At time of my evaluation she was on 6 L of nasal cannula.  Apparently last night during the night her oxygen saturation had decreased and her oxygen was turned up to 11 L.  The patient is very upset about this.    OBJECTIVE:     PHYSICAL EXAM:   VITALS:   Vitals:    25 0730 25 0921 25 1102 25 1214   BP: (!) 142/64  (!) 126/55    Pulse: 99 92 92 88   Resp:  18 16 24   Temp: 97.6 °F (36.4 °C)  97.1 °F (36.2 °C)    TempSrc: Temporal  Temporal    SpO2: 96% 94% 99% 97%   Weight:       Height:            Intake/Output Summary (Last 24 hours) at 2025 1907  Last data filed at 2025 0940  Gross per 24 hour   Intake 120 ml   Output --   Net 120 ml          CONSTITUTIONAL:   A&O x 3, NAD  SKIN:     No rash, No suspicious lesions, No skin discoloration  HEENT:     EOMI, MMM, No thrush  NECK:    No bruits, No JVP appreciated  CV:      Sinus,  No murmur, No rubs, No gallops  PULMONARY:   Couse BS,  No Wheezing, No Rales, No Rhonchi      No noted egophony  ABDOMEN:     Soft, non-tender. BS normal. No R/R/G  EXT:    No deformities .  No clubbing.       No lower extremity edema, No venous stasis  PULSE:   Appears equal and palpable.  PSYCHIATRIC:  Seems appropriate, No acute psychosis  MS:    No fractures, No gross weakness  NEUROLOGIC:   The clinical assessment is non-focal     DATA: IMAGING & TESTING:     LABORATORY TESTS:    CBC:   Lab Results   Component Value Date/Time    WBC 8.2 2025

## 2025-02-06 ENCOUNTER — OUTSIDE SERVICES (OUTPATIENT)
Dept: PULMONOLOGY | Age: 75
End: 2025-02-06

## 2025-02-06 NOTE — PROGRESS NOTES
inhale orally two times a day related to CHRONIC OBSTRUCTIVE PULMONARY DISEASE WITH (ACUTE) EXACERBATION (J44.1) Pharmacy Active 2/9/2025 20:00  2/4/2025    Actions   Ipratropium Bromide Nasal Solution 0.06 % (Ipratropium Bromide (Nasal)) 1 spray in both nostrils every 12 hours as needed for nasal drip Pharmacy Active 2/5/2025 16:00  2/4/2025    Actions   Ohtuvayre Inhalation Suspension 3 MG/2.5ML (Ensifentrine) 1 application inhale orally two times a day related to CHRONIC OBSTRUCTIVE PULMONARY DISEASE WITH (ACUTE) EXACERBATION (J44.1) Pharmacy Active 2/9/2025 20:00  2/4/2025    Actions   Ipratropium-Albuterol Inhalation Solution 0.5-2.5 (3) MG/3ML (Ipratropium-Albuterol) 3 ml inhale orally every 4 hours related to CHRONIC OBSTRUCTIVE PULMONARY DISEASE WITH (ACUTE) EXACERBATION (J44.1) while awake Pharmacy Active 2/9/2025 20:00  2/4/2025    Actions   Divalproex Sodium Oral Tablet Delayed Release 125 MG (Divalproex Sodium) Give 1 tablet by mouth three times a day related to ANXIETY DISORDER, UNSPECIFIED (F41.9) Pharmacy Active 2/5/2025 15:00  2/5/2025    Actions   Cholecalciferol Oral Capsule (Cholecalciferol) Give 00442 unit by mouth in the morning every Mon for supplement Pharmacy Active 2/10/2025 07:00  2/5/2025    Actions   Multi-Vitamin/Minerals Oral Tablet (Multiple Vitamins w/ Minerals) Give 1 tablet by mouth in the morning for supplement Pharmacy Active 2/7/2025 07:00  2/6/2025    Actions   MiraLax Oral Powder 17 GM/SCOOP (Polyethylene Glycol 3350) Give 1 scoop by mouth every 24 hours as needed for constipation mix in 4oz-8oz liquid Pharmacy Active 2/6/2025 08:30  2/6/2025    Actions   Pressure reducing mattress to bed every shift Other Active 2/4/2025 19:00  2/4/2025         Allergies   Allergen Reactions    Celebrex [Celecoxib] Other (See Comments)     Lowered blood pressure and itching with a rash    Neosporin [Neomycin-Polymyxin-Gramicidin] Rash    Augmentin [Amoxicillin-Pot Clavulanate] Nausea And

## 2025-02-12 ENCOUNTER — APPOINTMENT (OUTPATIENT)
Dept: CT IMAGING | Age: 75
DRG: 189 | End: 2025-02-12
Payer: MEDICARE

## 2025-02-12 ENCOUNTER — HOSPITAL ENCOUNTER (INPATIENT)
Age: 75
LOS: 11 days | Discharge: HOME | DRG: 189 | End: 2025-02-24
Attending: EMERGENCY MEDICINE | Admitting: FAMILY MEDICINE
Payer: MEDICARE

## 2025-02-12 DIAGNOSIS — J96.21 ACUTE ON CHRONIC RESPIRATORY FAILURE WITH HYPOXIA AND HYPERCAPNIA (HCC): Primary | ICD-10-CM

## 2025-02-12 DIAGNOSIS — J44.1 ACUTE EXACERBATION OF CHRONIC OBSTRUCTIVE PULMONARY DISEASE (COPD) (HCC): ICD-10-CM

## 2025-02-12 DIAGNOSIS — J96.02 ACUTE RESPIRATORY FAILURE WITH HYPOXIA AND HYPERCAPNIA (HCC): ICD-10-CM

## 2025-02-12 DIAGNOSIS — J96.01 ACUTE RESPIRATORY FAILURE WITH HYPOXIA AND HYPERCAPNIA (HCC): ICD-10-CM

## 2025-02-12 DIAGNOSIS — J96.22 ACUTE ON CHRONIC RESPIRATORY FAILURE WITH HYPOXIA AND HYPERCAPNIA (HCC): Primary | ICD-10-CM

## 2025-02-12 DIAGNOSIS — D64.9 ACUTE ON CHRONIC ANEMIA: ICD-10-CM

## 2025-02-12 LAB
ALBUMIN SERPL-MCNC: 3.4 G/DL (ref 3.5–5.2)
ALP SERPL-CCNC: 54 U/L (ref 35–104)
ALT SERPL-CCNC: 15 U/L (ref 0–32)
ANION GAP SERPL CALCULATED.3IONS-SCNC: 4 MMOL/L (ref 7–16)
AST SERPL-CCNC: 14 U/L (ref 0–31)
BASOPHILS # BLD: 0 K/UL (ref 0–0.2)
BASOPHILS NFR BLD: 0 % (ref 0–2)
BILIRUB SERPL-MCNC: <0.2 MG/DL (ref 0–1.2)
BNP SERPL-MCNC: 1594 PG/ML (ref 0–450)
BUN SERPL-MCNC: 27 MG/DL (ref 6–23)
CALCIUM SERPL-MCNC: 9.3 MG/DL (ref 8.6–10.2)
CHLORIDE SERPL-SCNC: 98 MMOL/L (ref 98–107)
CO2 SERPL-SCNC: 39 MMOL/L (ref 22–29)
CREAT SERPL-MCNC: 0.6 MG/DL (ref 0.5–1)
CRITICAL ACTION: NORMAL
CRITICAL NOTIFICATION DATE/TIME: NORMAL
CRITICAL NOTIFICATION: NORMAL
CRITICAL VALUE READ BACK: YES
EOSINOPHIL # BLD: 0 K/UL (ref 0.05–0.5)
EOSINOPHILS RELATIVE PERCENT: 0 % (ref 0–6)
ERYTHROCYTE [DISTWIDTH] IN BLOOD BY AUTOMATED COUNT: 18.2 % (ref 11.5–15)
GFR, ESTIMATED: >90 ML/MIN/1.73M2
GLUCOSE SERPL-MCNC: 151 MG/DL (ref 74–99)
HCT VFR BLD AUTO: 20.4 % (ref 34–48)
HGB BLD-MCNC: 6.1 G/DL (ref 11.5–15.5)
LYMPHOCYTES NFR BLD: 0.49 K/UL (ref 1.5–4)
LYMPHOCYTES RELATIVE PERCENT: 4 % (ref 20–42)
MCH RBC QN AUTO: 32.4 PG (ref 26–35)
MCHC RBC AUTO-ENTMCNC: 29.9 G/DL (ref 32–34.5)
MCV RBC AUTO: 108.5 FL (ref 80–99.9)
MONOCYTES NFR BLD: 0.2 K/UL (ref 0.1–0.95)
MONOCYTES NFR BLD: 2 % (ref 2–12)
NEUTROPHILS NFR BLD: 94 % (ref 43–80)
NEUTS SEG NFR BLD: 10.61 K/UL (ref 1.8–7.3)
PLATELET # BLD AUTO: 246 K/UL (ref 130–450)
PMV BLD AUTO: 10.9 FL (ref 7–12)
POC HCO3: 41.9 MMOL/L (ref 22–26)
POC O2 SATURATION: 94.7 % (ref 92–98.5)
POC PCO2: 78.1 MM HG (ref 35–45)
POC PH: 7.34 (ref 7.35–7.45)
POC PO2: 83.6 MM HG (ref 60–80)
POSITIVE BASE EXCESS, ART: 14.7 MMOL/L (ref 0–3)
POTASSIUM SERPL-SCNC: 4 MMOL/L (ref 3.5–5)
PROT SERPL-MCNC: 5.8 G/DL (ref 6.4–8.3)
RBC # BLD AUTO: 1.88 M/UL (ref 3.5–5.5)
RBC # BLD: ABNORMAL 10*6/UL
SODIUM SERPL-SCNC: 141 MMOL/L (ref 132–146)
TROPONIN I SERPL HS-MCNC: 29 NG/L (ref 0–9)
WBC OTHER # BLD: 11.3 K/UL (ref 4.5–11.5)

## 2025-02-12 PROCEDURE — 71275 CT ANGIOGRAPHY CHEST: CPT

## 2025-02-12 PROCEDURE — 83880 ASSAY OF NATRIURETIC PEPTIDE: CPT

## 2025-02-12 PROCEDURE — 86850 RBC ANTIBODY SCREEN: CPT

## 2025-02-12 PROCEDURE — 94660 CPAP INITIATION&MGMT: CPT

## 2025-02-12 PROCEDURE — 99285 EMERGENCY DEPT VISIT HI MDM: CPT

## 2025-02-12 PROCEDURE — 6360000004 HC RX CONTRAST MEDICATION: Performed by: RADIOLOGY

## 2025-02-12 PROCEDURE — 86900 BLOOD TYPING SEROLOGIC ABO: CPT

## 2025-02-12 PROCEDURE — 96374 THER/PROPH/DIAG INJ IV PUSH: CPT

## 2025-02-12 PROCEDURE — 5A09557 ASSISTANCE WITH RESPIRATORY VENTILATION, GREATER THAN 96 CONSECUTIVE HOURS, CONTINUOUS POSITIVE AIRWAY PRESSURE: ICD-10-PCS | Performed by: INTERNAL MEDICINE

## 2025-02-12 PROCEDURE — 36430 TRANSFUSION BLD/BLD COMPNT: CPT

## 2025-02-12 PROCEDURE — 93005 ELECTROCARDIOGRAM TRACING: CPT | Performed by: EMERGENCY MEDICINE

## 2025-02-12 PROCEDURE — 80053 COMPREHEN METABOLIC PANEL: CPT

## 2025-02-12 PROCEDURE — 86923 COMPATIBILITY TEST ELECTRIC: CPT

## 2025-02-12 PROCEDURE — 2500000003 HC RX 250 WO HCPCS

## 2025-02-12 PROCEDURE — 86901 BLOOD TYPING SEROLOGIC RH(D): CPT

## 2025-02-12 PROCEDURE — 85025 COMPLETE CBC W/AUTO DIFF WBC: CPT

## 2025-02-12 PROCEDURE — 84484 ASSAY OF TROPONIN QUANT: CPT

## 2025-02-12 PROCEDURE — 82803 BLOOD GASES ANY COMBINATION: CPT

## 2025-02-12 PROCEDURE — 2700000000 HC OXYGEN THERAPY PER DAY

## 2025-02-12 PROCEDURE — 6360000002 HC RX W HCPCS

## 2025-02-12 PROCEDURE — 2580000003 HC RX 258

## 2025-02-12 PROCEDURE — 36600 WITHDRAWAL OF ARTERIAL BLOOD: CPT

## 2025-02-12 PROCEDURE — 94640 AIRWAY INHALATION TREATMENT: CPT

## 2025-02-12 RX ORDER — IOPAMIDOL 755 MG/ML
75 INJECTION, SOLUTION INTRAVASCULAR
Status: COMPLETED | OUTPATIENT
Start: 2025-02-12 | End: 2025-02-12

## 2025-02-12 RX ORDER — LEVALBUTEROL INHALATION SOLUTION 0.63 MG/3ML
0.63 SOLUTION RESPIRATORY (INHALATION)
Status: COMPLETED | OUTPATIENT
Start: 2025-02-12 | End: 2025-02-12

## 2025-02-12 RX ORDER — SODIUM CHLORIDE 9 MG/ML
INJECTION, SOLUTION INTRAVENOUS PRN
Status: DISCONTINUED | OUTPATIENT
Start: 2025-02-12 | End: 2025-02-24 | Stop reason: HOSPADM

## 2025-02-12 RX ORDER — 0.9 % SODIUM CHLORIDE 0.9 %
1000 INTRAVENOUS SOLUTION INTRAVENOUS ONCE
Status: COMPLETED | OUTPATIENT
Start: 2025-02-12 | End: 2025-02-13

## 2025-02-12 RX ADMIN — LEVALBUTEROL HYDROCHLORIDE 0.63 MG: 0.63 SOLUTION RESPIRATORY (INHALATION) at 21:43

## 2025-02-12 RX ADMIN — WATER 125 MG: 1 INJECTION INTRAMUSCULAR; INTRAVENOUS; SUBCUTANEOUS at 21:50

## 2025-02-12 RX ADMIN — LEVALBUTEROL HYDROCHLORIDE 0.63 MG: 0.63 SOLUTION RESPIRATORY (INHALATION) at 21:41

## 2025-02-12 RX ADMIN — SODIUM CHLORIDE 1000 ML: 9 INJECTION, SOLUTION INTRAVENOUS at 21:47

## 2025-02-12 RX ADMIN — IOPAMIDOL 75 ML: 755 INJECTION, SOLUTION INTRAVENOUS at 23:35

## 2025-02-12 RX ADMIN — LEVALBUTEROL HYDROCHLORIDE 0.63 MG: 0.63 SOLUTION RESPIRATORY (INHALATION) at 21:42

## 2025-02-12 ASSESSMENT — PAIN - FUNCTIONAL ASSESSMENT: PAIN_FUNCTIONAL_ASSESSMENT: NONE - DENIES PAIN

## 2025-02-12 ASSESSMENT — LIFESTYLE VARIABLES
HOW OFTEN DO YOU HAVE A DRINK CONTAINING ALCOHOL: NEVER
HOW MANY STANDARD DRINKS CONTAINING ALCOHOL DO YOU HAVE ON A TYPICAL DAY: PATIENT DOES NOT DRINK

## 2025-02-13 PROBLEM — J96.21 ACUTE ON CHRONIC HYPOXIC RESPIRATORY FAILURE: Status: RESOLVED | Noted: 2024-12-29 | Resolved: 2025-02-13

## 2025-02-13 PROBLEM — F43.9 STRESS AT HOME: Status: RESOLVED | Noted: 2023-06-02 | Resolved: 2025-02-13

## 2025-02-13 PROBLEM — S22.32XA CLOSED FRACTURE OF ONE RIB OF LEFT SIDE: Status: RESOLVED | Noted: 2022-12-03 | Resolved: 2025-02-13

## 2025-02-13 PROBLEM — J96.02 ACUTE RESPIRATORY FAILURE WITH HYPOXIA AND HYPERCAPNIA (HCC): Status: ACTIVE | Noted: 2025-02-13

## 2025-02-13 PROBLEM — R55 SYNCOPE AND COLLAPSE: Status: RESOLVED | Noted: 2024-12-09 | Resolved: 2025-02-13

## 2025-02-13 PROBLEM — U09.9 POST COVID-19 CONDITION, UNSPECIFIED: Status: RESOLVED | Noted: 2022-12-03 | Resolved: 2025-02-13

## 2025-02-13 PROBLEM — Z51.5 PALLIATIVE CARE ENCOUNTER: Status: RESOLVED | Noted: 2025-01-01 | Resolved: 2025-02-13

## 2025-02-13 PROBLEM — Z23 NEED FOR IMMUNIZATION AGAINST INFLUENZA: Status: RESOLVED | Noted: 2023-12-02 | Resolved: 2025-02-13

## 2025-02-13 PROBLEM — J96.01 ACUTE RESPIRATORY FAILURE WITH HYPOXIA AND HYPERCAPNIA: Status: ACTIVE | Noted: 2025-02-13

## 2025-02-13 LAB
AMPHET UR QL SCN: NEGATIVE
B.E.: 5.1 MMOL/L (ref -3–3)
BARBITURATES UR QL SCN: NEGATIVE
BENZODIAZ UR QL: NEGATIVE
BUPRENORPHINE UR QL: NEGATIVE
CANNABINOIDS UR QL SCN: NEGATIVE
COCAINE UR QL SCN: NEGATIVE
COHB: 1 % (ref 0–1.5)
COMMENT: ABNORMAL
CRITICAL ACTION: NORMAL
CRITICAL NOTIFICATION DATE/TIME: NORMAL
CRITICAL NOTIFICATION: NORMAL
CRITICAL VALUE READ BACK: YES
CRITICAL: ABNORMAL
DATE ANALYZED: ABNORMAL
DATE OF COLLECTION: ABNORMAL
EKG ATRIAL RATE: 119 BPM
EKG P AXIS: 7 DEGREES
EKG P-R INTERVAL: 92 MS
EKG Q-T INTERVAL: 312 MS
EKG QRS DURATION: 80 MS
EKG QTC CALCULATION (BAZETT): 438 MS
EKG R AXIS: -37 DEGREES
EKG T AXIS: 60 DEGREES
EKG VENTRICULAR RATE: 119 BPM
FENTANYL UR QL: NEGATIVE
FLUAV RNA RESP QL NAA+PROBE: NOT DETECTED
FLUBV RNA RESP QL NAA+PROBE: NOT DETECTED
HCO3: 33.8 MMOL/L (ref 22–26)
HCT VFR BLD AUTO: 25 % (ref 34–48)
HGB BLD-MCNC: 7.9 G/DL (ref 11.5–15.5)
HHB: 0.3 % (ref 0–5)
LAB: ABNORMAL
Lab: 100
METHADONE UR QL: NEGATIVE
METHB: 0.2 % (ref 0–1.5)
MODE: ABNORMAL
O2 CONTENT: 13.3 ML/DL
O2 DELIVERY DEVICE: ABNORMAL
O2 SATURATION: 99.7 % (ref 92–98.5)
O2HB: 98.5 % (ref 94–97)
OPERATOR ID: ABNORMAL
OPIATES UR QL SCN: NEGATIVE
OXYCODONE UR QL SCN: NEGATIVE
PATIENT TEMP: 37 C
PCO2: 78.6 MMHG (ref 35–45)
PCP UR QL SCN: NEGATIVE
PEEP: 5
PH BLOOD GAS: 7.25 (ref 7.35–7.45)
PO2: 186.8 MMHG (ref 75–100)
POC HCO3: 37.7 MMOL/L (ref 22–26)
POC O2 SATURATION: 99.7 % (ref 92–98.5)
POC PCO2: 79.2 MM HG (ref 35–45)
POC PH: 7.29 (ref 7.35–7.45)
POC PO2: 224.8 MM HG (ref 60–80)
POSITIVE BASE EXCESS, ART: 9.5 MMOL/L (ref 0–3)
PRESSURE SUPPORT: 10
PROCALCITONIN SERPL-MCNC: 0.09 NG/ML (ref 0–0.08)
SARS-COV-2 RNA RESP QL NAA+PROBE: NOT DETECTED
SOURCE, BLOOD GAS: ABNORMAL
SOURCE: NORMAL
SPECIMEN DESCRIPTION: NORMAL
TEST INFORMATION: NORMAL
THB: 9.3 G/DL (ref 11.5–16.5)
TIME ANALYZED: 100
TROPONIN I SERPL HS-MCNC: 25 NG/L (ref 0–9)

## 2025-02-13 PROCEDURE — 94660 CPAP INITIATION&MGMT: CPT

## 2025-02-13 PROCEDURE — 2500000003 HC RX 250 WO HCPCS: Performed by: FAMILY MEDICINE

## 2025-02-13 PROCEDURE — NBSRV NON-BILLABLE SERVICE: Performed by: INTERNAL MEDICINE

## 2025-02-13 PROCEDURE — 93010 ELECTROCARDIOGRAM REPORT: CPT | Performed by: INTERNAL MEDICINE

## 2025-02-13 PROCEDURE — 82805 BLOOD GASES W/O2 SATURATION: CPT

## 2025-02-13 PROCEDURE — 99223 1ST HOSP IP/OBS HIGH 75: CPT | Performed by: FAMILY MEDICINE

## 2025-02-13 PROCEDURE — 85018 HEMOGLOBIN: CPT

## 2025-02-13 PROCEDURE — 80307 DRUG TEST PRSMV CHEM ANLYZR: CPT

## 2025-02-13 PROCEDURE — 6360000002 HC RX W HCPCS: Performed by: INTERNAL MEDICINE

## 2025-02-13 PROCEDURE — 84484 ASSAY OF TROPONIN QUANT: CPT

## 2025-02-13 PROCEDURE — P9016 RBC LEUKOCYTES REDUCED: HCPCS

## 2025-02-13 PROCEDURE — 36415 COLL VENOUS BLD VENIPUNCTURE: CPT

## 2025-02-13 PROCEDURE — 99223 1ST HOSP IP/OBS HIGH 75: CPT | Performed by: INTERNAL MEDICINE

## 2025-02-13 PROCEDURE — 6360000002 HC RX W HCPCS: Performed by: FAMILY MEDICINE

## 2025-02-13 PROCEDURE — 85014 HEMATOCRIT: CPT

## 2025-02-13 PROCEDURE — 6370000000 HC RX 637 (ALT 250 FOR IP): Performed by: INTERNAL MEDICINE

## 2025-02-13 PROCEDURE — 84145 PROCALCITONIN (PCT): CPT

## 2025-02-13 PROCEDURE — 94640 AIRWAY INHALATION TREATMENT: CPT

## 2025-02-13 PROCEDURE — 87449 NOS EACH ORGANISM AG IA: CPT

## 2025-02-13 PROCEDURE — 2700000000 HC OXYGEN THERAPY PER DAY

## 2025-02-13 PROCEDURE — 87899 AGENT NOS ASSAY W/OPTIC: CPT

## 2025-02-13 PROCEDURE — 87081 CULTURE SCREEN ONLY: CPT

## 2025-02-13 PROCEDURE — 82803 BLOOD GASES ANY COMBINATION: CPT

## 2025-02-13 PROCEDURE — 86738 MYCOPLASMA ANTIBODY: CPT

## 2025-02-13 PROCEDURE — 2060000000 HC ICU INTERMEDIATE R&B

## 2025-02-13 PROCEDURE — 87636 SARSCOV2 & INF A&B AMP PRB: CPT

## 2025-02-13 PROCEDURE — 6370000000 HC RX 637 (ALT 250 FOR IP): Performed by: FAMILY MEDICINE

## 2025-02-13 RX ORDER — SODIUM CHLORIDE 0.9 % (FLUSH) 0.9 %
5-40 SYRINGE (ML) INJECTION EVERY 12 HOURS SCHEDULED
Status: DISCONTINUED | OUTPATIENT
Start: 2025-02-13 | End: 2025-02-24 | Stop reason: HOSPADM

## 2025-02-13 RX ORDER — ACETYLCYSTEINE 100 MG/ML
4 SOLUTION ORAL; RESPIRATORY (INHALATION) EVERY 4 HOURS
Status: DISCONTINUED | OUTPATIENT
Start: 2025-02-13 | End: 2025-02-24 | Stop reason: HOSPADM

## 2025-02-13 RX ORDER — LORAZEPAM 2 MG/ML
1 INJECTION INTRAMUSCULAR ONCE
Status: DISCONTINUED | OUTPATIENT
Start: 2025-02-13 | End: 2025-02-13

## 2025-02-13 RX ORDER — PANTOPRAZOLE SODIUM 40 MG/1
40 TABLET, DELAYED RELEASE ORAL
Status: DISCONTINUED | OUTPATIENT
Start: 2025-02-13 | End: 2025-02-24 | Stop reason: HOSPADM

## 2025-02-13 RX ORDER — M-VIT,TX,IRON,MINS/CALC/FOLIC 27MG-0.4MG
1 TABLET ORAL DAILY
Status: DISCONTINUED | OUTPATIENT
Start: 2025-02-13 | End: 2025-02-24 | Stop reason: HOSPADM

## 2025-02-13 RX ORDER — POLYETHYLENE GLYCOL 3350 17 G/17G
17 POWDER, FOR SOLUTION ORAL DAILY PRN
COMMUNITY

## 2025-02-13 RX ORDER — ACETAMINOPHEN 650 MG/1
650 SUPPOSITORY RECTAL EVERY 6 HOURS PRN
Status: DISCONTINUED | OUTPATIENT
Start: 2025-02-13 | End: 2025-02-24 | Stop reason: HOSPADM

## 2025-02-13 RX ORDER — ENOXAPARIN SODIUM 100 MG/ML
30 INJECTION SUBCUTANEOUS DAILY
Status: DISCONTINUED | OUTPATIENT
Start: 2025-02-13 | End: 2025-02-20

## 2025-02-13 RX ORDER — IPRATROPIUM BROMIDE AND ALBUTEROL SULFATE 2.5; .5 MG/3ML; MG/3ML
1 SOLUTION RESPIRATORY (INHALATION)
Status: DISCONTINUED | OUTPATIENT
Start: 2025-02-13 | End: 2025-02-24 | Stop reason: HOSPADM

## 2025-02-13 RX ORDER — CALCIUM CARBONATE 500 MG/1
1 TABLET, CHEWABLE ORAL EVERY 8 HOURS PRN
COMMUNITY

## 2025-02-13 RX ORDER — BENZONATATE 100 MG/1
100 CAPSULE ORAL 3 TIMES DAILY PRN
Status: DISCONTINUED | OUTPATIENT
Start: 2025-02-13 | End: 2025-02-24 | Stop reason: HOSPADM

## 2025-02-13 RX ORDER — LORAZEPAM 0.5 MG/1
0.5 TABLET ORAL EVERY 6 HOURS PRN
Status: DISCONTINUED | OUTPATIENT
Start: 2025-02-13 | End: 2025-02-16

## 2025-02-13 RX ORDER — ACETAMINOPHEN 325 MG/1
650 TABLET ORAL EVERY 6 HOURS PRN
Status: DISCONTINUED | OUTPATIENT
Start: 2025-02-13 | End: 2025-02-24 | Stop reason: HOSPADM

## 2025-02-13 RX ORDER — ARFORMOTEROL TARTRATE 15 UG/2ML
15 SOLUTION RESPIRATORY (INHALATION)
Status: DISCONTINUED | OUTPATIENT
Start: 2025-02-13 | End: 2025-02-13

## 2025-02-13 RX ORDER — GUAIFENESIN 600 MG/1
600 TABLET, EXTENDED RELEASE ORAL 2 TIMES DAILY
Status: DISCONTINUED | OUTPATIENT
Start: 2025-02-13 | End: 2025-02-24 | Stop reason: HOSPADM

## 2025-02-13 RX ORDER — SODIUM CHLORIDE 0.9 % (FLUSH) 0.9 %
5-40 SYRINGE (ML) INJECTION PRN
Status: DISCONTINUED | OUTPATIENT
Start: 2025-02-13 | End: 2025-02-24 | Stop reason: HOSPADM

## 2025-02-13 RX ORDER — METOPROLOL SUCCINATE 50 MG/1
50 TABLET, EXTENDED RELEASE ORAL DAILY
Status: DISCONTINUED | OUTPATIENT
Start: 2025-02-13 | End: 2025-02-24 | Stop reason: HOSPADM

## 2025-02-13 RX ORDER — IPRATROPIUM BROMIDE AND ALBUTEROL SULFATE 2.5; .5 MG/3ML; MG/3ML
1 SOLUTION RESPIRATORY (INHALATION) EVERY 4 HOURS PRN
Status: DISCONTINUED | OUTPATIENT
Start: 2025-02-13 | End: 2025-02-24 | Stop reason: HOSPADM

## 2025-02-13 RX ORDER — AZITHROMYCIN 250 MG/1
500 TABLET, FILM COATED ORAL DAILY
Status: COMPLETED | OUTPATIENT
Start: 2025-02-13 | End: 2025-02-15

## 2025-02-13 RX ORDER — PREDNISONE 20 MG/1
40 TABLET ORAL DAILY
Status: COMPLETED | OUTPATIENT
Start: 2025-02-15 | End: 2025-02-24

## 2025-02-13 RX ORDER — BUDESONIDE 0.5 MG/2ML
0.5 INHALANT ORAL
Status: DISCONTINUED | OUTPATIENT
Start: 2025-02-13 | End: 2025-02-13

## 2025-02-13 RX ORDER — BUDESONIDE 0.5 MG/2ML
0.5 INHALANT ORAL
Status: DISCONTINUED | OUTPATIENT
Start: 2025-02-13 | End: 2025-02-24 | Stop reason: HOSPADM

## 2025-02-13 RX ADMIN — METOPROLOL SUCCINATE 50 MG: 50 TABLET, EXTENDED RELEASE ORAL at 12:29

## 2025-02-13 RX ADMIN — WATER 40 MG: 1 INJECTION INTRAMUSCULAR; INTRAVENOUS; SUBCUTANEOUS at 05:48

## 2025-02-13 RX ADMIN — WATER 40 MG: 1 INJECTION INTRAMUSCULAR; INTRAVENOUS; SUBCUTANEOUS at 16:54

## 2025-02-13 RX ADMIN — ENOXAPARIN SODIUM 30 MG: 100 INJECTION SUBCUTANEOUS at 12:29

## 2025-02-13 RX ADMIN — WATER 40 MG: 1 INJECTION INTRAMUSCULAR; INTRAVENOUS; SUBCUTANEOUS at 12:29

## 2025-02-13 RX ADMIN — ACETYLCYSTEINE 400 MG: 100 INHALANT RESPIRATORY (INHALATION) at 21:57

## 2025-02-13 RX ADMIN — PANTOPRAZOLE SODIUM 40 MG: 40 TABLET, DELAYED RELEASE ORAL at 12:29

## 2025-02-13 RX ADMIN — ACETYLCYSTEINE 400 MG: 100 INHALANT RESPIRATORY (INHALATION) at 17:44

## 2025-02-13 RX ADMIN — BUDESONIDE 500 MCG: 0.5 SUSPENSION RESPIRATORY (INHALATION) at 05:46

## 2025-02-13 RX ADMIN — IPRATROPIUM BROMIDE AND ALBUTEROL SULFATE 1 DOSE: .5; 2.5 SOLUTION RESPIRATORY (INHALATION) at 14:37

## 2025-02-13 RX ADMIN — Medication 1 TABLET: at 12:29

## 2025-02-13 RX ADMIN — AZITHROMYCIN DIHYDRATE 500 MG: 250 TABLET ORAL at 12:29

## 2025-02-13 RX ADMIN — WATER 40 MG: 1 INJECTION INTRAMUSCULAR; INTRAVENOUS; SUBCUTANEOUS at 20:11

## 2025-02-13 RX ADMIN — IPRATROPIUM BROMIDE AND ALBUTEROL SULFATE 1 DOSE: .5; 2.5 SOLUTION RESPIRATORY (INHALATION) at 17:47

## 2025-02-13 RX ADMIN — BUDESONIDE INHALATION 500 MCG: 0.5 SUSPENSION RESPIRATORY (INHALATION) at 17:47

## 2025-02-13 RX ADMIN — ACETYLCYSTEINE 400 MG: 100 INHALANT RESPIRATORY (INHALATION) at 14:37

## 2025-02-13 RX ADMIN — IPRATROPIUM BROMIDE AND ALBUTEROL SULFATE 1 DOSE: .5; 2.5 SOLUTION RESPIRATORY (INHALATION) at 21:57

## 2025-02-13 RX ADMIN — GUAIFENESIN 600 MG: 600 TABLET ORAL at 20:15

## 2025-02-13 RX ADMIN — ARFORMOTEROL TARTRATE 15 MCG: 15 SOLUTION RESPIRATORY (INHALATION) at 05:45

## 2025-02-13 RX ADMIN — GUAIFENESIN 600 MG: 600 TABLET ORAL at 12:29

## 2025-02-13 RX ADMIN — Medication 10 ML: at 20:12

## 2025-02-13 RX ADMIN — Medication 10 ML: at 12:30

## 2025-02-13 RX ADMIN — BENZOCAINE 6 MG-MENTHOL 10 MG LOZENGES 1 LOZENGE: at 17:31

## 2025-02-13 RX ADMIN — Medication 3 MG: at 20:11

## 2025-02-13 RX ADMIN — WATER 1000 MG: 1 INJECTION INTRAMUSCULAR; INTRAVENOUS; SUBCUTANEOUS at 05:51

## 2025-02-13 NOTE — CONSULTS
Assessment and Plan  Patient is a 74 y.o. female with the following medical Problems:   COPD with acute exacerbation  Left lower lobe community-acquired pneumonia  Acute on chronic hypoxic and hypercapnic respiratory failure requiring escalating dose of supplemental oxygen with associated exertional dyspnea and cough.  Severe pulm HTN RVSP 92 mmHg on December 7, 2024  Former tobacco abuse 50 pack years quit smoking in 2017  Advanced COPD--chronic respiratory failure with hypoxia and hypercapnia-home oxygen 4 L nasal cannula  Per Pulm note 11/26/24: Gold 4 with an FEV1 500 cc and 26% of predicted with chronic respiratory failure with moderate chronic hypercapnia(PaCO2 63 mmHg)/chronic hypoxemia.   6.  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 Cory Delgadillo, at the [facility].  Currently under the care of Oncologist, Dr. Martita Tompkins, at [facility] and noting response to chemotherapy on most recent chest CT in October 2024 and initiating targeted therapy with Keytruda under the care of medical oncology.   IV port in the left subclavian vein   7.  L Breast CA 2020 Stage 1 s/p lumpectomy chemotherapy and radiation that has been completed as of March 31, 2021.   8.  Cytotoxic chemotherapy developed acute on chronic anemia due to chronic disease with her hemoglobin reduced to 8.2 g/dL   Aranes   9.  Skin cancer 2017  10. Palpitations  11/21/2021 48 hour holter monitor:  PAC / PVC  ? PSVT in pulmonology office visit 11/26/2024 started on Toprol XL 25 daily and stopped losartan by Dr. Wiley  11. Carpal tunnel  12. Anxiety/depression  13. BMI 21  14. Frailty -   15.  Hx of Urinary tract infection with E. Coli 12/2024     Plan of care:  Scheduled Solu-Medrol, DuoNeb, Pulmicort,  Continue ceftriaxone and

## 2025-02-13 NOTE — CONSENT
Informed Consent for Blood Component Transfusion Note    I have discussed with the patient the rationale for blood component transfusion; its benefits in treating or preventing fatigue, organ damage, or death; and its risk which includes mild transfusion reactions, rare risk of blood borne infection, or more serious but rare reactions. I have discussed the alternatives to transfusion, including the risk and consequences of not receiving transfusion. The patient had an opportunity to ask questions and had agreed to proceed with transfusion of blood components.    Electronically signed by Wanda Wood MD on 2/12/25 at 10:53 PM EST

## 2025-02-13 NOTE — H&P
[facility] Hospitalist Group   History and Physical      CHIEF COMPLAINT:  SOB    History of Present Illness:  74 y.o. female with a history of COPD, stage 4 lung cancer, anemia, HTN presents from rehab with abnormal outpatient hemoglobin, SOB.  Workup in ED significant for CO2 39, BUN 27, glucose 151, pro-BNP 1594, troponin 29 and 25, hgb 6.1.  Given 1 unit PRBC in ED.  CTA chest multiple unchanged abnormalities.  ABG pH 7.338, pCO2 78.1, pO2 83.6 on 100% NRB.  Patient initially refused BIPAP, then did ultimately agree to wear it for a brief time, then refused it again.  Repeat ABG after wearing BIPAP pH 7.285, pCO2 79.2, pO2 99.7.  Spoke with patient's daughter, who states patient has been refusing BIPAP, has been in and out of the hospital/rehab for extended period of time, delirium developed during last hospitalization.  Daughter confirmed patient's code status as DNR-CCA/DNI.  Daughter states patient has required ativan to tolerate bipap in the past.  Discussed hospice as option with daughter, who is open to discussing with patient.  Patient repeatedly telling staff that she doesn't think daughter understands that patient won't live forever, patient stating she feels she will die soon.  Given xopenex, solu-medrol, 1L bolus in ED.      Informant(s) for H&P: patient, daughter, chart    REVIEW OF SYSTEMS:  no fevers, chills, cp, n/v, ha, vision/hearing changes, wt changes, hot/cold flashes, other open skin lesions, diarrhea, constipation, dysuria/hematuria unless noted in HPI. Complete ROS performed with the patient and is otherwise negative.      PMH:  Past Medical History:   Diagnosis Date    Cancer (HCC) 2020    LT BREAST    COPD (chronic obstructive pulmonary disease) (HCC)     History of Holter monitoring 11/18/2021    Hypertension     Lung cancer, lower lobe (HCC) 08/2017    Lung cancer, upper lobe (HCC) 08/2017       Surgical History:  Past Surgical History:   Procedure Laterality

## 2025-02-13 NOTE — ED PROVIDER NOTES
[facility] EMERGENCY DEPARTMENT  EMERGENCY DEPARTMENT ENCOUNTER        Pt Name: Ashley Vivas  MRN: [id number]  Birthdate 1950  Date of evaluation: 2/12/2025  Provider: Wanda Wood MD  PCP: Samina Ji MD  Note Started: 9:09 PM EST 2/12/25    CHIEF COMPLAINT       Chief Complaint   Patient presents with    Shortness of Breath     EMS states that they were called for low hemoglobin and hypoxic        HISTORY OF PRESENT ILLNESS: 1 or more Elements   History From: Patient and family member    Limitations to history : None    Ashley Vivas is a 74 y.o. female who presents for low hemoglobin at 6.1.  Patient is currently at a rehab facility.  She does have a history of lung cancer as well as COPD.  She is complaining of shortness of breath.  She is on 4 L of oxygen at baseline however per family member she has been requiring more lately.  In the ED she was found to be hypoxic to the low 80s and placed on 15 L nonrebreather with improvement.  She denies any signs of bleeding such as black or bloody stools.  She believes it may be due to the Keytruda she started in November as she has needed multiple transfusions in December and January.  She denies chest pain, fever, headache, dizziness, productive cough.  She used to be on Lovenox but is no longer on blood thinners.    Nursing Notes were all reviewed and agreed with or any disagreements were addressed in the HPI.        REVIEW OF EXTERNAL NOTE :       Patient was last seen and admitted on 1/28/2025 for COPD exacerbation, acute respiratory failure with hypoxia and hypercapnia    REVIEW OF SYSTEMS :           Positives and Pertinent negatives as per HPI.     SURGICAL HISTORY     Past Surgical History:   Procedure Laterality Date    BREAST LUMPECTOMY Left 2020    BRONCHOSCOPY  08/30/2017    With EBUS    CARPAL TUNNEL RELEASE Bilateral     COLONOSCOPY  04/01/2021    EYE SURGERY Right 11/07/2023    RIGHT EYE CATARACT

## 2025-02-14 LAB
ABO/RH: NORMAL
ANION GAP SERPL CALCULATED.3IONS-SCNC: 3 MMOL/L (ref 7–16)
ANTIBODY SCREEN: NEGATIVE
ARM BAND NUMBER: NORMAL
BASOPHILS # BLD: 0 K/UL (ref 0–0.2)
BASOPHILS NFR BLD: 0 % (ref 0–2)
BLOOD BANK BLOOD PRODUCT EXPIRATION DATE: NORMAL
BLOOD BANK DISPENSE STATUS: NORMAL
BLOOD BANK ISBT PRODUCT BLOOD TYPE: 6200
BLOOD BANK PRODUCT CODE: NORMAL
BLOOD BANK SAMPLE EXPIRATION: NORMAL
BLOOD BANK UNIT TYPE AND RH: NORMAL
BPU ID: NORMAL
BUN SERPL-MCNC: 24 MG/DL (ref 6–23)
CALCIUM SERPL-MCNC: 9 MG/DL (ref 8.6–10.2)
CHLORIDE SERPL-SCNC: 101 MMOL/L (ref 98–107)
CO2 SERPL-SCNC: 36 MMOL/L (ref 22–29)
COMPONENT: NORMAL
CREAT SERPL-MCNC: 0.4 MG/DL (ref 0.5–1)
CROSSMATCH RESULT: NORMAL
EOSINOPHIL # BLD: 0 K/UL (ref 0.05–0.5)
EOSINOPHILS RELATIVE PERCENT: 0 % (ref 0–6)
ERYTHROCYTE [DISTWIDTH] IN BLOOD BY AUTOMATED COUNT: 18.7 % (ref 11.5–15)
GFR, ESTIMATED: >90 ML/MIN/1.73M2
GLUCOSE SERPL-MCNC: 112 MG/DL (ref 74–99)
HCT VFR BLD AUTO: 23.2 % (ref 34–48)
HGB BLD-MCNC: 7.2 G/DL (ref 11.5–15.5)
L PNEUMO1 AG UR QL IA.RAPID: NEGATIVE
LYMPHOCYTES NFR BLD: 0.22 K/UL (ref 1.5–4)
LYMPHOCYTES RELATIVE PERCENT: 3 % (ref 20–42)
MCH RBC QN AUTO: 32.4 PG (ref 26–35)
MCHC RBC AUTO-ENTMCNC: 31 G/DL (ref 32–34.5)
MCV RBC AUTO: 104.5 FL (ref 80–99.9)
MONOCYTES NFR BLD: 0.22 K/UL (ref 0.1–0.95)
MONOCYTES NFR BLD: 3 % (ref 2–12)
MYCOPLASMA AB,IGM: NORMAL
NEUTROPHILS NFR BLD: 95 % (ref 43–80)
NEUTS SEG NFR BLD: 8.06 K/UL (ref 1.8–7.3)
PLATELET # BLD AUTO: 216 K/UL (ref 130–450)
PMV BLD AUTO: 11 FL (ref 7–12)
POTASSIUM SERPL-SCNC: 4.5 MMOL/L (ref 3.5–5)
RBC # BLD AUTO: 2.22 M/UL (ref 3.5–5.5)
RBC # BLD: NORMAL 10*6/UL
S PNEUM AG SPEC QL: NEGATIVE
SODIUM SERPL-SCNC: 140 MMOL/L (ref 132–146)
SPECIMEN SOURCE: NORMAL
TRANSFUSION STATUS: NORMAL
UNIT DIVISION: 0
UNIT ISSUE DATE/TIME: NORMAL
WBC OTHER # BLD: 8.5 K/UL (ref 4.5–11.5)

## 2025-02-14 PROCEDURE — 99232 SBSQ HOSP IP/OBS MODERATE 35: CPT | Performed by: INTERNAL MEDICINE

## 2025-02-14 PROCEDURE — 85025 COMPLETE CBC W/AUTO DIFF WBC: CPT

## 2025-02-14 PROCEDURE — 80048 BASIC METABOLIC PNL TOTAL CA: CPT

## 2025-02-14 PROCEDURE — 94640 AIRWAY INHALATION TREATMENT: CPT

## 2025-02-14 PROCEDURE — 6370000000 HC RX 637 (ALT 250 FOR IP): Performed by: INTERNAL MEDICINE

## 2025-02-14 PROCEDURE — 2500000003 HC RX 250 WO HCPCS: Performed by: FAMILY MEDICINE

## 2025-02-14 PROCEDURE — 99233 SBSQ HOSP IP/OBS HIGH 50: CPT | Performed by: INTERNAL MEDICINE

## 2025-02-14 PROCEDURE — 6360000002 HC RX W HCPCS: Performed by: FAMILY MEDICINE

## 2025-02-14 PROCEDURE — 2700000000 HC OXYGEN THERAPY PER DAY

## 2025-02-14 PROCEDURE — 99222 1ST HOSP IP/OBS MODERATE 55: CPT | Performed by: NURSE PRACTITIONER

## 2025-02-14 PROCEDURE — 36415 COLL VENOUS BLD VENIPUNCTURE: CPT

## 2025-02-14 PROCEDURE — 2060000000 HC ICU INTERMEDIATE R&B

## 2025-02-14 PROCEDURE — 6370000000 HC RX 637 (ALT 250 FOR IP): Performed by: FAMILY MEDICINE

## 2025-02-14 PROCEDURE — 6360000002 HC RX W HCPCS: Performed by: INTERNAL MEDICINE

## 2025-02-14 PROCEDURE — 94660 CPAP INITIATION&MGMT: CPT

## 2025-02-14 PROCEDURE — 0202U NFCT DS 22 TRGT SARS-COV-2: CPT

## 2025-02-14 RX ORDER — POLYETHYLENE GLYCOL 3350 17 G/17G
17 POWDER, FOR SOLUTION ORAL DAILY PRN
Status: DISCONTINUED | OUTPATIENT
Start: 2025-02-14 | End: 2025-02-24 | Stop reason: HOSPADM

## 2025-02-14 RX ADMIN — BUDESONIDE INHALATION 500 MCG: 0.5 SUSPENSION RESPIRATORY (INHALATION) at 04:50

## 2025-02-14 RX ADMIN — GUAIFENESIN 600 MG: 600 TABLET ORAL at 09:52

## 2025-02-14 RX ADMIN — IPRATROPIUM BROMIDE AND ALBUTEROL SULFATE 1 DOSE: .5; 2.5 SOLUTION RESPIRATORY (INHALATION) at 14:57

## 2025-02-14 RX ADMIN — WATER 40 MG: 1 INJECTION INTRAMUSCULAR; INTRAVENOUS; SUBCUTANEOUS at 15:48

## 2025-02-14 RX ADMIN — ENOXAPARIN SODIUM 30 MG: 100 INJECTION SUBCUTANEOUS at 09:53

## 2025-02-14 RX ADMIN — AZITHROMYCIN DIHYDRATE 500 MG: 250 TABLET ORAL at 09:53

## 2025-02-14 RX ADMIN — IPRATROPIUM BROMIDE AND ALBUTEROL SULFATE 1 DOSE: .5; 2.5 SOLUTION RESPIRATORY (INHALATION) at 22:04

## 2025-02-14 RX ADMIN — IPRATROPIUM BROMIDE AND ALBUTEROL SULFATE 1 DOSE: .5; 2.5 SOLUTION RESPIRATORY (INHALATION) at 10:11

## 2025-02-14 RX ADMIN — GUAIFENESIN 600 MG: 600 TABLET ORAL at 20:37

## 2025-02-14 RX ADMIN — ACETYLCYSTEINE 400 MG: 100 INHALANT RESPIRATORY (INHALATION) at 01:38

## 2025-02-14 RX ADMIN — Medication 10 ML: at 20:43

## 2025-02-14 RX ADMIN — WATER 40 MG: 1 INJECTION INTRAMUSCULAR; INTRAVENOUS; SUBCUTANEOUS at 20:37

## 2025-02-14 RX ADMIN — ACETYLCYSTEINE 400 MG: 100 INHALANT RESPIRATORY (INHALATION) at 14:57

## 2025-02-14 RX ADMIN — LORAZEPAM 0.5 MG: 0.5 TABLET ORAL at 20:37

## 2025-02-14 RX ADMIN — PANTOPRAZOLE SODIUM 40 MG: 40 TABLET, DELAYED RELEASE ORAL at 05:38

## 2025-02-14 RX ADMIN — POLYETHYLENE GLYCOL 3350 17 G: 17 POWDER, FOR SOLUTION ORAL at 15:47

## 2025-02-14 RX ADMIN — Medication 1 TABLET: at 09:52

## 2025-02-14 RX ADMIN — ACETYLCYSTEINE 400 MG: 100 INHALANT RESPIRATORY (INHALATION) at 04:51

## 2025-02-14 RX ADMIN — ACETYLCYSTEINE 400 MG: 100 INHALANT RESPIRATORY (INHALATION) at 19:00

## 2025-02-14 RX ADMIN — ACETYLCYSTEINE 400 MG: 100 INHALANT RESPIRATORY (INHALATION) at 22:04

## 2025-02-14 RX ADMIN — WATER 40 MG: 1 INJECTION INTRAMUSCULAR; INTRAVENOUS; SUBCUTANEOUS at 09:53

## 2025-02-14 RX ADMIN — IPRATROPIUM BROMIDE AND ALBUTEROL SULFATE 1 DOSE: .5; 2.5 SOLUTION RESPIRATORY (INHALATION) at 04:50

## 2025-02-14 RX ADMIN — BUDESONIDE INHALATION 500 MCG: 0.5 SUSPENSION RESPIRATORY (INHALATION) at 18:59

## 2025-02-14 RX ADMIN — WATER 40 MG: 1 INJECTION INTRAMUSCULAR; INTRAVENOUS; SUBCUTANEOUS at 04:42

## 2025-02-14 RX ADMIN — IPRATROPIUM BROMIDE AND ALBUTEROL SULFATE 1 DOSE: .5; 2.5 SOLUTION RESPIRATORY (INHALATION) at 18:59

## 2025-02-14 RX ADMIN — Medication 10 ML: at 09:53

## 2025-02-14 RX ADMIN — Medication 3 MG: at 20:37

## 2025-02-14 RX ADMIN — IPRATROPIUM BROMIDE AND ALBUTEROL SULFATE 1 DOSE: .5; 2.5 SOLUTION RESPIRATORY (INHALATION) at 01:38

## 2025-02-14 RX ADMIN — WATER 1000 MG: 1 INJECTION INTRAMUSCULAR; INTRAVENOUS; SUBCUTANEOUS at 04:41

## 2025-02-14 RX ADMIN — METOPROLOL SUCCINATE 50 MG: 50 TABLET, EXTENDED RELEASE ORAL at 09:53

## 2025-02-14 RX ADMIN — ACETYLCYSTEINE 400 MG: 100 INHALANT RESPIRATORY (INHALATION) at 10:11

## 2025-02-14 NOTE — CONSULTS
Palliative Care Department  [phone]  Palliative Care Initial Consult  Provider Tamara Reyes, APRN - CNP     Ashley Vivas  [id number]  Hospital Day: 3  Date of Initial Consult: 2/14/2025  Referring Provider: Pepe Calvert MD  Palliative Medicine was consulted for assistance with: goals of care, cancer    HPI:   Ashley Vivas is a 74 y.o. with a medical history of COPD, stage 4 lung cancer, anemia, HTN who was admitted on 2/12/2025 from nursing facility with a CHIEF COMPLAINT of abnormal labs, SOB. Workup in ED significant for CO2 39, BUN 27, glucose 151, pro-BNP 1594, troponin 29 and 25, hgb 6.1.  Given 1 unit PRBC in ED.  CTA chest multiple unchanged abnormalities.  ABG pH 7.338, pCO2 78.1, pO2 83.6 on 100% NRB.  Patient initially refused BIPAP, then did ultimately agree to wear it for a brief time, then refused it again.  Repeat ABG after wearing BIPAP pH 7.285, pCO2 79.2, pO2 99.7.  Pulmonology consulted.  Palliative medicine consulted for further assistance.    ASSESSMENT/PLAN:     Pertinent Hospital Diagnoses     COPD with acute exacerbation  Pneumonia  Acute on chronic hypoxic and hypercapnic respiratory failure  Severe pulmonary hypertension   Recurrent squamous cell lung cancer  History of breast cancer      Palliative Care Encounter / Counseling Regarding Goals of Care  Please see detailed goals of care discussion as below  At this time, Ashley Vivas, Does have capacity for medical decision-making.  Capacity is time limited and situation/question specific  During encounter Ligia was surrogate medical decision-maker  Outcome of goals of care meeting:   Continue current medical management  Discussed code status options  Discussed advanced directives  Code status Limited DNI  Advanced Directives: no POA or [facility]  Surrogate/Legal NOK:  Juan C Vivas (spouse) [phone], [phone]  Ligia Hightower (child) [phone]    Spiritual assessment: no spiritual distress

## 2025-02-14 NOTE — ACP (ADVANCE CARE PLANNING)
Advance Care Planning   Healthcare Decision Maker:    Primary Decision Maker: Juan C Vivas - Spouse - [phone]    Secondary Decision Maker: Ligia Hightower - Child - [phone]    Pt reported having HCPOA in place, but full copy not on chart. Electronically signed by ANMOL Munoz on 2/14/2025 at 4:01 PM      Click here to complete Healthcare Decision Makers including selection of the Healthcare Decision Maker Relationship (ie \"Primary\").

## 2025-02-14 NOTE — PLAN OF CARE
Problem: Safety - Adult  Goal: Free from fall injury  Outcome: Progressing     Problem: Respiratory - Adult  Goal: Achieves optimal ventilation and oxygenation  Outcome: Progressing     Problem: Hematologic - Adult  Goal: Maintains hematologic stability  Outcome: Progressing     Problem: Chronic Conditions and Co-morbidities  Goal: Patient's chronic conditions and co-morbidity symptoms are monitored and maintained or improved  Outcome: Progressing

## 2025-02-14 NOTE — PLAN OF CARE
Problem: Discharge Planning  Goal: Discharge to home or other facility with appropriate resources  Outcome: Progressing     Problem: Safety - Adult  Goal: Free from fall injury  2/14/2025 0045 by Nathaniel Sher RN  Outcome: Progressing     Problem: Respiratory - Adult  Goal: Achieves optimal ventilation and oxygenation  2/14/2025 0045 by Nathaniel Sher RN  Outcome: Progressing     Problem: Hematologic - Adult  Goal: Maintains hematologic stability  2/14/2025 0045 by Nathaniel Sher RN  Outcome: Progressing     Problem: Chronic Conditions and Co-morbidities  Goal: Patient's chronic conditions and co-morbidity symptoms are monitored and maintained or improved  2/14/2025 0045 by Nathaniel Sher RN  Outcome: Progressing

## 2025-02-15 ENCOUNTER — APPOINTMENT (OUTPATIENT)
Dept: GENERAL RADIOLOGY | Age: 75
DRG: 189 | End: 2025-02-15
Payer: MEDICARE

## 2025-02-15 LAB
B PARAP IS1001 DNA NPH QL NAA+NON-PROBE: NOT DETECTED
B PERT DNA SPEC QL NAA+PROBE: NOT DETECTED
C PNEUM DNA NPH QL NAA+NON-PROBE: NOT DETECTED
FLUAV RNA NPH QL NAA+NON-PROBE: NOT DETECTED
FLUBV RNA NPH QL NAA+NON-PROBE: NOT DETECTED
HADV DNA NPH QL NAA+NON-PROBE: NOT DETECTED
HCOV 229E RNA NPH QL NAA+NON-PROBE: NOT DETECTED
HCOV HKU1 RNA NPH QL NAA+NON-PROBE: NOT DETECTED
HCOV NL63 RNA NPH QL NAA+NON-PROBE: NOT DETECTED
HCOV OC43 RNA NPH QL NAA+NON-PROBE: NOT DETECTED
HMPV RNA NPH QL NAA+NON-PROBE: NOT DETECTED
HPIV1 RNA NPH QL NAA+NON-PROBE: NOT DETECTED
HPIV2 RNA NPH QL NAA+NON-PROBE: NOT DETECTED
HPIV3 RNA NPH QL NAA+NON-PROBE: NOT DETECTED
HPIV4 RNA NPH QL NAA+NON-PROBE: NOT DETECTED
M PNEUMO DNA NPH QL NAA+NON-PROBE: NOT DETECTED
MICROORGANISM SPEC CULT: NORMAL
RSV RNA NPH QL NAA+NON-PROBE: NOT DETECTED
RV+EV RNA NPH QL NAA+NON-PROBE: NOT DETECTED
SARS-COV-2 RNA NPH QL NAA+NON-PROBE: NOT DETECTED
SERVICE CMNT-IMP: NORMAL
SPECIMEN DESCRIPTION: NORMAL
SPECIMEN DESCRIPTION: NORMAL

## 2025-02-15 PROCEDURE — 99232 SBSQ HOSP IP/OBS MODERATE 35: CPT | Performed by: INTERNAL MEDICINE

## 2025-02-15 PROCEDURE — 6360000002 HC RX W HCPCS: Performed by: FAMILY MEDICINE

## 2025-02-15 PROCEDURE — 2500000003 HC RX 250 WO HCPCS: Performed by: FAMILY MEDICINE

## 2025-02-15 PROCEDURE — 2060000000 HC ICU INTERMEDIATE R&B

## 2025-02-15 PROCEDURE — 94640 AIRWAY INHALATION TREATMENT: CPT

## 2025-02-15 PROCEDURE — 6370000000 HC RX 637 (ALT 250 FOR IP): Performed by: INTERNAL MEDICINE

## 2025-02-15 PROCEDURE — 71045 X-RAY EXAM CHEST 1 VIEW: CPT

## 2025-02-15 PROCEDURE — 99233 SBSQ HOSP IP/OBS HIGH 50: CPT | Performed by: INTERNAL MEDICINE

## 2025-02-15 PROCEDURE — 6360000002 HC RX W HCPCS: Performed by: INTERNAL MEDICINE

## 2025-02-15 PROCEDURE — 94660 CPAP INITIATION&MGMT: CPT

## 2025-02-15 PROCEDURE — 30233N1 TRANSFUSION OF NONAUTOLOGOUS RED BLOOD CELLS INTO PERIPHERAL VEIN, PERCUTANEOUS APPROACH: ICD-10-PCS | Performed by: FAMILY MEDICINE

## 2025-02-15 PROCEDURE — 6370000000 HC RX 637 (ALT 250 FOR IP): Performed by: FAMILY MEDICINE

## 2025-02-15 PROCEDURE — 2700000000 HC OXYGEN THERAPY PER DAY

## 2025-02-15 RX ORDER — ESCITALOPRAM OXALATE 10 MG/1
10 TABLET ORAL DAILY
Status: DISCONTINUED | OUTPATIENT
Start: 2025-02-15 | End: 2025-02-24 | Stop reason: HOSPADM

## 2025-02-15 RX ADMIN — AZITHROMYCIN DIHYDRATE 500 MG: 250 TABLET ORAL at 08:50

## 2025-02-15 RX ADMIN — Medication 10 ML: at 21:41

## 2025-02-15 RX ADMIN — WATER 1000 MG: 1 INJECTION INTRAMUSCULAR; INTRAVENOUS; SUBCUTANEOUS at 05:58

## 2025-02-15 RX ADMIN — IPRATROPIUM BROMIDE AND ALBUTEROL SULFATE 1 DOSE: .5; 2.5 SOLUTION RESPIRATORY (INHALATION) at 21:57

## 2025-02-15 RX ADMIN — IPRATROPIUM BROMIDE AND ALBUTEROL SULFATE 1 DOSE: .5; 2.5 SOLUTION RESPIRATORY (INHALATION) at 14:54

## 2025-02-15 RX ADMIN — Medication 1 TABLET: at 08:50

## 2025-02-15 RX ADMIN — ESCITALOPRAM OXALATE 10 MG: 10 TABLET ORAL at 14:09

## 2025-02-15 RX ADMIN — ENOXAPARIN SODIUM 30 MG: 100 INJECTION SUBCUTANEOUS at 08:51

## 2025-02-15 RX ADMIN — PREDNISONE 40 MG: 20 TABLET ORAL at 08:51

## 2025-02-15 RX ADMIN — Medication 3 MG: at 21:41

## 2025-02-15 RX ADMIN — ACETYLCYSTEINE 400 MG: 100 INHALANT RESPIRATORY (INHALATION) at 01:01

## 2025-02-15 RX ADMIN — IPRATROPIUM BROMIDE AND ALBUTEROL SULFATE 1 DOSE: .5; 2.5 SOLUTION RESPIRATORY (INHALATION) at 18:27

## 2025-02-15 RX ADMIN — IPRATROPIUM BROMIDE AND ALBUTEROL SULFATE 1 DOSE: .5; 2.5 SOLUTION RESPIRATORY (INHALATION) at 10:00

## 2025-02-15 RX ADMIN — GUAIFENESIN 600 MG: 600 TABLET ORAL at 08:50

## 2025-02-15 RX ADMIN — ACETYLCYSTEINE 400 MG: 100 INHALANT RESPIRATORY (INHALATION) at 07:11

## 2025-02-15 RX ADMIN — ACETYLCYSTEINE 400 MG: 100 INHALANT RESPIRATORY (INHALATION) at 10:00

## 2025-02-15 RX ADMIN — ACETYLCYSTEINE 400 MG: 100 INHALANT RESPIRATORY (INHALATION) at 14:54

## 2025-02-15 RX ADMIN — ACETYLCYSTEINE 400 MG: 100 INHALANT RESPIRATORY (INHALATION) at 21:57

## 2025-02-15 RX ADMIN — IPRATROPIUM BROMIDE AND ALBUTEROL SULFATE 1 DOSE: .5; 2.5 SOLUTION RESPIRATORY (INHALATION) at 07:11

## 2025-02-15 RX ADMIN — METOPROLOL SUCCINATE 50 MG: 50 TABLET, EXTENDED RELEASE ORAL at 08:50

## 2025-02-15 RX ADMIN — LORAZEPAM 0.5 MG: 0.5 TABLET ORAL at 21:41

## 2025-02-15 RX ADMIN — GUAIFENESIN 600 MG: 600 TABLET ORAL at 21:41

## 2025-02-15 RX ADMIN — BUDESONIDE INHALATION 500 MCG: 0.5 SUSPENSION RESPIRATORY (INHALATION) at 18:27

## 2025-02-15 RX ADMIN — PANTOPRAZOLE SODIUM 40 MG: 40 TABLET, DELAYED RELEASE ORAL at 05:58

## 2025-02-15 RX ADMIN — ACETYLCYSTEINE 400 MG: 100 INHALANT RESPIRATORY (INHALATION) at 18:27

## 2025-02-15 RX ADMIN — BUDESONIDE INHALATION 500 MCG: 0.5 SUSPENSION RESPIRATORY (INHALATION) at 07:11

## 2025-02-15 NOTE — PLAN OF CARE
Problem: Discharge Planning  Goal: Discharge to home or other facility with appropriate resources  2/15/2025 1322 by Shweta Denise RN  Outcome: Progressing  Flowsheets (Taken 2/15/2025 0800)  Discharge to home or other facility with appropriate resources:   Identify barriers to discharge with patient and caregiver   Arrange for needed discharge resources and transportation as appropriate   Identify discharge learning needs (meds, wound care, etc)   Arrange for interpreters to assist at discharge as needed   Refer to discharge planning if patient needs post-hospital services based on physician order or complex needs related to functional status, cognitive ability or social support system  2/15/2025 0641 by Eduar Dover RN  Outcome: Progressing     Problem: Safety - Adult  Goal: Free from fall injury  2/15/2025 1322 by Shweta Denise RN  Outcome: Progressing  2/15/2025 0641 by Eduar Dover RN  Outcome: Progressing     Problem: Respiratory - Adult  Goal: Achieves optimal ventilation and oxygenation  2/15/2025 1322 by Shweta Denise RN  Outcome: Progressing  2/15/2025 0641 by Eduar Dover RN  Outcome: Progressing     Problem: Hematologic - Adult  Goal: Maintains hematologic stability  2/15/2025 1322 by Shweta Denise RN  Outcome: Progressing  Flowsheets (Taken 2/15/2025 0800)  Maintains hematologic stability:   Assess for signs and symptoms of bleeding or hemorrhage   Administer blood products/factors as ordered   Monitor labs for bleeding or clotting disorders  2/15/2025 0641 by Eduar Dover RN  Outcome: Progressing     Problem: Chronic Conditions and Co-morbidities  Goal: Patient's chronic conditions and co-morbidity symptoms are monitored and maintained or improved  2/15/2025 1322 by Shweta Denise RN  Outcome: Progressing  Flowsheets (Taken 2/15/2025 0800)  Care Plan - Patient's Chronic Conditions and Co-Morbidity Symptoms are Monitored and Maintained

## 2025-02-15 NOTE — PLAN OF CARE
Problem: Discharge Planning  Goal: Discharge to home or other facility with appropriate resources  Outcome: Progressing     Problem: Safety - Adult  Goal: Free from fall injury  Outcome: Progressing     Problem: Respiratory - Adult  Goal: Achieves optimal ventilation and oxygenation  Outcome: Progressing     Problem: Hematologic - Adult  Goal: Maintains hematologic stability  Outcome: Progressing     Problem: Chronic Conditions and Co-morbidities  Goal: Patient's chronic conditions and co-morbidity symptoms are monitored and maintained or improved  Outcome: Progressing

## 2025-02-15 NOTE — DISCHARGE SUMMARY
[facility] Hospitalist       Hospitalist Physician Discharge Summary       No follow-up provider specified.    Activity level: Slowly increase as tolerated    Diet: ADULT DIET; Regular; Low Fat/Low Chol/High Fiber/2 gm Na    Labs: None are pending at the discharge    Condition at discharge: Stable    Dispo: Return to home setting     Patient ID:  Ashley Vivas  [id number]  74 y.o.  1950    Admit date: 2/12/2025    Discharge date and time:  2/14/2025  7:58 PM    Admission Diagnoses: Principal Problem:    Acute respiratory failure with hypoxia and hypercapnia  Active Problems:    Hypoxemia requiring supplemental oxygen    Malignant neoplasm of upper lobe of right lung (HCC)    Malignant neoplasm of lower lobe of right lung (HCC)    Pulmonary nodules/lesions, multiple    Acute on chronic anemia    Complication of chemotherapy    COPD with acute exacerbation (HCC)    Acute on chronic respiratory failure with hypoxia and hypercapnia    Goals of care, counseling/discussion    Delirium, acute  Resolved Problems:    * No resolved hospital problems. *      Discharge Diagnoses: Principal Problem:    Acute respiratory failure with hypoxia and hypercapnia  Active Problems:    Hypoxemia requiring supplemental oxygen    Malignant neoplasm of upper lobe of right lung (HCC)    Malignant neoplasm of lower lobe of right lung (HCC)    Pulmonary nodules/lesions, multiple    Acute on chronic anemia    Complication of chemotherapy    COPD with acute exacerbation (HCC)    Acute on chronic respiratory failure with hypoxia and hypercapnia    Goals of care, counseling/discussion    Delirium, acute  Resolved Problems:    * No resolved hospital problems. *      Consults:  IP CONSULT TO HOSPICE  IP CONSULT TO PULMONOLOGY  IP CONSULT TO PALLIATIVE CARE    Procedures: None significant except if described in hospital course.    Hospital Course: History of present illness from History and Physical:  74 y.o. female with a history

## 2025-02-16 PROCEDURE — 99233 SBSQ HOSP IP/OBS HIGH 50: CPT | Performed by: INTERNAL MEDICINE

## 2025-02-16 PROCEDURE — 6370000000 HC RX 637 (ALT 250 FOR IP): Performed by: INTERNAL MEDICINE

## 2025-02-16 PROCEDURE — 94660 CPAP INITIATION&MGMT: CPT

## 2025-02-16 PROCEDURE — 2500000003 HC RX 250 WO HCPCS: Performed by: FAMILY MEDICINE

## 2025-02-16 PROCEDURE — 6370000000 HC RX 637 (ALT 250 FOR IP): Performed by: FAMILY MEDICINE

## 2025-02-16 PROCEDURE — 94640 AIRWAY INHALATION TREATMENT: CPT

## 2025-02-16 PROCEDURE — 6360000002 HC RX W HCPCS: Performed by: INTERNAL MEDICINE

## 2025-02-16 PROCEDURE — 6360000002 HC RX W HCPCS: Performed by: FAMILY MEDICINE

## 2025-02-16 PROCEDURE — 97161 PT EVAL LOW COMPLEX 20 MIN: CPT | Performed by: PHYSICAL THERAPIST

## 2025-02-16 PROCEDURE — 2700000000 HC OXYGEN THERAPY PER DAY

## 2025-02-16 PROCEDURE — 2060000000 HC ICU INTERMEDIATE R&B

## 2025-02-16 PROCEDURE — 99232 SBSQ HOSP IP/OBS MODERATE 35: CPT | Performed by: INTERNAL MEDICINE

## 2025-02-16 RX ORDER — HYDROXYZINE HYDROCHLORIDE 25 MG/1
25 TABLET, FILM COATED ORAL 3 TIMES DAILY PRN
Status: DISCONTINUED | OUTPATIENT
Start: 2025-02-16 | End: 2025-02-19

## 2025-02-16 RX ADMIN — PANTOPRAZOLE SODIUM 40 MG: 40 TABLET, DELAYED RELEASE ORAL at 05:19

## 2025-02-16 RX ADMIN — Medication 3 MG: at 20:59

## 2025-02-16 RX ADMIN — IPRATROPIUM BROMIDE AND ALBUTEROL SULFATE 1 DOSE: .5; 2.5 SOLUTION RESPIRATORY (INHALATION) at 09:22

## 2025-02-16 RX ADMIN — ACETYLCYSTEINE 400 MG: 100 INHALANT RESPIRATORY (INHALATION) at 06:14

## 2025-02-16 RX ADMIN — BUDESONIDE INHALATION 500 MCG: 0.5 SUSPENSION RESPIRATORY (INHALATION) at 06:13

## 2025-02-16 RX ADMIN — IPRATROPIUM BROMIDE AND ALBUTEROL SULFATE 1 DOSE: .5; 2.5 SOLUTION RESPIRATORY (INHALATION) at 13:17

## 2025-02-16 RX ADMIN — HYDROXYZINE HYDROCHLORIDE 25 MG: 25 TABLET ORAL at 21:00

## 2025-02-16 RX ADMIN — ACETYLCYSTEINE 400 MG: 100 INHALANT RESPIRATORY (INHALATION) at 18:16

## 2025-02-16 RX ADMIN — ACETYLCYSTEINE 400 MG: 100 INHALANT RESPIRATORY (INHALATION) at 13:18

## 2025-02-16 RX ADMIN — ACETYLCYSTEINE 400 MG: 100 INHALANT RESPIRATORY (INHALATION) at 01:24

## 2025-02-16 RX ADMIN — ACETYLCYSTEINE 400 MG: 100 INHALANT RESPIRATORY (INHALATION) at 21:59

## 2025-02-16 RX ADMIN — METOPROLOL SUCCINATE 50 MG: 50 TABLET, EXTENDED RELEASE ORAL at 09:49

## 2025-02-16 RX ADMIN — ACETYLCYSTEINE 400 MG: 100 INHALANT RESPIRATORY (INHALATION) at 09:24

## 2025-02-16 RX ADMIN — IPRATROPIUM BROMIDE AND ALBUTEROL SULFATE 1 DOSE: .5; 2.5 SOLUTION RESPIRATORY (INHALATION) at 21:59

## 2025-02-16 RX ADMIN — IPRATROPIUM BROMIDE AND ALBUTEROL SULFATE 1 DOSE: .5; 2.5 SOLUTION RESPIRATORY (INHALATION) at 18:16

## 2025-02-16 RX ADMIN — Medication 10 ML: at 21:00

## 2025-02-16 RX ADMIN — PREDNISONE 40 MG: 20 TABLET ORAL at 09:49

## 2025-02-16 RX ADMIN — ENOXAPARIN SODIUM 30 MG: 100 INJECTION SUBCUTANEOUS at 09:49

## 2025-02-16 RX ADMIN — WATER 1000 MG: 1 INJECTION INTRAMUSCULAR; INTRAVENOUS; SUBCUTANEOUS at 05:19

## 2025-02-16 RX ADMIN — IPRATROPIUM BROMIDE AND ALBUTEROL SULFATE 1 DOSE: .5; 2.5 SOLUTION RESPIRATORY (INHALATION) at 01:24

## 2025-02-16 RX ADMIN — BUDESONIDE INHALATION 500 MCG: 0.5 SUSPENSION RESPIRATORY (INHALATION) at 18:16

## 2025-02-16 RX ADMIN — Medication 10 ML: at 10:25

## 2025-02-16 RX ADMIN — Medication 1 TABLET: at 09:49

## 2025-02-16 RX ADMIN — IPRATROPIUM BROMIDE AND ALBUTEROL SULFATE 1 DOSE: .5; 2.5 SOLUTION RESPIRATORY (INHALATION) at 06:13

## 2025-02-16 ASSESSMENT — PAIN SCALES - GENERAL
PAINLEVEL_OUTOF10: 0
PAINLEVEL_OUTOF10: 0

## 2025-02-16 NOTE — PLAN OF CARE
Problem: Discharge Planning  Goal: Discharge to home or other facility with appropriate resources  2/15/2025 2253 by Belen Kyle RN  Outcome: Progressing     Problem: Safety - Adult  Goal: Free from fall injury  2/15/2025 2253 by Belen Kyle RN  Outcome: Progressing     Problem: Respiratory - Adult  Goal: Achieves optimal ventilation and oxygenation  2/15/2025 2253 by Belen Kyle RN  Outcome: Progressing     Problem: Hematologic - Adult  Goal: Maintains hematologic stability  2/15/2025 2253 by Belen Kyle RN  Outcome: Progressing     Problem: Chronic Conditions and Co-morbidities  Goal: Patient's chronic conditions and co-morbidity symptoms are monitored and maintained or improved  2/15/2025 2253 by Belen Kyle RN  Outcome: Progressing

## 2025-02-17 PROCEDURE — 6370000000 HC RX 637 (ALT 250 FOR IP): Performed by: FAMILY MEDICINE

## 2025-02-17 PROCEDURE — 6360000002 HC RX W HCPCS: Performed by: INTERNAL MEDICINE

## 2025-02-17 PROCEDURE — 6370000000 HC RX 637 (ALT 250 FOR IP): Performed by: INTERNAL MEDICINE

## 2025-02-17 PROCEDURE — 2060000000 HC ICU INTERMEDIATE R&B

## 2025-02-17 PROCEDURE — 94669 MECHANICAL CHEST WALL OSCILL: CPT

## 2025-02-17 PROCEDURE — 99232 SBSQ HOSP IP/OBS MODERATE 35: CPT | Performed by: FAMILY MEDICINE

## 2025-02-17 PROCEDURE — 6360000002 HC RX W HCPCS: Performed by: FAMILY MEDICINE

## 2025-02-17 PROCEDURE — 94640 AIRWAY INHALATION TREATMENT: CPT

## 2025-02-17 PROCEDURE — 2500000003 HC RX 250 WO HCPCS: Performed by: FAMILY MEDICINE

## 2025-02-17 PROCEDURE — 97165 OT EVAL LOW COMPLEX 30 MIN: CPT

## 2025-02-17 PROCEDURE — 94660 CPAP INITIATION&MGMT: CPT

## 2025-02-17 PROCEDURE — 2700000000 HC OXYGEN THERAPY PER DAY

## 2025-02-17 RX ADMIN — METOPROLOL SUCCINATE 50 MG: 50 TABLET, EXTENDED RELEASE ORAL at 08:59

## 2025-02-17 RX ADMIN — IPRATROPIUM BROMIDE AND ALBUTEROL SULFATE 1 DOSE: .5; 2.5 SOLUTION RESPIRATORY (INHALATION) at 16:02

## 2025-02-17 RX ADMIN — Medication 1 TABLET: at 08:59

## 2025-02-17 RX ADMIN — ESCITALOPRAM OXALATE 10 MG: 10 TABLET ORAL at 08:59

## 2025-02-17 RX ADMIN — Medication 3 MG: at 20:15

## 2025-02-17 RX ADMIN — GUAIFENESIN 600 MG: 600 TABLET ORAL at 20:16

## 2025-02-17 RX ADMIN — PREDNISONE 40 MG: 20 TABLET ORAL at 08:59

## 2025-02-17 RX ADMIN — Medication 10 ML: at 09:09

## 2025-02-17 RX ADMIN — WATER 1000 MG: 1 INJECTION INTRAMUSCULAR; INTRAVENOUS; SUBCUTANEOUS at 05:19

## 2025-02-17 RX ADMIN — ACETYLCYSTEINE 400 MG: 100 INHALANT RESPIRATORY (INHALATION) at 22:20

## 2025-02-17 RX ADMIN — ACETYLCYSTEINE 400 MG: 100 INHALANT RESPIRATORY (INHALATION) at 06:24

## 2025-02-17 RX ADMIN — IPRATROPIUM BROMIDE AND ALBUTEROL SULFATE 1 DOSE: .5; 2.5 SOLUTION RESPIRATORY (INHALATION) at 22:20

## 2025-02-17 RX ADMIN — IPRATROPIUM BROMIDE AND ALBUTEROL SULFATE 1 DOSE: .5; 2.5 SOLUTION RESPIRATORY (INHALATION) at 06:24

## 2025-02-17 RX ADMIN — IPRATROPIUM BROMIDE AND ALBUTEROL SULFATE 1 DOSE: .5; 2.5 SOLUTION RESPIRATORY (INHALATION) at 09:51

## 2025-02-17 RX ADMIN — PANTOPRAZOLE SODIUM 40 MG: 40 TABLET, DELAYED RELEASE ORAL at 05:08

## 2025-02-17 RX ADMIN — ACETYLCYSTEINE 400 MG: 100 INHALANT RESPIRATORY (INHALATION) at 09:51

## 2025-02-17 RX ADMIN — Medication 10 ML: at 20:16

## 2025-02-17 RX ADMIN — BUDESONIDE INHALATION 500 MCG: 0.5 SUSPENSION RESPIRATORY (INHALATION) at 06:24

## 2025-02-17 RX ADMIN — BUDESONIDE INHALATION 500 MCG: 0.5 SUSPENSION RESPIRATORY (INHALATION) at 21:02

## 2025-02-17 RX ADMIN — IPRATROPIUM BROMIDE AND ALBUTEROL SULFATE 1 DOSE: .5; 2.5 SOLUTION RESPIRATORY (INHALATION) at 21:01

## 2025-02-17 RX ADMIN — ENOXAPARIN SODIUM 30 MG: 100 INJECTION SUBCUTANEOUS at 09:01

## 2025-02-17 RX ADMIN — ACETYLCYSTEINE 400 MG: 100 INHALANT RESPIRATORY (INHALATION) at 16:02

## 2025-02-17 ASSESSMENT — PAIN SCALES - GENERAL
PAINLEVEL_OUTOF10: 0
PAINLEVEL_OUTOF10: 0

## 2025-02-17 NOTE — DISCHARGE INSTR - COC
Continuity of Care Form    Patient Name: Ashley Vivas   :  1950  MRN:  [id number]    Admit date:  2025  Discharge date:  25    Code Status Order: Limited   Advance Directives:   Advance Care Flowsheet Documentation             Admitting Physician:  Michelle Sethi DO  PCP: Samina Ji MD    Discharging Nurse: Shweta Dutta  Discharging Hospital Unit/Room#: 0536/0536-01  Discharging Unit Phone Number: [phone]    Emergency Contact:   Extended Emergency Contact Information  Primary Emergency Contact: Juan C Vivas  Address: [address] [place] [id number] [facility]  Home Phone: [phone]  Mobile Phone: [phone]  Relation: Spouse  Secondary Emergency Contact: Ligia Hightower  Address: [address] [place] [address] [place] [address] [facility]  Home Phone: [phone]  Relation: Child    Past Surgical History:  Past Surgical History:   Procedure Laterality Date    BREAST LUMPECTOMY Left     BRONCHOSCOPY  2017    With EBUS    CARPAL TUNNEL RELEASE Bilateral     COLONOSCOPY  2021    EYE SURGERY Right 2023    RIGHT EYE CATARACT EXTRACTION IOL IMPLANT performed by Lida Horvath MD at [facility] OR    EYE SURGERY Left 2023    LEFT EYE CATARACT EXTRACTION IOL IMPLANT performed by Lida Horvath MD at [facility] OR    KNEE ARTHROSCOPY Left     LUNG BIOPSY Right 2017    SKIN BIOPSY      CANCER BASAL TIP NOSE       Immunization History:   Immunization History   Administered Date(s) Administered    COVID-19, PFIZER Bivalent, DO NOT Dilute, (age 12y+), IM, 30 mcg/0.3 mL 10/12/2022    COVID-19, PFIZER PURPLE top, DILUTE for use, (age 12 y+), 30mcg/0.3mL 2021, 2021, 10/18/2021    Influenza Virus Vaccine 10/17/2017    Influenza, FLUZONE High Dose (age 65 y+), IM, Quadv, 0.7mL 2023    Influenza, FLUZONE High Dose, (age 65 y+), IM, Trivalent PF, 0.5mL 2018, 2019, 2024

## 2025-02-17 NOTE — PLAN OF CARE
Problem: Discharge Planning  Goal: Discharge to home or other facility with appropriate resources  2/17/2025 1724 by Dion Santiago RN  Outcome: Progressing  Flowsheets (Taken 2/17/2025 0900)  Discharge to home or other facility with appropriate resources: Identify barriers to discharge with patient and caregiver  2/17/2025 0418 by Belen Kyle RN  Outcome: Progressing     Problem: Safety - Adult  Goal: Free from fall injury  2/17/2025 1724 by Dion Santiago RN  Outcome: Progressing  Flowsheets (Taken 2/17/2025 0900)  Free From Fall Injury: Instruct family/caregiver on patient safety  2/17/2025 0418 by Belen Kyle RN  Outcome: Progressing     Problem: Respiratory - Adult  Goal: Achieves optimal ventilation and oxygenation  2/17/2025 1724 by Dion Santiago RN  Outcome: Progressing  Flowsheets (Taken 2/17/2025 0900)  Achieves optimal ventilation and oxygenation: Assess for changes in respiratory status  2/17/2025 0418 by Belen Kyle RN  Outcome: Progressing     Problem: Hematologic - Adult  Goal: Maintains hematologic stability  2/17/2025 1724 by Dion Santiago RN  Outcome: Progressing  Flowsheets (Taken 2/17/2025 0900)  Maintains hematologic stability: Assess for signs and symptoms of bleeding or hemorrhage  2/17/2025 0418 by Belen Kyle RN  Outcome: Progressing     Problem: Chronic Conditions and Co-morbidities  Goal: Patient's chronic conditions and co-morbidity symptoms are monitored and maintained or improved  2/17/2025 1724 by Dion Santiago RN  Outcome: Progressing  Flowsheets (Taken 2/17/2025 0900)  Care Plan - Patient's Chronic Conditions and Co-Morbidity Symptoms are Monitored and Maintained or Improved: Monitor and assess patient's chronic conditions and comorbid symptoms for stability, deterioration, or improvement  2/17/2025 0418 by Belen Kyle RN  Outcome: Progressing

## 2025-02-18 ENCOUNTER — APPOINTMENT (OUTPATIENT)
Dept: GENERAL RADIOLOGY | Age: 75
DRG: 189 | End: 2025-02-18
Payer: MEDICARE

## 2025-02-18 LAB
ALBUMIN SERPL-MCNC: 3.1 G/DL (ref 3.5–5.2)
ALP SERPL-CCNC: 63 U/L (ref 35–104)
ALT SERPL-CCNC: 57 U/L (ref 0–32)
ANION GAP SERPL CALCULATED.3IONS-SCNC: 10 MMOL/L (ref 7–16)
AST SERPL-CCNC: 31 U/L (ref 0–31)
BASOPHILS # BLD: 0.03 K/UL (ref 0–0.2)
BASOPHILS NFR BLD: 0 % (ref 0–2)
BILIRUB SERPL-MCNC: 0.3 MG/DL (ref 0–1.2)
BUN SERPL-MCNC: 14 MG/DL (ref 6–23)
CALCIUM SERPL-MCNC: 9 MG/DL (ref 8.6–10.2)
CHLORIDE SERPL-SCNC: 100 MMOL/L (ref 98–107)
CO2 SERPL-SCNC: 29 MMOL/L (ref 22–29)
CREAT SERPL-MCNC: 0.5 MG/DL (ref 0.5–1)
EOSINOPHIL # BLD: 0 K/UL (ref 0.05–0.5)
EOSINOPHILS RELATIVE PERCENT: 0 % (ref 0–6)
ERYTHROCYTE [DISTWIDTH] IN BLOOD BY AUTOMATED COUNT: 17 % (ref 11.5–15)
GFR, ESTIMATED: >90 ML/MIN/1.73M2
GLUCOSE SERPL-MCNC: 161 MG/DL (ref 74–99)
HCT VFR BLD AUTO: 26.6 % (ref 34–48)
HGB BLD-MCNC: 7.9 G/DL (ref 11.5–15.5)
IMM GRANULOCYTES # BLD AUTO: 0.08 K/UL (ref 0–0.58)
IMM GRANULOCYTES NFR BLD: 1 % (ref 0–5)
LYMPHOCYTES NFR BLD: 0.18 K/UL (ref 1.5–4)
LYMPHOCYTES RELATIVE PERCENT: 2 % (ref 20–42)
MCH RBC QN AUTO: 32.2 PG (ref 26–35)
MCHC RBC AUTO-ENTMCNC: 29.7 G/DL (ref 32–34.5)
MCV RBC AUTO: 108.6 FL (ref 80–99.9)
MONOCYTES NFR BLD: 0.13 K/UL (ref 0.1–0.95)
MONOCYTES NFR BLD: 1 % (ref 2–12)
NEUTROPHILS NFR BLD: 96 % (ref 43–80)
NEUTS SEG NFR BLD: 10.23 K/UL (ref 1.8–7.3)
PLATELET # BLD AUTO: 246 K/UL (ref 130–450)
PMV BLD AUTO: 11.4 FL (ref 7–12)
POTASSIUM SERPL-SCNC: 4.4 MMOL/L (ref 3.5–5)
PROT SERPL-MCNC: 5.6 G/DL (ref 6.4–8.3)
RBC # BLD AUTO: 2.45 M/UL (ref 3.5–5.5)
SODIUM SERPL-SCNC: 139 MMOL/L (ref 132–146)
WBC OTHER # BLD: 10.7 K/UL (ref 4.5–11.5)

## 2025-02-18 PROCEDURE — 6370000000 HC RX 637 (ALT 250 FOR IP): Performed by: INTERNAL MEDICINE

## 2025-02-18 PROCEDURE — 94640 AIRWAY INHALATION TREATMENT: CPT

## 2025-02-18 PROCEDURE — 6370000000 HC RX 637 (ALT 250 FOR IP): Performed by: FAMILY MEDICINE

## 2025-02-18 PROCEDURE — 99221 1ST HOSP IP/OBS SF/LOW 40: CPT | Performed by: NURSE PRACTITIONER

## 2025-02-18 PROCEDURE — 2500000003 HC RX 250 WO HCPCS: Performed by: FAMILY MEDICINE

## 2025-02-18 PROCEDURE — 36415 COLL VENOUS BLD VENIPUNCTURE: CPT

## 2025-02-18 PROCEDURE — 99232 SBSQ HOSP IP/OBS MODERATE 35: CPT | Performed by: FAMILY MEDICINE

## 2025-02-18 PROCEDURE — 2060000000 HC ICU INTERMEDIATE R&B

## 2025-02-18 PROCEDURE — 85025 COMPLETE CBC W/AUTO DIFF WBC: CPT

## 2025-02-18 PROCEDURE — 97110 THERAPEUTIC EXERCISES: CPT

## 2025-02-18 PROCEDURE — 80053 COMPREHEN METABOLIC PANEL: CPT

## 2025-02-18 PROCEDURE — 97530 THERAPEUTIC ACTIVITIES: CPT

## 2025-02-18 PROCEDURE — 6360000002 HC RX W HCPCS: Performed by: INTERNAL MEDICINE

## 2025-02-18 PROCEDURE — 6360000002 HC RX W HCPCS: Performed by: FAMILY MEDICINE

## 2025-02-18 PROCEDURE — 2700000000 HC OXYGEN THERAPY PER DAY

## 2025-02-18 PROCEDURE — 94660 CPAP INITIATION&MGMT: CPT

## 2025-02-18 PROCEDURE — 71045 X-RAY EXAM CHEST 1 VIEW: CPT

## 2025-02-18 RX ADMIN — BUDESONIDE INHALATION 500 MCG: 0.5 SUSPENSION RESPIRATORY (INHALATION) at 07:10

## 2025-02-18 RX ADMIN — Medication 3 MG: at 21:15

## 2025-02-18 RX ADMIN — IPRATROPIUM BROMIDE AND ALBUTEROL SULFATE 1 DOSE: .5; 2.5 SOLUTION RESPIRATORY (INHALATION) at 21:07

## 2025-02-18 RX ADMIN — IPRATROPIUM BROMIDE AND ALBUTEROL SULFATE 1 DOSE: .5; 2.5 SOLUTION RESPIRATORY (INHALATION) at 14:49

## 2025-02-18 RX ADMIN — ACETYLCYSTEINE 400 MG: 100 INHALANT RESPIRATORY (INHALATION) at 21:07

## 2025-02-18 RX ADMIN — BUDESONIDE INHALATION 500 MCG: 0.5 SUSPENSION RESPIRATORY (INHALATION) at 19:04

## 2025-02-18 RX ADMIN — IPRATROPIUM BROMIDE AND ALBUTEROL SULFATE 1 DOSE: .5; 2.5 SOLUTION RESPIRATORY (INHALATION) at 02:33

## 2025-02-18 RX ADMIN — ACETYLCYSTEINE 400 MG: 100 INHALANT RESPIRATORY (INHALATION) at 10:11

## 2025-02-18 RX ADMIN — PREDNISONE 40 MG: 20 TABLET ORAL at 08:29

## 2025-02-18 RX ADMIN — ENOXAPARIN SODIUM 30 MG: 100 INJECTION SUBCUTANEOUS at 08:31

## 2025-02-18 RX ADMIN — ACETYLCYSTEINE 400 MG: 100 INHALANT RESPIRATORY (INHALATION) at 02:33

## 2025-02-18 RX ADMIN — METOPROLOL SUCCINATE 50 MG: 50 TABLET, EXTENDED RELEASE ORAL at 08:29

## 2025-02-18 RX ADMIN — ACETYLCYSTEINE 400 MG: 100 INHALANT RESPIRATORY (INHALATION) at 07:10

## 2025-02-18 RX ADMIN — IPRATROPIUM BROMIDE AND ALBUTEROL SULFATE 1 DOSE: .5; 2.5 SOLUTION RESPIRATORY (INHALATION) at 10:11

## 2025-02-18 RX ADMIN — ESCITALOPRAM OXALATE 10 MG: 10 TABLET ORAL at 08:29

## 2025-02-18 RX ADMIN — Medication 10 ML: at 08:31

## 2025-02-18 RX ADMIN — IPRATROPIUM BROMIDE AND ALBUTEROL SULFATE 1 DOSE: .5; 2.5 SOLUTION RESPIRATORY (INHALATION) at 19:04

## 2025-02-18 RX ADMIN — IPRATROPIUM BROMIDE AND ALBUTEROL SULFATE 1 DOSE: .5; 2.5 SOLUTION RESPIRATORY (INHALATION) at 07:10

## 2025-02-18 RX ADMIN — ACETYLCYSTEINE 400 MG: 100 INHALANT RESPIRATORY (INHALATION) at 19:04

## 2025-02-18 RX ADMIN — Medication 10 ML: at 21:15

## 2025-02-18 RX ADMIN — PANTOPRAZOLE SODIUM 40 MG: 40 TABLET, DELAYED RELEASE ORAL at 05:01

## 2025-02-18 RX ADMIN — ACETYLCYSTEINE 400 MG: 100 INHALANT RESPIRATORY (INHALATION) at 14:50

## 2025-02-18 RX ADMIN — Medication 1 TABLET: at 08:29

## 2025-02-18 ASSESSMENT — PAIN SCALES - GENERAL
PAINLEVEL_OUTOF10: 0
PAINLEVEL_OUTOF10: 0

## 2025-02-18 NOTE — PLAN OF CARE
Problem: Discharge Planning  Goal: Discharge to home or other facility with appropriate resources  2/18/2025 0133 by Mar Manuel RN  Outcome: Progressing  2/17/2025 1724 by Dion Santiago RN  Outcome: Progressing  Flowsheets (Taken 2/17/2025 0900)  Discharge to home or other facility with appropriate resources: Identify barriers to discharge with patient and caregiver     Problem: Safety - Adult  Goal: Free from fall injury  2/18/2025 0133 by Mar Manuel RN  Outcome: Progressing  2/17/2025 1724 by Dion Santiago RN  Outcome: Progressing  Flowsheets (Taken 2/17/2025 0900)  Free From Fall Injury: Instruct family/caregiver on patient safety     Problem: Respiratory - Adult  Goal: Achieves optimal ventilation and oxygenation  2/18/2025 0133 by Mar Manuel RN  Outcome: Progressing  2/17/2025 1724 by Dion Santiago RN  Outcome: Progressing  Flowsheets (Taken 2/17/2025 0900)  Achieves optimal ventilation and oxygenation: Assess for changes in respiratory status     Problem: Hematologic - Adult  Goal: Maintains hematologic stability  2/18/2025 0133 by Mar Manuel RN  Outcome: Progressing  2/17/2025 1724 by Dion Santiago RN  Outcome: Progressing  Flowsheets (Taken 2/17/2025 0900)  Maintains hematologic stability: Assess for signs and symptoms of bleeding or hemorrhage     Problem: Chronic Conditions and Co-morbidities  Goal: Patient's chronic conditions and co-morbidity symptoms are monitored and maintained or improved  2/18/2025 0133 by Mar Manuel RN  Outcome: Progressing  2/17/2025 1724 by Dion Santiago RN  Outcome: Progressing  Flowsheets (Taken 2/17/2025 0900)  Care Plan - Patient's Chronic Conditions and Co-Morbidity Symptoms are Monitored and Maintained or Improved: Monitor and assess patient's chronic conditions and comorbid symptoms for stability, deterioration, or improvement

## 2025-02-18 NOTE — PLAN OF CARE
Problem: Discharge Planning  Goal: Discharge to home or other facility with appropriate resources  Outcome: Progressing  Flowsheets (Taken 2/18/2025 0852)  Discharge to home or other facility with appropriate resources: Identify barriers to discharge with patient and caregiver     Problem: Safety - Adult  Goal: Free from fall injury  Outcome: Progressing     Problem: Respiratory - Adult  Goal: Achieves optimal ventilation and oxygenation  Outcome: Progressing  Flowsheets (Taken 2/18/2025 0852)  Achieves optimal ventilation and oxygenation: Assess for changes in respiratory status     Problem: Hematologic - Adult  Goal: Maintains hematologic stability  Outcome: Progressing  Flowsheets (Taken 2/18/2025 0852)  Maintains hematologic stability: Assess for signs and symptoms of bleeding or hemorrhage     Problem: Chronic Conditions and Co-morbidities  Goal: Patient's chronic conditions and co-morbidity symptoms are monitored and maintained or improved  Outcome: Progressing  Flowsheets (Taken 2/18/2025 0852)  Care Plan - Patient's Chronic Conditions and Co-Morbidity Symptoms are Monitored and Maintained or Improved: Monitor and assess patient's chronic conditions and comorbid symptoms for stability, deterioration, or improvement

## 2025-02-19 LAB
ALBUMIN SERPL-MCNC: 3.2 G/DL (ref 3.5–5.2)
ALP SERPL-CCNC: 59 U/L (ref 35–104)
ALT SERPL-CCNC: 50 U/L (ref 0–32)
ANION GAP SERPL CALCULATED.3IONS-SCNC: 6 MMOL/L (ref 7–16)
AST SERPL-CCNC: 28 U/L (ref 0–31)
BASOPHILS # BLD: 0.02 K/UL (ref 0–0.2)
BASOPHILS NFR BLD: 0 % (ref 0–2)
BILIRUB SERPL-MCNC: 0.3 MG/DL (ref 0–1.2)
BUN SERPL-MCNC: 11 MG/DL (ref 6–23)
CALCIUM SERPL-MCNC: 9 MG/DL (ref 8.6–10.2)
CHLORIDE SERPL-SCNC: 97 MMOL/L (ref 98–107)
CO2 SERPL-SCNC: 38 MMOL/L (ref 22–29)
CREAT SERPL-MCNC: 0.4 MG/DL (ref 0.5–1)
EOSINOPHIL # BLD: 0.09 K/UL (ref 0.05–0.5)
EOSINOPHILS RELATIVE PERCENT: 1 % (ref 0–6)
ERYTHROCYTE [DISTWIDTH] IN BLOOD BY AUTOMATED COUNT: 16.8 % (ref 11.5–15)
GFR, ESTIMATED: >90 ML/MIN/1.73M2
GLUCOSE SERPL-MCNC: 105 MG/DL (ref 74–99)
HCT VFR BLD AUTO: 28.1 % (ref 34–48)
HGB BLD-MCNC: 8.3 G/DL (ref 11.5–15.5)
IMM GRANULOCYTES # BLD AUTO: 0.08 K/UL (ref 0–0.58)
IMM GRANULOCYTES NFR BLD: 1 % (ref 0–5)
LYMPHOCYTES NFR BLD: 0.66 K/UL (ref 1.5–4)
LYMPHOCYTES RELATIVE PERCENT: 8 % (ref 20–42)
MCH RBC QN AUTO: 31.7 PG (ref 26–35)
MCHC RBC AUTO-ENTMCNC: 29.5 G/DL (ref 32–34.5)
MCV RBC AUTO: 107.3 FL (ref 80–99.9)
MONOCYTES NFR BLD: 0.83 K/UL (ref 0.1–0.95)
MONOCYTES NFR BLD: 10 % (ref 2–12)
NEUTROPHILS NFR BLD: 80 % (ref 43–80)
NEUTS SEG NFR BLD: 6.87 K/UL (ref 1.8–7.3)
PLATELET # BLD AUTO: 221 K/UL (ref 130–450)
PMV BLD AUTO: 10.7 FL (ref 7–12)
POTASSIUM SERPL-SCNC: 3.9 MMOL/L (ref 3.5–5)
PROT SERPL-MCNC: 5.3 G/DL (ref 6.4–8.3)
RBC # BLD AUTO: 2.62 M/UL (ref 3.5–5.5)
SODIUM SERPL-SCNC: 141 MMOL/L (ref 132–146)
WBC OTHER # BLD: 8.6 K/UL (ref 4.5–11.5)

## 2025-02-19 PROCEDURE — 6360000002 HC RX W HCPCS: Performed by: INTERNAL MEDICINE

## 2025-02-19 PROCEDURE — 6370000000 HC RX 637 (ALT 250 FOR IP): Performed by: FAMILY MEDICINE

## 2025-02-19 PROCEDURE — 94660 CPAP INITIATION&MGMT: CPT

## 2025-02-19 PROCEDURE — 6370000000 HC RX 637 (ALT 250 FOR IP): Performed by: INTERNAL MEDICINE

## 2025-02-19 PROCEDURE — 97535 SELF CARE MNGMENT TRAINING: CPT

## 2025-02-19 PROCEDURE — 94640 AIRWAY INHALATION TREATMENT: CPT

## 2025-02-19 PROCEDURE — 6360000002 HC RX W HCPCS: Performed by: FAMILY MEDICINE

## 2025-02-19 PROCEDURE — 97112 NEUROMUSCULAR REEDUCATION: CPT

## 2025-02-19 PROCEDURE — 2500000003 HC RX 250 WO HCPCS: Performed by: FAMILY MEDICINE

## 2025-02-19 PROCEDURE — 85025 COMPLETE CBC W/AUTO DIFF WBC: CPT

## 2025-02-19 PROCEDURE — 80053 COMPREHEN METABOLIC PANEL: CPT

## 2025-02-19 PROCEDURE — 36415 COLL VENOUS BLD VENIPUNCTURE: CPT

## 2025-02-19 PROCEDURE — 2060000000 HC ICU INTERMEDIATE R&B

## 2025-02-19 PROCEDURE — 2700000000 HC OXYGEN THERAPY PER DAY

## 2025-02-19 PROCEDURE — 99232 SBSQ HOSP IP/OBS MODERATE 35: CPT | Performed by: FAMILY MEDICINE

## 2025-02-19 RX ORDER — LORAZEPAM 0.5 MG/1
0.5 TABLET ORAL EVERY 6 HOURS PRN
Qty: 120 TABLET | Refills: 0 | Status: SHIPPED | OUTPATIENT
Start: 2025-02-19 | End: 2025-02-20

## 2025-02-19 RX ORDER — HYDROXYZINE HYDROCHLORIDE 25 MG/1
25 TABLET, FILM COATED ORAL 3 TIMES DAILY PRN
DISCHARGE
Start: 2025-02-19 | End: 2025-02-24 | Stop reason: HOSPADM

## 2025-02-19 RX ORDER — PREDNISONE 20 MG/1
40 TABLET ORAL DAILY
DISCHARGE
Start: 2025-02-20 | End: 2025-02-25

## 2025-02-19 RX ORDER — ESCITALOPRAM OXALATE 10 MG/1
10 TABLET ORAL DAILY
DISCHARGE
Start: 2025-02-20 | End: 2025-02-24

## 2025-02-19 RX ORDER — HYDROXYZINE HYDROCHLORIDE 25 MG/1
25 TABLET, FILM COATED ORAL 3 TIMES DAILY PRN
Status: DISCONTINUED | OUTPATIENT
Start: 2025-02-19 | End: 2025-02-24 | Stop reason: HOSPADM

## 2025-02-19 RX ORDER — HYDROXYZINE HYDROCHLORIDE 50 MG/ML
50 INJECTION, SOLUTION INTRAMUSCULAR EVERY 6 HOURS PRN
Status: DISCONTINUED | OUTPATIENT
Start: 2025-02-19 | End: 2025-02-24 | Stop reason: HOSPADM

## 2025-02-19 RX ADMIN — ACETYLCYSTEINE 400 MG: 100 INHALANT RESPIRATORY (INHALATION) at 22:03

## 2025-02-19 RX ADMIN — ENOXAPARIN SODIUM 30 MG: 100 INJECTION SUBCUTANEOUS at 09:17

## 2025-02-19 RX ADMIN — GUAIFENESIN 600 MG: 600 TABLET ORAL at 09:17

## 2025-02-19 RX ADMIN — IPRATROPIUM BROMIDE AND ALBUTEROL SULFATE 1 DOSE: .5; 2.5 SOLUTION RESPIRATORY (INHALATION) at 09:34

## 2025-02-19 RX ADMIN — IPRATROPIUM BROMIDE AND ALBUTEROL SULFATE 1 DOSE: .5; 2.5 SOLUTION RESPIRATORY (INHALATION) at 02:11

## 2025-02-19 RX ADMIN — ACETYLCYSTEINE 400 MG: 100 INHALANT RESPIRATORY (INHALATION) at 09:34

## 2025-02-19 RX ADMIN — BUDESONIDE INHALATION 500 MCG: 0.5 SUSPENSION RESPIRATORY (INHALATION) at 17:08

## 2025-02-19 RX ADMIN — ACETYLCYSTEINE 400 MG: 100 INHALANT RESPIRATORY (INHALATION) at 02:11

## 2025-02-19 RX ADMIN — PREDNISONE 40 MG: 20 TABLET ORAL at 12:32

## 2025-02-19 RX ADMIN — ACETYLCYSTEINE 400 MG: 100 INHALANT RESPIRATORY (INHALATION) at 06:23

## 2025-02-19 RX ADMIN — Medication 1 TABLET: at 09:17

## 2025-02-19 RX ADMIN — IPRATROPIUM BROMIDE AND ALBUTEROL SULFATE 1 DOSE: .5; 2.5 SOLUTION RESPIRATORY (INHALATION) at 22:04

## 2025-02-19 RX ADMIN — IPRATROPIUM BROMIDE AND ALBUTEROL SULFATE 1 DOSE: .5; 2.5 SOLUTION RESPIRATORY (INHALATION) at 17:07

## 2025-02-19 RX ADMIN — IPRATROPIUM BROMIDE AND ALBUTEROL SULFATE 1 DOSE: .5; 2.5 SOLUTION RESPIRATORY (INHALATION) at 06:24

## 2025-02-19 RX ADMIN — Medication 3 MG: at 20:46

## 2025-02-19 RX ADMIN — ACETYLCYSTEINE 400 MG: 100 INHALANT RESPIRATORY (INHALATION) at 17:07

## 2025-02-19 RX ADMIN — BUDESONIDE INHALATION 500 MCG: 0.5 SUSPENSION RESPIRATORY (INHALATION) at 06:24

## 2025-02-19 RX ADMIN — METOPROLOL SUCCINATE 50 MG: 50 TABLET, EXTENDED RELEASE ORAL at 09:17

## 2025-02-19 RX ADMIN — Medication 10 ML: at 09:18

## 2025-02-19 RX ADMIN — Medication 10 ML: at 20:47

## 2025-02-19 NOTE — PLAN OF CARE
Problem: Discharge Planning  Goal: Discharge to home or other facility with appropriate resources  2/19/2025 1114 by Michelle Fajardo RN  Outcome: Progressing     Problem: Safety - Adult  Goal: Free from fall injury  2/19/2025 1114 by Michelle Fajardo RN  Outcome: Progressing     Problem: Respiratory - Adult  Goal: Achieves optimal ventilation and oxygenation  2/19/2025 1114 by Michelle Fajardo RN  Outcome: Progressing     Problem: Hematologic - Adult  Goal: Maintains hematologic stability  2/19/2025 1114 by Michelle Fajardo RN  Outcome: Progressing     Problem: Chronic Conditions and Co-morbidities  Goal: Patient's chronic conditions and co-morbidity symptoms are monitored and maintained or improved  2/19/2025 1114 by Michelle Fajardo RN  Outcome: Progressing     Problem: Hematologic - Adult  Goal: Maintains hematologic stability  2/19/2025 1114 by Michelle Fajardo RN  Outcome: Progressing

## 2025-02-19 NOTE — PLAN OF CARE
Problem: Discharge Planning  Goal: Discharge to home or other facility with appropriate resources  2/19/2025 0425 by Karuna Coleman RN  Outcome: Progressing  2/18/2025 1843 by Dion Santiago RN  Outcome: Progressing  Flowsheets (Taken 2/18/2025 0852)  Discharge to home or other facility with appropriate resources: Identify barriers to discharge with patient and caregiver     Problem: Safety - Adult  Goal: Free from fall injury  2/19/2025 0425 by Karuna Coleman RN  Outcome: Progressing  2/18/2025 1843 by Dion Santiago RN  Outcome: Progressing     Problem: Respiratory - Adult  Goal: Achieves optimal ventilation and oxygenation  2/19/2025 0425 by Karuna Coleman RN  Outcome: Progressing  2/18/2025 1843 by Dion Santiago RN  Outcome: Progressing  Flowsheets (Taken 2/18/2025 0852)  Achieves optimal ventilation and oxygenation: Assess for changes in respiratory status     Problem: Hematologic - Adult  Goal: Maintains hematologic stability  2/19/2025 0425 by Karuna Coleman RN  Outcome: Progressing  2/18/2025 1843 by Dion Santiago RN  Outcome: Progressing  Flowsheets (Taken 2/18/2025 0852)  Maintains hematologic stability: Assess for signs and symptoms of bleeding or hemorrhage     Problem: Chronic Conditions and Co-morbidities  Goal: Patient's chronic conditions and co-morbidity symptoms are monitored and maintained or improved  2/19/2025 0425 by Karuna Colmean RN  Outcome: Progressing  2/18/2025 1843 by Dion Santiago RN  Outcome: Progressing  Flowsheets (Taken 2/18/2025 0852)  Care Plan - Patient's Chronic Conditions and Co-Morbidity Symptoms are Monitored and Maintained or Improved: Monitor and assess patient's chronic conditions and comorbid symptoms for stability, deterioration, or improvement

## 2025-02-20 LAB
ALBUMIN SERPL-MCNC: 3.2 G/DL (ref 3.5–5.2)
ALP SERPL-CCNC: 56 U/L (ref 35–104)
ALT SERPL-CCNC: 42 U/L (ref 0–32)
ANION GAP SERPL CALCULATED.3IONS-SCNC: 5 MMOL/L (ref 7–16)
AST SERPL-CCNC: 19 U/L (ref 0–31)
BASOPHILS # BLD: 0 K/UL (ref 0–0.2)
BASOPHILS NFR BLD: 0 % (ref 0–2)
BILIRUB SERPL-MCNC: 0.3 MG/DL (ref 0–1.2)
BUN SERPL-MCNC: 14 MG/DL (ref 6–23)
CALCIUM SERPL-MCNC: 8.7 MG/DL (ref 8.6–10.2)
CHLORIDE SERPL-SCNC: 97 MMOL/L (ref 98–107)
CO2 SERPL-SCNC: 38 MMOL/L (ref 22–29)
CREAT SERPL-MCNC: 0.4 MG/DL (ref 0.5–1)
EOSINOPHIL # BLD: 0 K/UL (ref 0.05–0.5)
EOSINOPHILS RELATIVE PERCENT: 0 % (ref 0–6)
ERYTHROCYTE [DISTWIDTH] IN BLOOD BY AUTOMATED COUNT: 16.7 % (ref 11.5–15)
GFR, ESTIMATED: >90 ML/MIN/1.73M2
GLUCOSE SERPL-MCNC: 147 MG/DL (ref 74–99)
HCT VFR BLD AUTO: 25.6 % (ref 34–48)
HGB BLD-MCNC: 7.8 G/DL (ref 11.5–15.5)
LYMPHOCYTES NFR BLD: 0.14 K/UL (ref 1.5–4)
LYMPHOCYTES RELATIVE PERCENT: 2 % (ref 20–42)
MCH RBC QN AUTO: 32 PG (ref 26–35)
MCHC RBC AUTO-ENTMCNC: 30.5 G/DL (ref 32–34.5)
MCV RBC AUTO: 104.9 FL (ref 80–99.9)
MONOCYTES NFR BLD: 0.14 K/UL (ref 0.1–0.95)
MONOCYTES NFR BLD: 2 % (ref 2–12)
MYELOCYTES ABSOLUTE COUNT: 0.14 K/UL
MYELOCYTES: 2 %
NEUTROPHILS NFR BLD: 95 % (ref 43–80)
NEUTS SEG NFR BLD: 7.67 K/UL (ref 1.8–7.3)
PLATELET # BLD AUTO: 257 K/UL (ref 130–450)
PMV BLD AUTO: 10.9 FL (ref 7–12)
POTASSIUM SERPL-SCNC: 4.4 MMOL/L (ref 3.5–5)
PROT SERPL-MCNC: 5.2 G/DL (ref 6.4–8.3)
RBC # BLD AUTO: 2.44 M/UL (ref 3.5–5.5)
RBC # BLD: NORMAL 10*6/UL
SODIUM SERPL-SCNC: 140 MMOL/L (ref 132–146)
WBC OTHER # BLD: 8.1 K/UL (ref 4.5–11.5)

## 2025-02-20 PROCEDURE — 94660 CPAP INITIATION&MGMT: CPT

## 2025-02-20 PROCEDURE — 99232 SBSQ HOSP IP/OBS MODERATE 35: CPT | Performed by: FAMILY MEDICINE

## 2025-02-20 PROCEDURE — 6370000000 HC RX 637 (ALT 250 FOR IP): Performed by: FAMILY MEDICINE

## 2025-02-20 PROCEDURE — 2060000000 HC ICU INTERMEDIATE R&B

## 2025-02-20 PROCEDURE — 94640 AIRWAY INHALATION TREATMENT: CPT

## 2025-02-20 PROCEDURE — 80053 COMPREHEN METABOLIC PANEL: CPT

## 2025-02-20 PROCEDURE — 6360000002 HC RX W HCPCS: Performed by: FAMILY MEDICINE

## 2025-02-20 PROCEDURE — 85025 COMPLETE CBC W/AUTO DIFF WBC: CPT

## 2025-02-20 PROCEDURE — 99233 SBSQ HOSP IP/OBS HIGH 50: CPT | Performed by: INTERNAL MEDICINE

## 2025-02-20 PROCEDURE — 2500000003 HC RX 250 WO HCPCS: Performed by: FAMILY MEDICINE

## 2025-02-20 PROCEDURE — 6370000000 HC RX 637 (ALT 250 FOR IP): Performed by: INTERNAL MEDICINE

## 2025-02-20 PROCEDURE — 36415 COLL VENOUS BLD VENIPUNCTURE: CPT

## 2025-02-20 PROCEDURE — 6360000002 HC RX W HCPCS: Performed by: INTERNAL MEDICINE

## 2025-02-20 PROCEDURE — 2700000000 HC OXYGEN THERAPY PER DAY

## 2025-02-20 RX ORDER — LORAZEPAM 0.5 MG/1
0.5 TABLET ORAL EVERY 6 HOURS PRN
Qty: 120 TABLET | Refills: 0 | Status: SHIPPED | OUTPATIENT
Start: 2025-02-20 | End: 2025-03-22

## 2025-02-20 RX ORDER — ENOXAPARIN SODIUM 100 MG/ML
40 INJECTION SUBCUTANEOUS DAILY
Status: DISCONTINUED | OUTPATIENT
Start: 2025-02-21 | End: 2025-02-24 | Stop reason: HOSPADM

## 2025-02-20 RX ADMIN — Medication 3 MG: at 20:10

## 2025-02-20 RX ADMIN — IPRATROPIUM BROMIDE AND ALBUTEROL SULFATE 1 DOSE: .5; 2.5 SOLUTION RESPIRATORY (INHALATION) at 18:12

## 2025-02-20 RX ADMIN — ACETYLCYSTEINE 400 MG: 100 INHALANT RESPIRATORY (INHALATION) at 18:13

## 2025-02-20 RX ADMIN — ACETYLCYSTEINE 400 MG: 100 INHALANT RESPIRATORY (INHALATION) at 07:04

## 2025-02-20 RX ADMIN — Medication 10 ML: at 20:10

## 2025-02-20 RX ADMIN — IPRATROPIUM BROMIDE AND ALBUTEROL SULFATE 1 DOSE: .5; 2.5 SOLUTION RESPIRATORY (INHALATION) at 14:18

## 2025-02-20 RX ADMIN — PREDNISONE 40 MG: 20 TABLET ORAL at 08:06

## 2025-02-20 RX ADMIN — METOPROLOL SUCCINATE 50 MG: 50 TABLET, EXTENDED RELEASE ORAL at 08:07

## 2025-02-20 RX ADMIN — GUAIFENESIN 600 MG: 600 TABLET ORAL at 08:06

## 2025-02-20 RX ADMIN — IPRATROPIUM BROMIDE AND ALBUTEROL SULFATE 1 DOSE: .5; 2.5 SOLUTION RESPIRATORY (INHALATION) at 22:46

## 2025-02-20 RX ADMIN — ACETYLCYSTEINE 400 MG: 100 INHALANT RESPIRATORY (INHALATION) at 14:18

## 2025-02-20 RX ADMIN — ACETYLCYSTEINE 400 MG: 100 INHALANT RESPIRATORY (INHALATION) at 09:48

## 2025-02-20 RX ADMIN — ENOXAPARIN SODIUM 30 MG: 100 INJECTION SUBCUTANEOUS at 08:07

## 2025-02-20 RX ADMIN — BUDESONIDE INHALATION 500 MCG: 0.5 SUSPENSION RESPIRATORY (INHALATION) at 18:13

## 2025-02-20 RX ADMIN — Medication 1 TABLET: at 08:06

## 2025-02-20 RX ADMIN — BUDESONIDE INHALATION 500 MCG: 0.5 SUSPENSION RESPIRATORY (INHALATION) at 07:04

## 2025-02-20 RX ADMIN — Medication 10 ML: at 08:07

## 2025-02-20 RX ADMIN — ACETYLCYSTEINE 400 MG: 100 INHALANT RESPIRATORY (INHALATION) at 22:46

## 2025-02-20 RX ADMIN — IPRATROPIUM BROMIDE AND ALBUTEROL SULFATE 1 DOSE: .5; 2.5 SOLUTION RESPIRATORY (INHALATION) at 07:04

## 2025-02-20 RX ADMIN — IPRATROPIUM BROMIDE AND ALBUTEROL SULFATE 1 DOSE: .5; 2.5 SOLUTION RESPIRATORY (INHALATION) at 09:47

## 2025-02-20 RX ADMIN — PANTOPRAZOLE SODIUM 40 MG: 40 TABLET, DELAYED RELEASE ORAL at 06:49

## 2025-02-20 RX ADMIN — ESCITALOPRAM OXALATE 10 MG: 10 TABLET ORAL at 08:06

## 2025-02-20 ASSESSMENT — PAIN SCALES - GENERAL: PAINLEVEL_OUTOF10: 0

## 2025-02-21 ENCOUNTER — APPOINTMENT (OUTPATIENT)
Dept: GENERAL RADIOLOGY | Age: 75
DRG: 189 | End: 2025-02-21
Payer: MEDICARE

## 2025-02-21 PROBLEM — Z51.5 PALLIATIVE CARE ENCOUNTER: Status: ACTIVE | Noted: 2025-02-21

## 2025-02-21 LAB
ALBUMIN SERPL-MCNC: 3.3 G/DL (ref 3.5–5.2)
ALP SERPL-CCNC: 54 U/L (ref 35–104)
ALT SERPL-CCNC: 37 U/L (ref 0–32)
ANION GAP SERPL CALCULATED.3IONS-SCNC: 6 MMOL/L (ref 7–16)
AST SERPL-CCNC: 19 U/L (ref 0–31)
BASOPHILS # BLD: 0 K/UL (ref 0–0.2)
BASOPHILS NFR BLD: 0 % (ref 0–2)
BILIRUB SERPL-MCNC: 0.4 MG/DL (ref 0–1.2)
BUN SERPL-MCNC: 14 MG/DL (ref 6–23)
CALCIUM SERPL-MCNC: 8.9 MG/DL (ref 8.6–10.2)
CHLORIDE SERPL-SCNC: 100 MMOL/L (ref 98–107)
CO2 SERPL-SCNC: 37 MMOL/L (ref 22–29)
CREAT SERPL-MCNC: 0.4 MG/DL (ref 0.5–1)
EOSINOPHIL # BLD: 0 K/UL (ref 0.05–0.5)
EOSINOPHILS RELATIVE PERCENT: 0 % (ref 0–6)
ERYTHROCYTE [DISTWIDTH] IN BLOOD BY AUTOMATED COUNT: 16.8 % (ref 11.5–15)
GFR, ESTIMATED: >90 ML/MIN/1.73M2
GLUCOSE SERPL-MCNC: 120 MG/DL (ref 74–99)
HCT VFR BLD AUTO: 26 % (ref 34–48)
HGB BLD-MCNC: 7.8 G/DL (ref 11.5–15.5)
LYMPHOCYTES NFR BLD: 0.41 K/UL (ref 1.5–4)
LYMPHOCYTES RELATIVE PERCENT: 4 % (ref 20–42)
MCH RBC QN AUTO: 32.2 PG (ref 26–35)
MCHC RBC AUTO-ENTMCNC: 30 G/DL (ref 32–34.5)
MCV RBC AUTO: 107.4 FL (ref 80–99.9)
METAMYELOCYTES ABSOLUTE COUNT: 0.08 K/UL (ref 0–0.12)
METAMYELOCYTES: 1 % (ref 0–1)
MONOCYTES NFR BLD: 0.08 K/UL (ref 0.1–0.95)
MONOCYTES NFR BLD: 1 % (ref 2–12)
NEUTROPHILS NFR BLD: 94 % (ref 43–80)
NEUTS SEG NFR BLD: 8.83 K/UL (ref 1.8–7.3)
PLATELET # BLD AUTO: 270 K/UL (ref 130–450)
PMV BLD AUTO: 10.7 FL (ref 7–12)
POTASSIUM SERPL-SCNC: 3.9 MMOL/L (ref 3.5–5)
PROT SERPL-MCNC: 5.2 G/DL (ref 6.4–8.3)
RBC # BLD AUTO: 2.42 M/UL (ref 3.5–5.5)
RBC # BLD: NORMAL 10*6/UL
SODIUM SERPL-SCNC: 143 MMOL/L (ref 132–146)
WBC OTHER # BLD: 9.4 K/UL (ref 4.5–11.5)

## 2025-02-21 PROCEDURE — 2060000000 HC ICU INTERMEDIATE R&B

## 2025-02-21 PROCEDURE — 6360000002 HC RX W HCPCS: Performed by: FAMILY MEDICINE

## 2025-02-21 PROCEDURE — 99233 SBSQ HOSP IP/OBS HIGH 50: CPT | Performed by: INTERNAL MEDICINE

## 2025-02-21 PROCEDURE — 85025 COMPLETE CBC W/AUTO DIFF WBC: CPT

## 2025-02-21 PROCEDURE — 2500000003 HC RX 250 WO HCPCS: Performed by: FAMILY MEDICINE

## 2025-02-21 PROCEDURE — 36415 COLL VENOUS BLD VENIPUNCTURE: CPT

## 2025-02-21 PROCEDURE — 99231 SBSQ HOSP IP/OBS SF/LOW 25: CPT | Performed by: NURSE PRACTITIONER

## 2025-02-21 PROCEDURE — 6370000000 HC RX 637 (ALT 250 FOR IP): Performed by: INTERNAL MEDICINE

## 2025-02-21 PROCEDURE — 97530 THERAPEUTIC ACTIVITIES: CPT

## 2025-02-21 PROCEDURE — 99232 SBSQ HOSP IP/OBS MODERATE 35: CPT | Performed by: FAMILY MEDICINE

## 2025-02-21 PROCEDURE — 94640 AIRWAY INHALATION TREATMENT: CPT

## 2025-02-21 PROCEDURE — 2700000000 HC OXYGEN THERAPY PER DAY

## 2025-02-21 PROCEDURE — 71045 X-RAY EXAM CHEST 1 VIEW: CPT

## 2025-02-21 PROCEDURE — 6360000002 HC RX W HCPCS: Performed by: INTERNAL MEDICINE

## 2025-02-21 PROCEDURE — 80053 COMPREHEN METABOLIC PANEL: CPT

## 2025-02-21 PROCEDURE — 6370000000 HC RX 637 (ALT 250 FOR IP): Performed by: FAMILY MEDICINE

## 2025-02-21 PROCEDURE — 94660 CPAP INITIATION&MGMT: CPT

## 2025-02-21 PROCEDURE — 97110 THERAPEUTIC EXERCISES: CPT

## 2025-02-21 RX ORDER — HEPARIN SODIUM 1000 [USP'U]/ML
INJECTION, SOLUTION INTRAVENOUS; SUBCUTANEOUS
Status: DISCONTINUED
Start: 2025-02-21 | End: 2025-02-21 | Stop reason: CLARIF

## 2025-02-21 RX ADMIN — Medication 10 ML: at 21:40

## 2025-02-21 RX ADMIN — ESCITALOPRAM OXALATE 10 MG: 10 TABLET ORAL at 08:46

## 2025-02-21 RX ADMIN — PANTOPRAZOLE SODIUM 40 MG: 40 TABLET, DELAYED RELEASE ORAL at 05:20

## 2025-02-21 RX ADMIN — Medication 1 TABLET: at 08:46

## 2025-02-21 RX ADMIN — IPRATROPIUM BROMIDE AND ALBUTEROL SULFATE 1 DOSE: .5; 2.5 SOLUTION RESPIRATORY (INHALATION) at 18:10

## 2025-02-21 RX ADMIN — METOPROLOL SUCCINATE 50 MG: 50 TABLET, EXTENDED RELEASE ORAL at 08:46

## 2025-02-21 RX ADMIN — PREDNISONE 40 MG: 20 TABLET ORAL at 08:46

## 2025-02-21 RX ADMIN — IPRATROPIUM BROMIDE AND ALBUTEROL SULFATE 1 DOSE: .5; 2.5 SOLUTION RESPIRATORY (INHALATION) at 06:31

## 2025-02-21 RX ADMIN — ACETYLCYSTEINE 400 MG: 100 INHALANT RESPIRATORY (INHALATION) at 09:49

## 2025-02-21 RX ADMIN — BUDESONIDE INHALATION 500 MCG: 0.5 SUSPENSION RESPIRATORY (INHALATION) at 18:10

## 2025-02-21 RX ADMIN — ACETYLCYSTEINE 400 MG: 100 INHALANT RESPIRATORY (INHALATION) at 06:31

## 2025-02-21 RX ADMIN — BUDESONIDE INHALATION 500 MCG: 0.5 SUSPENSION RESPIRATORY (INHALATION) at 06:31

## 2025-02-21 RX ADMIN — ACETYLCYSTEINE 400 MG: 100 INHALANT RESPIRATORY (INHALATION) at 14:17

## 2025-02-21 RX ADMIN — IPRATROPIUM BROMIDE AND ALBUTEROL SULFATE 1 DOSE: .5; 2.5 SOLUTION RESPIRATORY (INHALATION) at 09:49

## 2025-02-21 RX ADMIN — IPRATROPIUM BROMIDE AND ALBUTEROL SULFATE 1 DOSE: .5; 2.5 SOLUTION RESPIRATORY (INHALATION) at 14:17

## 2025-02-21 RX ADMIN — ENOXAPARIN SODIUM 40 MG: 100 INJECTION SUBCUTANEOUS at 08:46

## 2025-02-21 RX ADMIN — ACETYLCYSTEINE 400 MG: 100 INHALANT RESPIRATORY (INHALATION) at 18:10

## 2025-02-21 RX ADMIN — GUAIFENESIN 600 MG: 600 TABLET ORAL at 08:46

## 2025-02-21 RX ADMIN — Medication 10 ML: at 08:47

## 2025-02-21 RX ADMIN — GUAIFENESIN 600 MG: 600 TABLET ORAL at 21:40

## 2025-02-21 RX ADMIN — HYDROXYZINE HYDROCHLORIDE 25 MG: 25 TABLET, FILM COATED ORAL at 21:40

## 2025-02-21 RX ADMIN — Medication 3 MG: at 21:40

## 2025-02-21 ASSESSMENT — PAIN SCALES - GENERAL: PAINLEVEL_OUTOF10: 0

## 2025-02-22 LAB
ALBUMIN SERPL-MCNC: 3.3 G/DL (ref 3.5–5.2)
ALP SERPL-CCNC: 56 U/L (ref 35–104)
ALT SERPL-CCNC: 40 U/L (ref 0–32)
ANION GAP SERPL CALCULATED.3IONS-SCNC: 8 MMOL/L (ref 7–16)
AST SERPL-CCNC: 22 U/L (ref 0–31)
BASOPHILS # BLD: 0.02 K/UL (ref 0–0.2)
BASOPHILS NFR BLD: 0 % (ref 0–2)
BILIRUB SERPL-MCNC: 0.3 MG/DL (ref 0–1.2)
BUN SERPL-MCNC: 13 MG/DL (ref 6–23)
CALCIUM SERPL-MCNC: 8.8 MG/DL (ref 8.6–10.2)
CHLORIDE SERPL-SCNC: 97 MMOL/L (ref 98–107)
CO2 SERPL-SCNC: 35 MMOL/L (ref 22–29)
CREAT SERPL-MCNC: 0.4 MG/DL (ref 0.5–1)
EOSINOPHIL # BLD: 0.04 K/UL (ref 0.05–0.5)
EOSINOPHILS RELATIVE PERCENT: 0 % (ref 0–6)
ERYTHROCYTE [DISTWIDTH] IN BLOOD BY AUTOMATED COUNT: 16.7 % (ref 11.5–15)
GFR, ESTIMATED: >90 ML/MIN/1.73M2
GLUCOSE SERPL-MCNC: 153 MG/DL (ref 74–99)
HCT VFR BLD AUTO: 27.7 % (ref 34–48)
HGB BLD-MCNC: 8.3 G/DL (ref 11.5–15.5)
IMM GRANULOCYTES # BLD AUTO: 0.19 K/UL (ref 0–0.58)
IMM GRANULOCYTES NFR BLD: 2 % (ref 0–5)
LYMPHOCYTES NFR BLD: 0.82 K/UL (ref 1.5–4)
LYMPHOCYTES RELATIVE PERCENT: 9 % (ref 20–42)
MCH RBC QN AUTO: 32.4 PG (ref 26–35)
MCHC RBC AUTO-ENTMCNC: 30 G/DL (ref 32–34.5)
MCV RBC AUTO: 108.2 FL (ref 80–99.9)
MONOCYTES NFR BLD: 0.96 K/UL (ref 0.1–0.95)
MONOCYTES NFR BLD: 11 % (ref 2–12)
NEUTROPHILS NFR BLD: 77 % (ref 43–80)
NEUTS SEG NFR BLD: 6.89 K/UL (ref 1.8–7.3)
PLATELET # BLD AUTO: 288 K/UL (ref 130–450)
PMV BLD AUTO: 10.5 FL (ref 7–12)
POTASSIUM SERPL-SCNC: 3.6 MMOL/L (ref 3.5–5)
PROT SERPL-MCNC: 5.3 G/DL (ref 6.4–8.3)
RBC # BLD AUTO: 2.56 M/UL (ref 3.5–5.5)
SODIUM SERPL-SCNC: 140 MMOL/L (ref 132–146)
WBC OTHER # BLD: 8.9 K/UL (ref 4.5–11.5)

## 2025-02-22 PROCEDURE — 36415 COLL VENOUS BLD VENIPUNCTURE: CPT

## 2025-02-22 PROCEDURE — 94640 AIRWAY INHALATION TREATMENT: CPT

## 2025-02-22 PROCEDURE — 6360000002 HC RX W HCPCS: Performed by: FAMILY MEDICINE

## 2025-02-22 PROCEDURE — 99233 SBSQ HOSP IP/OBS HIGH 50: CPT | Performed by: INTERNAL MEDICINE

## 2025-02-22 PROCEDURE — 2500000003 HC RX 250 WO HCPCS: Performed by: FAMILY MEDICINE

## 2025-02-22 PROCEDURE — 94660 CPAP INITIATION&MGMT: CPT

## 2025-02-22 PROCEDURE — 6370000000 HC RX 637 (ALT 250 FOR IP): Performed by: FAMILY MEDICINE

## 2025-02-22 PROCEDURE — 2060000000 HC ICU INTERMEDIATE R&B

## 2025-02-22 PROCEDURE — 6370000000 HC RX 637 (ALT 250 FOR IP): Performed by: INTERNAL MEDICINE

## 2025-02-22 PROCEDURE — 85025 COMPLETE CBC W/AUTO DIFF WBC: CPT

## 2025-02-22 PROCEDURE — 80053 COMPREHEN METABOLIC PANEL: CPT

## 2025-02-22 PROCEDURE — 6360000002 HC RX W HCPCS: Performed by: INTERNAL MEDICINE

## 2025-02-22 PROCEDURE — 99232 SBSQ HOSP IP/OBS MODERATE 35: CPT | Performed by: FAMILY MEDICINE

## 2025-02-22 PROCEDURE — 2700000000 HC OXYGEN THERAPY PER DAY

## 2025-02-22 RX ADMIN — BUDESONIDE INHALATION 500 MCG: 0.5 SUSPENSION RESPIRATORY (INHALATION) at 06:05

## 2025-02-22 RX ADMIN — IPRATROPIUM BROMIDE AND ALBUTEROL SULFATE 1 DOSE: .5; 2.5 SOLUTION RESPIRATORY (INHALATION) at 12:48

## 2025-02-22 RX ADMIN — Medication 1 TABLET: at 08:20

## 2025-02-22 RX ADMIN — ACETYLCYSTEINE 400 MG: 100 INHALANT RESPIRATORY (INHALATION) at 18:29

## 2025-02-22 RX ADMIN — PREDNISONE 40 MG: 20 TABLET ORAL at 08:20

## 2025-02-22 RX ADMIN — IPRATROPIUM BROMIDE AND ALBUTEROL SULFATE 1 DOSE: .5; 2.5 SOLUTION RESPIRATORY (INHALATION) at 09:40

## 2025-02-22 RX ADMIN — Medication 10 ML: at 22:10

## 2025-02-22 RX ADMIN — IPRATROPIUM BROMIDE AND ALBUTEROL SULFATE 1 DOSE: .5; 2.5 SOLUTION RESPIRATORY (INHALATION) at 21:43

## 2025-02-22 RX ADMIN — BUDESONIDE INHALATION 500 MCG: 0.5 SUSPENSION RESPIRATORY (INHALATION) at 18:29

## 2025-02-22 RX ADMIN — ACETYLCYSTEINE 400 MG: 100 INHALANT RESPIRATORY (INHALATION) at 06:04

## 2025-02-22 RX ADMIN — IPRATROPIUM BROMIDE AND ALBUTEROL SULFATE 1 DOSE: .5; 2.5 SOLUTION RESPIRATORY (INHALATION) at 06:04

## 2025-02-22 RX ADMIN — ACETYLCYSTEINE 400 MG: 100 INHALANT RESPIRATORY (INHALATION) at 21:43

## 2025-02-22 RX ADMIN — IPRATROPIUM BROMIDE AND ALBUTEROL SULFATE 1 DOSE: .5; 2.5 SOLUTION RESPIRATORY (INHALATION) at 18:29

## 2025-02-22 RX ADMIN — METOPROLOL SUCCINATE 50 MG: 50 TABLET, EXTENDED RELEASE ORAL at 08:20

## 2025-02-22 RX ADMIN — Medication 3 MG: at 22:08

## 2025-02-22 RX ADMIN — ACETYLCYSTEINE 400 MG: 100 INHALANT RESPIRATORY (INHALATION) at 09:40

## 2025-02-22 RX ADMIN — ENOXAPARIN SODIUM 40 MG: 100 INJECTION SUBCUTANEOUS at 08:20

## 2025-02-22 RX ADMIN — ACETYLCYSTEINE 400 MG: 100 INHALANT RESPIRATORY (INHALATION) at 12:48

## 2025-02-22 RX ADMIN — Medication 10 ML: at 08:21

## 2025-02-22 NOTE — PLAN OF CARE
Problem: Discharge Planning  Goal: Discharge to home or other facility with appropriate resources  2/22/2025 1020 by Nazanin Combs, RN  Outcome: Progressing  2/21/2025 2028 by Georgi Matthews, RN  Outcome: Progressing  Flowsheets (Taken 2/21/2025 1952)  Discharge to home or other facility with appropriate resources: Identify barriers to discharge with patient and caregiver

## 2025-02-23 LAB
ALBUMIN SERPL-MCNC: 3.4 G/DL (ref 3.5–5.2)
ALP SERPL-CCNC: 52 U/L (ref 35–104)
ALT SERPL-CCNC: 35 U/L (ref 0–32)
ANION GAP SERPL CALCULATED.3IONS-SCNC: 7 MMOL/L (ref 7–16)
AST SERPL-CCNC: 18 U/L (ref 0–31)
BASOPHILS # BLD: 0.02 K/UL (ref 0–0.2)
BASOPHILS NFR BLD: 0 % (ref 0–2)
BILIRUB SERPL-MCNC: 0.2 MG/DL (ref 0–1.2)
BUN SERPL-MCNC: 12 MG/DL (ref 6–23)
CALCIUM SERPL-MCNC: 8.9 MG/DL (ref 8.6–10.2)
CHLORIDE SERPL-SCNC: 100 MMOL/L (ref 98–107)
CO2 SERPL-SCNC: 35 MMOL/L (ref 22–29)
CREAT SERPL-MCNC: 0.4 MG/DL (ref 0.5–1)
EOSINOPHIL # BLD: 0.06 K/UL (ref 0.05–0.5)
EOSINOPHILS RELATIVE PERCENT: 1 % (ref 0–6)
ERYTHROCYTE [DISTWIDTH] IN BLOOD BY AUTOMATED COUNT: 16.9 % (ref 11.5–15)
GFR, ESTIMATED: >90 ML/MIN/1.73M2
GLUCOSE SERPL-MCNC: 94 MG/DL (ref 74–99)
HCT VFR BLD AUTO: 27.8 % (ref 34–48)
HGB BLD-MCNC: 8.2 G/DL (ref 11.5–15.5)
IMM GRANULOCYTES # BLD AUTO: 0.15 K/UL (ref 0–0.58)
IMM GRANULOCYTES NFR BLD: 2 % (ref 0–5)
LYMPHOCYTES NFR BLD: 0.74 K/UL (ref 1.5–4)
LYMPHOCYTES RELATIVE PERCENT: 11 % (ref 20–42)
MCH RBC QN AUTO: 31.8 PG (ref 26–35)
MCHC RBC AUTO-ENTMCNC: 29.5 G/DL (ref 32–34.5)
MCV RBC AUTO: 107.8 FL (ref 80–99.9)
MONOCYTES NFR BLD: 0.86 K/UL (ref 0.1–0.95)
MONOCYTES NFR BLD: 13 % (ref 2–12)
NEUTROPHILS NFR BLD: 73 % (ref 43–80)
NEUTS SEG NFR BLD: 4.98 K/UL (ref 1.8–7.3)
PLATELET # BLD AUTO: 295 K/UL (ref 130–450)
PMV BLD AUTO: 10.4 FL (ref 7–12)
POTASSIUM SERPL-SCNC: 3.8 MMOL/L (ref 3.5–5)
PROT SERPL-MCNC: 5.3 G/DL (ref 6.4–8.3)
RBC # BLD AUTO: 2.58 M/UL (ref 3.5–5.5)
SODIUM SERPL-SCNC: 142 MMOL/L (ref 132–146)
WBC OTHER # BLD: 6.8 K/UL (ref 4.5–11.5)

## 2025-02-23 PROCEDURE — 6370000000 HC RX 637 (ALT 250 FOR IP): Performed by: FAMILY MEDICINE

## 2025-02-23 PROCEDURE — 94640 AIRWAY INHALATION TREATMENT: CPT

## 2025-02-23 PROCEDURE — 6370000000 HC RX 637 (ALT 250 FOR IP): Performed by: INTERNAL MEDICINE

## 2025-02-23 PROCEDURE — 6360000002 HC RX W HCPCS: Performed by: INTERNAL MEDICINE

## 2025-02-23 PROCEDURE — 94660 CPAP INITIATION&MGMT: CPT

## 2025-02-23 PROCEDURE — 6360000002 HC RX W HCPCS: Performed by: FAMILY MEDICINE

## 2025-02-23 PROCEDURE — 2060000000 HC ICU INTERMEDIATE R&B

## 2025-02-23 PROCEDURE — 85025 COMPLETE CBC W/AUTO DIFF WBC: CPT

## 2025-02-23 PROCEDURE — 2500000003 HC RX 250 WO HCPCS: Performed by: FAMILY MEDICINE

## 2025-02-23 PROCEDURE — 2700000000 HC OXYGEN THERAPY PER DAY

## 2025-02-23 PROCEDURE — 36415 COLL VENOUS BLD VENIPUNCTURE: CPT

## 2025-02-23 PROCEDURE — 99233 SBSQ HOSP IP/OBS HIGH 50: CPT | Performed by: INTERNAL MEDICINE

## 2025-02-23 PROCEDURE — 80053 COMPREHEN METABOLIC PANEL: CPT

## 2025-02-23 PROCEDURE — 99232 SBSQ HOSP IP/OBS MODERATE 35: CPT | Performed by: FAMILY MEDICINE

## 2025-02-23 RX ADMIN — ACETYLCYSTEINE 400 MG: 100 INHALANT RESPIRATORY (INHALATION) at 04:51

## 2025-02-23 RX ADMIN — IPRATROPIUM BROMIDE AND ALBUTEROL SULFATE 1 DOSE: .5; 2.5 SOLUTION RESPIRATORY (INHALATION) at 04:51

## 2025-02-23 RX ADMIN — IPRATROPIUM BROMIDE AND ALBUTEROL SULFATE 1 DOSE: .5; 2.5 SOLUTION RESPIRATORY (INHALATION) at 09:17

## 2025-02-23 RX ADMIN — ACETYLCYSTEINE 400 MG: 100 INHALANT RESPIRATORY (INHALATION) at 01:30

## 2025-02-23 RX ADMIN — ENOXAPARIN SODIUM 40 MG: 100 INJECTION SUBCUTANEOUS at 08:46

## 2025-02-23 RX ADMIN — METOPROLOL SUCCINATE 50 MG: 50 TABLET, EXTENDED RELEASE ORAL at 08:46

## 2025-02-23 RX ADMIN — GUAIFENESIN 600 MG: 600 TABLET ORAL at 08:46

## 2025-02-23 RX ADMIN — Medication 3 MG: at 21:27

## 2025-02-23 RX ADMIN — IPRATROPIUM BROMIDE AND ALBUTEROL SULFATE 1 DOSE: .5; 2.5 SOLUTION RESPIRATORY (INHALATION) at 14:40

## 2025-02-23 RX ADMIN — Medication 10 ML: at 21:26

## 2025-02-23 RX ADMIN — IPRATROPIUM BROMIDE AND ALBUTEROL SULFATE 1 DOSE: .5; 2.5 SOLUTION RESPIRATORY (INHALATION) at 18:15

## 2025-02-23 RX ADMIN — Medication 1 TABLET: at 08:46

## 2025-02-23 RX ADMIN — ESCITALOPRAM OXALATE 10 MG: 10 TABLET ORAL at 08:46

## 2025-02-23 RX ADMIN — GUAIFENESIN 600 MG: 600 TABLET ORAL at 21:27

## 2025-02-23 RX ADMIN — Medication 10 ML: at 08:46

## 2025-02-23 RX ADMIN — IPRATROPIUM BROMIDE AND ALBUTEROL SULFATE 1 DOSE: .5; 2.5 SOLUTION RESPIRATORY (INHALATION) at 01:30

## 2025-02-23 RX ADMIN — PREDNISONE 40 MG: 20 TABLET ORAL at 08:46

## 2025-02-23 RX ADMIN — ACETYLCYSTEINE 400 MG: 100 INHALANT RESPIRATORY (INHALATION) at 14:40

## 2025-02-23 RX ADMIN — ACETYLCYSTEINE 400 MG: 100 INHALANT RESPIRATORY (INHALATION) at 09:17

## 2025-02-23 RX ADMIN — ACETYLCYSTEINE 400 MG: 100 INHALANT RESPIRATORY (INHALATION) at 18:16

## 2025-02-23 RX ADMIN — BUDESONIDE INHALATION 500 MCG: 0.5 SUSPENSION RESPIRATORY (INHALATION) at 04:51

## 2025-02-23 RX ADMIN — BUDESONIDE INHALATION 500 MCG: 0.5 SUSPENSION RESPIRATORY (INHALATION) at 18:16

## 2025-02-23 ASSESSMENT — PAIN SCALES - GENERAL: PAINLEVEL_OUTOF10: 0

## 2025-02-23 NOTE — PLAN OF CARE
Problem: Discharge Planning  Goal: Discharge to home or other facility with appropriate resources  2/23/2025 1455 by Kassandra Bennett RN  Outcome: Progressing  2/23/2025 0652 by Butch Brown RN  Outcome: Progressing  Flowsheets (Taken 2/22/2025 2000)  Discharge to home or other facility with appropriate resources: Identify barriers to discharge with patient and caregiver     Problem: Safety - Adult  Goal: Free from fall injury  2/23/2025 1455 by Kassandra Bennett RN  Outcome: Progressing  2/23/2025 0652 by Butch Brown RN  Outcome: Progressing  Flowsheets (Taken 2/22/2025 2011)  Free From Fall Injury: Instruct family/caregiver on patient safety     Problem: Respiratory - Adult  Goal: Achieves optimal ventilation and oxygenation  2/23/2025 1455 by Kassandra Bennett RN  Outcome: Progressing  2/23/2025 0652 by Butch Brown RN  Outcome: Progressing  Flowsheets (Taken 2/22/2025 2000)  Achieves optimal ventilation and oxygenation: Assess for changes in respiratory status     Problem: Hematologic - Adult  Goal: Maintains hematologic stability  2/23/2025 1455 by Kassandra Bennett RN  Outcome: Progressing  2/23/2025 0652 by Butch Brown RN  Outcome: Progressing  Flowsheets (Taken 2/22/2025 2000)  Maintains hematologic stability: Assess for signs and symptoms of bleeding or hemorrhage     Problem: Chronic Conditions and Co-morbidities  Goal: Patient's chronic conditions and co-morbidity symptoms are monitored and maintained or improved  2/23/2025 1455 by Kassandra Bennett RN  Outcome: Progressing  2/23/2025 0652 by Butch Brown RN  Outcome: Progressing  Flowsheets (Taken 2/22/2025 2000)  Care Plan - Patient's Chronic Conditions and Co-Morbidity Symptoms are Monitored and Maintained or Improved: Monitor and assess patient's chronic conditions and comorbid symptoms for stability, deterioration, or improvement     Problem: ABCDS Injury Assessment  Goal: Absence of physical injury  2/23/2025 1455 by Kassandra Bennett

## 2025-02-24 VITALS
WEIGHT: 113.8 LBS | RESPIRATION RATE: 18 BRPM | SYSTOLIC BLOOD PRESSURE: 110 MMHG | OXYGEN SATURATION: 94 % | BODY MASS INDEX: 22.34 KG/M2 | TEMPERATURE: 98.1 F | HEIGHT: 60 IN | HEART RATE: 83 BPM | DIASTOLIC BLOOD PRESSURE: 52 MMHG

## 2025-02-24 LAB
ALBUMIN SERPL-MCNC: 3.4 G/DL (ref 3.5–5.2)
ALP SERPL-CCNC: 53 U/L (ref 35–104)
ALT SERPL-CCNC: 36 U/L (ref 0–32)
ANION GAP SERPL CALCULATED.3IONS-SCNC: 8 MMOL/L (ref 7–16)
AST SERPL-CCNC: 17 U/L (ref 0–31)
BASOPHILS # BLD: 0.01 K/UL (ref 0–0.2)
BASOPHILS NFR BLD: 0 % (ref 0–2)
BILIRUB SERPL-MCNC: 0.3 MG/DL (ref 0–1.2)
BUN SERPL-MCNC: 11 MG/DL (ref 6–23)
CALCIUM SERPL-MCNC: 9.1 MG/DL (ref 8.6–10.2)
CHLORIDE SERPL-SCNC: 101 MMOL/L (ref 98–107)
CO2 SERPL-SCNC: 32 MMOL/L (ref 22–29)
CREAT SERPL-MCNC: 0.4 MG/DL (ref 0.5–1)
EOSINOPHIL # BLD: 0.03 K/UL (ref 0.05–0.5)
EOSINOPHILS RELATIVE PERCENT: 0 % (ref 0–6)
ERYTHROCYTE [DISTWIDTH] IN BLOOD BY AUTOMATED COUNT: 16.9 % (ref 11.5–15)
GFR, ESTIMATED: >90 ML/MIN/1.73M2
GLUCOSE SERPL-MCNC: 81 MG/DL (ref 74–99)
HCT VFR BLD AUTO: 30 % (ref 34–48)
HGB BLD-MCNC: 8.9 G/DL (ref 11.5–15.5)
IMM GRANULOCYTES # BLD AUTO: 0.12 K/UL (ref 0–0.58)
IMM GRANULOCYTES NFR BLD: 2 % (ref 0–5)
LYMPHOCYTES NFR BLD: 0.95 K/UL (ref 1.5–4)
LYMPHOCYTES RELATIVE PERCENT: 12 % (ref 20–42)
MCH RBC QN AUTO: 31.7 PG (ref 26–35)
MCHC RBC AUTO-ENTMCNC: 29.7 G/DL (ref 32–34.5)
MCV RBC AUTO: 106.8 FL (ref 80–99.9)
MONOCYTES NFR BLD: 1.03 K/UL (ref 0.1–0.95)
MONOCYTES NFR BLD: 13 % (ref 2–12)
NEUTROPHILS NFR BLD: 73 % (ref 43–80)
NEUTS SEG NFR BLD: 5.81 K/UL (ref 1.8–7.3)
PLATELET # BLD AUTO: 314 K/UL (ref 130–450)
PMV BLD AUTO: 10.5 FL (ref 7–12)
POTASSIUM SERPL-SCNC: 3.9 MMOL/L (ref 3.5–5)
PROT SERPL-MCNC: 5.4 G/DL (ref 6.4–8.3)
RBC # BLD AUTO: 2.81 M/UL (ref 3.5–5.5)
SODIUM SERPL-SCNC: 141 MMOL/L (ref 132–146)
WBC OTHER # BLD: 8 K/UL (ref 4.5–11.5)

## 2025-02-24 PROCEDURE — 94660 CPAP INITIATION&MGMT: CPT

## 2025-02-24 PROCEDURE — 99232 SBSQ HOSP IP/OBS MODERATE 35: CPT | Performed by: NURSE PRACTITIONER

## 2025-02-24 PROCEDURE — 85025 COMPLETE CBC W/AUTO DIFF WBC: CPT

## 2025-02-24 PROCEDURE — 2500000003 HC RX 250 WO HCPCS: Performed by: FAMILY MEDICINE

## 2025-02-24 PROCEDURE — 6370000000 HC RX 637 (ALT 250 FOR IP): Performed by: INTERNAL MEDICINE

## 2025-02-24 PROCEDURE — 97535 SELF CARE MNGMENT TRAINING: CPT

## 2025-02-24 PROCEDURE — 2700000000 HC OXYGEN THERAPY PER DAY

## 2025-02-24 PROCEDURE — 94640 AIRWAY INHALATION TREATMENT: CPT

## 2025-02-24 PROCEDURE — 80053 COMPREHEN METABOLIC PANEL: CPT

## 2025-02-24 PROCEDURE — 6360000002 HC RX W HCPCS: Performed by: INTERNAL MEDICINE

## 2025-02-24 PROCEDURE — 99239 HOSP IP/OBS DSCHRG MGMT >30: CPT | Performed by: FAMILY MEDICINE

## 2025-02-24 PROCEDURE — 6360000002 HC RX W HCPCS: Performed by: FAMILY MEDICINE

## 2025-02-24 PROCEDURE — 6370000000 HC RX 637 (ALT 250 FOR IP): Performed by: FAMILY MEDICINE

## 2025-02-24 PROCEDURE — 36415 COLL VENOUS BLD VENIPUNCTURE: CPT

## 2025-02-24 PROCEDURE — 99232 SBSQ HOSP IP/OBS MODERATE 35: CPT | Performed by: INTERNAL MEDICINE

## 2025-02-24 RX ORDER — ESCITALOPRAM OXALATE 5 MG/1
5 TABLET ORAL DAILY
Qty: 30 TABLET | Refills: 0 | Status: SHIPPED | OUTPATIENT
Start: 2025-02-24

## 2025-02-24 RX ORDER — GUAIFENESIN/DEXTROMETHORPHAN 100-10MG/5
10 SYRUP ORAL 4 TIMES DAILY PRN
DISCHARGE
Start: 2025-02-24 | End: 2025-03-06

## 2025-02-24 RX ADMIN — ENOXAPARIN SODIUM 40 MG: 100 INJECTION SUBCUTANEOUS at 09:24

## 2025-02-24 RX ADMIN — IPRATROPIUM BROMIDE AND ALBUTEROL SULFATE 1 DOSE: .5; 2.5 SOLUTION RESPIRATORY (INHALATION) at 06:47

## 2025-02-24 RX ADMIN — ACETYLCYSTEINE 400 MG: 100 INHALANT RESPIRATORY (INHALATION) at 14:21

## 2025-02-24 RX ADMIN — BUDESONIDE INHALATION 500 MCG: 0.5 SUSPENSION RESPIRATORY (INHALATION) at 06:47

## 2025-02-24 RX ADMIN — METOPROLOL SUCCINATE 50 MG: 50 TABLET, EXTENDED RELEASE ORAL at 09:27

## 2025-02-24 RX ADMIN — PANTOPRAZOLE SODIUM 40 MG: 40 TABLET, DELAYED RELEASE ORAL at 06:09

## 2025-02-24 RX ADMIN — ESCITALOPRAM OXALATE 10 MG: 10 TABLET ORAL at 09:26

## 2025-02-24 RX ADMIN — PREDNISONE 40 MG: 20 TABLET ORAL at 09:25

## 2025-02-24 RX ADMIN — Medication 1 TABLET: at 09:26

## 2025-02-24 RX ADMIN — IPRATROPIUM BROMIDE AND ALBUTEROL SULFATE 1 DOSE: .5; 2.5 SOLUTION RESPIRATORY (INHALATION) at 14:21

## 2025-02-24 RX ADMIN — ACETYLCYSTEINE 400 MG: 100 INHALANT RESPIRATORY (INHALATION) at 06:47

## 2025-02-24 RX ADMIN — GUAIFENESIN 600 MG: 600 TABLET ORAL at 09:26

## 2025-02-24 RX ADMIN — Medication 10 ML: at 09:24

## 2025-02-24 NOTE — PLAN OF CARE
Problem: Discharge Planning  Goal: Discharge to home or other facility with appropriate resources  Outcome: Adequate for Discharge     Problem: Safety - Adult  Goal: Free from fall injury  Outcome: Adequate for Discharge     Problem: Respiratory - Adult  Goal: Achieves optimal ventilation and oxygenation  Outcome: Adequate for Discharge     Problem: Hematologic - Adult  Goal: Maintains hematologic stability  Outcome: Adequate for Discharge     Problem: Chronic Conditions and Co-morbidities  Goal: Patient's chronic conditions and co-morbidity symptoms are monitored and maintained or improved  Outcome: Adequate for Discharge     Problem: ABCDS Injury Assessment  Goal: Absence of physical injury  Outcome: Adequate for Discharge     Problem: Skin/Tissue Integrity  Goal: Skin integrity remains intact  Description: 1.  Monitor for areas of redness and/or skin breakdown  2.  Assess vascular access sites hourly  3.  Every 4-6 hours minimum:  Change oxygen saturation probe site  4.  Every 4-6 hours:  If on nasal continuous positive airway pressure, respiratory therapy assess nares and determine need for appliance change or resting period  Outcome: Adequate for Discharge

## 2025-02-24 NOTE — DISCHARGE INSTR - DIET

## 2025-02-24 NOTE — CARE COORDINATION
2/17/25 SW Note:  Pt readmission for COPD; Acute Respiratory Failure w/ Hypoxia/hypercapnia; anemia. Pt currently on 11L of O2. IV fluids. Palliative & pulmonology following. Pt has ordered for chest vest & virtual . Pt has breast CA; mets to lungs. Per review of MR, pt has not returned home since 12/14/25 hospital admission & has had 2 placements @ [facility] & 1 @ [facility]. Pt admitted from [facility] & was admitted there from [facility] on 2/4/25. Upon 2/4/25 admit to [facility] 7000 was completed. Lucia @ [facility] declined referral d/t to pts current needs. Current discharge plan is for pt to return to [facility], updated Doris/liaison today. Pt is not a bedhold, but can return upon d/c. NO PRECERT NEEDED; Medicare eligibility met from prior admit. SW following. Electronically signed by ANMOL Munoz on 2/17/2025 at 4:31 PM    
2/19/25 SW Note: Pt has COPD; Acute Respiratory Failure w/ Hypoxia/hypercapnia; anemia. During IDR phys reported that she approached pt about hospice & stated willingness to have further discussion if/when dtr is present. Per phys inquiry, confirmed with SNF liaison ability to accommodate up to 15L of O2 @ snf.  Pt currently on 10L of High Flow O2. Palliative & pulmonology following. Pt has ordered for chest vest & virtual . Pt has breast CA; mets to lungs. Pt admitted from [facility] & was admitted there from [facility] on 2/4/25. Upon 2/4/25 admit to SNF 7000 was completed. Current discharge plan is for pt to return to [facility]. Pt is not a bedhold, but can return upon d/c. Pt does have a NIV auth through [facility]. NO PRECERT NEEDED; Medicare eligibility met from prior admit. SW following. Electronically signed by ANMOL Munoz on 2/19/2025 at 3:30 PM    
2/20/25  Note: Pt to discharge to [facility] today. Pulm consulted for changes in pts breathing, nurse updated phys, requesting d/c be held till later afternoon. Initially spoke with Briana, put transport on \"will call,\" called back, spoke with Bobbi, scheduled for 12:30pm pick, but d/t clinical changes returned call to [facility], spoke with Aria & ambulance transport put back on \"will call.\" CMN completed & put in pts soft chart. Updated nurse to be kept informed. Updated Doris/[facility] liaison. Upon 2/4/25 admit to [facility] 7000 was completed. NO PRECERT NEEDED; Medicare eligibility met from prior admit. ELDER needs completed prior to d/c. Electronically signed by ANMOL Munoz on 2/20/2025 at 10:31 AM    Addendum: Nurse reported pt is off bi-pap & on high flow O2 @ 11L. For continued monitoring recommended this SW to attempt to schedule [facility] transport for 5:30pm or after. Tct PAS, spoke with Tish, informed the lastest ambulance transport currently being scheduled is 4:30pm. Transport remains on will call, will return call. Updated Doris/liaison. Electronically signed by ANMOL Munoz on 2/20/2025 at 1:28 PM    Addendum: Tct PAS, spoke with Lizabeth & scheduled transport for 6pm . Followed up with nurse & notified that pt is back on bi-pap & breathing status not stable, per follow up with phys pt is not to discharge today. Returned call to PAS, spoke with Briana & changed pts transport to \"will call\" for 2/21/25 if pt discharges tomorrow. Updated pt & Doris/snf liaison. Electronically signed by ANMOL Munoz on 2/20/2025 at 2:45 PM    
2/21/2025 315p (sf)  NOTE: Palliative met with pt and spoke with dtr via phone. Pt remains on 10L 02 with FI02 at 97%. Pt desats with ambulation and is not able to be weaned down. Per Palliative, pt's dtr and pt's  will be coming to the hospital this evening to speak with pt regarding plan of care as well as hospice.     Pt is from [facility]. If pt and family decide for pt to return to facility, pt can return with NO PRECERT NEEDED. Will need ELDER signed by physician prior to dc.     Electronically signed by ANMOL Estrella on 2/21/2025 at 4:50 PM      
2/23/25 1604 CM note: respir panel (-) 2/14/25. Po prednisone. 10L hfnc vs bipap FIO2 50%. Hospice consult noted. Pts RN Kassandra spoke with pts dtr and she choiced for [facility], and Kassandra called referral in to them. CM placed referral request to [facility] via EPIC- awaiting their response. Palliative Care, pulmonary, and PT/OT following. Pt admitted from [facility] & was admitted there from [facility] on 2/4/25, and current plan is to return to this facility. NO PRECERT NEEDED. CM/SW will follow. Electronically signed by Britney Cox RN on 2/23/2025 at 4:14 PM   
2/24/25 Nancy/Palliative NP completed in-person visit with pt today & joined pts spouse & dtr by phone. Pt & family declined hospice @ this time, but open to palliative at snf. Suzanne/[facility] hospice reported that she received update from palliative & will not be consulting. Provided update to phys & pt to discharge to [facility] today. Pt currently on 11L of high flow O2 & has fluctuated from needing between 8-11L. Provided update to Doris/SNF liaison & confirmed pt can return to SNF today under Medicare benefit for rehab & confirmed ability to accommodate pts current O2/Bi-pap. Doris declined order needed for palliative & that compass/snf provider will follow up with pt once admitted. HENS in place from prior admit & NO PRECERT NEEDED. Tct Pas, spoke with Lalitha & scheduled ambulance transport;  @ 6pm, CMN updated & put in pts soft chart. Updated pt, dtr (per pts request), nurse, & snf liaison. ELDER needs completed by nurse prior to d/c. Electronically signed by ANMOL Munoz on 2/24/2025 at 3:26 PM    
CHET Note: Pt has COPD; Acute Respiratory Failure w/ Hypoxia/hypercapnia; anemia. Pt currently on 11L of O2. IV fluids. Palliative & pulmonology following. Pt has ordered for chest vest & virtual . Pt has breast CA; mets to lungs. Per review of MR, pt has not returned home since 12/14/25 hospital admission & has had 2 placements @ [facility] & 1 @ [facility]. Pt admitted from [facility] & was admitted there from [facility] on 2/4/25. Upon 2/4/25 admit to SNF 7000 was completed. Met with pt at bedside & updated pt on [facility] declining referral. Re-approached pt about LTAC & pt declined referral to be completed to [facility]. Current discharge plan is for pt to return to [facility]. Pt is not a bedhold, but can return upon d/c. Pt does have a NIV auth through [facility]. NO PRECERT NEEDED; Medicare eligibility met from prior admit. SW following. Electronically signed by ANMOL Munoz on 2/18/2025 at 1:47 PM    
    Financial    Payor: MEDICARE / Plan: MEDICARE PART A AND B / Product Type: *No Product type* /     Does insurance require precert for SNF: No    Potential assistance Purchasing Medications:    Meds-to-Beds request:        [id number] - [place], [place] - [address] - P [phone] - F [phone]  [address]  [facility] [address]  Phone: [phone] Fax: [phone]    [facility] - [place], [place] - [address] - P [phone] - F [phone]  [address]  [facility] [address]  Phone: [phone] Fax: [phone]      Notes:    Factors facilitating achievement of predicted outcomes: Family support    Barriers to discharge: Current level of O2/Clinical needs.     Additional Case Management Notes:     Sw/Discharge Planning Note: Pt readmission for COPD; Acute Respiratory Failure w/ Hypoxia/hypercapnia; anemia. Pt currently on high flow O2 @ 13L. IV solumedrol & rocephin. Consulted ordered for palliative & pulmonology.  Pt has ordered for chest vest & tele monitoring Pt has breast CA; mets to lungs. Per review of MR, pt has not returned home since 12/14/25 hospital admission & has had 2 placements @ \[facility]\"" & [facility]. Pt admitted from \[facility]\"" & was admitted there from [facility] on 2/4/25. Upon 2/4/25 admit to SNF 7000 was completed. Met with pt at bedside, discussed transition of care needs. Pt reported willingness to return to \[facility]\"" but requested this SW to complete referral to [facility]. Provided update to [facility]\"" liaison, pt is not a bedhold, but can return upon d/c. NO PRECERT NEEDED; Medicare eligibility met from prior admit. Completed referral to Lucia @ [facility], to review, awaiting decision. SW following. Electronically signed by ANMOL Munoz on 2/14/2025 at 4:22 PM      ANMOL Munoz  Case Management Department

## 2025-02-24 NOTE — PLAN OF CARE
Problem: Safety - Adult  Goal: Free from fall injury  2/24/2025 0206 by Donna Grace RN  Outcome: Progressing  2/23/2025 1455 by Kassandra Bennett RN  Outcome: Progressing     Problem: Respiratory - Adult  Goal: Achieves optimal ventilation and oxygenation  2/24/2025 0206 by Donna Grace RN  Outcome: Progressing  2/23/2025 1455 by Kassandra Bennett RN  Outcome: Progressing     Problem: Chronic Conditions and Co-morbidities  Goal: Patient's chronic conditions and co-morbidity symptoms are monitored and maintained or improved  2/24/2025 0206 by Donna Grace RN  Outcome: Progressing  2/23/2025 1455 by Kassandra Bennett RN  Outcome: Progressing     Problem: ABCDS Injury Assessment  Goal: Absence of physical injury  2/24/2025 0206 by Donna Grace RN  Outcome: Progressing  2/23/2025 1455 by Kassandra Bennett RN  Outcome: Progressing     Problem: Skin/Tissue Integrity  Goal: Skin integrity remains intact  2/24/2025 0206 by Donna Grace RN  Outcome: Progressing  2/23/2025 1455 by Kassandra Bennett RN  Outcome: Progressing

## 2025-02-25 NOTE — PROGRESS NOTES
[facility] Hospitalist Progress Note    Admitting Date and Time: 2/12/2025  8:22 PM  Admit Dx: Acute exacerbation of chronic obstructive pulmonary disease (COPD) (HCC) [J44.1]  Acute respiratory failure with hypoxia and hypercapnia (HCC) [J96.01, J96.02]  Acute on chronic respiratory failure with hypoxia and hypercapnia (HCC) [J96.21, J96.22]  Acute on chronic anemia [D64.9]    Subjective:  Patient is being followed for Acute exacerbation of chronic obstructive pulmonary disease (COPD) (HCC) [J44.1]  Acute respiratory failure with hypoxia and hypercapnia (HCC) [J96.01, J96.02]  Acute on chronic respiratory failure with hypoxia and hypercapnia (HCC) [J96.21, J96.22]  Acute on chronic anemia [D64.9]     Pt asking why she can't get out of bed, walk the hallways  Denies pain  Less SOB today  No fevers  Overnight was agitated, telesitter placed    Per RN: agitated, Telesitter placed, confused    ROS: denies fever, chills, cp, sob, n/v, HA unless stated above.      enoxaparin  40 mg SubCUTAneous Daily    escitalopram  10 mg Oral Daily    sodium chloride flush  5-40 mL IntraVENous 2 times per day    guaiFENesin  600 mg Oral BID    predniSONE  40 mg Oral Daily    melatonin  3 mg Oral QPM    metoprolol succinate  50 mg Oral Daily    therapeutic multivitamin-minerals  1 tablet Oral Daily    pantoprazole  40 mg Oral QAM AC    ipratropium 0.5 mg-albuterol 2.5 mg  1 Dose Inhalation Q4H WA RT    budesonide  0.5 mg Nebulization BID RT    acetylcysteine  4 mL Inhalation Q4H     hydrOXYzine HCl, 25 mg, TID PRN   Or  hydrOXYzine, 50 mg, Q6H PRN  polyethylene glycol, 17 g, Daily PRN  sodium chloride flush, 5-40 mL, PRN  acetaminophen, 650 mg, Q6H PRN   Or  acetaminophen, 650 mg, Q6H PRN  ipratropium 0.5 mg-albuterol 2.5 mg, 1 Dose, Q4H PRN  benzonatate, 100 mg, TID PRN  Benzocaine-Menthol, 1 lozenge, Q2H PRN  sodium chloride, , PRN         Objective:    BP (!) 106/45   Pulse 70   Temp 97.9 °F (36.6 °C) (Oral)   Resp 18  
       [facility] Hospitalist Progress Note    Admitting Date and Time: 2/12/2025  8:22 PM  Admit Dx: Acute exacerbation of chronic obstructive pulmonary disease (COPD) (HCC) [J44.1]  Acute respiratory failure with hypoxia and hypercapnia (HCC) [J96.01, J96.02]  Acute on chronic respiratory failure with hypoxia and hypercapnia (HCC) [J96.21, J96.22]  Acute on chronic anemia [D64.9]      Assessment:    Principal Problem:    Acute respiratory failure with hypoxia and hypercapnia (HCC)  Active Problems:    Hypoxemia requiring supplemental oxygen    Malignant neoplasm of upper lobe of right lung (HCC)    Malignant neoplasm of lower lobe of right lung (HCC)    Pulmonary nodules/lesions, multiple    Acute on chronic anemia    Complication of chemotherapy    COPD with acute exacerbation (HCC)    Acute on chronic respiratory failure with hypoxia and hypercapnia (HCC)    Goals of care, counseling/discussion    Delirium, acute  Resolved Problems:    * No resolved hospital problems. *      Plan:  2/13/2025  Acute hypoxic and hypercapneic respiratory failure  Squamous cell lung cancer on Keytruda  COPD exacerbation on underlying end stage COPD  Moderate pulmonary HTN  Delirium vs acute metabolic encephalopathy  - BIPAP  - trend pCO2  - ativan x 1  - pulmonary c/s  - hospice c/s  - rocephin/zithro  - duonebs  - brovana/pulmicort  - steroid taper  - rule out flu/covid     Chemo associated anemia  - 1 unit PRBC given in ED  - trend hgb/hct     Anxiety/depression  - continue prn ativan    2/14/2025  Acute hypoxic and hypercapneic respiratory failure multi factorial from both Squamous cell lung cancer on Keytruda & COPD exacerbation on underlying end stage COPD & Moderate pulmonary HTN  She said she used her BiPAP partially last night but she is phobic.  Pulmonary is on consult she asked me to prognosticate if she would ever come off this higher amount of O2 and I said it is very likely that she will that no matter what state her 
       [facility] Hospitalist Progress Note    Admitting Date and Time: 2/12/2025  8:22 PM  Admit Dx: Acute exacerbation of chronic obstructive pulmonary disease (COPD) (HCC) [J44.1]  Acute respiratory failure with hypoxia and hypercapnia [J96.01, J96.02]  Acute on chronic respiratory failure with hypoxia and hypercapnia [J96.21, J96.22]  Acute on chronic anemia [D64.9]      Assessment:    Principal Problem:    Acute respiratory failure with hypoxia and hypercapnia  Active Problems:    Hypoxemia requiring supplemental oxygen    Malignant neoplasm of upper lobe of right lung (HCC)    Malignant neoplasm of lower lobe of right lung (HCC)    Pulmonary nodules/lesions, multiple    Acute on chronic anemia    Complication of chemotherapy    COPD with acute exacerbation (HCC)    Acute on chronic respiratory failure with hypoxia and hypercapnia    Goals of care, counseling/discussion    Delirium, acute  Resolved Problems:    * No resolved hospital problems. *      Plan:  2/13/2025  Acute hypoxic and hypercapneic respiratory failure  Squamous cell lung cancer on Keytruda  COPD exacerbation on underlying end stage COPD  Moderate pulmonary HTN  Delirium vs acute metabolic encephalopathy  - BIPAP  - trend pCO2  - ativan x 1  - pulmonary c/s  - hospice c/s  - rocephin/zithro  - duonebs  - brovana/pulmicort  - steroid taper  - rule out flu/covid     Chemo associated anemia  - 1 unit PRBC given in ED  - trend hgb/hct     Anxiety/depression  - continue prn ativan    2/14/2025  Acute hypoxic and hypercapneic respiratory failure multi factorial from both Squamous cell lung cancer on Keytruda & COPD exacerbation on underlying end stage COPD & Moderate pulmonary HTN  She said she used her BiPAP partially last night but she is phobic.  Pulmonary is on consult she asked me to prognosticate if she would ever come off this higher amount of O2 and I said it is very likely that she will that no matter what state her lung cancer is and she 
       [facility] Hospitalist Progress Note    Admitting Date and Time: 2/12/2025  8:22 PM  Admit Dx: Acute exacerbation of chronic obstructive pulmonary disease (COPD) (HCC) [J44.1]  Acute respiratory failure with hypoxia and hypercapnia (HCC) [J96.01, J96.02]  Acute on chronic respiratory failure with hypoxia and hypercapnia (HCC) [J96.21, J96.22]  Acute on chronic anemia [D64.9]      Assessment:    Principal Problem:    Acute respiratory failure with hypoxia and hypercapnia (HCC)  Active Problems:    Hypoxemia requiring supplemental oxygen    Malignant neoplasm of upper lobe of right lung (HCC)    Malignant neoplasm of lower lobe of right lung (HCC)    Pulmonary nodules/lesions, multiple    Acute on chronic anemia    Complication of chemotherapy    COPD with acute exacerbation (HCC)    Acute on chronic respiratory failure with hypoxia and hypercapnia (HCC)    Goals of care, counseling/discussion    Delirium, acute  Resolved Problems:    * No resolved hospital problems. *      Plan:  2/13/2025  Acute hypoxic and hypercapneic respiratory failure  Squamous cell lung cancer on Keytruda  COPD exacerbation on underlying end stage COPD  Moderate pulmonary HTN  Delirium vs acute metabolic encephalopathy  - BIPAP  - trend pCO2  - ativan x 1  - pulmonary c/s  - hospice c/s  - rocephin/zithro  - duonebs  - brovana/pulmicort  - steroid taper  - rule out flu/covid     Chemo associated anemia  - 1 unit PRBC given in ED  - trend hgb/hct     Anxiety/depression  - continue prn ativan    2/14/2025  Acute hypoxic and hypercapneic respiratory failure multi factorial from both Squamous cell lung cancer on Keytruda & COPD exacerbation on underlying end stage COPD & Moderate pulmonary HTN  She said she used her BiPAP partially last night but she is phobic.  Pulmonary is on consult she asked me to prognosticate if she would ever come off this higher amount of O2 and I said it is very likely that she will that no matter what state her 
       [facility] Hospitalist Progress Note    Admitting Date and Time: 2/12/2025  8:22 PM  Admit Dx: Acute exacerbation of chronic obstructive pulmonary disease (COPD) (HCC) [J44.1]  Acute respiratory failure with hypoxia and hypercapnia (HCC) [J96.01, J96.02]  Acute on chronic respiratory failure with hypoxia and hypercapnia (HCC) [J96.21, J96.22]  Acute on chronic anemia [D64.9]    Subjective:  Patient is being followed for Acute exacerbation of chronic obstructive pulmonary disease (COPD) (HCC) [J44.1]  Acute respiratory failure with hypoxia and hypercapnia (HCC) [J96.01, J96.02]  Acute on chronic respiratory failure with hypoxia and hypercapnia (HCC) [J96.21, J96.22]  Acute on chronic anemia [D64.9]     Pt feels OK today  Denies pain  States last night the staff was fooling her  No fevers  No events  Telesitter in place  Eating some    Per RN: spoke to daughter and she is interested in speaking to hospice  Refused cpap most of the time    ROS: denies fever, chills, cp, sob, n/v, HA unless stated above.      enoxaparin  40 mg SubCUTAneous Daily    escitalopram  10 mg Oral Daily    sodium chloride flush  5-40 mL IntraVENous 2 times per day    guaiFENesin  600 mg Oral BID    predniSONE  40 mg Oral Daily    melatonin  3 mg Oral QPM    metoprolol succinate  50 mg Oral Daily    therapeutic multivitamin-minerals  1 tablet Oral Daily    pantoprazole  40 mg Oral QAM AC    ipratropium 0.5 mg-albuterol 2.5 mg  1 Dose Inhalation Q4H WA RT    budesonide  0.5 mg Nebulization BID RT    acetylcysteine  4 mL Inhalation Q4H     hydrOXYzine HCl, 25 mg, TID PRN   Or  hydrOXYzine, 50 mg, Q6H PRN  polyethylene glycol, 17 g, Daily PRN  sodium chloride flush, 5-40 mL, PRN  acetaminophen, 650 mg, Q6H PRN   Or  acetaminophen, 650 mg, Q6H PRN  ipratropium 0.5 mg-albuterol 2.5 mg, 1 Dose, Q4H PRN  benzonatate, 100 mg, TID PRN  Benzocaine-Menthol, 1 lozenge, Q2H PRN  sodium chloride, , PRN         Objective:    BP (!) 145/50   Pulse 78  
       [facility] Hospitalist Progress Note    Admitting Date and Time: 2/12/2025  8:22 PM  Admit Dx: Acute exacerbation of chronic obstructive pulmonary disease (COPD) (HCC) [J44.1]  Acute respiratory failure with hypoxia and hypercapnia [J96.01, J96.02]  Acute on chronic respiratory failure with hypoxia and hypercapnia [J96.21, J96.22]  Acute on chronic anemia [D64.9]      Assessment:    Principal Problem:    Acute respiratory failure with hypoxia and hypercapnia  Active Problems:    Hypoxemia requiring supplemental oxygen    Malignant neoplasm of upper lobe of right lung (HCC)    Malignant neoplasm of lower lobe of right lung (HCC)    Pulmonary nodules/lesions, multiple    Acute on chronic anemia    Complication of chemotherapy    COPD with acute exacerbation (HCC)    Acute on chronic respiratory failure with hypoxia and hypercapnia    Goals of care, counseling/discussion    Delirium, acute  Resolved Problems:    * No resolved hospital problems. *      Plan:  2/13/2025  Acute hypoxic and hypercapneic respiratory failure  Squamous cell lung cancer on Keytruda  COPD exacerbation on underlying end stage COPD  Moderate pulmonary HTN  Delirium vs acute metabolic encephalopathy  - BIPAP  - trend pCO2  - ativan x 1  - pulmonary c/s  - hospice c/s  - rocephin/zithro  - duonebs  - brovana/pulmicort  - steroid taper  - rule out flu/covid     Chemo associated anemia  - 1 unit PRBC given in ED  - trend hgb/hct     Anxiety/depression  - continue prn ativan    2/14/2025  Acute hypoxic and hypercapneic respiratory failure multi factorial from both Squamous cell lung cancer on Keytruda & COPD exacerbation on underlying end stage COPD & Moderate pulmonary HTN  She said she used her BiPAP partially last night but she is phobic.  Pulmonary is on consult she asked me to prognosticate if she would ever come off this higher amount of O2 and I said it is very likely that she will that no matter what state her lung cancer is and she 
       [facility] Hospitalist Progress Note    Admitting Date and Time: 2/12/2025  8:22 PM  Admit Dx: Acute exacerbation of chronic obstructive pulmonary disease (COPD) (HCC) [J44.1]  Acute respiratory failure with hypoxia and hypercapnia (HCC) [J96.01, J96.02]  Acute on chronic respiratory failure with hypoxia and hypercapnia (HCC) [J96.21, J96.22]  Acute on chronic anemia [D64.9]    Subjective:  Patient is being followed for Acute exacerbation of chronic obstructive pulmonary disease (COPD) (HCC) [J44.1]  Acute respiratory failure with hypoxia and hypercapnia (HCC) [J96.01, J96.02]  Acute on chronic respiratory failure with hypoxia and hypercapnia (HCC) [J96.21, J96.22]  Acute on chronic anemia [D64.9]     Pt feels OK, no pain, no chest pain, breathing is better  Asking when she can be discharged  No fevers  No events overnight  States she is eating OK    Still with telesitter    Per RN: nothing to report    ROS: denies fever, chills, cp, sob, n/v, HA unless stated above.      enoxaparin  40 mg SubCUTAneous Daily    escitalopram  10 mg Oral Daily    sodium chloride flush  5-40 mL IntraVENous 2 times per day    guaiFENesin  600 mg Oral BID    predniSONE  40 mg Oral Daily    melatonin  3 mg Oral QPM    metoprolol succinate  50 mg Oral Daily    therapeutic multivitamin-minerals  1 tablet Oral Daily    pantoprazole  40 mg Oral QAM AC    ipratropium 0.5 mg-albuterol 2.5 mg  1 Dose Inhalation Q4H WA RT    budesonide  0.5 mg Nebulization BID RT    acetylcysteine  4 mL Inhalation Q4H     hydrOXYzine HCl, 25 mg, TID PRN   Or  hydrOXYzine, 50 mg, Q6H PRN  polyethylene glycol, 17 g, Daily PRN  sodium chloride flush, 5-40 mL, PRN  acetaminophen, 650 mg, Q6H PRN   Or  acetaminophen, 650 mg, Q6H PRN  ipratropium 0.5 mg-albuterol 2.5 mg, 1 Dose, Q4H PRN  benzonatate, 100 mg, TID PRN  Benzocaine-Menthol, 1 lozenge, Q2H PRN  sodium chloride, , PRN         Objective:    BP (!) 134/51   Pulse 85   Temp 97.2 °F (36.2 °C)   Resp 
     Nutrition Note    Pt re-screened per LOS protocol. Chart reviewed. Pt currently eating ~% of meals & no other significant nutritional issues noted at this time. MST 0, no significant wound, no weight loss identified. Will follow-up per policy. Please consult if RD needed.      Electronically signed by Amy Solomon RD, LD on 2/20/25 at 8:50 AM EST    Contact: [phone]    
    I spoke to the pt's daughter at 10:13 AM.    I gave her an update on the patient's condition.  She asked me if the patient were going to pass away iminently.  I did explain that the patient has end-stage emphysema, and has had high oxygen requirement for 2 to 3 months, and that our maximum efforts here have only helped a little.    The options for the patient for here on would be hospice at the SNF, returning to prior SNF (which the patient is familiar with and has good RT), or going home with hospice, or to LTAC.  The daughter states the patient does not want to go to an LTAC.    She is interested in speaking with hospice for go through what they offer.  And will involve other family members.  She is leaning towards the patient going back to her prior ECF for RT in the next day or two without hospice, and to remain DNR/DNI.  
  [facility]  Department of Internal Medicine  Division of Pulmonary, Critical Care and Sleep Medicine  Consult Note      Aroldo VINICIUS Santiagonabil DO Devyn Diaz, APRN-CNP  Angeles Cooley, APRN-CNP      PRIMARY PULMONOLOGIST: Dr. Wiley      SUBJECTIVE: Ashley is seen and examined resting in bed. Remains on 10L supplemental oxygen, not much progress. Plan is for discharge back to nursing home.    OBJECTIVE:     PHYSICAL EXAM:   VITALS:   Vitals:    02/21/25 0542 02/21/25 0543 02/21/25 0747 02/21/25 1223   BP:   (!) 137/51 (!) 134/51   Pulse:   86 85   Resp:   21 22   Temp:   97.7 °F (36.5 °C) 97.2 °F (36.2 °C)   TempSrc:       SpO2: (!) 84% 92% (!) 88%    Weight:       Height:          No intake or output data in the 24 hours ending 02/21/25 1242         CONSTITUTIONAL:    A&O x 3, NAD, chronically ill appearing  SKIN:     No rash, No suspicious lesions, No skin discoloration  HEENT:     EOMI, MMM, No thrush  NECK:     No bruits, No JVP appreciated  CV:      Sinus,  No murmur, No rubs, No gallops  PULMONARY:    Diminished BS,  No Wheezing, No Rales, No Rhonchi      No noted egophony  ABDOMEN:     Soft, non-tender. BS normal. No R/R/G  EXT:    No deformities .  No clubbing.       No lower extremity edema, No venous stasis  PULSE:    Appears equal and palpable.  PSYCHIATRIC:   Seems appropriate, No acute psychosis  MS:    No fractures, No gross weakness  NEUROLOGIC:   The clinical assessment is non-focal     DATA: IMAGING & TESTING:     LABORATORY TESTS:    CBC with Differential:    Lab Results   Component Value Date/Time    WBC 9.4 02/21/2025 10:46 AM    RBC 2.42 02/21/2025 10:46 AM    HGB 7.8 02/21/2025 10:46 AM    HCT 26.0 02/21/2025 10:46 AM     02/21/2025 10:46 AM    .4 02/21/2025 10:46 AM    MCH 32.2 02/21/2025 10:46 AM    MCHC 30.0 02/21/2025 10:46 AM    RDW 16.8 
  [facility]  Department of Internal Medicine  Division of Pulmonary, Critical Care and Sleep Medicine  Progress Note      Aroldo Diaz, APRN-CNP  Angeleszana Cooley, APRN-CNP  ICU Intensivist Attestation:    I saw, examined, and discussed the patient with Angeles Cooley APRWESLEY-ACN and agree with her assessment and plan.    The patient appears to be doing quite well and has been weaned to an FiO2 of 10 L today which is essentially her baseline although I am not sure she needs the whole 10 L.    In any case, the patient is dressed and is ready to be discharged today.  She will follow-up with her pulmonary physician, Dr. Juan C Wiley.    Electronically signed by Jimmy Yan MD on 2/24/2025 at 5:44 PM      PRIMARY PULMONOLOGIST: Dr. Wiley      SUBJECTIVE:  Patient seen and examined.  Patient was seen on 11 L high flow nasal cannula, saturating 100% during time of evaluation.  Patient is requesting to be discharged to rehab.  She denies any current respiratory complaints.      OBJECTIVE:     PHYSICAL EXAM:   VITALS:   Vitals:    02/23/25 1939 02/24/25 0428 02/24/25 0647 02/24/25 0900   BP: 128/62 136/70  (!) 128/49   Pulse: 98 82  89   Resp: 23 21  18   Temp: 97.2 °F (36.2 °C) (!) 96.7 °F (35.9 °C)  97.3 °F (36.3 °C)   TempSrc: Oral Axillary  Oral   SpO2:  93% 97% 100%   Weight:       Height:            Intake/Output Summary (Last 24 hours) at 2/24/2025 1315  Last data filed at 2/24/2025 0900  Gross per 24 hour   Intake 480 ml   Output --   Net 480 ml            CONSTITUTIONAL:    A&O x 3, NAD, chronically ill appearing  SKIN:     No rash, No suspicious lesions, No skin discoloration  HEENT:     EOMI, MMM, No thrush  NECK:     No bruits, No JVP appreciated  CV:      Sinus,  No murmur, No rubs, No gallops  PULMONARY:    Diminished BS,  No Wheezing, No Rales, No 
  [facility]  Department of Internal Medicine  Division of Pulmonary, Critical Care and Sleep Medicine  Consult Note      Aroldo VINICIUS Keys DO   Shae Bear DO Devyn Diaz, APRN-CNP  Angeles Cooley, APRN-CNP      PRIMARY PULMONOLOGIST: Dr. Wiley      SUBJECTIVE: Ashley is seen and examined resting in bed. Remains on 10L supplemental oxygen, not much progress. Plan is for discharge back to nursing home.  Currently on 9 L nasal cannula.    OBJECTIVE:     PHYSICAL EXAM:   VITALS:   Vitals:    02/22/25 0845 02/22/25 1145 02/22/25 1151 02/22/25 1201   BP:    (!) 116/59   Pulse:    87   Resp:    18   Temp:    97.8 °F (36.6 °C)   TempSrc:    Oral   SpO2: 90% 99% 91% 98%   Weight:       Height:            Intake/Output Summary (Last 24 hours) at 2/22/2025 1559  Last data filed at 2/22/2025 1333  Gross per 24 hour   Intake 480 ml   Output 1 ml   Net 479 ml            CONSTITUTIONAL:    A&O x 3, NAD, chronically ill appearing  SKIN:     No rash, No suspicious lesions, No skin discoloration  HEENT:     EOMI, MMM, No thrush  NECK:     No bruits, No JVP appreciated  CV:      Sinus,  No murmur, No rubs, No gallops  PULMONARY:    Diminished BS,  No Wheezing, No Rales, No Rhonchi      No noted egophony  ABDOMEN:     Soft, non-tender. BS normal. No R/R/G  EXT:    No deformities .  No clubbing.       No lower extremity edema, No venous stasis  PULSE:    Appears equal and palpable.  PSYCHIATRIC:   Seems appropriate, No acute psychosis  MS:    No fractures, No gross weakness  NEUROLOGIC:   The clinical assessment is non-focal     DATA: IMAGING & TESTING:     LABORATORY TESTS:    CBC with Differential:    Lab Results   Component Value Date/Time    WBC 8.9 02/22/2025 08:50 AM    RBC 2.56 02/22/2025 08:50 AM    HGB 8.3 02/22/2025 08:50 AM    HCT 27.7 02/22/2025 08:50 AM     02/22/2025 08:50 AM    .2 
  [facility]  Department of Internal Medicine  Division of Pulmonary, Critical Care and Sleep Medicine  Consult Note      Aroldo VINICIUS Coles Chema DO Devyn Diaz, APRN-CNP  Angeles Cooley, APRN-CNP      PRIMARY PULMONOLOGIST: Dr. Wiley      SUBJECTIVE: Ashley is seen and examined sitting up to the side of the bed. Currently on 11L supplemental O2, previous baseline 6L. She is awake, alert and not in distress. Very anxious for discharge. Nursing home can accommodate 15L supplemental O2.       OBJECTIVE:     PHYSICAL EXAM:   VITALS:   Vitals:    02/19/25 0921 02/19/25 0936 02/19/25 1257 02/19/25 1556   BP:   125/68 129/71   Pulse:   87 85   Resp:   16 20   Temp:   97.5 °F (36.4 °C) 97 °F (36.1 °C)   TempSrc:   Axillary Oral   SpO2: 92% 98% 99% 94%   Weight: 51.6 kg (113 lb 12.8 oz)      Height:            Intake/Output Summary (Last 24 hours) at 2/19/2025 1741  Last data filed at 2/19/2025 0800  Gross per 24 hour   Intake --   Output 250 ml   Net -250 ml          CONSTITUTIONAL:    A&O x 3, NAD, chronically ill appearing  SKIN:     No rash, No suspicious lesions, No skin discoloration  HEENT:     EOMI, MMM, No thrush  NECK:     No bruits, No JVP appreciated  CV:      Sinus,  No murmur, No rubs, No gallops  PULMONARY:    Diminished BS,  No Wheezing, No Rales, No Rhonchi      No noted egophony  ABDOMEN:     Soft, non-tender. BS normal. No R/R/G  EXT:    No deformities .  No clubbing.       No lower extremity edema, No venous stasis  PULSE:    Appears equal and palpable.  PSYCHIATRIC:   Seems appropriate, No acute psychosis  MS:    No fractures, No gross weakness  NEUROLOGIC:   The clinical assessment is non-focal     DATA: IMAGING & TESTING:     LABORATORY TESTS:    CBC with Differential:    Lab Results   Component Value Date/Time    WBC 8.6 02/19/2025 08:00 AM    RBC 2.62 02/19/2025 
  Palliative Care Department  [phone]  Palliative Care Progress Note  Provider Nancy Peralta, APRN - CNP     Ashley Vivas  [id number]  Hospital Day: 13  Date of Initial Consult: 2/14/2025  Referring Provider: Pepe Calvert MD  Palliative Medicine was consulted for assistance with: goals of care, cancer    HPI:   Ashley Vivas is a 74 y.o. with a medical history of COPD, stage 4 lung cancer, anemia, HTN who was admitted on 2/12/2025 from nursing facility with a CHIEF COMPLAINT of abnormal labs, SOB. Workup in ED significant for CO2 39, BUN 27, glucose 151, pro-BNP 1594, troponin 29 and 25, hgb 6.1.  Given 1 unit PRBC in ED.  CTA chest multiple unchanged abnormalities.  ABG pH 7.338, pCO2 78.1, pO2 83.6 on 100% NRB.  Patient initially refused BIPAP, then did ultimately agree to wear it for a brief time, then refused it again.  Repeat ABG after wearing BIPAP pH 7.285, pCO2 79.2, pO2 99.7.  Pulmonology consulted.  Palliative medicine consulted for further assistance.    ASSESSMENT/PLAN:     Pertinent Hospital Diagnoses     COPD with acute exacerbation  Pneumonia  Acute on chronic hypoxic and hypercapnic respiratory failure  Severe pulmonary hypertension   Recurrent squamous cell lung cancer  History of breast cancer      Palliative Care Encounter / Counseling Regarding Goals of Care  Please see detailed goals of care discussion as below  At this time, Ashley Vivas, Does Not have capacity for medical decision-making.  Capacity is time limited and situation/question specific  During encounter Ligia was surrogate medical decision-maker  Outcome of goals of care meeting:   Continue current medical management  Patient and family are not ready for hospice services yet.  They would be interested in palliative services at nursing facility.  Code status DNR-CCA/DNI  Advanced Directives: no POA or living will in [facility]  Surrogate/Legal NOK:  Juan C Vivas (spouse) [phone], [phone]  Ligia Hightower 
  Palliative Care Department  [phone]  Palliative Care Progress Note  Provider Tamara Reyes, APRN - CNP     Ashley Vivas  [id number]  Hospital Day: 10  Date of Initial Consult: 2/14/2025  Referring Provider: Pepe Calvert MD  Palliative Medicine was consulted for assistance with: goals of care, cancer    HPI:   Ashley Vivas is a 74 y.o. with a medical history of COPD, stage 4 lung cancer, anemia, HTN who was admitted on 2/12/2025 from nursing facility with a CHIEF COMPLAINT of abnormal labs, SOB. Workup in ED significant for CO2 39, BUN 27, glucose 151, pro-BNP 1594, troponin 29 and 25, hgb 6.1.  Given 1 unit PRBC in ED.  CTA chest multiple unchanged abnormalities.  ABG pH 7.338, pCO2 78.1, pO2 83.6 on 100% NRB.  Patient initially refused BIPAP, then did ultimately agree to wear it for a brief time, then refused it again.  Repeat ABG after wearing BIPAP pH 7.285, pCO2 79.2, pO2 99.7.  Pulmonology consulted.  Palliative medicine consulted for further assistance.    ASSESSMENT/PLAN:     Pertinent Hospital Diagnoses     COPD with acute exacerbation  Pneumonia  Acute on chronic hypoxic and hypercapnic respiratory failure  Severe pulmonary hypertension   Recurrent squamous cell lung cancer  History of breast cancer      Palliative Care Encounter / Counseling Regarding Goals of Care  Please see detailed goals of care discussion as below  At this time, Ashley Vivas, Does Not have capacity for medical decision-making.  Capacity is time limited and situation/question specific  During encounter Ligia was surrogate medical decision-maker  Outcome of goals of care meeting:   Continue current medical management  Discussed hospice philosophy  Support  Code status DNR-CCA/DNI  Advanced Directives: no POA or living will in [facility]  Surrogate/Legal NOK:  Juan C Vivas (spouse) [phone], [phone]  Ligia Hightower (child) [phone]    Spiritual assessment: no spiritual distress identified  Bereavement and 
  Palliative Care Department  [phone]  Palliative Care Progress Note  Provider Nancy Peralta, APRN - CNP     Ashley Vivas  [id number]  Hospital Day: 7  Date of Initial Consult: 2/14/2025  Referring Provider: Pepe Calvert MD  Palliative Medicine was consulted for assistance with: goals of care, cancer    HPI:   Ashley Vivas is a 74 y.o. with a medical history of COPD, stage 4 lung cancer, anemia, HTN who was admitted on 2/12/2025 from nursing facility with a CHIEF COMPLAINT of abnormal labs, SOB. Workup in ED significant for CO2 39, BUN 27, glucose 151, pro-BNP 1594, troponin 29 and 25, hgb 6.1.  Given 1 unit PRBC in ED.  CTA chest multiple unchanged abnormalities.  ABG pH 7.338, pCO2 78.1, pO2 83.6 on 100% NRB.  Patient initially refused BIPAP, then did ultimately agree to wear it for a brief time, then refused it again.  Repeat ABG after wearing BIPAP pH 7.285, pCO2 79.2, pO2 99.7.  Pulmonology consulted.  Palliative medicine consulted for further assistance.    ASSESSMENT/PLAN:     Pertinent Hospital Diagnoses     COPD with acute exacerbation  Pneumonia  Acute on chronic hypoxic and hypercapnic respiratory failure  Severe pulmonary hypertension   Recurrent squamous cell lung cancer  History of breast cancer      Palliative Care Encounter / Counseling Regarding Goals of Care  Please see detailed goals of care discussion as below  At this time, Ashley Vivas, Does have capacity for medical decision-making.  Capacity is time limited and situation/question specific  During encounter Ligia was surrogate medical decision-maker  Outcome of goals of care meeting:   Continue current medical management  She is considering a hospice consult for information purposes.   Code status DNR-CCA/DNI  Advanced Directives: no POA or living will in [facility]  Surrogate/Legal NOK:  Juan C Vivas (spouse) [phone], [phone]  Ligia Hightower (child) [phone]    Spiritual assessment: no spiritual distress 
  [facility]  Department of Internal Medicine  Division of Pulmonary, Critical Care and Sleep Medicine  Consult Note      Aroldo Diaz, APRN-CNP  Angeles Cooley, APRN-CNP      PRIMARY PULMONOLOGIST: Dr. Wiley      SUBJECTIVE: Ashley is seen and examined sitting up to the side of the bed. Currently on 11L supplemental O2, previous baseline 6L. Ashley was to be discharged back to facility today, however, she had an acute desaturation into the 70's requiring BIPAP for recovery. Plan is for discharge likely tomorrow. On my examination she is awake, alert and oriented.     OBJECTIVE:     PHYSICAL EXAM:   VITALS:   Vitals:    02/20/25 0736 02/20/25 1235 02/20/25 1446 02/20/25 1507   BP: 133/72 (!) 165/73 (!) 130/54    Pulse: 80 89 89    Resp: 18 20 20    Temp: 98 °F (36.7 °C) 98.2 °F (36.8 °C) 98.1 °F (36.7 °C)    TempSrc: Axillary Axillary Oral    SpO2: 90% 92% 92% 93%   Weight:       Height:          No intake or output data in the 24 hours ending 02/20/25 1549         CONSTITUTIONAL:    A&O x 3, NAD, chronically ill appearing  SKIN:     No rash, No suspicious lesions, No skin discoloration  HEENT:     EOMI, MMM, No thrush  NECK:     No bruits, No JVP appreciated  CV:      Sinus,  No murmur, No rubs, No gallops  PULMONARY:    Diminished BS,  No Wheezing, No Rales, No Rhonchi      No noted egophony  ABDOMEN:     Soft, non-tender. BS normal. No R/R/G  EXT:    No deformities .  No clubbing.       No lower extremity edema, No venous stasis  PULSE:    Appears equal and palpable.  PSYCHIATRIC:   Seems appropriate, No acute psychosis  MS:    No fractures, No gross weakness  NEUROLOGIC:   The clinical assessment is non-focal     DATA: IMAGING & TESTING:     LABORATORY TESTS:    CBC with Differential:    Lab Results   Component Value Date/Time    WBC 8.1 02/20/2025 
  Physician Progress Note      PATIENT:               DOROTHY PLUMMER  [facility] #:                  [id number]  :                       1950  ADMIT DATE:       2025 8:22 PM  DISCH DATE:  RESPONDING  PROVIDER #:        Syd Ptael MD          QUERY TEXT:    Patient admitted with COPD. Noted documentation of urinary tract infection   with E. Coli. In order to support the diagnosis of UTI, please include   additional clinical indicators in your documentation.  Or please document if   the diagnosis of UTI has been ruled out after further study.    The medical record reflects the following:  Risk Factors: 74 year old female with COPD, lung cancer,  Clinical Indicators:  25 pulmonology consult: assessment and plan: urinary tract infection with   E. coli. Plan of care: continue ceftriaxone and azithromycin for COPD   exacerbation and community-acquired pneumonia. General exam: denies hematuria,   frequency, urgency, or dysuria    24 per [facility] medical record review: Urine culture: Escherichia Coli    Treatment: IV ceftriaxone and azithromycin, IV fluid bolus  Options provided:  -- UTI with E. Coli present as evidenced by, Please document evidence.  -- UTI with E. Coli was ruled out, pmhx only  -- Other - I will add my own diagnosis  -- Disagree - Not applicable / Not valid  -- Disagree - Clinically unable to determine / Unknown  -- Refer to Clinical Documentation Reviewer    PROVIDER RESPONSE TEXT:    UTI with E. Coli was ruled out after study, pmhx only.    Query created by: Caren Salcido on 2025 4:02 PM      Electronically signed by:  Syd Patel MD 2/15/2025 12:51 PM          
  Physician Progress Note      PATIENT:               DOROTHY PLUMMER  [facility] #:                  [id number]  :                       1950  ADMIT DATE:       2025 8:22 PM  DISCH DATE:  RESPONDING  PROVIDER #:        Chasidy Lira DO          QUERY TEXT:    Pt admitted with COPD exacerbation. Pt noted to have delirium vs acute   metabolic encephalopathy. If possible, please document in the progress notes   and discharge summary which of the following you are evaluating and / or   treating:    The medical record reflects the following:  Risk Factors: 74 year old female with hypercapnia, end stage COPD, lung   cancer, anxiety/depression  Clinical Indicators:  25 H&P: HPI: Per patients daughter, refusing BiPAP, in and out of the   hospital/rehab, delirium developed during last admission.  25 internal medicine progress note: Delirium vs acute metabolic   encephalopathy. Her large variability in her demeanor and cognition is   probably due to combination of reasons including her high level of stress and   overhanging medical conditions which could be contributing to metabolic   encephalopathy.  Suspect mood disorder and/or personality issues  2/15/25 internal medicine progress note: Cognition mood is fluctuating    25 AM per nursing: Disoriented to situation, oriented to person, oriented   to place, oriented to time; short term memory loss, follows commands  25 PM per nursing: Oriented to person, oriented to place, oriented to   time, disoriented to situation, oriented/disoriented at times; poor judgement,   poor safety awareness, poor attention/concentration, short term memory loss  2/15/25 AM per nursing: oriented x4, poor judgment  25 per nursing: oriented x4; poor judgement, poor safety awareness    25 blood gas: pH 7.251, PCO2 78.6, PO2 186.8, HCO3 33.8    Treatment: IV antibiotics, virtual , palliative care consult,   Bipap, ABG  Options provided:  -- 
  Physician Progress Note      PATIENT:               DOROTHY PLUMMER  [facility] #:                  [id number]  :                       1950  ADMIT DATE:       2025 8:22 PM  DISCH DATE:  RESPONDING  PROVIDER #:        Yulisa Diaz          QUERY TEXT:    Patient admitted with COPD. Noted documentation of pneumonia in pulmonology   consult note 25. In order to support the diagnosis of pneumonia, please   include additional clinical indicators in your documentation.  Or please   document if the diagnosis of pneumonia has been ruled out after further study.    The medical record reflects the following:  Risk Factors: 74 year old female with COPD, lung cancer, respiratory failure,   pulmonary hypertension  Clinical Indicators:  25 pulmonology consult note: Assessment and plan: COPD with acute   exacerbation; Left lower lobe community-acquired pneumonia. Plan of care:   Continue ceftriaxone and azithromycin for COPD exacerbation and   community-acquired pneumonia. Pneumonia workup is negative to date.    25 CT pulmonary: No evidence of pulmonary embolism or other acute   cardiopulmonary process. Redemonstration of right upper lobe pulmonary nodule,   lingular and left lower lobe peripheral consolidative opacities, and right   upper lobe nodular opacity with associated scarring, not significantly changed   from the prior examination. Background emphysematous changes.  2/15/25 XR Chest: No acute process.    25 procal 0.09, legionella and strep pneumo negative  25 respiratory panel negative  Treatment: imaging, labs, Pulmonology consult, respiratory panel, legionella   and strep pneumo, IV Ceftriaxone, PO Zithromax  Options provided:  -- Pneumonia present as evidenced by, Please document evidence.  -- Pneumonia was ruled out  -- Other - I will add my own diagnosis  -- Disagree - Not applicable / Not valid  -- Disagree - Clinically unable to determine / Unknown  -- Refer to Clinical 
  [facility]  Department of Internal Medicine  Division of Pulmonary, Critical Care and Sleep Medicine  Consult Note      Aroldo VINICIUS Santiagoreginaldo Diaz, APRN-CNP  Angeles Cooley, APRN-CNP      PRIMARY PULMONOLOGIST: Dr. Wiley      SUBJECTIVE: Ashley is seen and examined sitting up to the side of the bed. Currently on 11L supplemental O2, previous baseline 6L. She is awake, alert and not in distress. Asking when she can go home, discussed she is currently on too much oxygen for discharge safely. Discussed utilizing incentive spirometer and flutter valve to help improve breathing.       OBJECTIVE:     PHYSICAL EXAM:   VITALS:   Vitals:    02/17/25 0515 02/17/25 0624 02/17/25 0824 02/17/25 1211   BP:   (!) 130/49 (!) 120/43   Pulse:   70 70   Resp:   18 18   Temp:   97.2 °F (36.2 °C) 97.3 °F (36.3 °C)   TempSrc:   Oral Oral   SpO2: 93% (S) 100% 99% 91%   Weight:       Height:            Intake/Output Summary (Last 24 hours) at 2/17/2025 1613  Last data filed at 2/17/2025 0519  Gross per 24 hour   Intake 60 ml   Output --   Net 60 ml        CONSTITUTIONAL:    A&O x 3, NAD, chronically ill appearing  SKIN:     No rash, No suspicious lesions, No skin discoloration  HEENT:     EOMI, MMM, No thrush  NECK:     No bruits, No JVP appreciated  CV:      Sinus,  No murmur, No rubs, No gallops  PULMONARY:    Diminished BS,  No Wheezing, No Rales, No Rhonchi      No noted egophony  ABDOMEN:     Soft, non-tender. BS normal. No R/R/G  EXT:    No deformities .  No clubbing.       No lower extremity edema, No venous stasis  PULSE:    Appears equal and palpable.  PSYCHIATRIC:   Seems appropriate, No acute psychosis  MS:    No fractures, No gross weakness  NEUROLOGIC:   The clinical assessment is non-focal     DATA: IMAGING & TESTING:     LABORATORY TESTS:    CBC with Differential:    Lab Results 
  [facility]  Department of Internal Medicine  Division of Pulmonary, Critical Care and Sleep Medicine  Consult Note      Aroldo VINICIUS Arriagasonia Bear DO Devyn Diaz, APRN-CNP  Angeles Cooley, APRN-CNP      PRIMARY PULMONOLOGIST: Dr. Wiley      SUBJECTIVE: Ashley is seen and examined sitting up to the side of the bed. Currently on 11L supplemental O2, previous baseline 6L. She is awake, alert and not in distress. Did wear BIPAP overnight. Continues to require high levels of supplemental oxygen, 11L. Will continue to work on weaning and find out how much oxygen nursing home can take.       OBJECTIVE:     PHYSICAL EXAM:   VITALS:   Vitals:    02/17/25 2338 02/18/25 0459 02/18/25 0814 02/18/25 1208   BP: (!) 121/48 (!) 119/48 124/60 (!) 116/54   Pulse: 82 80 84 81   Resp: 17 18 18 18   Temp: 98.2 °F (36.8 °C) 97.7 °F (36.5 °C) 98.3 °F (36.8 °C) 98.3 °F (36.8 °C)   TempSrc: Oral Oral Oral Oral   SpO2: 99% 94% 96% 94%   Weight:       Height:          No intake or output data in the 24 hours ending 02/18/25 1656       CONSTITUTIONAL:    A&O x 3, NAD, chronically ill appearing  SKIN:     No rash, No suspicious lesions, No skin discoloration  HEENT:     EOMI, MMM, No thrush  NECK:     No bruits, No JVP appreciated  CV:      Sinus,  No murmur, No rubs, No gallops  PULMONARY:    Diminished BS,  No Wheezing, No Rales, No Rhonchi      No noted egophony  ABDOMEN:     Soft, non-tender. BS normal. No R/R/G  EXT:    No deformities .  No clubbing.       No lower extremity edema, No venous stasis  PULSE:    Appears equal and palpable.  PSYCHIATRIC:   Seems appropriate, No acute psychosis  MS:    No fractures, No gross weakness  NEUROLOGIC:   The clinical assessment is non-focal     DATA: IMAGING & TESTING:     LABORATORY TESTS:    CBC with Differential:    Lab Results   Component Value Date/Time    WBC 
4 Eyes Skin Assessment     NAME:  Ashley Vivas  YOB: 1950  MEDICAL RECORD NUMBER:  [id number]    The patient is being assessed for  Admission    I agree that at least one RN has performed a thorough Head to Toe Skin Assessment on the patient. ALL assessment sites listed below have been assessed.      Areas assessed by both nurses:    Head, Face, Ears, Shoulders, Back, Chest, Arms, Elbows, Hands, Sacrum. Buttock, Coccyx, Ischium, and Legs. Feet and Heels        Does the Patient have a Wound? No noted wound(s)       Clayton Prevention initiated by RN: N/A  Wound Care Orders initiated by RN: N/A    Pressure Injury (Stage 3,4, Unstageable, DTI, NWPT, and Complex wounds) if present, place Wound referral order by RN under : No    New Ostomies, if present place, Ostomy referral order under : No     Nurse 1 eSignature: Electronically signed by Mima Dangelo RN on 2/13/25 at 3:06 PM EST    **SHARE this note so that the co-signing nurse can place an eSignature**    Nurse 2 eSignature: Electronically signed by Wanda Loya RN on 2/13/25 at 3:45 PM EST    
Admitting Date and Time: 2/12/2025  8:22 PM  Admit Dx: Acute exacerbation of chronic obstructive pulmonary disease (COPD) (HCC) [J44.1]  Acute respiratory failure with hypoxia and hypercapnia [J96.01, J96.02]  Acute on chronic respiratory failure with hypoxia and hypercapnia [J96.21, J96.22]  Acute on chronic anemia [D64.9]    Subjective:    Pt feels anxious with less dyspnea   Per RN: On the call light frequently    ROS: denies fever, chills, cp, sob, n/v, HA unless stated above.     sodium chloride flush  5-40 mL IntraVENous 2 times per day    enoxaparin  30 mg SubCUTAneous Daily    cefTRIAXone (ROCEPHIN) IV  1,000 mg IntraVENous Q24H    guaiFENesin  600 mg Oral BID    predniSONE  40 mg Oral Daily    melatonin  3 mg Oral QPM    metoprolol succinate  50 mg Oral Daily    therapeutic multivitamin-minerals  1 tablet Oral Daily    pantoprazole  40 mg Oral QAM AC    ipratropium 0.5 mg-albuterol 2.5 mg  1 Dose Inhalation Q4H WA RT    budesonide  0.5 mg Nebulization BID RT    acetylcysteine  4 mL Inhalation Q4H     polyethylene glycol, 17 g, Daily PRN  sodium chloride flush, 5-40 mL, PRN  acetaminophen, 650 mg, Q6H PRN   Or  acetaminophen, 650 mg, Q6H PRN  ipratropium 0.5 mg-albuterol 2.5 mg, 1 Dose, Q4H PRN  benzonatate, 100 mg, TID PRN  LORazepam, 0.5 mg, Q6H PRN  Benzocaine-Menthol, 1 lozenge, Q2H PRN  sodium chloride, , PRN         Objective:    /72   Pulse (!) 107   Temp 97.2 °F (36.2 °C) (Axillary)   Resp 20   Ht 1.524 m (5')   Wt 50.8 kg (112 lb)   SpO2 90%   BMI 21.87 kg/m²   General Appearance: alert and oriented to person, place and time and in no acute distress  Skin: warm and dry  Head: normocephalic and atraumatic  Eyes: pupils equal, round, and reactive to light, extraocular eye movements intact, conjunctivae normal  Neck: neck supple and non tender without mass   Pulmonary/Chest: clear to auscultation bilaterally- no wheezes, rales or rhonchi, normal air movement, no respiratory 
Admitting Date and Time: 2/12/2025  8:22 PM  Admit Dx: Acute exacerbation of chronic obstructive pulmonary disease (COPD) (HCC) [J44.1]  Acute respiratory failure with hypoxia and hypercapnia [J96.01, J96.02]  Acute on chronic respiratory failure with hypoxia and hypercapnia [J96.21, J96.22]  Acute on chronic anemia [D64.9]    Subjective:    Pt improved anxious ness & dyspnea   Per RN: confused claiming staff taking away treatment  (Bipap makes) last night which is not accurate    ROS: denies fever, chills, cp, sob, n/v, HA unless stated above.     sodium chloride flush  5-40 mL IntraVENous 2 times per day    enoxaparin  30 mg SubCUTAneous Daily    cefTRIAXone (ROCEPHIN) IV  1,000 mg IntraVENous Q24H    guaiFENesin  600 mg Oral BID    predniSONE  40 mg Oral Daily    melatonin  3 mg Oral QPM    metoprolol succinate  50 mg Oral Daily    therapeutic multivitamin-minerals  1 tablet Oral Daily    pantoprazole  40 mg Oral QAM AC    ipratropium 0.5 mg-albuterol 2.5 mg  1 Dose Inhalation Q4H WA RT    budesonide  0.5 mg Nebulization BID RT    acetylcysteine  4 mL Inhalation Q4H     polyethylene glycol, 17 g, Daily PRN  sodium chloride flush, 5-40 mL, PRN  acetaminophen, 650 mg, Q6H PRN   Or  acetaminophen, 650 mg, Q6H PRN  ipratropium 0.5 mg-albuterol 2.5 mg, 1 Dose, Q4H PRN  benzonatate, 100 mg, TID PRN  LORazepam, 0.5 mg, Q6H PRN  Benzocaine-Menthol, 1 lozenge, Q2H PRN  sodium chloride, , PRN         Objective:    BP (!) 125/54   Pulse (!) 104   Temp 97.2 °F (36.2 °C) (Axillary)   Resp 18   Ht 1.524 m (5')   Wt 50.8 kg (112 lb)   SpO2 96%   BMI 21.87 kg/m²   General Appearance: alert and oriented to person, place and time and in no acute distress  Skin: warm and dry  Head: normocephalic and atraumatic  Eyes: pupils equal, round, and reactive to light, extraocular eye movements intact, conjunctivae normal  Neck: neck supple and non tender without mass   Pulmonary/Chest: clear to auscultation bilaterally- no wheezes, 
Admitting Date and Time: 2/12/2025  8:22 PM  Admit Dx: Acute exacerbation of chronic obstructive pulmonary disease (COPD) (HCC) [J44.1]  Acute respiratory failure with hypoxia and hypercapnia [J96.01, J96.02]  Acute on chronic respiratory failure with hypoxia and hypercapnia [J96.21, J96.22]  Acute on chronic anemia [D64.9]    Subjective:  Patient not enjoying heated high flow and was noncompliant I entered the room with staff members because she was desatting and we were alerted.  Oxygen replaced soon after which she dismissed her body from the room so she can tell me privately the same thing she had just told me in front of her body else that she prefers to BiPAP.  She also told me privately about some minor complaints that I suspect could be part of her delirium  Per RN: confused claiming staff taking away treatment  (Bipap makes) last night which is not accurate    ROS: denies fever, chills, cp, sob, n/v, HA unless stated above.     escitalopram  10 mg Oral Daily    sodium chloride flush  5-40 mL IntraVENous 2 times per day    enoxaparin  30 mg SubCUTAneous Daily    cefTRIAXone (ROCEPHIN) IV  1,000 mg IntraVENous Q24H    guaiFENesin  600 mg Oral BID    predniSONE  40 mg Oral Daily    melatonin  3 mg Oral QPM    metoprolol succinate  50 mg Oral Daily    therapeutic multivitamin-minerals  1 tablet Oral Daily    pantoprazole  40 mg Oral QAM AC    ipratropium 0.5 mg-albuterol 2.5 mg  1 Dose Inhalation Q4H WA RT    budesonide  0.5 mg Nebulization BID RT    acetylcysteine  4 mL Inhalation Q4H     hydrOXYzine HCl, 25 mg, TID PRN  polyethylene glycol, 17 g, Daily PRN  sodium chloride flush, 5-40 mL, PRN  acetaminophen, 650 mg, Q6H PRN   Or  acetaminophen, 650 mg, Q6H PRN  ipratropium 0.5 mg-albuterol 2.5 mg, 1 Dose, Q4H PRN  benzonatate, 100 mg, TID PRN  Benzocaine-Menthol, 1 lozenge, Q2H PRN  sodium chloride, , PRN         Objective:    /65   Pulse 72   Temp 97.5 °F (36.4 °C) (Axillary)   Resp 20   Ht 1.524 m 
Assessment and Plan  Patient is a 74 y.o. female with the following medical Problems:   COPD with acute exacerbation  Left lower lobe community-acquired pneumonia  Acute on chronic hypoxic and hypercapnic respiratory failure requiring escalating dose of supplemental oxygen with associated exertional dyspnea and cough.  Severe pulm HTN RVSP 92 mmHg on December 7, 2024  Former tobacco abuse 50 pack years quit smoking in 2017  Advanced COPD--chronic respiratory failure with hypoxia and hypercapnia-home oxygen 4 L nasal cannula  Per Pulm note 11/26/24: Gold 4 with an FEV1 500 cc and 26% of predicted with chronic respiratory failure with moderate chronic hypercapnia(PaCO2 63 mmHg)/chronic hypoxemia.   6.  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 Cory Delgadillo, at the [facility].  Currently under the care of Oncologist, Dr. Martita Tompkins, at [facility] and noting response to chemotherapy on most recent chest CT in October 2024 and initiating targeted therapy with Keytruda under the care of medical oncology.   IV port in the left subclavian vein   7.  L Breast CA 2020 Stage 1 s/p lumpectomy chemotherapy and radiation that has been completed as of March 31, 2021.   8.  Cytotoxic chemotherapy developed acute on chronic anemia due to chronic disease with her hemoglobin reduced to 8.2 g/dL   Aranes   9.  Skin cancer 2017  10. Palpitations  11/21/2021 48 hour holter monitor:  PAC / PVC  ? PSVT in pulmonology office visit 11/26/2024 started on Toprol XL 25 daily and stopped losartan by Dr. Wiley  11. Carpal tunnel  12. Anxiety/depression  13. BMI 21  14. Frailty -   15.  Hx of Urinary tract infection with E. Coli 12/2024     Plan of care:  Scheduled prednisone, DuoNeb, Pulmicort,  Patient completed 3 days of 
Assessment and Plan  Patient is a 74 y.o. female with the following medical Problems:   COPD with acute exacerbation  Left lower lobe community-acquired pneumonia  Acute on chronic hypoxic and hypercapnic respiratory failure requiring escalating dose of supplemental oxygen with associated exertional dyspnea and cough.  Severe pulm HTN RVSP 92 mmHg on December 7, 2024  Former tobacco abuse 50 pack years quit smoking in 2017  Advanced COPD--chronic respiratory failure with hypoxia and hypercapnia-home oxygen 4 L nasal cannula  Per Pulm note 11/26/24: Gold 4 with an FEV1 500 cc and 26% of predicted with chronic respiratory failure with moderate chronic hypercapnia(PaCO2 63 mmHg)/chronic hypoxemia.   6.  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 Cory Delgadillo, at the [facility].  Currently under the care of Oncologist, Dr. Martita Tompkins, at [facility] and noting response to chemotherapy on most recent chest CT in October 2024 and initiating targeted therapy with Keytruda under the care of medical oncology.   IV port in the left subclavian vein   7.  L Breast CA 2020 Stage 1 s/p lumpectomy chemotherapy and radiation that has been completed as of March 31, 2021.   8.  Cytotoxic chemotherapy developed acute on chronic anemia due to chronic disease with her hemoglobin reduced to 8.2 g/dL   Aranes   9.  Skin cancer 2017  10. Palpitations  11/21/2021 48 hour holter monitor:  PAC / PVC  ? PSVT in pulmonology office visit 11/26/2024 started on Toprol XL 25 daily and stopped losartan by Dr. Wiley  11. Carpal tunnel  12. Anxiety/depression  13. BMI 21  14. Frailty -   15.  Hx of Urinary tract infection with E. Coli 12/2024     Plan of care:  Scheduled Solu-Medrol, DuoNeb, Pulmicort,  Continue ceftriaxone and 
Attempted to call [facility] twice to give report, waiting on hold and the [place] rang for 3 minutes with no answer both times. Gave report to physician's ambulance personel.  
Bipap placed at 2200 and removed at 0000. Monitoring continues.  
Communion   
Notifed dr marie that daughter rony wants a return call  
OCCUPATIONAL THERAPY INITIAL EVALUATION    [facility]  [address] [place]        Date:2025                                                  Patient Name: Ashley Vivas    MRN: [id number]    : 1950    Room: [address] [place] [address]      Evaluating OT: Yasmin Holguin OTR/L [id number]     Referring Provider and Specific Provider Orders/Date:      25  OT eval and treat  Start:  25,   End:  25,   ONE TIME,   Standing Count:  1 Occurrences,   R         Pepe Calvert MD      Placement Recommendation:  Long Term Acute Care vs subacute d/t o2 demands        Diagnosis:   1. Acute on chronic respiratory failure with hypoxia and hypercapnia    2. Acute exacerbation of chronic obstructive pulmonary disease (COPD) (HCC)    3. Acute on chronic anemia         Surgery: none      Pertinent Medical History:       Past Medical History:   Diagnosis Date    Cancer (HCC)     LT BREAST    COPD (chronic obstructive pulmonary disease) (HCC)     History of Holter monitoring 2021    Hypertension     Lung cancer, lower lobe (HCC) 2017    Lung cancer, upper lobe (HCC) 2017         Past Surgical History:   Procedure Laterality Date    BREAST LUMPECTOMY Left     BRONCHOSCOPY  2017    With EBUS    CARPAL TUNNEL RELEASE Bilateral     COLONOSCOPY  2021    EYE SURGERY Right 2023    RIGHT EYE CATARACT EXTRACTION IOL IMPLANT performed by Lida Horvath MD at [facility]    EYE SURGERY Left 2023    LEFT EYE CATARACT EXTRACTION IOL IMPLANT performed by Lida Horvath MD at [facility]    KNEE ARTHROSCOPY Left     LUNG BIOPSY Right 2017    SKIN BIOPSY      CANCER BASAL TIP NOSE      Precautions:  Fall Risk, Continuous pulse oximetry, up with assistance.     Assessment of current deficits:     [x] Functional mobility  [x]ADLs  [x] Strength               []Cognition    [x] Functional transfers   [x] IADLs    
OCCUPATIONAL THERAPY TREATMENT   [facility]  [address] [place] [place]        Date:2025                                                  Patient Name: Ashley Vivas    MRN: [id number]    : 1950    Room: [address] [place] [address]      Evaluating OT: Yasmin Holguin OTR/L [id number]     Referring Provider and Specific Provider Orders/Date:      25  OT eval and treat  Start:  25,   End:  25,   ONE TIME,   Standing Count:  1 Occurrences,   R         Pepe Calvert MD      Placement Recommendation:  Long Term Acute Care vs subacute d/t o2 demands        Diagnosis:   1. Acute on chronic respiratory failure with hypoxia and hypercapnia (HCC)    2. Acute exacerbation of chronic obstructive pulmonary disease (COPD) (HCC)    3. Acute on chronic anemia    4. Acute respiratory failure with hypoxia and hypercapnia (HCC)         Surgery: none      Pertinent Medical History:       Past Medical History:   Diagnosis Date    Cancer (HCC)     LT BREAST    COPD (chronic obstructive pulmonary disease) (HCC)     History of Holter monitoring 2021    Hypertension     Lung cancer, lower lobe (HCC) 2017    Lung cancer, upper lobe (HCC) 2017         Past Surgical History:   Procedure Laterality Date    BREAST LUMPECTOMY Left     BRONCHOSCOPY  2017    With EBUS    CARPAL TUNNEL RELEASE Bilateral     COLONOSCOPY  2021    EYE SURGERY Right 2023    RIGHT EYE CATARACT EXTRACTION IOL IMPLANT performed by Lida Horvath MD at [facility]    EYE SURGERY Left 2023    LEFT EYE CATARACT EXTRACTION IOL IMPLANT performed by Lida [facility]vath MD at [facility]    KNEE ARTHROSCOPY Left     LUNG BIOPSY Right 2017    SKIN BIOPSY      CANCER BASAL TIP NOSE      Precautions:  Fall Risk, Continuous pulse oximetry, up with assistance.     Assessment of current deficits:     [x] Functional mobility  [x]ADLs  [x] Strength       
Occupational Therapy  OT BEDSIDE TREATMENT NOTE   [facility]  [address], [place]       Date:2025  Patient Name: Ashley Vivas  MRN: [id number]  : 1950  Room: 360536-     Per OT Eval:  Evaluating OT: Yasmin Holguin OTR/L [id number]      Referring Provider and Specific Provider Orders/Date:      25   OT eval and treat  Start:  25,   End:  25,   ONE TIME,   Standing Count:  1 Occurrences,   R         Pepe Calvert MD       Placement Recommendation:  Long Term Acute Care vs subacute d/t o2 demands         Diagnosis:   1. Acute on chronic respiratory failure with hypoxia and hypercapnia    2. Acute exacerbation of chronic obstructive pulmonary disease (COPD) (HCC)    3. Acute on chronic anemia         Surgery: none       Pertinent Medical History:       Past Medical History        Past Medical History:   Diagnosis Date    Cancer (HCC)      LT BREAST    COPD (chronic obstructive pulmonary disease) (HCC)      History of Holter monitoring 2021    Hypertension      Lung cancer, lower lobe (HCC) 2017    Lung cancer, upper lobe (HCC) 2017            Past Surgical History         Past Surgical History:   Procedure Laterality Date    BREAST LUMPECTOMY Left     BRONCHOSCOPY   2017     With EBUS    CARPAL TUNNEL RELEASE Bilateral      COLONOSCOPY   2021    EYE SURGERY Right 2023     RIGHT EYE CATARACT EXTRACTION IOL IMPLANT performed by Lida Horvath MD at [facility]    EYE SURGERY Left 2023     LEFT EYE CATARACT EXTRACTION IOL IMPLANT performed by Lida Horvath MD at [facility]    KNEE ARTHROSCOPY Left      LUNG BIOPSY Right 2017    SKIN BIOPSY         CANCER BASAL TIP NOSE          Precautions:  Fall Risk, Continuous pulse oximetry, up with assistance.      Assessment of current deficits:     [x] Functional mobility            [x]ADLs           [x] 
Offered patient supplies to get a bath. Patient would like to wait until her family comes in.  
Patient awoke and became extremely distraught. Patient began screaming while wearing her bipap \"I wanna see my mama\". RN attempted to orient patient. Patient resistant to communicating with RN. RN sang to patient, who then went back to sleep. Patient awoke several more times, with varying disturbing phrases shouting through the bipap mask Patient refused oral medications, patient falls asleep after yelling for a minute or two. Statements seem irrational. Patient shouts \"Just let me die, why are you doing this to me?\" \"I need help immediately\" \"I'm having diarrhea\" where no diarrhea exists.  
Patient continuously is removing her BiPAP and oxygen, stating, \"I want to just die, I don't want all of this\". Patient is refusing bipap but will wear non re breather off and on. Patient continues to desaturate into the low 80's when removing her oxygen. Education given, but patient states she does not care.   
Patient placed on bipap around 0100 and came off around 0430. Monitoring continues.  
Physical Therapy Initial Evaluation/Plan of Care    Room #:  0536/0536-01  Patient Name: Ashley Vivas  YOB: 1950  MRN: [id number]    Date of Service: 2/16/2025     Tentative placement recommendation: Long Term Acute Care vs subacute d/t o2 demands   Equipment recommendation: To be determined      Evaluating Physical Therapist: Anival Pagan, PT  #[id number]      Specific Provider Orders/Date/Referring Provider :      PT eval and treat  Start:  02/14/25 1445,   End:  02/14/25 1445,   ONE TIME,   Standing Count:  1 Occurrences,   R       Pepe Calvert MD    Admitting Diagnosis:   Acute exacerbation of chronic obstructive pulmonary disease (COPD) (HCC) [J44.1]  Acute respiratory failure with hypoxia and hypercapnia [J96.01, J96.02]  Acute on chronic respiratory failure with hypoxia and hypercapnia [J96.21, J96.22]  Acute on chronic anemia [D64.9]    Admitted with    shortness of breath , current rehab patient, pulmonary hypertension  Surgery: none  Visit Diagnoses         Codes    Acute exacerbation of chronic obstructive pulmonary disease (COPD) (HCC)     J44.1            Patient Active Problem List   Diagnosis    Bulging of cervical intervertebral disc without myelopathy    Cervical compression fracture (HCC)    Ulnar neuropathy at elbow    Cervical radiculopathy    COPD, very severe (HCC)    Hypoxemia requiring supplemental oxygen    Malignant neoplasm of upper lobe of right lung (HCC)    Malignant neoplasm of lower lobe of right lung (HCC)    Status post radiation therapy    Fibrinous pleuritis    Pulmonary nodules/lesions, multiple    Paroxysmal SVT (supraventricular tachycardia) (HCC)    Chronic respiratory failure with hypoxia and hypercapnia    Malignant neoplasm of lung (HCC)    Frailty    Acute on chronic anemia    Debilitated    Complication of chemotherapy    COPD with acute exacerbation (HCC)    Acute on chronic respiratory failure with hypoxia and hypercapnia    Goals of care, 
Pt placed back on bipap after sat dropped to the 80s on 15L   02/19/25 0624   NIV Type   NIV Started/Stopped On   Equipment Type v60   Mode Bilevel   Mask Type Full face mask   Mask Size Extra small   Settings/Measurements   PIP Observed 15 cm H20   IPAP 15 cmH20   CPAP/EPAP 5 cmH2O   Vt (Measured) 349 mL   Rate Ordered 15   FiO2  (S)  80 %   I Time/ I Time % 0.8 s   Minute Volume (L/min) 6.2 Liters   Mask Leak (lpm) 32 lpm   Patient's Home Machine No       
Pt refused midnight vitals.   
RN completed hospice consult with hosp[facility]   
RN spoke with daughter who agreed to a hospice consult for informational purposes and prognosis discussion. Daughter believes that if patient \"just kept her BiPAP on she will get better\". Family has no preference on hospice company, [facility] to be consulted.   
Spiritual Health History and Assessment/Progress Note  [facility]    Spiritual/Emotional Needs,  , New Diagnosis,      Name: Ashley Vivas MRN: [id number]    Age: 74 y.o.     Sex: female   Language: English   Druze: Anabaptism   Acute respiratory failure with hypoxia and hypercapnia     Date: 2/13/2025                           Spiritual Assessment began in [facility] EMERGENCY DEPARTMENT        Referral/Consult From: Rounding   Encounter Overview/Reason: Spiritual/Emotional Needs  Service Provided For: Patient    Pauly, Belief, Meaning:   Patient identifies as spiritual, is connected with a pauly tradition or spiritual practice, and has beliefs or practices that help with coping during difficult times  Family/Friends No family/friends present      Importance and Influence:  Patient has spiritual/personal beliefs that influence decisions regarding their health  Family/Friends No family/friends present    Community:  Patient feels well-supported. Support system includes: Spouse/Partner and Children  Family/Friends No family/friends present    Assessment and Plan of Care:     Patient Interventions include: Facilitated expression of thoughts and feelings, Explored spiritual coping/struggle/distress, and Affirmed coping skills/support systems  Family/Friends Interventions include: No family/friends present  Ashley is of the Anabaptism pauly and had sacraments on 2/3 by Fr. Uriarte.     Patient Plan of Care: Spiritual Care available upon further referral  Family/Friends Plan of Care: No family/friends present    Electronically signed by JORDAN Awan on 2/13/2025 at 8:31 AM   
Spiritual Health History and Assessment/Progress Note  [facility]    Initial Encounter, Spiritual/Emotional Needs, Rituals, Rites and Sacraments, Grief, Loss, and Adjustments,  Encounter (Fr. Ballard),  , New Diagnosis,      Name: Ashley Vivas MRN: [id number]    Age: 74 y.o.     Sex: female   Language: English   Pentecostal: Druze   Acute respiratory failure with hypoxia and hypercapnia     Date: 2/14/2025                           Spiritual Assessment began in [facility] PIC/ICU        Referral/Consult From: Rounding   Encounter Overview/Reason: Initial Encounter, Spiritual/Emotional Needs, Rituals, Rites and Sacraments, Grief, Loss, and Adjustments,  Encounter (Fr. Ballard)  Service Provided For: Patient    Pauly, Belief, Meaning:   Patient identifies as spiritual and is connected with a pauly tradition or spiritual practice  Family/Friends No family/friends present      Importance and Influence:  Patient unable to assess at this time  Family/Friends No family/friends present    Community:  Patient Other: unknown  Family/Friends No family/friends present    Assessment and Plan of Care:     Patient Interventions include: Facilitated expression of thoughts and feelings and Provided sacramental/Advent ritual  Family/Friends Interventions include: No family/friends present    Patient Plan of Care: No spiritual needs identified for follow-up and Spiritual Care available upon further referral  Family/Friends Plan of Care: No family/friends present    Electronically signed by Chaplain Kenyon on 2/14/2025 at 1:45 PM   
This patient was admitted by my colleague on this calender day, a few hours before my shift began.    History of present illness from History and Physical:  74 y.o. female with a history of COPD, stage 4 lung cancer, anemia, HTN presents from rehab with abnormal outpatient hemoglobin, SOB.  Workup in ED significant for CO2 39, BUN 27, glucose 151, pro-BNP 1594, troponin 29 and 25, hgb 6.1.  Given 1 unit PRBC in ED.  CTA chest multiple unchanged abnormalities.  ABG pH 7.338, pCO2 78.1, pO2 83.6 on 100% NRB.  Patient initially refused BIPAP, then did ultimately agree to wear it for a brief time, then refused it again.  Repeat ABG after wearing BIPAP pH 7.285, pCO2 79.2, pO2 99.7.  Spoke with patient's daughter, who states patient has been refusing BIPAP, has been in and out of the hospital/rehab for extended period of time, delirium developed during last hospitalization.  Daughter confirmed patient's code status as DNR-CCA/DNI.  Daughter states patient has required ativan to tolerate bipap in the past.  Discussed hospice as option with daughter, who is open to discussing with patient.  Patient repeatedly telling staff that she doesn't think daughter understands that patient won't live forever, patient stating she feels she will die soon.  Given xopenex, solu-medrol, 1L bolus in ED.        Acute hypoxic and hypercapneic respiratory failure  Squamous cell lung cancer on Keytruda  COPD exacerbation on underlying end stage COPD  Moderate pulmonary HTN  Delirium vs acute metabolic encephalopathy  - BIPAP  - trend pCO2  - ativan x 1  - pulmonary c/s  - hospice c/s  - rocephin/zithro  - duonebs  - brovana/pulmicort  - steroid taper  - rule out flu/covid     Chemo associated anemia  - 1 unit PRBC given in ED  - trend hgb/hct     Anxiety/depression  - continue prn ativan. It won't help depression. I ask about mood stabilzer     Code Status: DNR-CCA/DNI  DVT prophylaxis: Lovenox  
Date:  2/18/2025     Short Term/ Long Term   Goals   Was pt agreeable to Eval/treatment? Yes Yes  To be met in 5 days   Pain level   0/10    Not stated    Bed Mobility  Using rails and head of bed elevated:     Rolling: Supervision     Supine to sit: Minimal assist of 1    Sit to supine: Minimal assist of 1    Scooting: Minimal assist of 1   Using rails and head of bed elevated:     Rolling: Not assessed    Supine to sit: Not assessed    Sit to supine: Minimal assist of 1   Scooting: Supervision     Rolling: Independent    Supine to sit: Independent    Sit to supine: Independent    Scooting: Independent     Transfers Sit to stand: Not assessed patient seated edge of bed   Sit to stand: Minimal assist of 1    Sit to stand: Minimal assist of 1     Ambulation    not assessed  3-5 feet using  hand held assist with Minimal assist of 1   for balance and safety  Cues for breathing.    10 feet using  least restrictive device with Minimal assist of 1 vs independent wheelchair user   ROM Within functional limits        Strength BUE:   3/5  RLE:  3/5  LLE:  3/5  Increase strength in affected mm groups by 1/3 grade   Balance Sitting EOB:  fair    Dynamic Standing:  not assessed   Sitting EOB: good   Dynamic Standing: fair HHA    Sitting EOB:  good    Dynamic Standing: fair       Patient is Alert & Oriented x person, place, time, and situation and follows directions    Sensation:  Patient  denies numbness/tingling   Edema:  no   Endurance: poor      Vitals:  13 liters nasal cannula   Blood Pressure at rest  Blood Pressure during session    Heart Rate at rest   Heart Rate during session     SPO2 at rest 98%  SPO2 during session 81-95% increased time for recovery.      Patient education  Patient educated on role of Physical Therapy, risks of immobility, safety and plan of care, importance of positional changes for oxygen exchange, and  importance of mobility while in hospital  incentive spirometer.     Patient response to 
opacity in the right upper lobe with associated scarring, similar compared to the prior examination allowing for differences in technique and slice selection.  Lingular and left lower lobe peripheral consolidative opacities are also not significantly changed. No new pulmonary opacity is identified.  No pneumothorax or pleural effusion. Upper Abdomen:  Limited images of the upper abdomen demonstrate no acute abnormality. Soft Tissues/Bones: Chronic compression deformity of T12.  No focal soft tissue abnormality.     1. No evidence of pulmonary embolism or other acute cardiopulmonary process. 2. Redemonstration of right upper lobe pulmonary nodule, lingular and left lower lobe peripheral consolidative opacities, and right upper lobe nodular opacity with associated scarring, not significantly changed from the prior examination. 3. Background emphysematous changes. 4. Chronic compression deformity of T12.         History of Present Illness:   Patient is a 74-year-old woman with above-mentioned medical problem who was admitted February 13, 2025 with progressive shortness of breath and cough .   Patient initially refused BiPAP however she ultimately agreed to use it.  Her initial blood gases showed acute on chronic hypoxic and hypercapnic respiratory failure.  Patient's CODE STATUS is DNR CCA and DNI.  She was borderline hypotensive and received 1 L of fluid in the emergency room.      Assessment:     Acute on chronic hypoxic hypercapnic respiratory failure  Severe COPD  LLL pneumonia- treated  Recent RSV virus  Pulmonary cachexia  Severe pulmonary HTN-RVSP 92 mmHg  Former tobacco use  Squamous cell lung cancer-Keytruda  Hx L breast cancer  CTA chest 2/12/2025- No PE, RUL lung nodule, lingular and left  lower lobe peripheral consolidative opacities, and right upper  lobe nodular opacity with associated scarring, not significantly changed from the prior  examination.  9. Emphysema  10. Medical non compliance      Plan: 
    Treatment:  Patient practiced and was instructed/facilitated in the following treatment: Patient Sat edge of bed 3-4 minutes with Supervision  to increase dynamic sitting balance and activity tolerance. Requested to get back into bed. Supine exercise. Repositioned for comfort.       Therapeutic Exercises:  ankle pumps, quad sets, glut sets, heel slide, hip abduction/adduction, straight leg raise, and SAQ  x 12 reps.      At end of session, patient in bed with     call light and phone within reach,  all lines and tubes intact, nursing notified.      Patient would benefit from continued skilled Physical Therapy to improve functional independence and quality of life.         Patient's/ family goals   none stated    Time in  1037a  Time out  1100a    Total Treatment Time 23 minutes    Therapeutic activities (62457)   8 minutes  1 unit(s)  Therapeutic exercises (06215)   15 minutes  1 unit(s)    [facility] lic#[id number]    
swelling, arthritis or myalgia  Hematologic: denies bleeding, adenopathy and easy bruising  Skin: denies rashes and skin discoloration  Psychiatry: denies depression    Physical Exam:   Vital Signs:  BP (!) 125/54   Pulse (!) 104   Temp 97.2 °F (36.2 °C) (Axillary)   Resp 18   Ht 1.524 m (5')   Wt 50.8 kg (112 lb)   SpO2 96%   BMI 21.87 kg/m²     Input/Output:  No intake/output data recorded.    Oxygen requirements: HFO      General appearance:  not in pain or distress, in no respiratory distress    HEENT: Atraumatic/normocephalic, EOMI, REGINE, pharynx clear, moist mucosa, redness of the uvula appreciated,   Neck: Supple, no jugular venous distension, lymphadenopathy, thyromegaly or carotid bruits  Chest: Decreased breath sounds, +ve wheezing, no crackles and no tenderness over ribs   Cardiovascular: Normal S1 , S2, regular rate and rhythm, +ve murmur, rub or gallop  Abdomen: Normal sounds present, soft, lax with no tenderness, no hepatosplenomegaly, and no masses  Extremities: No edema. Pulses are equally present.   Skin: intact, no rashes   Neurologic: Alert and oriented x 2-3, No focal deficit     Investigations:  Labs, radiological imaging and cardiac work up were personally reviewed and independently interpreted.        STAFF PHYSICIAN NOTE OF PERSONAL INVOLVEMENT IN CARE  As the attending physician, I certify that I personally reviewed the patient's history and personally examined the patient to confirm the physical findings described above, and that I reviewed the relevant imaging studies and available reports.  I also discussed the differential diagnosis and all of the proposed management plans with the patient and individuals accompanying the patient to this visit.  They had the opportunity to ask questions about the proposed management plans and to have those questions answered.      Electronically signed by Syd Patel MD on 2/15/2025 at 2:35 PM

## 2025-02-27 VITALS
BODY MASS INDEX: 20.55 KG/M2 | OXYGEN SATURATION: 96 % | HEART RATE: 96 BPM | DIASTOLIC BLOOD PRESSURE: 47 MMHG | WEIGHT: 112.4 LBS | TEMPERATURE: 97.9 F | SYSTOLIC BLOOD PRESSURE: 123 MMHG | RESPIRATION RATE: 18 BRPM

## 2025-02-27 NOTE — DISCHARGE SUMMARY
[facility] Hospitalist       Hospitalist Physician Discharge Summary       [facility]  [address]  [place] [address] [phone]        Juan C Wiley DO  [address] [address]  [phone]    Follow up in 2 week(s)      Samina Ji MD  [address]  [place] [address]  [phone]    Follow up in 3 day(s)        Activity level:   Up as tolerated with assistance    Diet: low salt diet    Labs:  None    Condition at discharge:   Guarded/stable    Dispo:  To prior ECF    Continue supplemental oxygen via nasal canula @ 8 to 11 LPM round-the-clock.    Continue CPAP / BiPAP during sleep as prior to admission.    Patient ID:  Ashley Vivas  [id number]  74 y.o.  1950    Admit date: 2/12/2025    Discharge date:  2/24/2025    Admission Diagnoses: Principal Problem:    Acute respiratory failure with hypoxia and hypercapnia (HCC)  Active Problems:    Hypoxemia requiring supplemental oxygen    Malignant neoplasm of upper lobe of right lung (HCC)    Malignant neoplasm of lower lobe of right lung (HCC)    Pulmonary nodules/lesions, multiple    Acute on chronic anemia    Complication of chemotherapy    COPD with acute exacerbation (HCC)    Acute on chronic respiratory failure with hypoxia and hypercapnia (HCC)    Goals of care, counseling/discussion    Delirium, acute    Palliative care encounter  Resolved Problems:    * No resolved hospital problems. *      Discharge Diagnoses: Principal Problem:    Acute respiratory failure with hypoxia and hypercapnia (HCC)  Active Problems:    Hypoxemia requiring supplemental oxygen    Malignant neoplasm of upper lobe of right lung (HCC)    Malignant neoplasm of lower lobe of right lung (HCC)    Pulmonary nodules/lesions, multiple    Acute on chronic anemia    Complication of chemotherapy    COPD with acute exacerbation (HCC)    Acute on chronic respiratory failure with hypoxia and

## 2025-02-28 ENCOUNTER — OUTSIDE SERVICES (OUTPATIENT)
Dept: PULMONOLOGY | Age: 75
End: 2025-02-28

## 2025-02-28 NOTE — PROGRESS NOTES
Mariano Jovel M.D.,[facility]  Wayne Chino D.O., NATALY., [facility]  Starr Stearns M.D.  Giselle Snow M.D.   Jose Luis Caceres D.O.  Cory Maddox M.D.         Daily Pulmonary Progress Note    Patient:  Ashley Vivas 74 y.o. female MRN: [id number]            Synopsis     We are following patient for respiratory failure, COPD    Code status:      Subjective   HPI:  Ashley Vivas is a 74 y.o. female he was recently hospitalized at [facility] from 1/28/2025 -2/4/2025 with COPD exacerbation and RSV.  She was being followed by [facility] pulmonology however her normal pulmonologist is Dr. Wiley.  During her hospital stay she was treated with nebulized bronchodilators and steroids as well as an NIV in the form of AVAPS with settings of RR 14, Vt 400, EPAP 5/10, PS 10/20.  She does not have a device for home.       Ashley has a history of severe advanced COPD. She has a FEV1 of 500 ccs and 26% of predicted.  The vital capacity is 1.4 liters and 54% of predicted. There is static hyperinflation and severe gas transfer impairment.  Her home regimen consists of Trelegy Ellipta and Ohtuvayre, 3 liters NC AAT.      She also has a history of underlying early-stage lung cancers, that were addressed at the [facility].  The first one in the right upper lobe as a Stage 1A addressed with SBRT in 2017 without recurrence.  The second early-stage lung cancer Stage 1A in the right lower lobe 2018. She is considered Stage 3A (T4, M0, N0) with the new stage system without recurrence.    She is now with a left lower lobe 8 mm pulmonary nodule and a right upper lobe 9 mm pulmonary nodule that had a subtle increase in size over the last 3 months on chest CT being monitored very closely by radiation oncologist at the [facility] Dr. Patrice Patterson.  PET scan does demonstrate significant hypermetabolic activity with these nodules.  Patient is very high risk for transthoracic needle aspiration biopsy due to  History/Exam/Medical Decision Making History/Exam/Medical Decision Making

## 2025-03-06 ENCOUNTER — OUTSIDE SERVICES (OUTPATIENT)
Dept: PULMONOLOGY | Age: 75
End: 2025-03-06

## 2025-03-06 NOTE — PROGRESS NOTES
Mariano Jovel M.D.,[facility]  Wayne Chino D.O., NATALY., [facility]  Starr Stearns M.D.  Giselle Snow M.D.   Jose Luis Caceres D.O.  Cory Maddox M.D.         Daily Pulmonary Progress Note    Patient:  Ashley Vivas 74 y.o. female MRN: [id number]            Synopsis     We are following patient for respiratory failure, COPD    Code status:      Subjective   HPI:  Ashley Vivas is a 74 y.o. female he was recently hospitalized at [facility] from 1/28/2025 -2/4/2025 with COPD exacerbation and RSV.  She was being followed by [facility] pulmonology however her normal pulmonologist is Dr. Wiley.  During her hospital stay she was treated with nebulized bronchodilators and steroids as well as an NIV in the form of AVAPS with settings of RR 14, Vt 400, EPAP 5/10, PS 10/20.  She does not have a device for home.       Ashley has a history of severe advanced COPD. She has a FEV1 of 500 ccs and 26% of predicted.  The vital capacity is 1.4 liters and 54% of predicted. There is static hyperinflation and severe gas transfer impairment.  Her home regimen consists of Trelegy Ellipta and Ohtuvayre, 3 liters NC AAT.      She also has a history of underlying early-stage lung cancers, that were addressed at the [facility].  The first one in the right upper lobe as a Stage 1A addressed with SBRT in 2017 without recurrence.  The second early-stage lung cancer Stage 1A in the right lower lobe 2018. She is considered Stage 3A (T4, M0, N0) with the new stage system without recurrence.    She is now with a left lower lobe 8 mm pulmonary nodule and a right upper lobe 9 mm pulmonary nodule that had a subtle increase in size over the last 3 months on chest CT being monitored very closely by radiation oncologist at the [facility] Dr. Patrice Patterson.  PET scan does demonstrate significant hypermetabolic activity with these nodules.  Patient is very high risk for transthoracic needle aspiration biopsy due to

## 2025-04-13 VITALS
BODY MASS INDEX: 21.48 KG/M2 | RESPIRATION RATE: 18 BRPM | DIASTOLIC BLOOD PRESSURE: 82 MMHG | SYSTOLIC BLOOD PRESSURE: 121 MMHG | TEMPERATURE: 97 F | WEIGHT: 110 LBS | OXYGEN SATURATION: 97 % | HEART RATE: 82 BPM

## 2025-04-14 NOTE — PROGRESS NOTES
Visit Date: 3/4/2025 - [facility]  Ashley Vivas  1950  female 74 y.o.      Subjective:    CC: Patient presents stating she feels okay. She is a DNR-CC.     HPI: The patient is a 74-year-old lady who was admitted to the hospital with RSV pneumonia. She was treated with IV steroids and breathing treatments. She also had acute on chronic respiratory failure and stage IV lung cancer. She was discharged on Levaquin x 1 week and prednisone x 5 days.  The patient is seen and examined. Medications on the chart are reviewed. Labs are reviewed. Discussed with nursing staff.       ROS:  Review of Systems   Constitutional:  Negative for chills, fatigue, fever and unexpected weight change.   HENT:  Negative for congestion, postnasal drip, rhinorrhea, sore throat, trouble swallowing and voice change.    Eyes:  Negative for photophobia, pain and visual disturbance.   Respiratory:  Negative for cough, chest tightness, shortness of breath and wheezing.    Cardiovascular:  Negative for chest pain and palpitations.   Gastrointestinal:  Negative for abdominal pain, blood in stool, constipation, diarrhea, nausea and vomiting.   Endocrine: Negative for heat intolerance, polydipsia and polyuria.   Genitourinary:  Negative for difficulty urinating, dysuria, frequency, hematuria and urgency.   Musculoskeletal:  Negative for arthralgias, back pain, neck pain and neck stiffness.   Skin:  Negative for pallor.   Neurological: Negative.  Negative for dizziness and light-headedness.   Hematological:  Does not bruise/bleed easily.        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

## (undated) DEVICE — PACK PROCEDURE SURG SURG CATARACT CUSTOM

## (undated) DEVICE — 1 ML TUBERCULIN SYRINGE,DETACHABLE NEEDLE: Brand: MONOJECT

## (undated) DEVICE — SOLUTION IRRIGATION BAL SALT SOLUTION 15 ML STRL BSS

## (undated) DEVICE — PACK PROC FLD MGMT SYS CENTURION CUST

## (undated) DEVICE — HANDPIECE IRRIG 45DEG ASPIR SIL CAP GRD

## (undated) DEVICE — PAD PREP L 2 PLY 70% ISO ALC NONWOVEN SFT ABSRB TOP ANTISEP

## (undated) DEVICE — GLOVE SURG SZ 65 CRM LTX FREE POLYISOPRENE POLYMER BEAD ANTI

## (undated) DEVICE — COVER MICROSCOPE KNOB LG

## (undated) DEVICE — CANNULA OPHTH 25GA 7 8IN ORNG 45DEG ANG 4MM FR END DOME SHP

## (undated) DEVICE — NEEDLE HYPO 30GA L0.5IN BGE POLYPR HUB S STL REG BVL STR

## (undated) DEVICE — GLASSES SAFETY PROTCT GRN

## (undated) DEVICE — SCALPEL 15 DEG NO 715

## (undated) DEVICE — 40436 HEAD REST OCULAR: Brand: 40436 HEAD REST OCULAR

## (undated) DEVICE — CLEARCUT® SLIT KNIFE INTREPID MICRO-COAXIAL SYSTEM 2.4 SB: Brand: CLEARCUT®; INTREPID

## (undated) DEVICE — THE MONARCH® "D" CARTRIDGE IS A SINGLE-USE POLYPROPYLENE CARTRIDGE FOR POSTERIOR CHAMBER IOL DELIVERY: Brand: MONARCH® III

## (undated) DEVICE — SHIELD EYE W3XL2.5IN UNIV CLR PLAS LTWT

## (undated) DEVICE — NEEDLE HYPO 25GA L0.625IN BLU POLYPR HUB S STL REG BVL STR

## (undated) DEVICE — SYRINGE MARK SCALE W/ LL TIP 3ML 200 S/C

## (undated) DEVICE — SOLUTION IRRIG 1000ML STRL H2O USP PLAS POUR BTL

## (undated) DEVICE — NEEDLE FLTR 18GA L1.5IN MEM THK5UM BLNT DISP